# Patient Record
Sex: FEMALE | Race: BLACK OR AFRICAN AMERICAN | Employment: OTHER | URBAN - METROPOLITAN AREA
[De-identification: names, ages, dates, MRNs, and addresses within clinical notes are randomized per-mention and may not be internally consistent; named-entity substitution may affect disease eponyms.]

---

## 2015-12-09 LAB — PAP SMEAR, EXTERNAL: 0

## 2017-01-10 ENCOUNTER — OP HISTORICAL/CONVERTED ENCOUNTER (OUTPATIENT)
Dept: OTHER | Age: 52
End: 2017-01-10

## 2017-01-20 ENCOUNTER — OFFICE VISIT (OUTPATIENT)
Dept: ENDOCRINOLOGY | Age: 52
End: 2017-01-20

## 2017-01-20 VITALS
SYSTOLIC BLOOD PRESSURE: 95 MMHG | HEART RATE: 87 BPM | BODY MASS INDEX: 46.1 KG/M2 | WEIGHT: 270 LBS | HEIGHT: 64 IN | OXYGEN SATURATION: 98 % | DIASTOLIC BLOOD PRESSURE: 69 MMHG | TEMPERATURE: 96.6 F | RESPIRATION RATE: 20 BRPM

## 2017-01-20 DIAGNOSIS — Z79.4 UNCONTROLLED TYPE 2 DIABETES MELLITUS WITH HYPERGLYCEMIA, WITH LONG-TERM CURRENT USE OF INSULIN (HCC): Primary | ICD-10-CM

## 2017-01-20 DIAGNOSIS — I10 ESSENTIAL HYPERTENSION: ICD-10-CM

## 2017-01-20 DIAGNOSIS — E78.2 MIXED HYPERLIPIDEMIA: ICD-10-CM

## 2017-01-20 DIAGNOSIS — E11.42 DIABETIC POLYNEUROPATHY ASSOCIATED WITH TYPE 2 DIABETES MELLITUS (HCC): ICD-10-CM

## 2017-01-20 DIAGNOSIS — G62.0 NEUROPATHY DUE TO CHEMOTHERAPEUTIC DRUG (HCC): ICD-10-CM

## 2017-01-20 DIAGNOSIS — T45.1X5A NEUROPATHY DUE TO CHEMOTHERAPEUTIC DRUG (HCC): ICD-10-CM

## 2017-01-20 DIAGNOSIS — E11.65 UNCONTROLLED TYPE 2 DIABETES MELLITUS WITH HYPERGLYCEMIA, WITH LONG-TERM CURRENT USE OF INSULIN (HCC): Primary | ICD-10-CM

## 2017-01-20 DIAGNOSIS — G60.3 IDIOPATHIC PROGRESSIVE POLYNEUROPATHY: ICD-10-CM

## 2017-01-20 RX ORDER — SIMVASTATIN 20 MG/1
20 TABLET, FILM COATED ORAL
Qty: 30 TAB | Refills: 6 | Status: SHIPPED | OUTPATIENT
Start: 2017-01-20 | End: 2018-11-13 | Stop reason: ALTCHOICE

## 2017-01-20 NOTE — PROGRESS NOTES
Wt Readings from Last 3 Encounters:   01/20/17 270 lb (122.5 kg)   10/21/16 270 lb (122.5 kg)   07/21/16 267 lb (121.1 kg)     Temp Readings from Last 3 Encounters:   01/20/17 96.6 °F (35.9 °C) (Oral)   10/21/16 97.5 °F (36.4 °C) (Oral)   07/21/16 98 °F (36.7 °C) (Oral)     BP Readings from Last 3 Encounters:   01/20/17 95/69   10/21/16 90/64   07/21/16 103/74     Pulse Readings from Last 3 Encounters:   01/20/17 87   10/21/16 78   07/21/16 77     Lab Results   Component Value Date/Time    Hemoglobin A1c 6.8 01/11/2017 09:23 AM    Hemoglobin A1c, External 9.0 03/18/2016     No Podiatry  No Recent Eye exam

## 2017-01-20 NOTE — PROGRESS NOTES
HISTORY OF PRESENT ILLNESS  Luis Garza is a 46 y.o. female. HPI  Patient here for second f/u  After last visit of Type 2 diabetes mellitus  From july 2016  Gained 3 lb     She suffers from on and off cellulitis over the right arm and hand - from lymphedema after breast cancer surgery   She feels good over all     She describes neuropathy symptoms got worse   Only on cymbalta   She did nto like neurontin and she did not try metanx        Prior history :    Referred : by self/pcp    H/o diabetes for 30 years     Current A1C is 9 % and symptoms/problems include polyuria, polydipsia and visual disturbances     Current diabetic medications include intensive insulin injection program. And metformin   novolog by 10 units tid plus sliding scale and levemir  36 units hs       Current monitoring regimen: home blood tests - 2 times daily  Home blood sugar records: trend: fluctuating a lot  Any episodes of hypoglycemia? no    Weight trend: fluctuating a lot  Prior visit with dietician: no  Current diet: \"unhealthy\" diet in general  Current exercise: no regular exercise    Known diabetic complications: peripheral neuropathy  Cardiovascular risk factors: dyslipidemia, diabetes mellitus, obesity, hypertension, stress, post-menopausal    Eye exam current (within one year): yes  TRISTEN: no     Past Medical History   Diagnosis Date    Cancer [facility]) 2009     RIGHT breast    Diabetes ([address]     Hypercholesterolemia     Hypertension      Past Surgical History   Procedure Laterality Date    Hx breast lumpectomy  2009     RIGHT with SNBX    Hx appendectomy      Hx gyn      Hx urological      Hx heent       Current Outpatient Prescriptions   Medication Sig    ACCU-CHEK FASTCLIX misc USE TO TEST FOUR TIMES DAILY    SYNJARDY 12.5-1,000 mg per tablet TAKE 1 TABLET BY MOUTH TWICE DAILY WITH MEALS.  STOP METFORMIN    topiramate (TOPAMAX) 25 mg tablet TK 2 TS PO BID    TOUJEO SOLOSTAR 300 unit/mL (1.5 mL) inpn INJECT 50 UNITS SUBCUTANEOUSLY NIGHTLY. STOP LEVEMIR    albiglutide (TANZEUM) 30 mg/0.5 mL pnij 30 mg by SubCUTAneous route every seven (7) days.  DULoxetine (CYMBALTA) 60 mg capsule Take 1 Cap by mouth daily. Stop 30mg dose    carvedilol (COREG) 12.5 mg tablet Take 1 Tab by mouth daily.  simvastatin (ZOCOR) 20 mg tablet     triamcinolone acetonide (KENALOG) 0.1 % topical cream     NOVOFINE 30 30 gauge x 1/3\"     insulin aspart (NOVOLOG FLEXPEN) 100 unit/mL inpn Decrease to 7 units before breakfast, 7 units before lunch, and 8 units before dinner. Plus sliding scale. Max daily dose 64 units    glucose blood VI test strips (ACCU-CHEK SMARTVIEW TEST STRIP) strip Test 4 times daily. Dx code E11.65    losartan (COZAAR) 25 mg tablet Take  by mouth daily.  multivitamin (ONE A DAY) tablet Take 1 Tab by mouth daily.  ibuprofen (MOTRIN) 800 mg tablet Take  by mouth.  biotin 500 mcg Cap Take  by mouth.  levomefolate-B6-meB12-algal oil 3 mg-35 mg-2 mg -90.314 mg cap Take 1 Cap by mouth two (2) times a day.  dicloxacillin (DYNAPEN) 250 mg capsule TAKE ONE CAPSULE BY MOUTH FOUR TIMES DAILY    insulin glargine (TOUJEO SOLOSTAR) 300 unit/mL (1.5 mL) inpn 50 Units by SubCUTAneous route nightly. No current facility-administered medications for this visit. Review of Systems   Constitutional: Negative. HENT: Negative. Eyes: Negative for pain and redness. Respiratory: Negative. Cardiovascular: Negative for chest pain, palpitations and leg swelling. Gastrointestinal: Negative. Negative for constipation. Genitourinary: Negative. Musculoskeletal: Negative for myalgias. Skin:lump on left nasolabial fold   Neurological: Negative. Endo/Heme/Allergies: Negative. Psychiatric/Behavioral: Negative for depression and memory loss. The patient does not have insomnia. Physical Exam   Constitutional: She is oriented to person, place, and time. She appears well-developed and well-nourished. HENT:   Head: Normocephalic. Eyes: Conjunctivae and EOM are normal. Pupils are equal, round, and reactive to light. Neck: Normal range of motion. Neck supple. No JVD present. No tracheal deviation present. No thyromegaly present. Cardiovascular: Normal rate, regular rhythm and normal heart sounds. No murmur heard. Pulmonary/Chest: Breath sounds normal.   Abdominal: Soft. Bowel sounds are normal.   Musculoskeletal: Normal range of motion. Lymphadenopathy:     She has no cervical adenopathy. Neurological: She is alert and oriented to person, place, and time. She has pain, tingling and numbness on both feet   She has normal reflexes. Skin: Skin is warm. Psychiatric: She has a normal mood and affect. No disparity noted on LE       Lab Results   Component Value Date/Time    Hemoglobin A1c 6.8 01/11/2017 09:23 AM    Hemoglobin A1c 6.6 10/17/2016 11:07 AM    Hemoglobin A1c 7.0 07/14/2016 12:00 AM    Hemoglobin A1c, External 9.0 03/18/2016    Glucose 105 01/11/2017 09:23 AM    Glucose (POC) 172 02/06/2009 01:29 PM    Glucose  05/20/2016 04:58 PM    Microalb/Creat ratio (ug/mg creat.) 5.1 01/11/2017 09:23 AM    LDL, calculated 80 01/11/2017 09:23 AM    Creatinine (POC) 0.8 02/21/2012 06:03 PM    Creatinine 0.77 01/11/2017 09:23 AM      Lab Results   Component Value Date/Time    Cholesterol, total 197 01/11/2017 09:23 AM    HDL Cholesterol 44 01/11/2017 09:23 AM    LDL, calculated 80 01/11/2017 09:23 AM    Triglyceride 365 01/11/2017 09:23 AM       Lab Results   Component Value Date/Time    ALT 17 01/11/2017 09:23 AM    AST 16 01/11/2017 09:23 AM    Alk. phosphatase 58 01/11/2017 09:23 AM    Bilirubin, total <0.2 01/11/2017 09:23 AM            ASSESSMENT and PLAN  1.  Type 2 DM, poorly controlled : a1c is  6.8 %    From jan 2017   Compared to   6.6 %     From oct 2016  Compared to   7% from July 2016  Compared to    8.2 %    From may 2016 compared to  9 %    From march 2016       Reviewed the glucose log , excellent log   on synjardy   She could not tolerate januvia  In the past   Continuing on lantus and  Humalog  before breakfast, lunch and dinner. Add additional Humalog as recommended in increments . Patient is advised about checking blood sugars 4 times a day and maintaining log book. The danger of having low blood sugars has been explained with inappropriate use of insulin  Patient voiced understanding and using the printed instructions at home. Hypoglycemia management has been explained to the patient. lab results and schedule of future lab studies reviewed with patient      2. Hypoglycemia :  Educated on treating the hypoglycemia. Discussed insulin use and prescribed    3. HTN : continue coreg and cozaar   As BP is low, advised her to stop hctz. Also she is on synjardy   Patient is educated about importance of compliance with anti-hypertensives especially ARB/ACEI    4. Dyslipidemia : continue zocor,  And will try stopping tricor 50 as it is a recent addition and resume it   Later if needed as she achieved great control   Patient is educated about benefits and adverse effects of statins and explained how benefits outweigh risk. 5. use of aspirin to prevent MI and TIA's discussed      6. Diabetic complications :     A. Retinopathy-NO   educated on this complication,  regular f/u with ophthalmologist encouraged    B. Nephropathy - NO     educated on this disease and indicated stages and its prognosis.      C. 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

## 2017-01-20 NOTE — MR AVS SNAPSHOT
Visit Information Date & Time Provider Department Dept. Phone Encounter #  
 1/20/2017  2:45 PM Jose Martin Cervantes MD [facility] Diabetes & Endocrinology [phone] [id number] Follow-up Instructions Return in about 3 months (around 4/20/2017). Upcoming Health Maintenance Date Due Hepatitis C Screening 1965 FOOT EXAM Q1 4/20/1973 EYE EXAM RETINAL OR DILATED Q1 4/20/1973 Pneumococcal 19-64 Highest Risk (1 of 3 - PCV13) 4/20/1982 DTaP/Tdap/Td series (1 - Tdap) 4/20/1984 PAP AKA CERVICAL CYTOLOGY 4/20/1984 BREAST CANCER SCRN MAMMOGRAM 4/20/2013 FOBT Q 1 YEAR AGE 50-75 4/20/2013 INFLUENZA AGE 9 TO ADULT 8/1/2016 HEMOGLOBIN A1C Q6M 7/11/2017 MICROALBUMIN Q1 1/11/2018 LIPID PANEL Q1 1/11/2018 Allergies as of 1/20/2017  Review Complete On: 1/20/2017 By: Jose Martin Cervantes MD  
  
 Severity Noted Reaction Type Reactions Januvia [Sitagliptin]  04/08/2016    Rash Current Immunizations  Never Reviewed No immunizations on file. Not reviewed this visit You Were Diagnosed With   
  
 Codes Comments Uncontrolled type 2 diabetes mellitus with hyperglycemia, with long-term current use of insulin (HCC)    -  Primary ICD-10-CM: E11.65, Z79.4 ICD-9-CM: 250.02, V58.67 Mixed hyperlipidemia     ICD-10-CM: E78.2 ICD-9-CM: 272.2 Essential hypertension     ICD-10-CM: I10 
ICD-9-CM: 401.9 Idiopathic progressive polyneuropathy     ICD-10-CM: G60.3 ICD-9-CM: 356.4 Neuropathy due to chemotherapeutic drug ([facility] [address]     ICD-10-CM: G62.0, T45.1X5A 
ICD-9-CM: 357.6, E933.1 Diabetic polyneuropathy associated with type 2 diabetes mellitus ([facility] [address]     ICD-10-CM: E11.42 
ICD-9-CM: 250.60, 357.2 Vitals BP Pulse Temp Resp Height(growth percentile) Weight(growth percentile) 95/69 87 96.6 °F (35.9 °C) (Oral) 20 5' 4\" (1.626 m) 270 lb (122.5 kg) SpO2 BMI OB Status Smoking Status 98% 46.35 kg/m2 Postmenopausal Never Smoker BMI and BSA Data Body Mass Index Body Surface Area  
 46.35 kg/m 2 2.35 m 2 Preferred Pharmacy Pharmacy Name Phone [facility], [address] [place] [phone] Your Updated Medication List  
  
   
This list is accurate as of: 1/20/17  3:31 PM.  Always use your most recent med list.  
  
  
  
  
 Abelardo Long Generic drug:  Lancets USE TO TEST FOUR TIMES DAILY  
  
 albiglutide 30 mg/0.5 mL injector pen Commonly known as:  TANZEUM  
30 mg by SubCUTAneous route every seven (7) days. biotin 500 mcg Cap Take  by mouth. carvedilol 12.5 mg tablet Commonly known as:  Ernesto Gut Take 1 Tab by mouth daily. dicloxacillin 250 mg capsule Commonly known as:  DYNAPEN  
TAKE ONE CAPSULE BY MOUTH FOUR TIMES DAILY DULoxetine 60 mg capsule Commonly known as:  CYMBALTA Take 1 Cap by mouth daily. Stop 30mg dose  
  
 glucose blood VI test strips strip Commonly known as:  [address] Test 4 times daily. Dx code E11.65  
  
 ibuprofen 800 mg tablet Commonly known as:  MOTRIN Take  by mouth. insulin aspart 100 unit/mL Inpn Commonly known as:  Venancio Kelsea Decrease to 7 units before breakfast, 7 units before lunch, and 8 units before dinner. Plus sliding scale. Max daily dose 64 units * insulin glargine 300 unit/mL (1.5 mL) Inpn Commonly known as:  TOUJEO SOLOSTAR  
50 Units by SubCUTAneous route nightly. * TOUJEO SOLOSTAR 300 unit/mL (1.5 mL) Inpn Generic drug:  insulin glargine INJECT 50 UNITS SUBCUTANEOUSLY NIGHTLY. STOP LEVEMIR  
  
 losartan 25 mg tablet Commonly known as:  COZAAR Take  by mouth daily. multivitamin tablet Commonly known as:  ONE A DAY Take 1 Tab by mouth daily. NOVOFINE 30 30 gauge x 1/3\" Generic drug:  Insulin Needles (Disposable)  
  
 simvastatin 20 mg tablet Commonly known as:  ZOCOR  
  
 SYNJARDY 12.5-1,000 mg per tablet Generic drug:  empagliflozin-metFORMIN  
TAKE 1 TABLET BY MOUTH TWICE DAILY WITH MEALS. STOP METFORMIN  
  
 topiramate 25 mg tablet Commonly known as:  TOPAMAX TK 2 TS PO BID  
  
 triamcinolone acetonide 0.1 % topical cream  
Commonly known as:  KENALOG * Notice: This list has 2 medication(s) that are the same as other medications prescribed for you. Read the directions carefully, and ask your doctor or other care provider to review them with you. Follow-up Instructions Return in about 3 months (around 4/20/2017). To-Do List   
 01/20/2017 Neurology:  EMG TWO EXTREMITIES LOWER Introducing [facility] SERVICES! Dear [facility]: Thank you for requesting a [employer] account. Our records indicate that you already have an active [employer] account. You can access your account anytime at [email or web address] Did you know that you can access your hospital and ER discharge instructions at any time in [employer]? You can also review all of your test results from your hospital stay or ER visit. Additional Information If you have questions, please visit the Frequently Asked Questions section of the [employer] website at [email or web address] Remember, [employer] is NOT to be used for urgent needs. For medical emergencies, dial 911. Now available from your iPhone and Android! Please provide this summary of care documentation to your next provider. Your primary care clinician is listed as Crissy Evans. If you have any questions after today's visit, please call [phone].

## 2017-02-02 DIAGNOSIS — G62.9 NEUROPATHY: ICD-10-CM

## 2017-02-02 DIAGNOSIS — E11.43 CONTROLLED TYPE 2 DIABETES MELLITUS WITH DIABETIC AUTONOMIC NEUROPATHY, WITH LONG-TERM CURRENT USE OF INSULIN (HCC): ICD-10-CM

## 2017-02-02 DIAGNOSIS — Z79.4 CONTROLLED TYPE 2 DIABETES MELLITUS WITH DIABETIC AUTONOMIC NEUROPATHY, WITH LONG-TERM CURRENT USE OF INSULIN (HCC): ICD-10-CM

## 2017-02-04 RX ORDER — TOPIRAMATE 25 MG/1
TABLET ORAL
Qty: 120 TAB | Refills: 0 | Status: SHIPPED | OUTPATIENT
Start: 2017-02-04 | End: 2017-07-24 | Stop reason: ALTCHOICE

## 2017-02-24 ENCOUNTER — TELEPHONE (OUTPATIENT)
Dept: ENDOCRINOLOGY | Age: 52
End: 2017-02-24

## 2017-02-24 NOTE — TELEPHONE ENCOUNTER
Patient called about needing a letter for getting out of work. She said her employer is now not even allowing her time off for office visits. Please call patient.

## 2017-02-27 NOTE — TELEPHONE ENCOUNTER
Pt is wanting a letter to be out of work from 2.24.17 until 4.27.17 due to not being able to walk. She also states she sent info through my chart email. Please advise on specifics of the letter.  She states she discussed with you at visit in January

## 2017-02-27 NOTE — TELEPHONE ENCOUNTER
Please let her know that I am keeping abreast of the events shaping up but she needs to see physical rehab doc as to get that insight of being unable to walk or lift items from neuropathy           Dr. Belkys Cooley

## 2017-02-28 NOTE — TELEPHONE ENCOUNTER
She states she can not get the letter to be out of work, from rehab dr Jeffrey Pendleton, because she has never seen that doctor

## 2017-03-16 ENCOUNTER — ED HISTORICAL/CONVERTED ENCOUNTER (OUTPATIENT)
Dept: OTHER | Age: 52
End: 2017-03-16

## 2017-03-20 RX ORDER — LANCETS
EACH MISCELLANEOUS
Qty: 200 PACKAGE | Refills: 6 | Status: SHIPPED | OUTPATIENT
Start: 2017-03-20 | End: 2022-03-31 | Stop reason: ALTCHOICE

## 2017-03-20 NOTE — TELEPHONE ENCOUNTER
From: Carmen Garza  To: Amari Victor MD  Sent: 3/18/2017 10:11 PM EDT  Subject: Medication Renewal Request    Original authorizing provider: Amari Victor MD    Luis Greg would like a refill of the following medications:  ACCU-CHEK FASTCLIX misc Amari Victor MD]    [facility] [address]    Comment:  Kit Cronin, The pharmacy is filling the incorrect prescription. I do not use multi-clix, I use fast-clix. The pen used to dispense the needles are totally different, not compatible.

## 2017-04-09 RX ORDER — BLOOD SUGAR DIAGNOSTIC
STRIP MISCELLANEOUS
Qty: 200 STRIP | Refills: 0 | Status: SHIPPED | OUTPATIENT
Start: 2017-04-09 | End: 2017-05-25 | Stop reason: SDUPTHER

## 2017-04-12 RX ORDER — INSULIN GLARGINE 300 U/ML
INJECTION, SOLUTION SUBCUTANEOUS
Qty: 4.5 ML | Refills: 6 | Status: SHIPPED | OUTPATIENT
Start: 2017-04-12 | End: 2017-11-17 | Stop reason: SDUPTHER

## 2017-04-27 ENCOUNTER — OFFICE VISIT (OUTPATIENT)
Dept: ENDOCRINOLOGY | Age: 52
End: 2017-04-27

## 2017-04-27 VITALS
WEIGHT: 266 LBS | BODY MASS INDEX: 45.41 KG/M2 | OXYGEN SATURATION: 98 % | RESPIRATION RATE: 20 BRPM | SYSTOLIC BLOOD PRESSURE: 102 MMHG | DIASTOLIC BLOOD PRESSURE: 70 MMHG | HEART RATE: 72 BPM | HEIGHT: 64 IN | TEMPERATURE: 96.6 F

## 2017-04-27 DIAGNOSIS — G62.0 NEUROPATHY DUE TO CHEMOTHERAPEUTIC DRUG (HCC): ICD-10-CM

## 2017-04-27 DIAGNOSIS — Z79.4 UNCONTROLLED TYPE 2 DIABETES MELLITUS WITH HYPERGLYCEMIA, WITH LONG-TERM CURRENT USE OF INSULIN (HCC): Primary | ICD-10-CM

## 2017-04-27 DIAGNOSIS — I10 ESSENTIAL HYPERTENSION: ICD-10-CM

## 2017-04-27 DIAGNOSIS — E11.42 DIABETIC POLYNEUROPATHY ASSOCIATED WITH TYPE 2 DIABETES MELLITUS (HCC): ICD-10-CM

## 2017-04-27 DIAGNOSIS — T45.1X5A NEUROPATHY DUE TO CHEMOTHERAPEUTIC DRUG (HCC): ICD-10-CM

## 2017-04-27 DIAGNOSIS — E78.2 MIXED HYPERLIPIDEMIA: ICD-10-CM

## 2017-04-27 DIAGNOSIS — E11.65 UNCONTROLLED TYPE 2 DIABETES MELLITUS WITH HYPERGLYCEMIA, WITH LONG-TERM CURRENT USE OF INSULIN (HCC): Primary | ICD-10-CM

## 2017-04-27 RX ORDER — TRAMADOL HYDROCHLORIDE 50 MG/1
TABLET ORAL AS NEEDED
Refills: 0 | COMMUNITY
Start: 2017-03-08 | End: 2017-07-24 | Stop reason: ALTCHOICE

## 2017-04-27 RX ORDER — SUMATRIPTAN 100 MG/1
TABLET, FILM COATED ORAL AS NEEDED
Refills: 0 | COMMUNITY
Start: 2017-04-14 | End: 2017-11-17 | Stop reason: ALTCHOICE

## 2017-04-27 RX ORDER — ALCOHOL 2.38 KG/3.79L
1 GEL TOPICAL 2 TIMES DAILY
Qty: 60 CAP | Refills: 6 | Status: SHIPPED | OUTPATIENT
Start: 2017-04-27 | End: 2017-07-24 | Stop reason: SDUPTHER

## 2017-04-27 RX ORDER — GABAPENTIN 800 MG/1
1 TABLET ORAL 3 TIMES DAILY
Refills: 0 | Status: ON HOLD | COMMUNITY
Start: 2017-03-24

## 2017-04-27 NOTE — LETTER
NOTIFICATION RETURN TO WORK / SCHOOL 
 
4/27/2017 1:54 PM 
 
Ms. Luis Garza [address] [facility] [phone] To Whom It May Concern: 
 
Luis Garza is currently under the care of [address] [address] [address]. She will need to be out of work starting today 4/27/2017. She will return to work/school on: 7/27/2017 If there are questions or concerns please have the patient contact our office. Sincerely, Angelita Kulkarni MD

## 2017-04-27 NOTE — PROGRESS NOTES
Wt Readings from Last 3 Encounters:   04/27/17 266 lb (120.7 kg)   01/20/17 270 lb (122.5 kg)   10/21/16 270 lb (122.5 kg)     Temp Readings from Last 3 Encounters:   04/27/17 96.6 °F (35.9 °C) (Oral)   01/20/17 96.6 °F (35.9 °C) (Oral)   10/21/16 97.5 °F (36.4 °C) (Oral)     BP Readings from Last 3 Encounters:   04/27/17 102/70   01/20/17 95/69   10/21/16 90/64     Pulse Readings from Last 3 Encounters:   04/27/17 72   01/20/17 87   10/21/16 78     Lab Results   Component Value Date/Time    Hemoglobin A1c 6.6 04/20/2017 08:38 AM    Hemoglobin A1c, External 9.0 03/18/2016     No Podiatry  Last Eye exam unknown

## 2017-04-27 NOTE — PROGRESS NOTES
HISTORY OF PRESENT ILLNESS  Luis Garza is a 45 y.o. female. HPI  Patient here for  f/u  After last visit of Type 2 diabetes mellitus  From Jan 2017  Lost 4  lb     She has had several issued  From loss of sensations on LE  She had a fall     She reports losing balance several occasions   She has been off from work, per her PCP since Feb last week       She has seen Dr. Chuckie Jay per my request         Prior history :    Referred : by self/pcp    H/o diabetes for 30 years     Current A1C is 9 % and symptoms/problems include polyuria, polydipsia and visual disturbances     Current diabetic medications include intensive insulin injection program. And metformin   novolog by 10 units tid plus sliding scale and levemir  36 units hs       Current monitoring regimen: home blood tests - 2 times daily  Home blood sugar records: trend: fluctuating a lot  Any episodes of hypoglycemia? no    Weight trend: fluctuating a lot  Prior visit with dietician: no  Current diet: \"unhealthy\" diet in general  Current exercise: no regular exercise    Known diabetic complications: peripheral neuropathy  Cardiovascular risk factors: dyslipidemia, diabetes mellitus, obesity, hypertension, stress, post-menopausal    Eye exam current (within one year): yes  TRISTEN: no     Past Medical History:   Diagnosis Date    Cancer ([facility] [address] 2009    RIGHT breast    Diabetes ([facility] [address]     Hypercholesterolemia     Hypertension      Past Surgical History:   Procedure Laterality Date    HX APPENDECTOMY      HX BREAST LUMPECTOMY  2009    RIGHT with SNBX    HX GYN      HX HEENT      HX UROLOGICAL       Current Outpatient Prescriptions   Medication Sig    gabapentin (NEURONTIN) 800 mg tablet Take 1 Tab by mouth three (3) times daily.  SUMAtriptan (IMITREX) 100 mg tablet as needed.  traMADol (ULTRAM) 50 mg tablet as needed.  TOUJEO SOLOSTAR 300 unit/mL (1.5 mL) inpn INJECT 50 UNITS SUBCUTANEOUSLY EVERY NIGHT.  STOP USING LEVEMIR    ACCU-CHEK SMARTVIEW TEST STRIP strip USE TO TEST FOUR TIMES DAILY    Lancets (ACCU-CHEK FASTCLIX) misc Test 4 times daily. Dx code E11.65    DULoxetine (CYMBALTA) 60 mg capsule TAKE 1 CAPSULE BY MOUTH DAILY. STOP 30 MG DOSE    TANZEUM 30 mg/0.5 mL injector pen INJECT 30 MG SUBCUTANEOUSLY EVERY 7 DAYS    topiramate (TOPAMAX) 25 mg tablet TK 2 TS PO BID    simvastatin (ZOCOR) 20 mg tablet Take 1 Tab by mouth nightly.  SYNJARDY 12.5-1,000 mg per tablet TAKE 1 TABLET BY MOUTH TWICE DAILY WITH MEALS. STOP METFORMIN    carvedilol (COREG) 12.5 mg tablet Take 1 Tab by mouth daily.  triamcinolone acetonide (KENALOG) 0.1 % topical cream     NOVOFINE 30 30 gauge x 1/3\"     insulin aspart (NOVOLOG FLEXPEN) 100 unit/mL inpn Decrease to 7 units before breakfast, 7 units before lunch, and 8 units before dinner. Plus sliding scale. Max daily dose 64 units    losartan (COZAAR) 25 mg tablet Take  by mouth daily.  multivitamin (ONE A DAY) tablet Take 1 Tab by mouth daily.  biotin 500 mcg Cap Take  by mouth. No current facility-administered medications for this visit. Review of Systems   Constitutional: Negative. HENT: Negative. Eyes: Negative for pain and redness. Respiratory: Negative. Cardiovascular: Negative for chest pain, palpitations and leg swelling. Gastrointestinal: Negative. Negative for constipation. Genitourinary: Negative. Musculoskeletal: Negative for myalgias. Skin:lump on left nasolabial fold   Neurological: Negative. Endo/Heme/Allergies: Negative. Psychiatric/Behavioral: Negative for depression and memory loss. The patient does not have insomnia. Physical Exam   Constitutional: She is oriented to person, place, and time. She appears well-developed and well-nourished. HENT:   Head: Normocephalic. Eyes: Conjunctivae and EOM are normal. Pupils are equal, round, and reactive to light. Neck: Normal range of motion. Neck supple. No JVD present.  No tracheal deviation present. No thyromegaly present. Cardiovascular: Normal rate, regular rhythm and normal heart sounds. No murmur heard. Pulmonary/Chest: Breath sounds normal.   Abdominal: Soft. Bowel sounds are normal.   Musculoskeletal: Normal range of motion. Lymphadenopathy:     She has no cervical adenopathy. Neurological: She is alert and oriented to person, place, and time. She has pain, tingling and numbness on both feet   She has normal reflexes. Skin: Skin is warm. Psychiatric: She has a normal mood and affect. No disparity noted on LE       Lab Results   Component Value Date/Time    Hemoglobin A1c 6.6 04/20/2017 08:38 AM    Hemoglobin A1c 6.8 01/11/2017 09:23 AM    Hemoglobin A1c 6.6 10/17/2016 11:07 AM    Hemoglobin A1c, External 9.0 03/18/2016    Glucose 112 04/20/2017 08:38 AM    Glucose (POC) 172 02/06/2009 01:29 PM    Glucose  05/20/2016 04:58 PM    Microalb/Creat ratio (ug/mg creat.) 60.5 04/20/2017 08:38 AM    LDL, calculated 64 04/20/2017 08:38 AM    Creatinine (POC) 0.8 02/21/2012 06:03 PM    Creatinine 0.81 04/20/2017 08:38 AM      Lab Results   Component Value Date/Time    Cholesterol, total 159 04/20/2017 08:38 AM    HDL Cholesterol 48 04/20/2017 08:38 AM    LDL, calculated 64 04/20/2017 08:38 AM    Triglyceride 233 04/20/2017 08:38 AM       Lab Results   Component Value Date/Time    ALT (SGPT) 12 04/20/2017 08:38 AM    AST (SGOT) 15 04/20/2017 08:38 AM    Alk. phosphatase 51 04/20/2017 08:38 AM    Bilirubin, total <0.2 04/20/2017 08:38 AM            ASSESSMENT and PLAN  1.  Type 2 DM, un controlled : a1c is   6.6 %       From     April 2017     Compared to   6.8 %    From jan 2017   Compared to   6.6 %     From oct 2016  Compared to   7% from July 2016  Compared to    8.2 %    From may 2016 compared to  9 %    From march 2016       Could not review the  glucose log ,  She has been maintaining the  Glycemic control well    on synjardy   She could not tolerate januvia  In the past Continuing on lantus and  Humalog  before breakfast, lunch and dinner. Add additional Humalog as recommended in increments . Patient is advised about checking blood sugars 4 times a day and maintaining log book. The danger of having low blood sugars has been explained with inappropriate use of insulin  Patient voiced understanding and using the printed instructions at home. Hypoglycemia management has been explained to the patient. lab results and schedule of future lab studies reviewed with patient      2. Hypoglycemia :  Educated on treating the hypoglycemia. Discussed insulin use and prescribed    3. HTN : continue coreg and cozaar   As BP is low, advised her to stop hctz. Also she is on synjardy   Patient is educated about importance of compliance with anti-hypertensives especially ARB/ACEI    4. Dyslipidemia : continue zocor,  And will try stopping tricor 50 as it is a recent addition and resume it   Later if needed as she achieved great control   Patient is educated about benefits and adverse effects of statins and explained how benefits outweigh risk. 5. use of aspirin to prevent MI and TIA's discussed      6. Diabetic complications :     A. Retinopathy-NO   educated on this complication,  regular f/u with ophthalmologist encouraged    B. Nephropathy - NO     educated on this disease and indicated stages and its prognosis.      C. Peripheral Neuropathy - YES  severe  On cymbalta increased to 60 mg hs    She felt metanx would not work for her   she has maintained the glycemic control very well ( may have had bad control prior to being under my care )  She has no other microvascular complications from DM    She has back issues  And she is restricted from several activities by her spine doc , [facility]   She has chemotherapy induced neuropathy also   Reviewed  EMG   She has severe neuropathy , lost proprioception and has balance issues ,  Had a fall     She has numbness on left leg - no better  She is being continued to stay on short term disability  From - April 27 2017       E : CAD : YES  Cardiomyopathy , after chemo- adriamycin      Right breast cancer - treated and  Has lymphedema on right UE   She has lymph pump put in  In 2009 she says   She reports cellulitis on and off     D/w Dr. Chris Landers , he agrees to her developing chemo induced neuropathy     D/ w Dr. Dudley Aguirre who concurs that she has severe neuropathy leading loss of balance        F/u in 3 months

## 2017-04-27 NOTE — PATIENT INSTRUCTIONS
metanx 1 cap bid       synjardy 12.5 /1000 mg twice a day ( drink plenty of water )       Tanzeum 30 mg once  A week       Check blood sugars immediately before each meal and at bedtime      Take  Toujeo  insulin  50 units  at bed time     novolog insulin 7 units before breakfast, 7 units before lunch and 8 units before dinner.     Also, add additional novolog  as follows with meals  If blood sugars are[de-identified]    150-200 mg 2 units    201-250 mg 4 units    251-300 mg 6 units    301-350 mg 8 units    351-400 mg 10 units    401-450 mg 12 units    451-500 mg 14 units     Less than 70 mg NO INSULIN          Do not skip meals  Do not eat in between meals    Reduce carbs- pasta, rice, potatoes, bread   Do not drink juices or sodas  Donot eat peanut butter     Do not eat sugar free cookies and cakes   Do not eat peaches, grapes, pineapples, oranges

## 2017-04-27 NOTE — MR AVS SNAPSHOT
Visit Information Date & Time Provider Department Dept. Phone Encounter #  
 4/27/2017  1:15 PM Jarrett Hamman, MD [facility] [phone] [id number] Follow-up Instructions Return in about 3 months (around 7/27/2017). Upcoming Health Maintenance Date Due Hepatitis C Screening 1965 FOOT EXAM Q1 4/20/1973 EYE EXAM RETINAL OR DILATED Q1 4/20/1973 Pneumococcal 19-64 Highest Risk (1 of 3 - PCV13) 4/20/1982 DTaP/Tdap/Td series (1 - Tdap) 4/20/1984 PAP AKA CERVICAL CYTOLOGY 4/20/1984 BREAST CANCER SCRN MAMMOGRAM 4/20/2013 FOBT Q 1 YEAR AGE 50-75 4/20/2013 INFLUENZA AGE 9 TO ADULT 8/1/2016 HEMOGLOBIN A1C Q6M 10/20/2017 MICROALBUMIN Q1 4/20/2018 LIPID PANEL Q1 4/20/2018 Allergies as of 4/27/2017  Review Complete On: 4/27/2017 By: Jarrett Hamman, MD  
  
 Severity Noted Reaction Type Reactions Januvia [Sitagliptin]  04/08/2016    Rash Current Immunizations  Never Reviewed No immunizations on file. Not reviewed this visit You Were Diagnosed With   
  
 Codes Comments Uncontrolled type 2 diabetes mellitus with hyperglycemia, with long-term current use of insulin (HCC)    -  Primary ICD-10-CM: E11.65, Z79.4 ICD-9-CM: 250.02, V58.67 Mixed hyperlipidemia     ICD-10-CM: E78.2 ICD-9-CM: 272.2 Essential hypertension     ICD-10-CM: I10 
ICD-9-CM: 401.9 Neuropathy due to chemotherapeutic drug ([facility] [address]     ICD-10-CM: G62.0, T45.1X5A 
ICD-9-CM: 357.6, E933.1 Idiopathic progressive polyneuropathy     ICD-10-CM: G60.3 ICD-9-CM: 440. 4 Vitals BP Pulse Temp Resp Height(growth percentile) Weight(growth percentile) 102/70 72 96.6 °F (35.9 °C) (Oral) 20 5' 4\" (1.626 m) 266 lb (120.7 kg) SpO2 BMI OB Status Smoking Status 98% 45.66 kg/m2 Postmenopausal Never Smoker BMI and BSA Data Body Mass Index Body Surface Area  
 45.66 kg/m 2 2.33 m 2 Preferred Pharmacy Pharmacy Name Phone 99 [facility], [address] [place] [address] Quin Valdovinos [phone] Your Updated Medication List  
  
   
This list is accurate as of: 4/27/17  1:49 PM.  Always use your most recent med list.  
  
  
  
  
 ACCU-CHEK SMARTVIEW TEST STRIP strip Generic drug:  glucose blood VI test strips USE TO TEST FOUR TIMES DAILY  
  
 biotin 500 mcg Cap Take  by mouth. carvedilol 12.5 mg tablet Commonly known as:  Cleda Skip Take 1 Tab by mouth daily. DULoxetine 60 mg capsule Commonly known as:  CYMBALTA TAKE 1 CAPSULE BY MOUTH DAILY. STOP 30 MG DOSE  
  
 gabapentin 800 mg tablet Commonly known as:  NEURONTIN Take 1 Tab by mouth three (3) times daily. insulin aspart 100 unit/mL Inpn Commonly known as:  [place] Escalera Decrease to 7 units before breakfast, 7 units before lunch, and 8 units before dinner. Plus sliding scale. Max daily dose 64 units Lancets Misc Commonly known as:  ACCU-CHEK FASTCLIX Test 4 times daily. Dx code E11.65  
  
 losartan 25 mg tablet Commonly known as:  COZAAR Take  by mouth daily. multivitamin tablet Commonly known as:  ONE A DAY Take 1 Tab by mouth daily. NOVOFINE 30 30 gauge x 1/3\" Generic drug:  Insulin Needles (Disposable)  
  
 simvastatin 20 mg tablet Commonly known as:  ZOCOR Take 1 Tab by mouth nightly. SUMAtriptan 100 mg tablet Commonly known as:  IMITREX  
as needed. SYNJARDY 12.5-1,000 mg per tablet Generic drug:  empagliflozin-metFORMIN  
TAKE 1 TABLET BY MOUTH TWICE DAILY WITH MEALS. STOP METFORMIN  
  
 TANZEUM 30 mg/0.5 mL injector pen Generic drug:  albiglutide INJECT 30 MG SUBCUTANEOUSLY EVERY 7 DAYS  
  
 topiramate 25 mg tablet Commonly known as:  TOPAMAX TK 2 TS PO BID  
  
 TOUJEO SOLOSTAR 300 unit/mL (1.5 mL) Inpn Generic drug:  insulin glargine INJECT 50 UNITS SUBCUTANEOUSLY EVERY NIGHT.  STOP USING LEVEMIR  
 traMADol 50 mg tablet Commonly known as:  ULTRAM  
as needed. triamcinolone acetonide 0.1 % topical cream  
Commonly known as:  KENALOG Follow-up Instructions Return in about 3 months (around 7/27/2017). Patient Instructions   
 
metanx 1 cap bid  
 
 
synjardy 12.5 /1000 mg twice a day ( drink plenty of water ) Tanzeum 30 mg once  A week Check blood sugars immediately before each meal and at bedtime Take  Toujeo  insulin  50 units  at bed time 
 
 novolog insulin 7 units before breakfast, 7 units before lunch and 8 units before dinner. Also, add additional novolog  as follows with meals  If blood sugars are[de-identified] 
 
150-200 mg 2 units 201-250 mg 4 units 251-300 mg 6 units 301-350 mg 8 units 351-400 mg 10 units 401-450 mg 12 units 451-500 mg 14 units Less than 70 mg NO INSULIN 
 
 
 
 
Do not skip meals Do not eat in between meals Reduce carbs- pasta, rice, potatoes, bread Do not drink juices or sodas Donot eat peanut butter Do not eat sugar free cookies and cakes Do not eat peaches, grapes, pineapples, oranges Introducing [facility]! Dear Valarie Bassett: Thank you for requesting a [employer] account. Our records indicate that you already have an active [employer] account. You can access your account anytime at [email or web address] Did you know that you can access your hospital and ER discharge instructions at any time in [employer]? You can also review all of your test results from your hospital stay or ER visit. Additional Information If you have questions, please visit the Frequently Asked Questions section of the [employer] website at [email or web address] Remember, [employer] is NOT to be used for urgent needs. For medical emergencies, dial 911. Now available from your iPhone and Android! Please provide this summary of care documentation to your next provider. Your primary care clinician is listed as [address] If you have any questions after today's visit, please call [phone].

## 2017-05-22 ENCOUNTER — OP HISTORICAL/CONVERTED ENCOUNTER (OUTPATIENT)
Dept: OTHER | Age: 52
End: 2017-05-22

## 2017-07-13 ENCOUNTER — TELEPHONE (OUTPATIENT)
Dept: ENDOCRINOLOGY | Age: 52
End: 2017-07-13

## 2017-07-13 DIAGNOSIS — Z79.4 TYPE 2 DIABETES MELLITUS WITH HYPERGLYCEMIA, WITH LONG-TERM CURRENT USE OF INSULIN (HCC): Primary | ICD-10-CM

## 2017-07-13 DIAGNOSIS — E11.65 TYPE 2 DIABETES MELLITUS WITH HYPERGLYCEMIA, WITH LONG-TERM CURRENT USE OF INSULIN (HCC): Primary | ICD-10-CM

## 2017-07-24 ENCOUNTER — OFFICE VISIT (OUTPATIENT)
Dept: ENDOCRINOLOGY | Age: 52
End: 2017-07-24

## 2017-07-24 VITALS
HEIGHT: 64 IN | SYSTOLIC BLOOD PRESSURE: 105 MMHG | DIASTOLIC BLOOD PRESSURE: 81 MMHG | TEMPERATURE: 98.4 F | HEART RATE: 83 BPM | WEIGHT: 270 LBS | RESPIRATION RATE: 14 BRPM | BODY MASS INDEX: 46.1 KG/M2

## 2017-07-24 DIAGNOSIS — G60.3 IDIOPATHIC PROGRESSIVE POLYNEUROPATHY: ICD-10-CM

## 2017-07-24 DIAGNOSIS — G62.0 NEUROPATHY DUE TO CHEMOTHERAPEUTIC DRUG (HCC): ICD-10-CM

## 2017-07-24 DIAGNOSIS — E11.65 UNCONTROLLED TYPE 2 DIABETES MELLITUS WITH HYPERGLYCEMIA, WITH LONG-TERM CURRENT USE OF INSULIN (HCC): Primary | ICD-10-CM

## 2017-07-24 DIAGNOSIS — Z79.4 UNCONTROLLED TYPE 2 DIABETES MELLITUS WITH HYPERGLYCEMIA, WITH LONG-TERM CURRENT USE OF INSULIN (HCC): Primary | ICD-10-CM

## 2017-07-24 DIAGNOSIS — T45.1X5A NEUROPATHY DUE TO CHEMOTHERAPEUTIC DRUG (HCC): ICD-10-CM

## 2017-07-24 DIAGNOSIS — I10 ESSENTIAL HYPERTENSION: ICD-10-CM

## 2017-07-24 DIAGNOSIS — E78.2 MIXED HYPERLIPIDEMIA: ICD-10-CM

## 2017-07-24 RX ORDER — TAPENTADOL HYDROCHLORIDE 50 MG/1
TABLET, FILM COATED ORAL
Refills: 0 | COMMUNITY
Start: 2017-07-14 | End: 2019-05-16 | Stop reason: ALTCHOICE

## 2017-07-24 RX ORDER — ALCOHOL 2.38 KG/3.79L
1 GEL TOPICAL 2 TIMES DAILY
Qty: 180 CAP | Refills: 4 | Status: SHIPPED | OUTPATIENT
Start: 2017-07-24 | End: 2017-11-17 | Stop reason: ALTCHOICE

## 2017-07-24 NOTE — MR AVS SNAPSHOT
Visit Information Date & Time Provider Department Dept. Phone Encounter #  
 7/24/2017 10:15 AM Edmond Lutz MD [facility] [phone] [id number] Follow-up Instructions Return in about 4 months (around 11/24/2017). Upcoming Health Maintenance Date Due Hepatitis C Screening 1965 FOOT EXAM Q1 4/20/1973 EYE EXAM RETINAL OR DILATED Q1 4/20/1973 Pneumococcal 19-64 Highest Risk (1 of 3 - PCV13) 4/20/1982 DTaP/Tdap/Td series (1 - Tdap) 4/20/1984 PAP AKA CERVICAL CYTOLOGY 4/20/1984 BREAST CANCER SCRN MAMMOGRAM 4/20/2013 FOBT Q 1 YEAR AGE 50-75 4/20/2013 INFLUENZA AGE 9 TO ADULT 8/1/2017 HEMOGLOBIN A1C Q6M 1/18/2018 MICROALBUMIN Q1 7/18/2018 LIPID PANEL Q1 7/18/2018 Allergies as of 7/24/2017  Review Complete On: 7/24/2017 By: Edmond Lutz MD  
  
 Severity Noted Reaction Type Reactions Januvia [Sitagliptin]  04/08/2016    Rash Current Immunizations  Never Reviewed No immunizations on file. Not reviewed this visit You Were Diagnosed With   
  
 Codes Comments Uncontrolled type 2 diabetes mellitus with hyperglycemia, with long-term current use of insulin (HCC)    -  Primary ICD-10-CM: E11.65, Z79.4 ICD-9-CM: 250.02, V58.67 Neuropathy due to chemotherapeutic drug ([facility] [address]     ICD-10-CM: G62.0, T45.1X5A 
ICD-9-CM: 357.6, E933.1 Idiopathic progressive polyneuropathy     ICD-10-CM: G60.3 ICD-9-CM: 356.4 Mixed hyperlipidemia     ICD-10-CM: E78.2 ICD-9-CM: 272.2 Essential hypertension     ICD-10-CM: I10 
ICD-9-CM: 401.9 Vitals BP Pulse Temp Resp Height(growth percentile) Weight(growth percentile) 105/81 (BP 1 Location: Left arm, BP Patient Position: Sitting) 83 98.4 °F (36.9 °C) (Oral) 14 5' 4\" (1.626 m) 270 lb (122.5 kg) BMI OB Status Smoking Status 46.35 kg/m2 Postmenopausal Never Smoker BMI and BSA Data Body Mass Index Body Surface Area 46.35 kg/m 2 2.35 m 2 Preferred Pharmacy Pharmacy Name Phone 99 [facility], [address] Quin Valdovinos [phone] Your Updated Medication List  
  
   
This list is accurate as of: 7/24/17 11:15 AM.  Always use your most recent med list.  
  
  
  
  
 ACCU-CHEK SMARTVIEW TEST STRIP strip Generic drug:  glucose blood VI test strips USE TO TEST FOUR TIMES DAILY  
  
 biotin 500 mcg Cap Take  by mouth. carvedilol 12.5 mg tablet Commonly known as:  Edy Heal Take 1 Tab by mouth daily. DULoxetine 60 mg capsule Commonly known as:  CYMBALTA TAKE 1 CAPSULE BY MOUTH DAILY. STOP 30 MG DOSE  
  
 gabapentin 800 mg tablet Commonly known as:  NEURONTIN Take 1 Tab by mouth three (3) times daily. insulin aspart 100 unit/mL Inpn Commonly known as:  Priya Broaden Decrease to 7 units before breakfast, 7 units before lunch, and 8 units before dinner. Plus sliding scale. Max daily dose 64 units Lancets Misc Commonly known as:  ACCU-CHEK FASTCLIX Test 4 times daily. Dx code E11.65  
  
 levomefolate-B6-meB12-algal oil 3 mg-35 mg-2 mg -90.314 mg Cap Commonly known as:  METANX (ALGAL OIL) Take 1 Cap by mouth two (2) times a day. losartan 25 mg tablet Commonly known as:  COZAAR Take  by mouth daily. multivitamin tablet Commonly known as:  ONE A DAY Take 1 Tab by mouth daily. NOVOFINE 30 30 gauge x 1/3\" Generic drug:  Insulin Needles (Disposable) NUCYNTA 50 mg tablet Generic drug:  tapentadol TK 1 T PO TID PRN  
  
 simvastatin 20 mg tablet Commonly known as:  ZOCOR Take 1 Tab by mouth nightly. SUMAtriptan 100 mg tablet Commonly known as:  IMITREX  
as needed. SYNJARDY 12.5-1,000 mg per tablet Generic drug:  empagliflozin-metFORMIN  
TAKE 1 TABLET BY MOUTH TWICE DAILY WITH MEALS. STOP METFORMIN  
  
 TANZEUM 30 mg/0.5 mL injector pen Generic drug:  albiglutide INJECT 30 MG SUBCUTANEOUSLY EVERY 7 DAYS TOUJEO SOLOSTAR 300 unit/mL (1.5 mL) Inpn Generic drug:  insulin glargine INJECT 50 UNITS SUBCUTANEOUSLY EVERY NIGHT. STOP USING LEVEMIR  
  
 triamcinolone acetonide 0.1 % topical cream  
Commonly known as:  KENALOG Follow-up Instructions Return in about 4 months (around 11/24/2017). Patient Instructions   
 
metanx 1 cap bid ( send to brand direct ) 
 
 
synjardy 12.5 /1000 mg twice a day ( drink plenty of water ) Tanzeum 30 mg once  A week Check blood sugars immediately before each meal and at bedtime Take  Toujeo  insulin  50 units  at bed time 
 
 novolog insulin 7 units before breakfast, 7 units before lunch and 8 units before dinner. Also, add additional novolog  as follows with meals  If blood sugars are[de-identified] 
 
150-200 mg 2 units 201-250 mg 4 units 251-300 mg 6 units 301-350 mg 8 units 351-400 mg 10 units 401-450 mg 12 units 451-500 mg 14 units Less than 70 mg NO INSULIN 
 
 
 
 
Do not skip meals Do not eat in between meals Reduce carbs- pasta, rice, potatoes, bread Do not drink juices or sodas Donot eat peanut butter Do not eat sugar free cookies and cakes Do not eat peaches, grapes, pineapples, oranges Introducing [facility]! Dear [place]: Thank you for requesting a [employer] account. Our records indicate that you already have an active [employer] account. You can access your account anytime at [email or web address] Did you know that you can access your hospital and ER discharge instructions at any time in [employer]? You can also review all of your test results from your hospital stay or ER visit. Additional Information If you have questions, please visit the Frequently Asked Questions section of the [employer] website at [email or web address] Remember, MyChart is NOT to be used for urgent needs. For medical emergencies, dial 911. Now available from your iPhone and Android! Please provide this summary of care documentation to your next provider. Your primary care clinician is listed as [address]. If you have any questions after today's visit, please call [phone].

## 2017-07-24 NOTE — PROGRESS NOTES
HISTORY OF PRESENT ILLNESS  Luis Garza is a 45 y.o. female. HPI  Patient here for  f/u  After last visit of Type 2 diabetes mellitus  From April 2017  Gained  4  lb     She has seen Dr. Dominguez Summers per my request   She deferred the spinal stimulator  Because of complications           Prior history :    Referred : by self/pcp    H/o diabetes for 30 years     Current A1C is 9 % and symptoms/problems include polyuria, polydipsia and visual disturbances     Current diabetic medications include intensive insulin injection program. And metformin   novolog by 10 units tid plus sliding scale and levemir  36 units hs       Current monitoring regimen: home blood tests - 2 times daily  Home blood sugar records: trend: fluctuating a lot  Any episodes of hypoglycemia? no    Weight trend: fluctuating a lot  Prior visit with dietician: no  Current diet: \"unhealthy\" diet in general  Current exercise: no regular exercise    Known diabetic complications: peripheral neuropathy  Cardiovascular risk factors: dyslipidemia, diabetes mellitus, obesity, hypertension, stress, post-menopausal    Eye exam current (within one year): yes  TRISTEN: no     Past Medical History:   Diagnosis Date    [facility] [address] 2009    RIGHT breast    [facility]     Hypercholesterolemia     Hypertension      Past Surgical History:   Procedure Laterality Date    HX APPENDECTOMY      HX BREAST LUMPECTOMY  2009    RIGHT with SNBX    HX GYN      HX HEENT      HX UROLOGICAL       Current Outpatient Prescriptions   Medication Sig    SYNJARDY 12.5-1,000 mg per tablet TAKE 1 TABLET BY MOUTH TWICE DAILY WITH MEALS. STOP METFORMIN    ACCU-CHEK SMARTVIEW TEST STRIP strip USE TO TEST FOUR TIMES DAILY    gabapentin (NEURONTIN) 800 mg tablet Take 1 Tab by mouth three (3) times daily.  SUMAtriptan (IMITREX) 100 mg tablet as needed.  TOUJEO SOLOSTAR 300 unit/mL (1.5 mL) inpn INJECT 50 UNITS SUBCUTANEOUSLY EVERY NIGHT.  STOP USING LEVEMIR    Lancets (ACCU-CHEK FASTCLIX) misc Test 4 times daily. Dx code E11.65    DULoxetine (CYMBALTA) 60 mg capsule TAKE 1 CAPSULE BY MOUTH DAILY. STOP 30 MG DOSE    TANZEUM 30 mg/0.5 mL injector pen INJECT 30 MG SUBCUTANEOUSLY EVERY 7 DAYS    simvastatin (ZOCOR) 20 mg tablet Take 1 Tab by mouth nightly.  carvedilol (COREG) 12.5 mg tablet Take 1 Tab by mouth daily.  insulin aspart (NOVOLOG FLEXPEN) 100 unit/mL inpn Decrease to 7 units before breakfast, 7 units before lunch, and 8 units before dinner. Plus sliding scale. Max daily dose 64 units    losartan (COZAAR) 25 mg tablet Take  by mouth daily.  multivitamin (ONE A DAY) tablet Take 1 Tab by mouth daily.  biotin 500 mcg Cap Take  by mouth.  NUCYNTA 50 mg tablet TK 1 T PO TID PRN    levomefolate-B6-meB12-algal oil (METANX, ALGAL OIL,) 3 mg-35 mg-2 mg -90.314 mg cap Take 1 Cap by mouth two (2) times a day.  topiramate (TOPAMAX) 25 mg tablet TK 2 TS PO BID    triamcinolone acetonide (KENALOG) 0.1 % topical cream     NOVOFINE 30 30 gauge x 1/3\"      No current facility-administered medications for this visit. Review of Systems   Constitutional: Negative. HENT: Negative. Eyes: Negative for pain and redness. Respiratory: Negative. Cardiovascular: Negative for chest pain, palpitations and leg swelling. Gastrointestinal: Negative. Negative for constipation. Genitourinary: Negative. Musculoskeletal: Negative for myalgias. Skin:lump on left nasolabial fold   Neurological: Negative. Endo/Heme/Allergies: Negative. Psychiatric/Behavioral: Negative for depression and memory loss. The patient does not have insomnia. Physical Exam   Constitutional: She is oriented to person, place, and time. She appears well-developed and well-nourished. HENT:   Head: Normocephalic. Eyes: Conjunctivae and EOM are normal. Pupils are equal, round, and reactive to light. Neck: Normal range of motion. Neck supple. No JVD present.  No tracheal deviation present. No thyromegaly present. Cardiovascular: Normal rate, regular rhythm and normal heart sounds. No murmur heard. Pulmonary/Chest: Breath sounds normal.   Abdominal: Soft. Bowel sounds are normal.   Musculoskeletal: Normal range of motion. Lymphadenopathy:     She has no cervical adenopathy. Neurological: She is alert and oriented to person, place, and time. She has pain, tingling and numbness on both feet   She has normal reflexes. Skin: Skin is warm. Psychiatric: She has a normal mood and affect. No disparity noted on LE       Lab Results   Component Value Date/Time    Hemoglobin A1c 6.4 07/18/2017 09:19 AM    Hemoglobin A1c 6.6 04/20/2017 08:38 AM    Hemoglobin A1c 6.8 01/11/2017 09:23 AM    Hemoglobin A1c, External 9.0 03/18/2016    Glucose 105 07/18/2017 09:19 AM    Glucose (POC) 172 02/06/2009 01:29 PM    Glucose  05/20/2016 04:58 PM    Microalb/Creat ratio (ug/mg creat.) 5.9 07/18/2017 09:19 AM    LDL, calculated 46 07/18/2017 09:19 AM    Creatinine (POC) 0.8 02/21/2012 06:03 PM    Creatinine 0.74 07/18/2017 09:19 AM      Lab Results   Component Value Date/Time    Cholesterol, total 148 07/18/2017 09:19 AM    HDL Cholesterol 52 07/18/2017 09:19 AM    LDL, calculated 46 07/18/2017 09:19 AM    Triglyceride 248 07/18/2017 09:19 AM       Lab Results   Component Value Date/Time    ALT (SGPT) 12 07/18/2017 09:19 AM    AST (SGOT) 12 07/18/2017 09:19 AM    Alk. phosphatase 53 07/18/2017 09:19 AM    Bilirubin, total 0.2 07/18/2017 09:19 AM                ASSESSMENT and PLAN  1.  Type 2 DM, un controlled : a1c is  6.4  %     From    July 2017   Compared to  6.6 %       From     April 2017     Compared to   6.8 %    From jan 2017   Compared to   6.6 %     From oct 2016  Compared to   7% from July 2016  Compared to    8.2 %    From may 2016 compared to  9 %    From march 2016       Reviewed the glucose log   She has been maintaining the  Glycemic control well    on synjardy   She could not tolerate januvia  In the past   Continuing on lantus and  Humalog  before breakfast, lunch and dinner. Add additional Humalog as recommended in increments . Patient is advised about checking blood sugars 4 times a day and maintaining log book. The danger of having low blood sugars has been explained with inappropriate use of insulin  Patient voiced understanding and using the printed instructions at home. Hypoglycemia management has been explained to the patient. lab results and schedule of future lab studies reviewed with patient      2. Hypoglycemia :  Educated on treating the hypoglycemia. Discussed insulin use and prescribed    3. HTN : continue coreg and cozaar   As BP is low, advised her to stop hctz. Also she is on synjardy   Patient is educated about importance of compliance with anti-hypertensives especially ARB/ACEI    4. Dyslipidemia : continue zocor,  And will try stopping tricor 50 as it is a recent addition and resume it   Later if needed as she achieved great control   Patient is educated about benefits and adverse effects of statins and explained how benefits outweigh risk. 5. use of aspirin to prevent MI and TIA's discussed      6. Diabetic complications :     A. Retinopathy-NO   educated on this complication,  regular f/u with ophthalmologist encouraged    B. Nephropathy - NO     educated on this disease and indicated stages and its prognosis.      C. Peripheral Neuropathy - YES  severe  On cymbalta increased to 60 mg hs   , on neurontin 800 mg tid   recommending   Metanx       she has maintained the glycemic control very well ( may have had bad control prior to being under my care )  She has no other microvascular complications from DM    She has back issues  And she is restricted from several activities by her spine doc , [facility]   She has chemotherapy induced neuropathy also   Reviewed  EMG   She has severe neuropathy , lost proprioception and has balance issues ,  Had a fall She has numbness on left leg - no better  She is being continued to stay on short term disability  From - April 27 2017     She deferred spinal stimulator for fear of infection  She cannot tolerate Lyrica   She can only take gabapentin      E : CAD : YES  Cardiomyopathy , after chemo- adriamycin      Right breast cancer - treated and  Has lymphedema on right UE   She has lymph pump put in  In 2009 she says   She reports cellulitis on and off       She got papers from [employer] last week and wanted to extend the leave off from work - I refused to fill papers/ extent leave  - did not remember   From my previous conversation, she said pcp is starting the leave     D/w Dr. Brandt Whitlock , he agrees to her developing chemo induced neuropathy     D/ w Dr. Brett Tobar who concurs that she has severe neuropathy leading loss of balance        F/u in  4 months

## 2017-07-24 NOTE — PATIENT INSTRUCTIONS
metanx 1 cap bid ( send to [facility] )      synjardy 12.5 /1000 mg twice a day ( drink plenty of water )       Tanzeum 30 mg once  A week       Check blood sugars immediately before each meal and at bedtime      Take  Toujeo  insulin  50 units  at bed time     novolog insulin 7 units before breakfast, 7 units before lunch and 8 units before dinner.     Also, add additional novolog  as follows with meals  If blood sugars are[de-identified]    150-200 mg 2 units    201-250 mg 4 units    251-300 mg 6 units    301-350 mg 8 units    351-400 mg 10 units    401-450 mg 12 units    451-500 mg 14 units     Less than 70 mg NO INSULIN          Do not skip meals  Do not eat in between meals    Reduce carbs- pasta, rice, potatoes, bread   Do not drink juices or sodas  Donot eat peanut butter     Do not eat sugar free cookies and cakes   Do not eat peaches, grapes, pineapples, oranges

## 2017-07-24 NOTE — PROGRESS NOTES
Wt Readings from Last 3 Encounters:   07/24/17 270 lb (122.5 kg)   04/27/17 266 lb (120.7 kg)   01/20/17 270 lb (122.5 kg)     Temp Readings from Last 3 Encounters:   07/24/17 98.4 °F (36.9 °C) (Oral)   04/27/17 96.6 °F (35.9 °C) (Oral)   01/20/17 96.6 °F (35.9 °C) (Oral)     BP Readings from Last 3 Encounters:   07/24/17 105/81   04/27/17 102/70   01/20/17 95/69     Pulse Readings from Last 3 Encounters:   07/24/17 83   04/27/17 72   01/20/17 87     Lab Results   Component Value Date/Time    Hemoglobin A1c 6.4 07/18/2017 09:19 AM    Hemoglobin A1c, External 9.0 03/18/2016

## 2017-07-27 RX ORDER — INSULIN ASPART 100 [IU]/ML
INJECTION, SOLUTION INTRAVENOUS; SUBCUTANEOUS
Qty: 15 ML | Refills: 6 | Status: SHIPPED | OUTPATIENT
Start: 2017-07-27 | End: 2017-07-27 | Stop reason: CLARIF

## 2017-07-27 RX ORDER — INSULIN LISPRO 100 [IU]/ML
INJECTION, SOLUTION INTRAVENOUS; SUBCUTANEOUS
Qty: 15 ML | Refills: 6 | Status: SHIPPED | OUTPATIENT
Start: 2017-07-27 | End: 2018-05-17 | Stop reason: ALTCHOICE

## 2017-07-27 NOTE — TELEPHONE ENCOUNTER
Patient still waiting to get Novalog refill from Pharmacy. Please contact patient if there is something she needs to do.

## 2017-08-07 ENCOUNTER — TELEPHONE (OUTPATIENT)
Dept: ENDOCRINOLOGY | Age: 52
End: 2017-08-07

## 2017-08-07 NOTE — TELEPHONE ENCOUNTER
[employer] paperwork is going to be faxed again. Due by 8. 9.2017.  Pt called today requesting it be sent ASAP

## 2017-11-16 ENCOUNTER — TELEPHONE (OUTPATIENT)
Dept: SURGERY | Age: 52
End: 2017-11-16

## 2017-11-16 DIAGNOSIS — I89.0 LYMPHEDEMA OF RIGHT ARM: Primary | ICD-10-CM

## 2017-11-16 RX ORDER — DICLOXACILLIN SODIUM 500 MG/1
CAPSULE ORAL
Qty: 28 CAP | Status: SHIPPED | OUTPATIENT
Start: 2017-11-16 | End: 2019-05-16 | Stop reason: ALTCHOICE

## 2017-11-17 ENCOUNTER — OFFICE VISIT (OUTPATIENT)
Dept: ENDOCRINOLOGY | Age: 52
End: 2017-11-17

## 2017-11-17 VITALS
RESPIRATION RATE: 16 BRPM | HEART RATE: 86 BPM | BODY MASS INDEX: 47.14 KG/M2 | TEMPERATURE: 98.2 F | SYSTOLIC BLOOD PRESSURE: 111 MMHG | DIASTOLIC BLOOD PRESSURE: 75 MMHG | HEIGHT: 64 IN | WEIGHT: 276.1 LBS | OXYGEN SATURATION: 98 %

## 2017-11-17 DIAGNOSIS — E11.65 UNCONTROLLED TYPE 2 DIABETES MELLITUS WITH HYPERGLYCEMIA, WITH LONG-TERM CURRENT USE OF INSULIN (HCC): Primary | ICD-10-CM

## 2017-11-17 DIAGNOSIS — I10 ESSENTIAL HYPERTENSION: ICD-10-CM

## 2017-11-17 DIAGNOSIS — G62.0 NEUROPATHY DUE TO CHEMOTHERAPEUTIC DRUG (HCC): ICD-10-CM

## 2017-11-17 DIAGNOSIS — T45.1X5A NEUROPATHY DUE TO CHEMOTHERAPEUTIC DRUG (HCC): ICD-10-CM

## 2017-11-17 DIAGNOSIS — E78.2 MIXED HYPERLIPIDEMIA: ICD-10-CM

## 2017-11-17 DIAGNOSIS — E11.42 DIABETIC POLYNEUROPATHY ASSOCIATED WITH TYPE 2 DIABETES MELLITUS (HCC): ICD-10-CM

## 2017-11-17 DIAGNOSIS — Z79.4 UNCONTROLLED TYPE 2 DIABETES MELLITUS WITH HYPERGLYCEMIA, WITH LONG-TERM CURRENT USE OF INSULIN (HCC): Primary | ICD-10-CM

## 2017-11-17 NOTE — PROGRESS NOTES
HISTORY OF PRESENT ILLNESS  Luis Garza is a 45 y.o. female. HPI  Patient here for  f/u  After last visit of Type 2 diabetes mellitus  From July 2017  Gained  6  lb     She is OFF  Pain meds ( too expensive )  Got into disability, long term  She deferred the spinal stimulator  Because of complications           Prior history :    Referred : by self/pcp    H/o diabetes for 30 years     Current A1C is 9 % and symptoms/problems include polyuria, polydipsia and visual disturbances     Current diabetic medications include intensive insulin injection program. And metformin   novolog by 10 units tid plus sliding scale and levemir  36 units hs       Current monitoring regimen: home blood tests - 2 times daily  Home blood sugar records: trend: fluctuating a lot  Any episodes of hypoglycemia? no    Weight trend: fluctuating a lot  Prior visit with dietician: no  Current diet: \"unhealthy\" diet in general  Current exercise: no regular exercise    Known diabetic complications: peripheral neuropathy  Cardiovascular risk factors: dyslipidemia, diabetes mellitus, obesity, hypertension, stress, post-menopausal    Eye exam current (within one year): yes  TRISTEN: no     Past Medical History:   Diagnosis Date    [facility] ([facility] [address] 2009    RIGHT breast    Diabetes ([facility] [address] [facility]     Hypercholesterolemia     Hypertension      Past Surgical History:   Procedure Laterality Date    HX APPENDECTOMY      HX BREAST LUMPECTOMY  2009    RIGHT with SNBX    HX GYN      HX HEENT      HX UROLOGICAL       Current Outpatient Prescriptions   Medication Sig    dicloxacillin (DYNAPEN) 500 mg capsule TAKE ONE CAPSULE BY MOUTH FOUR TIMES DAILY FOR 7 DAYS.  DULoxetine (CYMBALTA) 60 mg capsule TAKE 1 CAPSULE BY MOUTH DAILY. STOP 30 MG DOSE    insulin lispro (HUMALOG KWIKPEN) 100 unit/mL kwikpen Decrease to 7 units before breakfast, 7 units before lunch, and 8 units before dinner.  Plus sliding scale    SYNJARDY 12.5-1,000 mg per tablet TAKE 1 TABLET BY MOUTH TWICE DAILY WITH MEALS. STOP METFORMIN    ACCU-CHEK SMARTVIEW TEST STRIP strip USE TO TEST FOUR TIMES DAILY    gabapentin (NEURONTIN) 800 mg tablet Take 1 Tab by mouth three (3) times daily.  TOUJEO SOLOSTAR 300 unit/mL (1.5 mL) inpn INJECT 50 UNITS SUBCUTANEOUSLY EVERY NIGHT. STOP USING LEVEMIR    Lancets (ACCU-CHEK FASTCLIX) misc Test 4 times daily. Dx code E11.65    TANZEUM 30 mg/0.5 mL injector pen INJECT 30 MG SUBCUTANEOUSLY EVERY 7 DAYS    simvastatin (ZOCOR) 20 mg tablet Take 1 Tab by mouth nightly.  carvedilol (COREG) 12.5 mg tablet Take 1 Tab by mouth daily.  NOVOFINE 30 30 gauge x 1/3\"     losartan (COZAAR) 25 mg tablet Take  by mouth daily.  multivitamin (ONE A DAY) tablet Take 1 Tab by mouth daily.  biotin 500 mcg Cap Take  by mouth.  NUCYNTA 50 mg tablet TK 1 T PO TID PRN    levomefolate-B6-meB12-algal oil (METANX, ALGAL OIL,) 3 mg-35 mg-2 mg -90.314 mg cap Take 1 Cap by mouth two (2) times a day.  SUMAtriptan (IMITREX) 100 mg tablet as needed.  triamcinolone acetonide (KENALOG) 0.1 % topical cream      No current facility-administered medications for this visit. Review of Systems   Constitutional: Negative. HENT: Negative. Eyes: Negative for pain and redness. Respiratory: Negative. Cardiovascular: Negative for chest pain, palpitations and leg swelling. Gastrointestinal: Negative. Negative for constipation. Genitourinary: Negative. Musculoskeletal: Negative for myalgias. Skin:lump on left nasolabial fold   Neurological: Negative. Endo/Heme/Allergies: Negative. Psychiatric/Behavioral: Negative for depression and memory loss. The patient does not have insomnia. Physical Exam   Constitutional: She is oriented to person, place, and time. She appears well-developed and well-nourished. HENT:   Head: Normocephalic. Eyes: Conjunctivae and EOM are normal. Pupils are equal, round, and reactive to light.    Neck: Normal range of motion. Neck supple. No JVD present. No tracheal deviation present. No thyromegaly present. Cardiovascular: Normal rate, regular rhythm and normal heart sounds. No murmur heard. Pulmonary/Chest: Breath sounds normal.   Abdominal: Soft. Bowel sounds are normal.   Musculoskeletal: Normal range of motion. Lymphadenopathy:     She has no cervical adenopathy. Neurological: She is alert and oriented to person, place, and time. She has pain, tingling and numbness on both feet   Skin: Skin is warm. Psychiatric: She has a normal mood and affect. No disparity noted on LE       Lab Results   Component Value Date/Time    Hemoglobin A1c 7.2 11/09/2017 08:30 AM    Hemoglobin A1c 6.4 07/18/2017 09:19 AM    Hemoglobin A1c 6.6 04/20/2017 08:38 AM    Hemoglobin A1c, External 9.0 03/18/2016    Glucose 145 11/09/2017 08:30 AM    Glucose (POC) 172 02/06/2009 01:29 PM    Glucose  05/20/2016 04:58 PM    Microalb/Creat ratio (ug/mg creat.) 4.9 11/09/2017 08:30 AM    LDL, calculated Comment 11/09/2017 08:30 AM    Creatinine (POC) 0.8 02/21/2012 06:03 PM    Creatinine 0.94 11/09/2017 08:30 AM      Lab Results   Component Value Date/Time    Cholesterol, total 295 11/09/2017 08:30 AM    HDL Cholesterol 56 11/09/2017 08:30 AM    LDL, calculated Comment 11/09/2017 08:30 AM    Triglyceride 453 11/09/2017 08:30 AM       Lab Results   Component Value Date/Time    ALT (SGPT) 20 11/09/2017 08:30 AM    AST (SGOT) 19 11/09/2017 08:30 AM    Alk. phosphatase 58 11/09/2017 08:30 AM    Bilirubin, total 0.2 11/09/2017 08:30 AM                ASSESSMENT and PLAN  1.  Type 2 DM, un controlled : a1c is  7.2 %     From   Nov 2017    Compared to  6.4  %     From    July 2017   Compared to  6.6 %       From     April 2017     Compared to   6.8 %    From jan 2017   Compared to   6.6 %     From oct 2016  Compared to   7% from July 2016  Compared to    8.2 %    From may 2016 compared to  9 %    From march 2016     No log for review   Losing control of Glycemic control    Cellulitis of right UE     on synjardy   She could not tolerate januvia  In the past   Continuing on lantus and  Humalog  before breakfast, lunch and dinner. Add additional Humalog as recommended in increments . Patient is advised about checking blood sugars 4 times a day and maintaining log book. The danger of having low blood sugars has been explained with inappropriate use of insulin  Patient voiced understanding and using the printed instructions at home. Hypoglycemia management has been explained to the patient. lab results and schedule of future lab studies reviewed with patient      2. Hypoglycemia :  Educated on treating the hypoglycemia. Discussed insulin use and prescribed    3. HTN : continue coreg and cozaar   As BP is low, advised her to stop hctz. Also she is on synjardy   Patient is educated about importance of compliance with anti-hypertensives especially ARB/ACEI    4. Dyslipidemia : lipids are very high . continue zocor,    Discussed low  fat diet    Patient is educated about benefits and adverse effects of statins and explained how benefits outweigh risk. 5. use of aspirin to prevent MI and TIA's discussed      6. Diabetic complications :     A. Retinopathy-NO   educated on this complication,  regular f/u with ophthalmologist encouraged    B. Nephropathy - NO     educated on this disease and indicated stages and its prognosis.      C. Peripheral Neuropathy - YES  severe  On cymbalta increased to 60 mg hs   , on neurontin 800 mg tid   recommending   Metanx ( too expensive )      she has maintained the glycemic control very well ( may have had bad control prior to being under my care )  She has no other microvascular complications from DM    She has back issues  And she is restricted from several activities by her spine doc , [facility]   She has chemotherapy induced neuropathy also   Reviewed  EMG   She has severe neuropathy , lost proprioception and has balance issues ,  Had a fall   She has numbness on left leg - no better  She is being continued to stay on short term disability  From - April 27 2017     She deferred spinal stimulator for fear of infection  She cannot tolerate Lyrica   She can only take gabapentin      E : CAD : YES  Cardiomyopathy , after chemo- adriamycin      Right breast cancer - treated and  Has lymphedema on right UE   She has lymph pump put in  In 2009 she says   She reports cellulitis on and off       She got papers from [employer] last week and wanted to extend the leave off from work - I refused to fill papers/ extent leave  - did not remember   From my previous conversation, she said pcp is starting the leave     D/w Dr. Jared Crockett , he agrees to her developing chemo induced neuropathy     D/ w Dr. Gael Ruiz who concurs that she has severe neuropathy leading loss of balance        F/u in 6  months

## 2017-11-17 NOTE — PROGRESS NOTES
Chief Complaint   Patient presents with    Diabetes     1. Have you been to the ER, urgent care clinic since your last visit? Hospitalized since your last visit? No    2. Have you seen or consulted any other health care providers outside of the [address] [place] [address] since your last visit? Include any pap smears or colon screening.  No     Wt Readings from Last 3 Encounters:   11/17/17 276 lb 1.6 oz (125.2 kg)   07/24/17 270 lb (122.5 kg)   04/27/17 266 lb (120.7 kg)     Temp Readings from Last 3 Encounters:   11/17/17 98.2 °F (36.8 °C) (Oral)   07/24/17 98.4 °F (36.9 °C) (Oral)   04/27/17 96.6 °F (35.9 °C) (Oral)     BP Readings from Last 3 Encounters:   11/17/17 111/75   07/24/17 105/81   04/27/17 102/70     Pulse Readings from Last 3 Encounters:   11/17/17 86   07/24/17 83   04/27/17 72     Pt do not have meter  Pt had a eye exam  No foot exam

## 2017-11-17 NOTE — MR AVS SNAPSHOT
Visit Information Date & Time Provider Department Dept. Phone Encounter #  
 11/17/2017 10:00 AM Devang Tavarez MD [facility] Diabetes & Endocrinology [phone] [id number] Follow-up Instructions Return in about 6 months (around 5/17/2018). Upcoming Health Maintenance Date Due Hepatitis C Screening 1965 FOOT EXAM Q1 4/20/1973 EYE EXAM RETINAL OR DILATED Q1 4/20/1973 Pneumococcal 19-64 Highest Risk (1 of 3 - PCV13) 4/20/1982 DTaP/Tdap/Td series (1 - Tdap) 4/20/1984 PAP AKA CERVICAL CYTOLOGY 4/20/1984 BREAST CANCER SCRN MAMMOGRAM 4/20/2013 FOBT Q 1 YEAR AGE 50-75 4/20/2013 Influenza Age 5 to Adult 8/1/2017 HEMOGLOBIN A1C Q6M 5/9/2018 MICROALBUMIN Q1 11/9/2018 LIPID PANEL Q1 11/9/2018 Allergies as of 11/17/2017  Review Complete On: 11/17/2017 By: Devang Tavarez MD  
  
 Severity Noted Reaction Type Reactions Januvia [Sitagliptin]  04/08/2016    Rash Current Immunizations  Never Reviewed No immunizations on file. Not reviewed this visit You Were Diagnosed With   
  
 Codes Comments Uncontrolled type 2 diabetes mellitus with hyperglycemia, with long-term current use of insulin ([facility])    -  Primary ICD-10-CM: E11.65, Z79.4 ICD-9-CM: 250.02, V58.67 Diabetic polyneuropathy associated with type 2 diabetes mellitus ([facility]     ICD-10-CM: E11.42 
ICD-9-CM: 250.60, 357.2 Neuropathy due to chemotherapeutic drug ([facility]     ICD-10-CM: G62.0, T45.1X5A 
ICD-9-CM: 357.6, E933.1 Mixed hyperlipidemia     ICD-10-CM: E78.2 ICD-9-CM: 272.2 Essential hypertension     ICD-10-CM: I10 
ICD-9-CM: 401.9 Vitals BP Pulse Temp Resp Height(growth percentile) Weight(growth percentile) 111/75 (BP 1 Location: Left arm, BP Patient Position: Sitting) 86 98.2 °F (36.8 °C) (Oral) 16 5' 4\" (1.626 m) 276 lb 1.6 oz (125.2 kg) SpO2 BMI OB Status Smoking Status 98% 47.39 kg/m2 Postmenopausal Never Smoker BMI and BSA Data Body Mass Index Body Surface Area  
 47.39 kg/m 2 2.38 m 2 Preferred Pharmacy Pharmacy Name Phone 99 [facility], [address] [place] [phone] Your Updated Medication List  
  
   
This list is accurate as of: 11/17/17 10:58 AM.  Always use your most recent med list.  
  
  
  
  
 ACCU-CHEK SMARTVIEW TEST STRIP strip Generic drug:  glucose blood VI test strips USE TO TEST FOUR TIMES DAILY  
  
 biotin 500 mcg Cap Take  by mouth. carvedilol 12.5 mg tablet Commonly known as:  Elier Briana Take 1 Tab by mouth daily. dicloxacillin 500 mg capsule Commonly known as:  DYNAPEN  
TAKE ONE CAPSULE BY MOUTH FOUR TIMES DAILY FOR 7 DAYS. DULoxetine 60 mg capsule Commonly known as:  CYMBALTA TAKE 1 CAPSULE BY MOUTH DAILY. STOP 30 MG DOSE  
  
 gabapentin 800 mg tablet Commonly known as:  NEURONTIN Take 1 Tab by mouth three (3) times daily. insulin lispro 100 unit/mL kwikpen Commonly known as:  HumaLOG KwikPen Decrease to 7 units before breakfast, 7 units before lunch, and 8 units before dinner. Plus sliding scale Lancets Misc Commonly known as:  ACCU-CHEK FASTCLIX Test 4 times daily. Dx code E11.65  
  
 losartan 25 mg tablet Commonly known as:  COZAAR Take  by mouth daily. multivitamin tablet Commonly known as:  ONE A DAY Take 1 Tab by mouth daily. NOVOFINE 30 30 gauge x 1/3\" Generic drug:  Insulin Needles (Disposable) NUCYNTA 50 mg tablet Generic drug:  tapentadol TK 1 T PO TID PRN  
  
 simvastatin 20 mg tablet Commonly known as:  ZOCOR Take 1 Tab by mouth nightly. SYNJARDY 12.5-1,000 mg per tablet Generic drug:  empagliflozin-metFORMIN  
TAKE 1 TABLET BY MOUTH TWICE DAILY WITH MEALS. STOP METFORMIN  
  
 TANZEUM 30 mg/0.5 mL injector pen Generic drug:  albiglutide INJECT 30 MG SUBCUTANEOUSLY EVERY 7 DAYS TOUJEO SOLOSTAR 300 unit/mL (1.5 mL) Inpn Generic drug:  insulin glargine INJECT 50 UNITS SUBCUTANEOUSLY EVERY NIGHT. STOP USING LEVEMIR  
  
 triamcinolone acetonide 0.1 % topical cream  
Commonly known as:  KENALOG Follow-up Instructions Return in about 6 months (around 5/17/2018). Patient Instructions   
 
 
synjardy 12.5 /1000 mg twice a day ( drink plenty of water ) Tanzeum 30 mg once  A week Check blood sugars immediately before each meal and at bedtime Take  Toujeo  insulin  50 units  at bed time 
 
 novolog insulin 7 units before breakfast, 7 units before lunch and 8 units before dinner. Also, add additional novolog  as follows with meals  If blood sugars are[de-identified] 
 
150-200 mg 2 units 201-250 mg 4 units 251-300 mg 6 units 301-350 mg 8 units 351-400 mg 10 units 401-450 mg 12 units 451-500 mg 14 units Less than 70 mg NO INSULIN 
 
 
 
 
Do not skip meals Do not eat in between meals Reduce carbs- pasta, rice, potatoes, bread Do not drink juices or sodas Donot eat peanut butter Do not eat sugar free cookies and cakes Do not eat peaches, grapes, pineapples, oranges Introducing [facility]! Dear Manjula Singh: Thank you for requesting a [employer] account. Our records indicate that you already have an active [employer] Or[employer] account. You can access your account anytime at [email or web address] Did you know that you can access your hospital and ER discharge instructions at any time in [employer] Orchid? You can also review all of your test results from your hospital stay or ER visit. Additional Information If you have questions, please visit the Frequently Asked Questions section of the [employer] Or[employer] website at [email or web address] Remember, [employer] is NOT to be used for urgent needs. For medical emergencies, dial 911. Now available from your iPhone and Android! Please provide this summary of care documentation to your next provider. Your primary care clinician is listed as [address]. If you have any questions after today's visit, please call [phone].

## 2017-11-17 NOTE — PATIENT INSTRUCTIONS
synjardy 12.5 /1000 mg twice a day ( drink plenty of water )       Tanzeum 30 mg once  A week       Check blood sugars immediately before each meal and at bedtime      Take  Toujeo  insulin  50 units  at bed time     novolog insulin 7 units before breakfast, 7 units before lunch and 8 units before dinner.     Also, add additional novolog  as follows with meals  If blood sugars are[de-identified]    150-200 mg 2 units    201-250 mg 4 units    251-300 mg 6 units    301-350 mg 8 units    351-400 mg 10 units    401-450 mg 12 units    451-500 mg 14 units     Less than 70 mg NO INSULIN          Do not skip meals  Do not eat in between meals    Reduce carbs- pasta, rice, potatoes, bread   Do not drink juices or sodas  Donot eat peanut butter     Do not eat sugar free cookies and cakes   Do not eat peaches, grapes, pineapples, oranges

## 2017-12-01 ENCOUNTER — OP HISTORICAL/CONVERTED ENCOUNTER (OUTPATIENT)
Dept: OTHER | Age: 52
End: 2017-12-01

## 2017-12-06 ENCOUNTER — DOCUMENTATION ONLY (OUTPATIENT)
Dept: SURGERY | Age: 52
End: 2017-12-06

## 2018-01-09 ENCOUNTER — TELEPHONE (OUTPATIENT)
Dept: ENDOCRINOLOGY | Age: 53
End: 2018-01-09

## 2018-01-09 NOTE — TELEPHONE ENCOUNTER
Patient needs to discuss with nurse about medication. Toujeo. She states she needs to discuss info that may be incorrect.

## 2018-01-09 NOTE — TELEPHONE ENCOUNTER
Explained to patient with the relion brand she would need 2 vials per month because there are 100 units/ml and 10ml in the bottle. Patient verbalized understanding.

## 2018-05-25 ENCOUNTER — OFFICE VISIT (OUTPATIENT)
Dept: ENDOCRINOLOGY | Age: 53
End: 2018-05-25

## 2018-05-25 VITALS
BODY MASS INDEX: 47.27 KG/M2 | DIASTOLIC BLOOD PRESSURE: 59 MMHG | TEMPERATURE: 96.7 F | WEIGHT: 276.9 LBS | RESPIRATION RATE: 12 BRPM | SYSTOLIC BLOOD PRESSURE: 92 MMHG | OXYGEN SATURATION: 96 % | HEART RATE: 82 BPM | HEIGHT: 64 IN

## 2018-05-25 DIAGNOSIS — Z79.4 ENCOUNTER FOR LONG-TERM (CURRENT) USE OF INSULIN (HCC): ICD-10-CM

## 2018-05-25 DIAGNOSIS — I10 ESSENTIAL HYPERTENSION: ICD-10-CM

## 2018-05-25 DIAGNOSIS — E78.2 MIXED HYPERLIPIDEMIA: ICD-10-CM

## 2018-05-25 DIAGNOSIS — G62.9 NEUROPATHY: ICD-10-CM

## 2018-05-25 LAB — HBA1C MFR BLD HPLC: 6.3 %

## 2018-05-25 RX ORDER — PRAVASTATIN SODIUM 40 MG/1
40 TABLET ORAL
Qty: 30 TAB | Refills: 6 | Status: SHIPPED | OUTPATIENT
Start: 2018-05-25 | End: 2018-11-09 | Stop reason: SDUPTHER

## 2018-05-25 RX ORDER — INSULIN ASPART 100 [IU]/ML
INJECTION, SOLUTION INTRAVENOUS; SUBCUTANEOUS
Qty: 15 ML | Refills: 6 | Status: SHIPPED | OUTPATIENT
Start: 2018-05-25 | End: 2018-11-13 | Stop reason: ALTCHOICE

## 2018-05-25 RX ORDER — METFORMIN HYDROCHLORIDE 500 MG/1
1000 TABLET, EXTENDED RELEASE ORAL 2 TIMES DAILY WITH MEALS
Qty: 120 TAB | Refills: 6 | Status: SHIPPED | OUTPATIENT
Start: 2018-05-25 | End: 2018-11-09 | Stop reason: SDUPTHER

## 2018-05-25 RX ORDER — LEVORPHANOL TARTRATE 2 MG/1
TABLET ORAL
COMMUNITY
End: 2019-05-16 | Stop reason: ALTCHOICE

## 2018-05-25 NOTE — PROGRESS NOTES
HISTORY OF PRESENT ILLNESS  Luis Garza is a 52 y.o. female. HPI  Patient here for  f/u  After last visit of Type 2 diabetes mellitus  From Nov 2017  Weight is stable     She is OFF  Pain meds ( too expensive )  Got into disability, long term  She deferred the spinal stimulator  Because of complications     Compliant with diabetic meds           Prior history :    Referred : by self/pcp    H/o diabetes for 30 years     Current A1C is 9 % and symptoms/problems include polyuria, polydipsia and visual disturbances     Current diabetic medications include intensive insulin injection program. And metformin   novolog by 10 units tid plus sliding scale and levemir  36 units hs       Current monitoring regimen: home blood tests - 2 times daily  Home blood sugar records: trend: fluctuating a lot  Any episodes of hypoglycemia? no    Weight trend: fluctuating a lot  Prior visit with dietician: no  Current diet: \"unhealthy\" diet in general  Current exercise: no regular exercise    Known diabetic complications: peripheral neuropathy  Cardiovascular risk factors: dyslipidemia, diabetes mellitus, obesity, hypertension, stress, post-menopausal    Eye exam current (within one year): yes  TRISTEN: no     Past Medical History:   Diagnosis Date    Cancer ([facility] 2009    RIGHT breast    Diabetes ([facility]     Hypercholesterolemia     Hypertension      Past Surgical History:   Procedure Laterality Date    HX APPENDECTOMY      HX BREAST LUMPECTOMY  2009    RIGHT with SNBX    HX GYN      HX HEENT      HX UROLOGICAL       Current Outpatient Prescriptions   Medication Sig    levorphanol (LEVO-DROMORAN) 2 mg tablet Take 1 tablet every 8 hours by oral route as directed for 30 days.  NOVOLOG FLEXPEN U-100 INSULIN 100 unit/mL inpn INJECT 8 UNITS SUBCUTANEOUS BEFORE BREAKFAST, LUNCH, AND DINNER. PLUS SLIDING SCALE. MAX DAILY DOSE 66 UNITS    DULoxetine (CYMBALTA) 60 mg capsule TAKE 1 CAPSULE BY MOUTH DAILY.  STOP 30 MG DOSE   [facility] 12.5-1,000 mg per tablet TAKE 1 TABLET BY MOUTH TWICE DAILY WITH MEALS. STOP METFORMIN    insulin glargine (TOUJEO SOLOSTAR) 300 unit/mL (1.5 mL) inpn 50 Units by SubCUTAneous route nightly.  ACCU-CHEK SMARTVIEW TEST STRIP strip USE TO TEST FOUR TIMES DAILY    gabapentin (NEURONTIN) 800 mg tablet Take 1 Tab by mouth three (3) times daily.  Lancets (ACCU-CHEK FASTCLIX) misc Test 4 times daily. Dx code E11.65    carvedilol (COREG) 12.5 mg tablet Take 1 Tab by mouth daily.  triamcinolone acetonide (KENALOG) 0.1 % topical cream     NOVOFINE 30 30 gauge x 1/3\"     losartan (COZAAR) 25 mg tablet Take  by mouth daily.  multivitamin (ONE A DAY) tablet Take 1 Tab by mouth daily.  biotin 500 mcg Cap Take  by mouth.  albiglutide (TANZEUM) 30 mg/0.5 mL injector pen INJECT 30 MG SUBCUTANEOUSLY EVERY 7 DAYS    dicloxacillin (DYNAPEN) 500 mg capsule TAKE ONE CAPSULE BY MOUTH FOUR TIMES DAILY FOR 7 DAYS.  NUCYNTA 50 mg tablet TK 1 T PO TID PRN    simvastatin (ZOCOR) 20 mg tablet Take 1 Tab by mouth nightly. No current facility-administered medications for this visit. Review of Systems   Constitutional: Negative. HENT: Negative. Eyes: Negative for pain and redness. Respiratory: Negative. Cardiovascular: Negative for chest pain, palpitations and leg swelling. Gastrointestinal: Negative. Negative for constipation. Genitourinary: Negative. Musculoskeletal: Negative for myalgias. Skin:lump on left nasolabial fold   Neurological: Negative. Endo/Heme/Allergies: Negative. Psychiatric/Behavioral: Negative for depression and memory loss. The patient does not have insomnia. Physical Exam   Constitutional: She is oriented to person, place, and time. She appears well-developed and well-nourished. HENT:   Head: Normocephalic. Eyes: Conjunctivae and EOM are normal. Pupils are equal, round, and reactive to light. Neck: Normal range of motion. Neck supple.  No JVD present. No tracheal deviation present. No thyromegaly present. Cardiovascular: Normal rate, regular rhythm and normal heart sounds. No murmur heard. Pulmonary/Chest: Breath sounds normal.   Abdominal: Soft. Bowel sounds are normal.   Musculoskeletal: Normal range of motion. Lymphadenopathy:     She has no cervical adenopathy. Neurological: She is alert and oriented to person, place, and time. She has pain, tingling and numbness on both feet   Skin: Skin is warm. Psychiatric: She has a normal mood and affect. No disparity noted on LE       Lab Results   Component Value Date/Time    Hemoglobin A1c 6.7 (H) 05/18/2018 02:19 PM    Hemoglobin A1c 7.2 (H) 11/09/2017 08:30 AM    Hemoglobin A1c 6.4 (H) 07/18/2017 09:19 AM    Hemoglobin A1c, External 9.0 03/18/2016    Glucose 134 (H) 05/18/2018 02:19 PM    Glucose (POC) 172 (H) 02/06/2009 01:29 PM    Glucose  05/20/2016 04:58 PM    Microalb/Creat ratio (ug/mg creat.) 52.1 (H) 05/18/2018 02:19 PM    LDL, calculated Comment 05/18/2018 02:19 PM    Creatinine (POC) 0.8 02/21/2012 06:03 PM    Creatinine 0.96 05/18/2018 02:19 PM      Lab Results   Component Value Date/Time    Cholesterol, total 267 (H) 05/18/2018 02:19 PM    HDL Cholesterol 46 05/18/2018 02:19 PM    LDL, calculated Comment 05/18/2018 02:19 PM    Triglyceride 491 (H) 05/18/2018 02:19 PM       Lab Results   Component Value Date/Time    ALT (SGPT) 13 05/18/2018 02:19 PM    AST (SGOT) 11 05/18/2018 02:19 PM    Alk. phosphatase 54 05/18/2018 02:19 PM    Bilirubin, total <0.2 05/18/2018 02:19 PM                ASSESSMENT and PLAN  1.  Type 2 DM, un controlled : a1c is   6.7 %    From  May 2018     Compared to   7.2 %     From   Nov 2017    Compared to  6.4  %     From    July 2017   Compared to  6.6 %       From     April 2017     Compared to   6.8 %    From jan 2017   Compared to   6.6 %     From oct 2016  Compared to   7% from July 2016  Compared to    8.2 %    From may 2016 compared to  9 % From march 2016     She brought the  log for review   on synjardy ( sometimes difficulty in obtaining it )  She can do metformin et 1000 mg bid   She could not tolerate [place]  In the past       Continuing on lantus and  Novolog  before breakfast, lunch and dinner. Suggesting change to NPH and regular insulins    Patient is advised about checking blood sugars 4 times a day and maintaining log book. The danger of having low blood sugars has been explained with inappropriate use of insulin  Patient voiced understanding and using the printed instructions at home. Hypoglycemia management has been explained to the patient. lab results and schedule of future lab studies reviewed with patient      2. Hypoglycemia :  Educated on treating the hypoglycemia. Discussed insulin use and prescribed    3. HTN : continue coreg and cozaar   As BP is low, advised her to stop hctz. Also she is on synjardy   Patient is educated about importance of compliance with anti-hypertensives especially ARB/ACEI    4. Dyslipidemia : lipids are very high . Start on pravachol  40 mg hs   Discussed low  fat diet    Patient is educated about benefits and adverse effects of statins and explained how benefits outweigh risk. 5. use of aspirin to prevent MI and TIA's discussed      6. Diabetic complications :     A. Retinopathy-NO   educated on this complication,  regular f/u with ophthalmologist encouraged    B. Nephropathy - NO     educated on this disease and indicated stages and its prognosis.      C. Peripheral Neuropathy - YES  severe  On cymbalta increased to 60 mg hs   And  on  neurontin 800 mg tid   recommending   Metanx ( too expensive )      she has maintained the glycemic control very well ( may have had bad control prior to being under my care )  She has no other microvascular complications from DM    She has back issues  And she is restricted from several activities by her spine doc , [facility]   She has chemotherapy induced neuropathy also   Reviewed  EMG   She has severe neuropathy , lost proprioception and has balance issues ,  Had a fall   She has numbness on left leg - no better  She is being continued to stay on short term disability  From - April 27 2017     She deferred spinal stimulator for fear of infection  She cannot tolerate Lyrica   She can only take gabapentin      E : CAD : YES  Cardiomyopathy , after chemo- adriamycin      Right breast cancer - treated and  Has lymphedema on right UE   She has lymph pump put in  In 2009 she says   She reports cellulitis on and off       She got papers from [employer] last week and wanted to extend the leave off from work - I refused to fill papers/ extent leave  - did not remember   From my previous conversation, she said pcp is starting the leave     D/w Dr. Jaleesa Perkins , he agrees to her developing chemo induced neuropathy     D/ w Dr. Franky Cagle who concurs that she has severe neuropathy leading loss of balance        F/u in 6  months  > 50 % of time is spent on counseling   Patient voiced understanding her plan of care

## 2018-05-25 NOTE — PATIENT INSTRUCTIONS
Stop synjardy 12.5 /1000 mg twice a day ( drink plenty of water )  Start  On Metformin er 500 mg 2 pills twice a day        STOP Tanzeum 30 mg once  A week       Start on pravachol 40 mg at night       Check blood sugars immediately before each meal and at bedtime      Start on   NPH  insulin  50 units  at bed time     novolog  Or    Regular   insulin 7 units before breakfast, 7 units before lunch and 8 units before dinner.    Vials    Also, add additional novolog  as follows with meals  If blood sugars are[de-identified]    150-200 mg 2 units    201-250 mg 4 units    251-300 mg 6 units    301-350 mg 8 units    351-400 mg 10 units    401-450 mg 12 units    451-500 mg 14 units     Less than 70 mg NO INSULIN          Do not skip meals  Do not eat in between meals    Reduce carbs- pasta, rice, potatoes, bread   Do not drink juices or sodas  Donot eat peanut butter     Do not eat sugar free cookies and cakes   Do not eat peaches, grapes, pineapples, oranges

## 2018-05-25 NOTE — MR AVS SNAPSHOT
[address] [facility] [address] 
[phone] Patient: Ramy Alcala MRN: [id number] Visit Information Date & Time Provider Department Dept. Phone Encounter #  
 5/25/2018  9:15 AM Mali Rodríguez MD [facility] Diabetes & Endocrinology [phone] [id number] Follow-up Instructions Return in about 6 months (around 11/25/2018). Upcoming Health Maintenance Date Due Hepatitis C Screening 1965 FOOT EXAM Q1 4/20/1973 EYE EXAM RETINAL OR DILATED Q1 4/20/1973 Pneumococcal 19-64 Highest Risk (1 of 3 - PCV13) 4/20/1982 DTaP/Tdap/Td series (1 - Tdap) 4/20/1984 PAP AKA CERVICAL CYTOLOGY 4/20/1984 BREAST CANCER SCRN MAMMOGRAM 4/20/2013 FOBT Q 1 YEAR AGE 50-75 4/20/2013 Influenza Age 5 to Adult 8/1/2018 HEMOGLOBIN A1C Q6M 11/18/2018 MICROALBUMIN Q1 5/18/2019 LIPID PANEL Q1 5/18/2019 Allergies as of 5/25/2018  Review Complete On: 5/25/2018 By: Mali Rodríguez MD  
  
 Severity Noted Reaction Type Reactions Januvia [Sitagliptin]  04/08/2016    Rash Current Immunizations  Never Reviewed No immunizations on file. Not reviewed this visit You Were Diagnosed With   
  
 Codes Comments Uncontrolled type 2 diabetes mellitus with diabetic nephropathy, without long-term current use of insulin ([address]    -  Primary ICD-10-CM: E11.21, E11.65 ICD-9-CM: 250.42, 583.81 Encounter for long-term (current) use of insulin ([address]     ICD-10-CM: Z79.4 ICD-9-CM: V58.67 Mixed hyperlipidemia     ICD-10-CM: E78.2 ICD-9-CM: 272.2 Essential hypertension     ICD-10-CM: I10 
ICD-9-CM: 401.9 Neuropathy     ICD-10-CM: G62.9 ICD-9-CM: 003. 9 Vitals BP Pulse Temp Resp Height(growth percentile) Weight(growth percentile) 92/59 (BP 1 Location: Left arm, BP Patient Position: Sitting) 82 96.7 °F (35.9 °C) (Oral) 12 5' 4\" (1.626 m) 276 lb 14.4 oz (125.6 kg) SpO2 BMI OB Status Smoking Status 96% 47.53 kg/m2 Postmenopausal Never Smoker Vitals History BMI and BSA Data Body Mass Index Body Surface Area  
 47.53 kg/m 2 2.38 m 2 Preferred Pharmacy Pharmacy Name Phone 99 [facility], [address] [place] [address] [place] [phone] Your Updated Medication List  
  
   
This list is accurate as of 5/25/18  9:32 AM.  Always use your most recent med list.  
  
  
  
  
 ACCU-CHEK SMARTVIEW TEST STRIP strip Generic drug:  glucose blood VI test strips USE TO TEST FOUR TIMES DAILY  
  
 albiglutide 30 mg/0.5 mL injector pen Commonly known as:  Merwyn Ruhs INJECT 30 MG SUBCUTANEOUSLY EVERY 7 DAYS  
  
 biotin 500 mcg Cap Take  by mouth. carvedilol 12.5 mg tablet Commonly known as:  Miguel Angel Fritter Take 1 Tab by mouth daily. dicloxacillin 500 mg capsule Commonly known as:  DYNAPEN  
TAKE ONE CAPSULE BY MOUTH FOUR TIMES DAILY FOR 7 DAYS. DULoxetine 60 mg capsule Commonly known as:  CYMBALTA TAKE 1 CAPSULE BY MOUTH DAILY. STOP 30 MG DOSE  
  
 gabapentin 800 mg tablet Commonly known as:  NEURONTIN Take 1 Tab by mouth three (3) times daily. insulin glargine 300 unit/mL (1.5 mL) Inpn Commonly known as:  TOUJEO SOLOSTAR U-300 INSULIN  
50 Units by SubCUTAneous route nightly. Lancets Misc Commonly known as:  ACCU-CHEK FASTCLIX Test 4 times daily. Dx code E11.65  
  
 levorphanol 2 mg tablet Commonly known as:  LEVO-DROMORAN Take 1 tablet every 8 hours by oral route as directed for 30 days. losartan 25 mg tablet Commonly known as:  COZAAR Take  by mouth daily. multivitamin tablet Commonly known as:  ONE A DAY Take 1 Tab by mouth daily. NOVOFINE 30 30 gauge x 1/3\" Generic drug:  Insulin Needles (Disposable) NovoLOG Flexpen U-100 Insulin 100 unit/mL Inpn Generic drug:  insulin aspart U-100 INJECT 8 UNITS SUBCUTANEOUS BEFORE BREAKFAST, LUNCH, AND DINNER. PLUS SLIDING SCALE. MAX DAILY DOSE 66 UNITS  
  
 NUCYNTA 50 mg tablet Generic drug:  tapentadol TK 1 T PO TID PRN  
  
 simvastatin 20 mg tablet Commonly known as:  ZOCOR Take 1 Tab by mouth nightly. triamcinolone acetonide 0.1 % topical cream  
Commonly known as:  KENALOG We Performed the Following AMB POC HEMOGLOBIN A1C [10263 CPT(R)] Follow-up Instructions Return in about 6 months (around 11/25/2018). Patient Instructions Stop synjardy 12.5 /1000 mg twice a day ( drink plenty of water ) Start  On Metformin er 500 mg 2 pills twice a day STOP Tanzeum 30 mg once  A week Start on pravachol 40 mg at night Check blood sugars immediately before each meal and at bedtime Start on   NPH  insulin  50 units  at bed time 
 
 novolog  Or    Regular   insulin 7 units before breakfast, 7 units before lunch and 8 units before dinner. Vials Also, add additional novolog  as follows with meals  If blood sugars are[de-identified] 
 
150-200 mg 2 units 201-250 mg 4 units 251-300 mg 6 units 301-350 mg 8 units 351-400 mg 10 units 401-450 mg 12 units 451-500 mg 14 units Less than 70 mg NO INSULIN 
 
 
 
 
Do not skip meals Do not eat in between meals Reduce carbs- pasta, rice, potatoes, bread Do not drink juices or sodas Donot eat peanut butter Do not eat sugar free cookies and cakes Do not eat peaches, grapes, pineapples, oranges Introducing [facility]! Dear Torres Pablo: Thank you for requesting a [employer] account. Our records indicate that you already have an active TherOx account. You can access your account anytime at [email or web address] Did you know that you can access your hospital and ER discharge instructions at any time in TherOx? You can also review all of your test results from your hospital stay or ER visit. Additional Information If you have questions, please visit the Frequently Asked Questions section of the [employer] website at [email or web address] Remember, [employer] is NOT to be used for urgent needs. For medical emergencies, dial 911. Now available from your iPhone and Android! Please provide this summary of care documentation to your next provider. Your primary care clinician is listed as [address]. If you have any questions after today's visit, please call [phone].

## 2018-05-25 NOTE — PROGRESS NOTES
Chief Complaint   Patient presents with    Diabetes     6 months f/up     1. Have you been to the ER, urgent care clinic since your last visit? Hospitalized since your last visit? No    2. Have you seen or consulted any other health care providers outside of the [address] [place] [address] since your last visit? Include any pap smears or colon screening.  No       Wt Readings from Last 3 Encounters:   05/25/18 276 lb 14.4 oz (125.6 kg)   11/17/17 276 lb 1.6 oz (125.2 kg)   07/24/17 270 lb (122.5 kg)     Temp Readings from Last 3 Encounters:   05/25/18 96.7 °F (35.9 °C) (Oral)   11/17/17 98.2 °F (36.8 °C) (Oral)   07/24/17 98.4 °F (36.9 °C) (Oral)     BP Readings from Last 3 Encounters:   05/25/18 92/59   11/17/17 111/75   07/24/17 105/81     Pulse Readings from Last 3 Encounters:   05/25/18 82   11/17/17 86   07/24/17 83     Last foot exam date n/a   Last eye exam date n/a

## 2018-11-09 ENCOUNTER — OFFICE VISIT (OUTPATIENT)
Dept: ENDOCRINOLOGY | Age: 53
End: 2018-11-09

## 2018-11-09 VITALS
BODY MASS INDEX: 47.84 KG/M2 | TEMPERATURE: 97.9 F | DIASTOLIC BLOOD PRESSURE: 65 MMHG | OXYGEN SATURATION: 97 % | SYSTOLIC BLOOD PRESSURE: 113 MMHG | HEART RATE: 77 BPM | RESPIRATION RATE: 16 BRPM | HEIGHT: 64 IN | WEIGHT: 280.2 LBS

## 2018-11-09 DIAGNOSIS — G62.9 NEUROPATHY: ICD-10-CM

## 2018-11-09 DIAGNOSIS — E78.2 MIXED HYPERLIPIDEMIA: ICD-10-CM

## 2018-11-09 DIAGNOSIS — E78.1 HYPERTRIGLYCERIDEMIA: ICD-10-CM

## 2018-11-09 DIAGNOSIS — Z79.4 ENCOUNTER FOR LONG-TERM (CURRENT) USE OF INSULIN (HCC): ICD-10-CM

## 2018-11-09 RX ORDER — INSULIN ASPART 100 [IU]/ML
INJECTION, SOLUTION INTRAVENOUS; SUBCUTANEOUS
Qty: 15 ML | Refills: 6 | Status: CANCELLED | OUTPATIENT
Start: 2018-11-09

## 2018-11-09 NOTE — PROGRESS NOTES
HISTORY OF PRESENT ILLNESS Aneta Redd is a 52 y.o. female. HPI Patient here for  f/u  After last visit of Type 2 diabetes mellitus  From May 2018 Gained 4 lbs She is compliant with insulins No meter She is now starting with rheumatologist  
 
 
 
Old history Weight is stable She is OFF  Pain meds ( too expensive ) Got into disability, long term She deferred the spinal stimulator  Because of complications Compliant with diabetic meds Prior history : 
 
Referred : by self/pcp H/o diabetes for 30 years Current A1C is 9 % and symptoms/problems include polyuria, polydipsia and visual disturbances Current diabetic medications include intensive insulin injection program. And metformin  
novolog by 10 units tid plus sliding scale and levemir  36 units hs Current monitoring regimen: home blood tests - 2 times daily Home blood sugar records: trend: fluctuating a lot Any episodes of hypoglycemia? no 
 
Weight trend: fluctuating a lot Prior visit with dietician: no 
Current diet: \"unhealthy\" diet in general 
Current exercise: no regular exercise Known diabetic complications: peripheral neuropathy Cardiovascular risk factors: dyslipidemia, diabetes mellitus, obesity, hypertension, stress, post-menopausal 
 
Eye exam current (within one year): yes TRISTEN: no  
 
Past Medical History:  
Diagnosis Date  Cancer [facility]) 2009 RIGHT breast  
 Diabetes ([address]  Hypercholesterolemia  Hypertension Past Surgical History:  
Procedure Laterality Date  HX APPENDECTOMY  HX BREAST LUMPECTOMY  2009 RIGHT with SNBX  
 HX GYN    
 HX HEENT    
 HX UROLOGICAL Current Outpatient Medications Medication Sig  
 insulin regular (NOVOLIN R REGULAR U-100 INSULN) 100 unit/mL injection Novolin R Regular U-100 Insulin 100 unit/mL injection solution Take 8 sliding scale doses 3 times a day by injection route.  levorphanol (LEVO-DROMORAN) 2 mg tablet Take 1 tablet every 8 hours by oral route as directed for 30 days.  metFORMIN ER (GLUCOPHAGE XR) 500 mg tablet Take 2 Tabs by mouth two (2) times daily (with meals). Stop synjardy  insulin NPH (HUMULIN N) 100 unit/mL (3 mL) inpn Inject 50 units at bedtime  DULoxetine (CYMBALTA) 60 mg capsule TAKE 1 CAPSULE BY MOUTH DAILY. STOP 30 MG DOSE  dicloxacillin (DYNAPEN) 500 mg capsule TAKE ONE CAPSULE BY MOUTH FOUR TIMES DAILY FOR 7 DAYS.  ACCU-CHEK SMARTVIEW TEST STRIP strip USE TO TEST FOUR TIMES DAILY  gabapentin (NEURONTIN) 800 mg tablet Take 1 Tab by mouth three (3) times daily.  Lancets (ACCU-CHEK FASTCLIX) misc Test 4 times daily. Dx code E11.65  
 carvedilol (COREG) 12.5 mg tablet Take 1 Tab by mouth daily.  triamcinolone acetonide (KENALOG) 0.1 % topical cream   
 NOVOFINE 30 30 gauge x 1/3\"  losartan (COZAAR) 25 mg tablet Take  by mouth daily.  multivitamin (ONE A DAY) tablet Take 1 Tab by mouth daily.  biotin 500 mcg Cap Take  by mouth.  pravastatin (PRAVACHOL) 40 mg tablet Take 1 Tab by mouth nightly.  insulin aspart U-100 (NOVOLOG FLEXPEN U-100 INSULIN) 100 unit/mL inpn Inject 7 units before breakfast and lunch, and 8 units before dinner. Plus sliding scale to max 64 units daily  insulin glargine (TOUJEO SOLOSTAR) 300 unit/mL (1.5 mL) inpn 50 Units by SubCUTAneous route nightly.  NUCYNTA 50 mg tablet TK 1 T PO TID PRN  
 simvastatin (ZOCOR) 20 mg tablet Take 1 Tab by mouth nightly. No current facility-administered medications for this visit. Review of Systems Constitutional: Negative. HENT: Negative. Eyes: Negative for pain and redness. Respiratory: Negative. Cardiovascular: Negative for chest pain, palpitations and leg swelling. Gastrointestinal: Negative. Negative for constipation. Genitourinary: Negative. Musculoskeletal: Negative for myalgias. Skin:lump on left nasolabial fold Neurological: Negative. Endo/Heme/Allergies: Negative. Psychiatric/Behavioral: Negative for depression and memory loss. The patient does not have insomnia. Physical Exam  
Constitutional: She is oriented to person, place, and time. She appears well-developed and well-nourished. HENT:  
Head: Normocephalic. Eyes: Conjunctivae and EOM are normal. Pupils are equal, round, and reactive to light. Neck: Normal range of motion. Neck supple. No JVD present. No tracheal deviation present. No thyromegaly present. Cardiovascular: Normal rate, regular rhythm and normal heart sounds. No murmur heard. Pulmonary/Chest: Breath sounds normal.  
Abdominal: Soft. Bowel sounds are normal.  
Musculoskeletal: Normal range of motion. Lymphadenopathy:  
  She has no cervical adenopathy. Neurological: She is alert and oriented to person, place, and time. She has pain, tingling and numbness on both feet Skin: Skin is warm. Psychiatric: She has a normal mood and affect. No disparity noted on LE  
 
 
Diabetic foot exam: nov 2018 Left Foot: 
 Visual Exam: normal  
 Pulse DP: 2+ (normal) Filament test: absent sensation Vibratory sensation: absent Right Foot: 
 Visual Exam: normal  
 Pulse DP: 2+ (normal) Filament test: absent sensation Vibratory sensation: absent Lab Results Component Value Date/Time Hemoglobin A1c 6.4 (H) 11/02/2018 08:24 AM  
 Hemoglobin A1c 6.7 (H) 05/18/2018 02:19 PM  
 Hemoglobin A1c 7.2 (H) 11/09/2017 08:30 AM  
 Hemoglobin A1c, External 9.0 03/18/2016 Glucose 131 (H) 11/02/2018 08:24 AM  
 Glucose (POC) 172 (H) 02/06/2009 01:29 PM  
 Glucose  05/20/2016 04:58 PM  
 Microalb/Creat ratio (ug/mg creat.) 15.8 11/02/2018 08:24 AM  
 LDL, calculated Comment 11/02/2018 08:24 AM  
 Creatinine (POC) 0.8 02/21/2012 06:03 PM  
 Creatinine 0.81 11/02/2018 08:24 AM  
  
Lab Results Component Value Date/Time Cholesterol, total 235 (H) 11/02/2018 08:24 AM  
 HDL Cholesterol 43 11/02/2018 08:24 AM  
 LDL, calculated Comment 11/02/2018 08:24 AM  
 Triglyceride 456 (H) 11/02/2018 08:24 AM  
 
 
Lab Results Component Value Date/Time ALT (SGPT) 15 11/02/2018 08:24 AM  
 AST (SGOT) 12 11/02/2018 08:24 AM  
 Alk. phosphatase 61 11/02/2018 08:24 AM  
 Bilirubin, total 0.2 11/02/2018 08:24 AM  
 
     
  
 
ASSESSMENT and PLAN 1. Type 2 DM, un controlled : a1c is   6.4 %       From nov 2018      Compared to    6.7 %    From  May 2018     Compared to   7.2 %     From   Nov 2017    Compared to  6.4  %     From    July 2017   Compared to  6.6 %       From     April 2017     Compared to   6.8 %    From jan 2017   Compared to   6.6 %     From oct 2016  Compared to   7% from July 2016  Compared to    8.2 %    From may 2016 compared to  9 %    From march 2016 No  log for review  
 metformin et 1000 mg bid She could not tolerate [place]  In the past  
 
 
Continuing on lantus and  Novolog  before breakfast, lunch and dinner. Suggested changing to NPH and regular insulins Patient is advised about checking blood sugars 4 times a day and maintaining log book. The danger of having low blood sugars has been explained with inappropriate use of insulin  Patient voiced understanding and using the printed instructions at home. Hypoglycemia management has been explained to the patient. lab results and schedule of future lab studies reviewed with patient 2. Hypoglycemia :  Educated on treating the hypoglycemia. Discussed insulin use and prescribed 3. HTN : continue coreg and cozaar As BP is low, advised her to stop hctz. Also she is on synjardy Patient is educated about importance of compliance with anti-hypertensives especially ARB/ACEI 4. Dyslipidemia : lipids are very high . Start on pravachol  40 mg hs   ( TG are high ) Discussed low  fat diet Patient is educated about benefits and adverse effects of statins and explained how benefits outweigh risk. 5. use of aspirin to prevent MI and TIA's discussed 6. Diabetic complications :  
 
A. Retinopathy-NO   educated on this complication,  regular f/u with ophthalmologist encouraged B. Nephropathy - NO    
educated on this disease and indicated stages and its prognosis. C. Peripheral Neuropathy - YES  severe On cymbalta increased to 60 mg hs   And  on  neurontin 800 mg tid  
recommending   Metanx ( too expensive ) 
 
 
she has maintained the glycemic control very well ( may have had bad control prior to being under my care ) She has no other microvascular complications from DM She has back issues  And she is restricted from several activities by her spine doc , [facility] She has chemotherapy induced neuropathy also Reviewed  EMG She has severe neuropathy , lost proprioception and has balance issues ,  Had a fall She has numbness on left leg - no better She is being continued to stay on short term disability  From - April 27 2017 She deferred spinal stimulator for fear of infection She cannot tolerate Lyrica She can only take gabapentin E : CAD : YES Cardiomyopathy , after chemo- adriamycin Right breast cancer - treated and  Has lymphedema on right UE She has lymph pump put in  In 2009 she says She reports cellulitis on and off She got papers from [employer] last week and wanted to extend the leave off from work - I refused to fill papers/ extent leave  - did not remember From my previous conversation, she said pcp is starting the leave D/w Dr. Lisa Galdamez , he agrees to her developing chemo induced neuropathy D/ w Dr. Danyel Quinn who concurs that she has severe neuropathy leading loss of balance 7. Obesity  : Body mass index is 48.1 kg/m². Difficulty in mobility with  Neuropathy F/u in 6  Months > 50 % of time is spent on counseling Patient voiced understanding her plan of care

## 2018-11-09 NOTE — PROGRESS NOTES
1. Have you been to the ER, urgent care clinic since your last visit? Hospitalized since your last visit? No 
 
2. Have you seen or consulted any other health care providers outside of the [address] [place] [address] since your last visit? Include any pap smears or colon screening. No  
 
Lab Results Component Value Date/Time Hemoglobin A1c 6.4 (H) 11/02/2018 08:24 AM  
 Hemoglobin A1c (POC) 6.3 05/25/2018 09:13 AM  
 Hemoglobin A1c, External 9.0 03/18/2016 Wt Readings from Last 3 Encounters:  
11/09/18 280 lb 3.2 oz (127.1 kg) 05/25/18 276 lb 14.4 oz (125.6 kg)  
11/17/17 276 lb 1.6 oz (125.2 kg) Temp Readings from Last 3 Encounters:  
11/09/18 97.9 °F (36.6 °C) (Oral) 05/25/18 96.7 °F (35.9 °C) (Oral)  
11/17/17 98.2 °F (36.8 °C) (Oral) BP Readings from Last 3 Encounters:  
11/09/18 113/65  
05/25/18 92/59  
11/17/17 111/75 Pulse Readings from Last 3 Encounters:  
11/09/18 77  
05/25/18 82  
11/17/17 86

## 2018-11-09 NOTE — PATIENT INSTRUCTIONS
Metformin er 500 mg 2 pills twice a day Stay  on pravachol 40 mg at night Check blood sugars immediately before each meal and at bedtime Start on   NPH  insulin  50 units  at bed time 
 
 novolog  Or    Regular   insulin 7 units before breakfast, 7 units before lunch and 8 units before dinner. Vials Also, add additional novolog  as follows with meals  If blood sugars are[de-identified] 
 
150-200 mg 2 units 201-250 mg 4 units 251-300 mg 6 units 301-350 mg 8 units 351-400 mg 10 units 401-450 mg 12 units 451-500 mg 14 units Less than 70 mg NO INSULIN 
 
 
 
 
Do not skip meals Do not eat in between meals Reduce carbs- pasta, rice, potatoes, bread Do not drink juices or sodas Donot eat peanut butter Do not eat sugar free cookies and cakes Do not eat peaches, grapes, pineapples, oranges

## 2018-11-13 RX ORDER — METFORMIN HYDROCHLORIDE 500 MG/1
1000 TABLET, EXTENDED RELEASE ORAL 2 TIMES DAILY WITH MEALS
Qty: 120 TAB | Refills: 6 | Status: SHIPPED | OUTPATIENT
Start: 2018-11-13 | End: 2019-05-16 | Stop reason: SDUPTHER

## 2018-11-13 RX ORDER — PRAVASTATIN SODIUM 40 MG/1
40 TABLET ORAL
Qty: 30 TAB | Refills: 6 | Status: SHIPPED | OUTPATIENT
Start: 2018-11-13 | End: 2019-05-16 | Stop reason: SDUPTHER

## 2018-12-14 RX ORDER — DULOXETIN HYDROCHLORIDE 60 MG/1
CAPSULE, DELAYED RELEASE ORAL
Qty: 30 CAP | Refills: 0 | Status: SHIPPED | OUTPATIENT
Start: 2018-12-14 | End: 2019-01-10 | Stop reason: SDUPTHER

## 2019-01-10 RX ORDER — DULOXETIN HYDROCHLORIDE 60 MG/1
CAPSULE, DELAYED RELEASE ORAL
Qty: 30 CAP | Refills: 0 | Status: SHIPPED | OUTPATIENT
Start: 2019-01-10 | End: 2019-02-22 | Stop reason: SDUPTHER

## 2019-02-22 RX ORDER — DULOXETIN HYDROCHLORIDE 60 MG/1
CAPSULE, DELAYED RELEASE ORAL
Qty: 30 CAP | Refills: 0 | Status: SHIPPED | OUTPATIENT
Start: 2019-02-22 | End: 2019-03-23 | Stop reason: SDUPTHER

## 2019-03-12 DIAGNOSIS — E78.2 MIXED HYPERLIPIDEMIA: ICD-10-CM

## 2019-03-12 DIAGNOSIS — E78.1 HYPERTRIGLYCERIDEMIA: ICD-10-CM

## 2019-03-13 LAB
CHOLEST SERPL-MCNC: 271 MG/DL (ref 100–199)
HDLC SERPL-MCNC: 48 MG/DL
INTERPRETATION, 910389: NORMAL
LDLC SERPL CALC-MCNC: ABNORMAL MG/DL (ref 0–99)
LDLC SERPL DIRECT ASSAY-MCNC: 149 MG/DL (ref 0–99)
TRIGL SERPL-MCNC: 458 MG/DL (ref 0–149)
VLDLC SERPL CALC-MCNC: ABNORMAL MG/DL (ref 5–40)

## 2019-04-04 LAB — MAMMOGRAPHY, EXTERNAL: 0

## 2019-05-10 LAB
ALBUMIN SERPL-MCNC: 4.3 G/DL (ref 3.5–5.5)
ALBUMIN/CREAT UR: <15.6 MG/G CREAT (ref 0–30)
ALBUMIN/GLOB SERPL: 1.6 {RATIO} (ref 1.2–2.2)
ALP SERPL-CCNC: 58 IU/L (ref 39–117)
ALT SERPL-CCNC: 15 IU/L (ref 0–32)
AST SERPL-CCNC: 13 IU/L (ref 0–40)
BILIRUB SERPL-MCNC: 0.2 MG/DL (ref 0–1.2)
BUN SERPL-MCNC: 12 MG/DL (ref 6–24)
BUN/CREAT SERPL: 13 (ref 9–23)
CALCIUM SERPL-MCNC: 9.6 MG/DL (ref 8.7–10.2)
CHLORIDE SERPL-SCNC: 102 MMOL/L (ref 96–106)
CHOLEST SERPL-MCNC: 286 MG/DL (ref 100–199)
CO2 SERPL-SCNC: 23 MMOL/L (ref 20–29)
CREAT SERPL-MCNC: 0.89 MG/DL (ref 0.57–1)
CREAT UR-MCNC: 19.2 MG/DL
EST. AVERAGE GLUCOSE BLD GHB EST-MCNC: 148 MG/DL
GLOBULIN SER CALC-MCNC: 2.7 G/DL (ref 1.5–4.5)
GLUCOSE SERPL-MCNC: 126 MG/DL (ref 65–99)
HBA1C MFR BLD: 6.8 % (ref 4.8–5.6)
HDLC SERPL-MCNC: 48 MG/DL
INTERPRETATION, 910389: NORMAL
LDLC SERPL CALC-MCNC: 169 MG/DL (ref 0–99)
Lab: NORMAL
MICROALBUMIN UR-MCNC: <3 UG/ML
POTASSIUM SERPL-SCNC: 4.7 MMOL/L (ref 3.5–5.2)
PROT SERPL-MCNC: 7 G/DL (ref 6–8.5)
SODIUM SERPL-SCNC: 140 MMOL/L (ref 134–144)
TRIGL SERPL-MCNC: 344 MG/DL (ref 0–149)
VLDLC SERPL CALC-MCNC: 69 MG/DL (ref 5–40)

## 2019-05-16 ENCOUNTER — OFFICE VISIT (OUTPATIENT)
Dept: ENDOCRINOLOGY | Age: 54
End: 2019-05-16

## 2019-05-16 VITALS
BODY MASS INDEX: 44.44 KG/M2 | HEIGHT: 64 IN | HEART RATE: 77 BPM | DIASTOLIC BLOOD PRESSURE: 74 MMHG | WEIGHT: 260.3 LBS | RESPIRATION RATE: 18 BRPM | OXYGEN SATURATION: 95 % | TEMPERATURE: 97.9 F | SYSTOLIC BLOOD PRESSURE: 107 MMHG

## 2019-05-16 DIAGNOSIS — Z79.4 UNCONTROLLED TYPE 2 DIABETES MELLITUS WITH HYPERGLYCEMIA, WITH LONG-TERM CURRENT USE OF INSULIN (HCC): Primary | ICD-10-CM

## 2019-05-16 DIAGNOSIS — E11.65 UNCONTROLLED TYPE 2 DIABETES MELLITUS WITH HYPERGLYCEMIA, WITH LONG-TERM CURRENT USE OF INSULIN (HCC): Primary | ICD-10-CM

## 2019-05-16 DIAGNOSIS — G62.9 NEUROPATHY: ICD-10-CM

## 2019-05-16 DIAGNOSIS — E78.2 MIXED HYPERLIPIDEMIA: ICD-10-CM

## 2019-05-16 RX ORDER — METFORMIN HYDROCHLORIDE 500 MG/1
1000 TABLET, EXTENDED RELEASE ORAL 2 TIMES DAILY WITH MEALS
Qty: 120 TAB | Refills: 6 | Status: SHIPPED | OUTPATIENT
Start: 2019-05-16 | End: 2019-11-21 | Stop reason: SDUPTHER

## 2019-05-16 RX ORDER — VENLAFAXINE 75 MG/1
75 TABLET ORAL 2 TIMES DAILY
COMMUNITY
End: 2019-11-21 | Stop reason: ALTCHOICE

## 2019-05-16 RX ORDER — ICOSAPENT ETHYL 1000 MG/1
1 CAPSULE ORAL DAILY
COMMUNITY
End: 2019-11-21 | Stop reason: ALTCHOICE

## 2019-05-16 RX ORDER — PRAVASTATIN SODIUM 40 MG/1
40 TABLET ORAL
Qty: 30 TAB | Refills: 6 | Status: SHIPPED | OUTPATIENT
Start: 2019-05-16 | End: 2019-11-21 | Stop reason: ALTCHOICE

## 2019-05-16 RX ORDER — LATANOPROST 50 UG/ML
1 SOLUTION/ DROPS OPHTHALMIC
Status: ON HOLD | COMMUNITY

## 2019-05-16 RX ORDER — HYDROCODONE BITARTRATE AND ACETAMINOPHEN 10; 325 MG/1; MG/1
1 TABLET ORAL
COMMUNITY
End: 2021-11-18

## 2019-05-16 NOTE — PATIENT INSTRUCTIONS
salivary swabs at 11 pm      Metformin er 500 mg 2 pills twice a day       Stay  on pravachol 40 mg at night       Check blood sugars immediately before each meal and at bedtime      novolin  NPH  insulin  50 units  at bed time     novolin   Regular   insulin 7 units before breakfast, 7 units before lunch and 8 units before dinner.    Vials    Also, add additional novolin  R as follows with meals  If blood sugars are[de-identified]    150-200 mg 2 units    201-250 mg 4 units    251-300 mg 6 units    301-350 mg 8 units    351-400 mg 10 units    401-450 mg 12 units    451-500 mg 14 units     Less than 70 mg NO INSULIN          Do not skip meals  Do not eat in between meals    Reduce carbs- pasta, rice, potatoes, bread   Do not drink juices or sodas  Donot eat peanut butter     Do not eat sugar free cookies and cakes   Do not eat peaches, grapes, pineapples, oranges

## 2019-05-16 NOTE — PROGRESS NOTES
HISTORY OF PRESENT ILLNESS  Luis Garza is a 62 y.o. female.   HPI  Patient here for  f/u  After last visit of Type 2 diabetes mellitus  From November 2018     Pt lost 20 lbs   She did water aerobics and now doing weight watchers     She has early stage glaucoma   She is hoping to get bariatric surgery   She is now having primary care doc Dr. Maeve Walden at [address] [place] [address] hsitory       Gained 4 lbs   She is compliant with insulins   No meter   She is now starting with rheumatologist         Old history   Weight is stable   She is OFF  Pain meds ( too expensive )  Got into disability, long term  She deferred the spinal stimulator  Because of complications     Compliant with diabetic meds           Prior history :    Referred : by self/pcp    H/o diabetes for 30 years     Current A1C is 9 % and symptoms/problems include polyuria, polydipsia and visual disturbances     Current diabetic medications include intensive insulin injection program. And metformin   novolog by 10 units tid plus sliding scale and levemir  36 units hs       Current monitoring regimen: home blood tests - 2 times daily  Home blood sugar records: trend: fluctuating a lot  Any episodes of hypoglycemia? no    Weight trend: fluctuating a lot  Prior visit with dietician: no  Current diet: \"unhealthy\" diet in general  Current exercise: no regular exercise    Known diabetic complications: peripheral neuropathy  Cardiovascular risk factors: dyslipidemia, diabetes mellitus, obesity, hypertension, stress, post-menopausal    Eye exam current (within one year): yes  TRISTEN: no     Past Medical History:   Diagnosis Date    [facility] ([facility] 2009    RIGHT breast    Diabetes ([facility]     Hypercholesterolemia     Hypertension      Past Surgical History:   Procedure Laterality Date    HX APPENDECTOMY      HX BREAST LUMPECTOMY  2009    RIGHT with SNBX    HX GYN      HX HEENT      HX UROLOGICAL       Current Outpatient Medications   Medication Sig    venlafaxine (EFFEXOR) 75 mg tablet Take 75 mg by mouth two (2) times a day.  latanoprost (XALATAN) 0.005 % ophthalmic solution Administer 1 Drop to both eyes nightly.  HYDROcodone-acetaminophen (NORCO)  mg tablet Take 1 Tab by mouth every eight (8) hours as needed for Pain.  icosapent ethyl (VASCEPA) 1 gram capsule Take 1 Cap by mouth daily.  DULoxetine (CYMBALTA) 60 mg capsule TAKE 1 CAPSULE BY MOUTH DAILY. STOP 30 MG DOSE    metFORMIN ER (GLUCOPHAGE XR) 500 mg tablet Take 2 Tabs by mouth two (2) times daily (with meals). Stop synjardy    insulin regular (NOVOLIN R REGULAR U-100 INSULN) 100 unit/mL injection Inject 7 units before breakfast, 7 units before lunch and 8 units before dinner. Plus sliding scale to max 64 units daily    insulin NPH (HUMULIN N) 100 unit/mL (3 mL) inpn Inject 50 units at bedtime    gabapentin (NEURONTIN) 800 mg tablet Take 1 Tab by mouth three (3) times daily.  Lancets (ACCU-CHEK FASTCLIX) misc Test 4 times daily. Dx code E11.65    carvedilol (COREG) 12.5 mg tablet Take 1 Tab by mouth daily.  triamcinolone acetonide (KENALOG) 0.1 % topical cream     NOVOFINE 30 30 gauge x 1/3\"     multivitamin (ONE A DAY) tablet Take 1 Tab by mouth daily.  biotin 500 mcg Cap Take  by mouth.  pravastatin (PRAVACHOL) 40 mg tablet Take 1 Tab by mouth nightly.  levorphanol (LEVO-DROMORAN) 2 mg tablet Take 1 tablet every 8 hours by oral route as directed for 30 days.  dicloxacillin (DYNAPEN) 500 mg capsule TAKE ONE CAPSULE BY MOUTH FOUR TIMES DAILY FOR 7 DAYS.  NUCYNTA 50 mg tablet TK 1 T PO TID PRN    ACCU-CHEK SMARTVIEW TEST STRIP strip USE TO TEST FOUR TIMES DAILY    losartan (COZAAR) 25 mg tablet Take  by mouth daily. No current facility-administered medications for this visit. Review of Systems   Constitutional: Negative. HENT: Negative. Eyes: Negative for pain and redness. Respiratory: Negative.     Cardiovascular: Negative for chest pain, palpitations and leg swelling. Gastrointestinal: Negative. Negative for constipation. Genitourinary: Negative. Musculoskeletal: Negative for myalgias. Skin:lump on left nasolabial fold   Neurological: Negative. Endo/Heme/Allergies: Negative. Psychiatric/Behavioral: Negative for depression and memory loss. The patient does not have insomnia. Physical Exam   Constitutional: She is oriented to person, place, and time. She appears well-developed and well-nourished. HENT:   Head: Normocephalic. Eyes: Conjunctivae and EOM are normal. Pupils are equal, round, and reactive to light. Neck: Normal range of motion. Neck supple. No JVD present. No tracheal deviation present. No thyromegaly present. Cardiovascular: Normal rate, regular rhythm and normal heart sounds. No murmur heard. Pulmonary/Chest: Breath sounds normal.   Abdominal: Soft. Bowel sounds are normal.   Musculoskeletal: she has right wrist splint    Lymphadenopathy:     She has no cervical adenopathy. Neurological: She is alert and oriented to person, place, and time. She has pain, tingling and numbness on both feet   Skin: Skin is warm. Psychiatric: She has a normal mood and affect.    No disparity noted on LE       Diabetic foot exam: nov 2018     Left Foot:   Visual Exam: normal    Pulse DP: 2+ (normal)   Filament test: absent sensation    Vibratory sensation: absent      Right Foot:   Visual Exam: normal    Pulse DP: 2+ (normal)   Filament test: absent sensation    Vibratory sensation: absent          Lab Results   Component Value Date/Time    Hemoglobin A1c 6.8 (H) 05/09/2019 08:44 AM    Hemoglobin A1c 6.4 (H) 11/02/2018 08:24 AM    Hemoglobin A1c 6.7 (H) 05/18/2018 02:19 PM    Hemoglobin A1c, External 9.0 03/18/2016    Glucose 126 (H) 05/09/2019 08:44 AM    Glucose (POC) 172 (H) 02/06/2009 01:29 PM    Glucose  05/20/2016 04:58 PM    Microalb/Creat ratio (ug/mg creat.) <15.6 05/09/2019 08:44 AM    LDL,Direct 149 (H) 03/12/2019 09:26 AM    LDL, calculated 169 (H) 05/09/2019 08:44 AM    Creatinine (POC) 0.8 02/21/2012 06:03 PM    Creatinine 0.89 05/09/2019 08:44 AM      Lab Results   Component Value Date/Time    Cholesterol, total 286 (H) 05/09/2019 08:44 AM    HDL Cholesterol 48 05/09/2019 08:44 AM    LDL,Direct 149 (H) 03/12/2019 09:26 AM    LDL, calculated 169 (H) 05/09/2019 08:44 AM    Triglyceride 344 (H) 05/09/2019 08:44 AM       Lab Results   Component Value Date/Time    ALT (SGPT) 15 05/09/2019 08:44 AM    AST (SGOT) 13 05/09/2019 08:44 AM    Alk. phosphatase 58 05/09/2019 08:44 AM    Bilirubin, total 0.2 05/09/2019 08:44 AM                ASSESSMENT and PLAN  1. Type 2 DM, un controlled : a1c is   6.8  %     From may 2019     Compared to  6.4 %       From nov 2018      Compared to    6.7 %    From  May 2018     Compared to   7.2 %     From   Nov 2017    Compared to  6.4  %     From    July 2017   Compared to  6.6 %       From     April 2017     Compared to   6.8 %    From jan 2017   Compared to   6.6 %     From oct 2016  Compared to   7% from July 2016  Compared to    8.2 %    From may 2016 compared to  9 %    From march 2016     She got the   log for review    metformin er 1000 mg bid   She could not tolerate januvia  In the past       Continuing on lantus and  Novolog  before breakfast, lunch and dinner. Suggested changing to NPH and regular insulins to accomodate the cost     Patient is advised about checking blood sugars 4 times a day and maintaining log book. The danger of having low blood sugars has been explained with inappropriate use of insulin  Patient voiced understanding and using the printed instructions at home. Hypoglycemia management has been explained to the patient. lab results and schedule of future lab studies reviewed with patient      2. Hypoglycemia :  Educated on treating the hypoglycemia. Discussed insulin use and prescribed    3.  HTN : continue coreg and cozaar   As BP is low, advised her to stop hctz. Also she is on synjardy   Patient is educated about importance of compliance with anti-hypertensives especially ARB/ACEI    4. Dyslipidemia : lipids are very high . Start on pravachol  40 mg hs   ( TG are high )  Discussed low  fat diet    Patient is educated about benefits and adverse effects of statins and explained how benefits outweigh risk. 5. use of aspirin to prevent MI and TIA's discussed      6. Diabetic complications :     A. Retinopathy-NO   educated on this complication,  regular f/u with ophthalmologist encouraged    B. Nephropathy - NO     educated on this disease and indicated stages and its prognosis.      C. Peripheral Neuropathy - YES  severe  On cymbalta increased to 60 mg hs   And  on  neurontin 800 mg tid   recommending   Metanx ( too expensive )      she has maintained the glycemic control very well ( may have had bad control prior to being under my care )  She has no other microvascular complications from DM    She has back issues  And she is restricted from several activities by her spine doc , [facility]   She has chemotherapy induced neuropathy also   Reviewed  EMG   She has severe neuropathy , lost proprioception and has balance issues ,  Had a fall   She has numbness on left leg - no better  She is being continued to stay on short term disability  From - April 27 2017     She deferred spinal stimulator for fear of infection  She cannot tolerate Lyrica   She can only take gabapentin      E : CAD : YES  Cardiomyopathy , after chemo- adriamycin      Right breast cancer - treated and  Has lymphedema on right UE   She has lymph pump put in  In 2009 she says   She reports cellulitis on and off       She got papers from [employer] last week and wanted to extend the leave off from work - I refused to fill papers/ extent leave  - did not remember   From my previous conversation, she said pcp is starting the leave     D/w Dr. Marin Schaumann , he agrees to her developing chemo induced neuropathy     D/ w Dr. Sunil Victor who concurs that she has severe neuropathy leading loss of balance        7. Obesity  : Body mass index is 44.68 kg/m².   Difficulty in mobility with  Neuropathy   She is now seeing pcp at [facility]   She would like to get bariatric surgery  Done   Lost 20 lbs by TLC           F/u in 6  Months      > 50 % of time is spent on counseling   Patient voiced understanding her plan of care

## 2019-05-16 NOTE — LETTER
5/16/19 Patient: Ayleen Cornejo YOB: 1965 Date of Visit: 5/16/2019 Andee Leyden, MD 
[place] [address] VIA Facsimile: [phone] Dear Andee Leyden, MD, Thank you for referring Ms. Luis Garza to [facility] for evaluation. My notes for this consultation are attached. If you have questions, please do not hesitate to call me. I look forward to following your patient along with you. Sincerely, Cata Ravi MD

## 2019-05-29 DIAGNOSIS — Z79.4 UNCONTROLLED TYPE 2 DIABETES MELLITUS WITH HYPERGLYCEMIA, WITH LONG-TERM CURRENT USE OF INSULIN (HCC): ICD-10-CM

## 2019-05-29 DIAGNOSIS — E11.65 UNCONTROLLED TYPE 2 DIABETES MELLITUS WITH HYPERGLYCEMIA, WITH LONG-TERM CURRENT USE OF INSULIN (HCC): ICD-10-CM

## 2019-05-29 DIAGNOSIS — E78.2 MIXED HYPERLIPIDEMIA: ICD-10-CM

## 2019-05-29 DIAGNOSIS — G62.9 NEUROPATHY: ICD-10-CM

## 2019-05-30 LAB
C PEPTIDE SERPL-MCNC: 3.7 NG/ML (ref 1.1–4.4)
GAD65 AB SER IA-ACNC: <5 U/ML (ref 0–5)
TSH SERPL DL<=0.005 MIU/L-ACNC: 1.42 UIU/ML (ref 0.45–4.5)

## 2019-06-04 LAB
CORTIS BS SAL-MCNC: 0.03 UG/DL
CORTIS SP1 P CHAL SAL-MCNC: 0.03 UG/DL

## 2019-10-24 RX ORDER — DULOXETIN HYDROCHLORIDE 60 MG/1
CAPSULE, DELAYED RELEASE ORAL
Qty: 30 CAP | Refills: 0 | Status: SHIPPED | OUTPATIENT
Start: 2019-10-24 | End: 2019-11-21 | Stop reason: SDUPTHER

## 2019-11-14 ENCOUNTER — LAB ONLY (OUTPATIENT)
Dept: ENDOCRINOLOGY | Age: 54
End: 2019-11-14

## 2019-11-14 ENCOUNTER — HOSPITAL ENCOUNTER (OUTPATIENT)
Dept: LAB | Age: 54
Discharge: HOME | End: 2019-11-14

## 2019-11-14 DIAGNOSIS — E78.2 MIXED HYPERLIPIDEMIA: ICD-10-CM

## 2019-11-14 DIAGNOSIS — G62.0 NEUROPATHY DUE TO CHEMOTHERAPEUTIC DRUG (HCC): ICD-10-CM

## 2019-11-14 DIAGNOSIS — E11.65 UNCONTROLLED TYPE 2 DIABETES MELLITUS WITH HYPERGLYCEMIA, WITH LONG-TERM CURRENT USE OF INSULIN (HCC): ICD-10-CM

## 2019-11-14 DIAGNOSIS — G62.9 NEUROPATHY: ICD-10-CM

## 2019-11-14 DIAGNOSIS — Z79.4 UNCONTROLLED TYPE 2 DIABETES MELLITUS WITH HYPERGLYCEMIA, WITH LONG-TERM CURRENT USE OF INSULIN (HCC): Primary | ICD-10-CM

## 2019-11-14 DIAGNOSIS — T45.1X5A NEUROPATHY DUE TO CHEMOTHERAPEUTIC DRUG (HCC): ICD-10-CM

## 2019-11-14 DIAGNOSIS — E11.65 UNCONTROLLED TYPE 2 DIABETES MELLITUS WITH HYPERGLYCEMIA, WITH LONG-TERM CURRENT USE OF INSULIN (HCC): Primary | ICD-10-CM

## 2019-11-14 DIAGNOSIS — Z79.4 UNCONTROLLED TYPE 2 DIABETES MELLITUS WITH HYPERGLYCEMIA, WITH LONG-TERM CURRENT USE OF INSULIN (HCC): ICD-10-CM

## 2019-11-14 LAB
ALBUMIN SERPL-MCNC: 3.8 G/DL (ref 3.5–5)
ALBUMIN/GLOB SERPL: 1.1 {RATIO} (ref 1.1–2.2)
ALP SERPL-CCNC: 53 U/L (ref 45–117)
ALT SERPL-CCNC: 20 U/L (ref 12–78)
ANION GAP SERPL CALC-SCNC: 6 MMOL/L (ref 5–15)
AST SERPL-CCNC: 9 U/L (ref 15–37)
BILIRUB SERPL-MCNC: 0.3 MG/DL (ref 0.2–1)
BUN SERPL-MCNC: 9 MG/DL (ref 6–20)
BUN/CREAT SERPL: 12 (ref 12–20)
CALCIUM SERPL-MCNC: 9.3 MG/DL (ref 8.5–10.1)
CHLORIDE SERPL-SCNC: 103 MMOL/L (ref 97–108)
CHOLEST SERPL-MCNC: 290 MG/DL
CO2 SERPL-SCNC: 28 MMOL/L (ref 21–32)
CREAT SERPL-MCNC: 0.75 MG/DL (ref 0.55–1.02)
CREAT UR-MCNC: 210 MG/DL
EST. AVERAGE GLUCOSE BLD GHB EST-MCNC: 143 MG/DL
GLOBULIN SER CALC-MCNC: 3.5 G/DL (ref 2–4)
GLUCOSE SERPL-MCNC: 145 MG/DL (ref 65–100)
HBA1C MFR BLD: 6.6 % (ref 4–5.6)
HDLC SERPL-MCNC: 53 MG/DL
HDLC SERPL: 5.5 {RATIO} (ref 0–5)
LDLC SERPL CALC-MCNC: 158.2 MG/DL (ref 0–100)
LIPID PROFILE,FLP: ABNORMAL
MICROALBUMIN UR-MCNC: 3.67 MG/DL
MICROALBUMIN/CREAT UR-RTO: 17 MG/G (ref 0–30)
POTASSIUM SERPL-SCNC: 4.7 MMOL/L (ref 3.5–5.1)
PROT SERPL-MCNC: 7.3 G/DL (ref 6.4–8.2)
SODIUM SERPL-SCNC: 137 MMOL/L (ref 136–145)
TRIGL SERPL-MCNC: 394 MG/DL (ref ?–150)
VLDLC SERPL CALC-MCNC: 78.8 MG/DL

## 2019-11-21 ENCOUNTER — OFFICE VISIT (OUTPATIENT)
Dept: ENDOCRINOLOGY | Age: 54
End: 2019-11-21

## 2019-11-21 VITALS
SYSTOLIC BLOOD PRESSURE: 105 MMHG | RESPIRATION RATE: 18 BRPM | OXYGEN SATURATION: 95 % | HEART RATE: 74 BPM | DIASTOLIC BLOOD PRESSURE: 69 MMHG | HEIGHT: 64 IN | BODY MASS INDEX: 44.2 KG/M2 | TEMPERATURE: 98.1 F | WEIGHT: 258.9 LBS

## 2019-11-21 DIAGNOSIS — E11.65 UNCONTROLLED TYPE 2 DIABETES MELLITUS WITH HYPERGLYCEMIA, WITH LONG-TERM CURRENT USE OF INSULIN (HCC): Primary | ICD-10-CM

## 2019-11-21 DIAGNOSIS — G62.9 NEUROPATHY: ICD-10-CM

## 2019-11-21 DIAGNOSIS — Z79.4 ENCOUNTER FOR LONG-TERM (CURRENT) USE OF INSULIN (HCC): ICD-10-CM

## 2019-11-21 DIAGNOSIS — Z79.4 UNCONTROLLED TYPE 2 DIABETES MELLITUS WITH HYPERGLYCEMIA, WITH LONG-TERM CURRENT USE OF INSULIN (HCC): Primary | ICD-10-CM

## 2019-11-21 DIAGNOSIS — E78.2 MIXED HYPERLIPIDEMIA: ICD-10-CM

## 2019-11-21 RX ORDER — METFORMIN HYDROCHLORIDE 500 MG/1
1000 TABLET, EXTENDED RELEASE ORAL 2 TIMES DAILY WITH MEALS
Qty: 120 TAB | Refills: 6 | Status: SHIPPED | OUTPATIENT
Start: 2019-11-21 | End: 2020-01-19

## 2019-11-21 RX ORDER — TRAZODONE HYDROCHLORIDE 100 MG/1
100 TABLET ORAL
Refills: 1 | Status: ON HOLD | COMMUNITY
Start: 2019-10-24

## 2019-11-21 RX ORDER — POTASSIUM CHLORIDE 20 MEQ/1
TABLET, EXTENDED RELEASE ORAL
Refills: 5 | COMMUNITY
Start: 2019-09-10 | End: 2022-05-20

## 2019-11-21 RX ORDER — ALLOPURINOL 100 MG/1
TABLET ORAL
Refills: 4 | COMMUNITY
Start: 2019-10-23 | End: 2020-11-19 | Stop reason: ALTCHOICE

## 2019-11-21 RX ORDER — TIMOLOL MALEATE 2.5 MG/ML
1 SOLUTION/ DROPS OPHTHALMIC 2 TIMES DAILY
COMMUNITY
End: 2020-11-19 | Stop reason: SDUPTHER

## 2019-11-21 RX ORDER — DULOXETIN HYDROCHLORIDE 60 MG/1
CAPSULE, DELAYED RELEASE ORAL
Qty: 30 CAP | Refills: 0 | Status: SHIPPED | OUTPATIENT
Start: 2019-11-21 | End: 2019-12-22

## 2019-11-21 RX ORDER — FUROSEMIDE 20 MG/1
TABLET ORAL
Refills: 5 | COMMUNITY
Start: 2019-11-10 | End: 2022-05-20

## 2019-11-21 NOTE — PROGRESS NOTES
HISTORY OF PRESENT ILLNESS  Luis Garza is a 62 y.o. female.   HPI  Patient here for  f/u  After last visit of Type 2 diabetes mellitus  From may 2019     Going for bariatric surgery by Dr. Mony Patel at [facility] in [facility] 48 2 lbs     She is doing TLC      Old history  :   Pt lost 20 lbs   She did water aerobics and now doing weight watchers     She has early stage glaucoma   She is hoping to get bariatric surgery   She is now having primary care doc Dr. Grady Venegas at [address]   Gained 4 lbs   She is compliant with insulins   No meter   She is now starting with rheumatologist         Old history   Weight is stable   She is OFF  Pain meds ( too expensive )  Got into disability, long term  She deferred the spinal stimulator  Because of complications     Compliant with diabetic meds           Prior history :    Referred : by self/pcp    H/o diabetes for 30 years     Current A1C is 9 % and symptoms/problems include polyuria, polydipsia and visual disturbances     Current diabetic medications include intensive insulin injection program. And metformin   novolog by 10 units tid plus sliding scale and levemir  36 units hs       Current monitoring regimen: home blood tests - 2 times daily  Home blood sugar records: trend: fluctuating a lot  Any episodes of hypoglycemia? no    Weight trend: fluctuating a lot  Prior visit with dietician: no  Current diet: \"unhealthy\" diet in general  Current exercise: no regular exercise    Known diabetic complications: peripheral neuropathy  Cardiovascular risk factors: dyslipidemia, diabetes mellitus, obesity, hypertension, stress, post-menopausal    Eye exam current (within one year): yes  TRISTEN: no     Past Medical History:   Diagnosis Date    Cancer ([facility] [address] 2009    RIGHT breast    Diabetes ([facility] [address]     Hypercholesterolemia     Hypertension      Past Surgical History:   Procedure Laterality Date    HX APPENDECTOMY      HX BREAST LUMPECTOMY  2009    RIGHT with SNBX    HX GYN      HX HEENT      HX UROLOGICAL       Current Outpatient Medications   Medication Sig    timolol (TIMOPTIC) 0.25 % ophthalmic solution Administer 1 Drop to both eyes two (2) times a day.  allopurinol (ZYLOPRIM) 100 mg tablet TK 1 T PO QD    traZODone (DESYREL) 100 mg tablet TK 1 T PO QD HS    furosemide (LASIX) 20 mg tablet TK 1 T PO QD PRF WEIGHT GAIN OF 2 LBS OR MORE IN A DAY    potassium chloride (K-DUR, KLOR-CON) 20 mEq tablet TK 1 T PO QD PRN WHEN TAKING FUROSEMIDE    DULoxetine (CYMBALTA) 60 mg capsule TAKE 1 CAPSULE BY MOUTH DAILY. STOP 30 MG DOSE    latanoprost (XALATAN) 0.005 % ophthalmic solution Administer 1 Drop to both eyes nightly.  HYDROcodone-acetaminophen (NORCO)  mg tablet Take 1 Tab by mouth every eight (8) hours as needed for Pain.  metFORMIN ER (GLUCOPHAGE XR) 500 mg tablet Take 2 Tabs by mouth two (2) times daily (with meals). Stop synjardy    insulin regular (NOVOLIN R REGULAR U-100 INSULN) 100 unit/mL injection Inject 7 units before breakfast, 7 units before lunch and 8 units before dinner. Plus sliding scale to max 64 units daily    insulin NPH (NOVOLIN N NPH U-100 INSULIN) 100 unit/mL injection Inject 50 units at bedtime. Stop humulin    gabapentin (NEURONTIN) 800 mg tablet Take 1 Tab by mouth three (3) times daily.  Lancets (ACCU-CHEK FASTCLIX) misc Test 4 times daily. Dx code E11.65    carvedilol (COREG) 12.5 mg tablet Take 1 Tab by mouth daily.  triamcinolone acetonide (KENALOG) 0.1 % topical cream     NOVOFINE 30 30 gauge x 1/3\"     multivitamin (ONE A DAY) tablet Take 1 Tab by mouth daily.  biotin 500 mcg Cap Take  by mouth.  venlafaxine (EFFEXOR) 75 mg tablet Take 75 mg by mouth two (2) times a day.  icosapent ethyl (VASCEPA) 1 gram capsule Take 1 Cap by mouth daily.  pravastatin (PRAVACHOL) 40 mg tablet Take 1 Tab by mouth nightly.     insulin NPH (HUMULIN N) 100 unit/mL (3 mL) inpn Inject 50 units at bedtime     No current facility-administered medications for this visit. Review of Systems   Constitutional: Negative. HENT: Negative. Eyes: Negative for pain and redness. Respiratory: Negative. Cardiovascular: Negative for chest pain, palpitations and leg swelling. Gastrointestinal: Negative. Negative for constipation. Genitourinary: Negative. Musculoskeletal: Negative for myalgias. Skin:lump on left nasolabial fold   Neurological: Negative. Endo/Heme/Allergies: Negative. Psychiatric/Behavioral: Negative for depression and memory loss. The patient does not have insomnia. Physical Exam   Constitutional: She is oriented to person, place, and time. She appears well-developed and well-nourished. HENT:   Head: Normocephalic. Eyes: Conjunctivae and EOM are normal. Pupils are equal, round, and reactive to light. Neck: Normal range of motion. Neck supple. No JVD present. No tracheal deviation present. No thyromegaly present. Cardiovascular: Normal rate, regular rhythm and normal heart sounds. No murmur heard. Pulmonary/Chest: Breath sounds normal.   Abdominal: Soft. Bowel sounds are normal.   Musculoskeletal: she has right wrist splint    Lymphadenopathy:     She has no cervical adenopathy. Neurological: She is alert and oriented to person, place, and time. She has pain, tingling and numbness on both feet   Skin: Skin is warm. Psychiatric: She has a normal mood and affect.    No disparity noted on LE       Diabetic foot exam: nov 2018     Left Foot:   Visual Exam: normal    Pulse DP: 2+ (normal)   Filament test: absent sensation    Vibratory sensation: absent      Right Foot:   Visual Exam: normal    Pulse DP: 2+ (normal)   Filament test: absent sensation    Vibratory sensation: absent          Lab Results   Component Value Date/Time    Hemoglobin A1c 6.6 (H) 11/14/2019 10:11 AM    Hemoglobin A1c 6.8 (H) 05/09/2019 08:44 AM    Hemoglobin A1c 6.4 (H) 11/02/2018 08:24 AM    Hemoglobin A1c, External 9.0 03/18/2016    Glucose 145 (H) 11/14/2019 10:11 AM    Glucose (POC) 172 (H) 02/06/2009 01:29 PM    Glucose  05/20/2016 04:58 PM    Microalbumin/Creat ratio (mg/g creat) 17 11/14/2019 10:11 AM    Microalbumin,urine random 3.67 11/14/2019 10:11 AM    LDL,Direct 149 (H) 03/12/2019 09:26 AM    LDL, calculated 158.2 (H) 11/14/2019 10:11 AM    Creatinine (POC) 0.8 02/21/2012 06:03 PM    Creatinine 0.75 11/14/2019 10:11 AM      Lab Results   Component Value Date/Time    Cholesterol, total 290 (H) 11/14/2019 10:11 AM    HDL Cholesterol 53 11/14/2019 10:11 AM    LDL,Direct 149 (H) 03/12/2019 09:26 AM    LDL, calculated 158.2 (H) 11/14/2019 10:11 AM    Triglyceride 394 (H) 11/14/2019 10:11 AM    CHOL/HDL Ratio 5.5 (H) 11/14/2019 10:11 AM       Lab Results   Component Value Date/Time    ALT (SGPT) 20 11/14/2019 10:11 AM    AST (SGOT) 9 (L) 11/14/2019 10:11 AM    Alk. phosphatase 53 11/14/2019 10:11 AM    Bilirubin, total 0.3 11/14/2019 10:11 AM                ASSESSMENT and PLAN  1. Type 2 DM, un controlled : a1c is   6.6 %     From nov 2019  Compared to   6.8  %     From may 2019     Compared to  6.4 %       From nov 2018      Compared to    6.7 %    From  May 2018     Compared to   7.2 %     From   Nov 2017    Compared to  6.4  %     From    July 2017   Compared to  6.6 %       From     April 2017     Compared to   6.8 %    From jan 2017   Compared to   6.6 %     From oct 2016  Compared to   7% from July 2016  Compared to    8.2 %    From may 2016 compared to  9 %    From march 2016     She got the   log for review , good sugars    stay on  metformin er 1000 mg bid , stopped synjardy because of cost   She could not tolerate januvia  In the past       Continuing on lantus and  Novolog  before breakfast, lunch and dinner. Suggested changing to NPH and regular insulins to accomodate the cost     Patient is advised about checking blood sugars 4 times a day and maintaining log book.  The danger of having low blood sugars has been explained with inappropriate use of insulin  Patient voiced understanding and using the printed instructions at home. Hypoglycemia management has been explained to the patient. lab results and schedule of future lab studies reviewed with patient      2. Hypoglycemia :  Educated on treating the hypoglycemia. Discussed insulin use and prescribed    3. HTN : continue coreg and cozaar   As BP is low, advised her to stop hctz. Patient is educated about importance of compliance with anti-hypertensives especially ARB/ACEI    4. Dyslipidemia : lipids are very high along with TG   Could not tolerate any kinds of statins  Discussed low  fat diet    Patient is educated about benefits and adverse effects of statins and explained how benefits outweigh risk. 5. use of aspirin to prevent MI and TIA's discussed      6. Diabetic complications :     A. Retinopathy-NO   educated on this complication,  regular f/u with ophthalmologist encouraged    B. Nephropathy - NO     educated on this disease and indicated stages and its prognosis.      C. Peripheral Neuropathy - YES  severe  On cymbalta increased to 60 mg hs   And  on  neurontin 800 mg tid   recommending   Metanx ( too expensive )      she has maintained the glycemic control very well ( may have had bad control prior to being under my care )  She has no other microvascular complications from DM    She has back issues  And she is restricted from several activities by her spine doc , [facility]   She has chemotherapy induced neuropathy also   Reviewed  EMG   She has severe neuropathy , lost proprioception and has balance issues ,  Had a fall   She has numbness on left leg - no better  She is being continued to stay on short term disability  From - April 27 2017     She deferred spinal stimulator for fear of infection  She cannot tolerate Lyrica   She can only take gabapentin      E : CAD : YES  Cardiomyopathy , after chemo- adriamycin      H/o Right breast cancer - treated and  Has lymphedema on right UE   She has lymph pump put in  In 2009 she says   She reports cellulitis on and off       She got papers from [employer] last week and wanted to extend the leave off from work - I refused to fill papers/ extent leave  - did not remember   From my previous conversation, she said pcp is starting the leave     D/w Dr. Krystal Shea , he agrees to her developing chemo induced neuropathy     D/ w Dr. Roxanne Litten who concurs that she has severe neuropathy leading loss of balance        7. Obesity  : Body mass index is 44.44 kg/m².   Difficulty in mobility with  Neuropathy   She is now seeing pcp at [facility]   She would like to get bariatric surgery  Done             F/u in 6  Months      > 50 % of time is spent on counseling   Patient voiced understanding her plan of care

## 2019-11-21 NOTE — LETTER
11/23/19 Patient: Ganesh De La Garza YOB: 1965 Date of Visit: 11/21/2019 Reinhold Krabbe, MD 
[place] [address] VIA Facsimile: [phone] Dear Reinhold Krabbe, MD, Thank you for referring Ms. Luis Garza to [facility] for evaluation. My notes for this consultation are attached. If you have questions, please do not hesitate to call me. I look forward to following your patient along with you. Sincerely, Juan Alberto Ayon MD

## 2019-11-21 NOTE — PROGRESS NOTES
1. Have you been to the ER, urgent care clinic since your last visit? No  Hospitalized since your last visit? No    2. Have you seen or consulted any other health care providers outside of the [address] [place] [address] since your last visit? Include any pap smears or colon screening. No    Wt Readings from Last 3 Encounters:   11/21/19 258 lb 14.4 oz (117.4 kg)   05/16/19 260 lb 4.8 oz (118.1 kg)   11/09/18 280 lb 3.2 oz (127.1 kg)     Temp Readings from Last 3 Encounters:   11/21/19 98.1 °F (36.7 °C) (Oral)   05/16/19 97.9 °F (36.6 °C) (Oral)   11/09/18 97.9 °F (36.6 °C) (Oral)     BP Readings from Last 3 Encounters:   11/21/19 105/69   05/16/19 107/74   11/09/18 113/65     Pulse Readings from Last 3 Encounters:   11/21/19 74   05/16/19 77   11/09/18 77     Lab Results   Component Value Date/Time    Hemoglobin A1c 6.6 (H) 11/14/2019 10:11 AM    Hemoglobin A1c (POC) 6.3 05/25/2018 09:13 AM    Hemoglobin A1c, External 9.0 03/18/2016     Patient has meter today.

## 2019-11-21 NOTE — PATIENT INSTRUCTIONS
Metformin er 500 mg 2 pills twice a day     Stop  pravachol       Check blood sugars immediately before each meal and at bedtime      novolin  NPH  insulin  50 units  at bed time     novolin   Regular   insulin 7 units before breakfast, 7 units before lunch and 8 units before dinner.    Vials    Also, add additional novolin  R as follows with meals  If blood sugars are[de-identified]    150-200 mg 2 units    201-250 mg 4 units    251-300 mg 6 units    301-350 mg 8 units    351-400 mg 10 units    401-450 mg 12 units    451-500 mg 14 units     Less than 70 mg NO INSULIN          Do not skip meals  Do not eat in between meals    Reduce carbs- pasta, rice, potatoes, bread   Do not drink juices or sodas  Donot eat peanut butter     Do not eat sugar free cookies and cakes   Do not eat peaches, grapes, pineapples, oranges

## 2019-12-11 ENCOUNTER — DOCUMENTATION ONLY (OUTPATIENT)
Dept: ENDOCRINOLOGY | Age: 54
End: 2019-12-11

## 2019-12-11 NOTE — PROGRESS NOTES
She dropped off long term disability papers from [facility]    I filled them in after discussing with her on phone    These papers were filled in by her PCP in the past and the person working for PCP could nto do it and so it is sent to me.    The papers have questions reg her functional capacity etc., which I can never      Jenna Munoz MD

## 2019-12-22 RX ORDER — DULOXETIN HYDROCHLORIDE 60 MG/1
CAPSULE, DELAYED RELEASE ORAL
Qty: 30 CAP | Refills: 0 | Status: SHIPPED | OUTPATIENT
Start: 2019-12-22 | End: 2020-01-20

## 2020-01-19 RX ORDER — METFORMIN HYDROCHLORIDE 500 MG/1
TABLET, EXTENDED RELEASE ORAL
Qty: 120 TAB | Refills: 6 | Status: SHIPPED | OUTPATIENT
Start: 2020-01-19 | End: 2020-05-21 | Stop reason: SDUPTHER

## 2020-01-20 ENCOUNTER — OP HISTORICAL/CONVERTED ENCOUNTER (OUTPATIENT)
Dept: OTHER | Age: 55
End: 2020-01-20

## 2020-01-20 RX ORDER — DULOXETIN HYDROCHLORIDE 60 MG/1
CAPSULE, DELAYED RELEASE ORAL
Qty: 30 CAP | Refills: 0 | Status: SHIPPED | OUTPATIENT
Start: 2020-01-20 | End: 2020-02-23

## 2020-01-21 ENCOUNTER — IP HISTORICAL/CONVERTED ENCOUNTER (OUTPATIENT)
Dept: OTHER | Age: 55
End: 2020-01-21

## 2020-02-23 RX ORDER — DULOXETIN HYDROCHLORIDE 60 MG/1
CAPSULE, DELAYED RELEASE ORAL
Qty: 30 CAP | Refills: 0 | Status: SHIPPED | OUTPATIENT
Start: 2020-02-23 | End: 2020-03-24

## 2020-03-24 RX ORDER — DULOXETIN HYDROCHLORIDE 60 MG/1
CAPSULE, DELAYED RELEASE ORAL
Qty: 30 CAP | Refills: 0 | Status: SHIPPED | OUTPATIENT
Start: 2020-03-24 | End: 2020-04-21

## 2020-04-15 ENCOUNTER — TELEPHONE (OUTPATIENT)
Dept: ENDOCRINOLOGY | Age: 55
End: 2020-04-15

## 2020-04-15 DIAGNOSIS — Z79.4 UNCONTROLLED TYPE 2 DIABETES MELLITUS WITH HYPERGLYCEMIA, WITH LONG-TERM CURRENT USE OF INSULIN (HCC): Primary | ICD-10-CM

## 2020-04-15 DIAGNOSIS — I10 ESSENTIAL HYPERTENSION: ICD-10-CM

## 2020-04-15 DIAGNOSIS — E78.2 MIXED HYPERLIPIDEMIA: ICD-10-CM

## 2020-04-15 DIAGNOSIS — E11.65 UNCONTROLLED TYPE 2 DIABETES MELLITUS WITH HYPERGLYCEMIA, WITH LONG-TERM CURRENT USE OF INSULIN (HCC): Primary | ICD-10-CM

## 2020-04-15 NOTE — TELEPHONE ENCOUNTER
Order placed for pt per verbal order with read back from Dr. Ama Brumfield 04/15/20    Lab slips mailed

## 2020-04-21 RX ORDER — DULOXETIN HYDROCHLORIDE 60 MG/1
CAPSULE, DELAYED RELEASE ORAL
Qty: 30 CAP | Refills: 0 | Status: SHIPPED | OUTPATIENT
Start: 2020-04-21 | End: 2020-05-21 | Stop reason: SDUPTHER

## 2020-04-23 ENCOUNTER — HOSPITAL ENCOUNTER (OUTPATIENT)
Dept: LAB | Age: 55
Discharge: HOME | End: 2020-04-23
Payer: MEDICARE

## 2020-04-23 PROCEDURE — 80061 LIPID PANEL: CPT

## 2020-04-23 PROCEDURE — 80053 COMPREHEN METABOLIC PANEL: CPT

## 2020-04-23 PROCEDURE — 82043 UR ALBUMIN QUANTITATIVE: CPT

## 2020-04-23 PROCEDURE — 83036 HEMOGLOBIN GLYCOSYLATED A1C: CPT

## 2020-04-23 PROCEDURE — 36415 COLL VENOUS BLD VENIPUNCTURE: CPT

## 2020-04-24 LAB
ALBUMIN SERPL-MCNC: 4.4 G/DL (ref 3.8–4.9)
ALBUMIN/CREAT UR: 6 MG/G CREAT (ref 0–29)
ALBUMIN/GLOB SERPL: 1.9 {RATIO} (ref 1.2–2.2)
ALP SERPL-CCNC: 55 IU/L (ref 39–117)
ALT SERPL-CCNC: 8 IU/L (ref 0–32)
AST SERPL-CCNC: 8 IU/L (ref 0–40)
BILIRUB SERPL-MCNC: 0.2 MG/DL (ref 0–1.2)
BUN SERPL-MCNC: 13 MG/DL (ref 6–24)
BUN/CREAT SERPL: 19 (ref 9–23)
CALCIUM SERPL-MCNC: 9.6 MG/DL (ref 8.7–10.2)
CHLORIDE SERPL-SCNC: 106 MMOL/L (ref 96–106)
CHOLEST SERPL-MCNC: 219 MG/DL (ref 100–199)
CO2 SERPL-SCNC: 23 MMOL/L (ref 20–29)
CREAT SERPL-MCNC: 0.69 MG/DL (ref 0.57–1)
CREAT UR-MCNC: 246 MG/DL
EST. AVERAGE GLUCOSE BLD GHB EST-MCNC: 137 MG/DL
GLOBULIN SER CALC-MCNC: 2.3 G/DL (ref 1.5–4.5)
GLUCOSE SERPL-MCNC: 108 MG/DL (ref 65–99)
HBA1C MFR BLD: 6.4 % (ref 4.8–5.6)
HDLC SERPL-MCNC: 52 MG/DL
INTERPRETATION, 910389: NORMAL
LDLC SERPL CALC-MCNC: 130 MG/DL (ref 0–99)
Lab: NORMAL
MICROALBUMIN UR-MCNC: 14 UG/ML
POTASSIUM SERPL-SCNC: 4.6 MMOL/L (ref 3.5–5.2)
PROT SERPL-MCNC: 6.7 G/DL (ref 6–8.5)
SODIUM SERPL-SCNC: 143 MMOL/L (ref 134–144)
TRIGL SERPL-MCNC: 183 MG/DL (ref 0–149)
VLDLC SERPL CALC-MCNC: 37 MG/DL (ref 5–40)

## 2020-05-21 ENCOUNTER — VIRTUAL VISIT (OUTPATIENT)
Dept: ENDOCRINOLOGY | Age: 55
End: 2020-05-21

## 2020-05-21 DIAGNOSIS — Z79.4 UNCONTROLLED TYPE 2 DIABETES MELLITUS WITH HYPERGLYCEMIA, WITH LONG-TERM CURRENT USE OF INSULIN (HCC): Primary | ICD-10-CM

## 2020-05-21 DIAGNOSIS — Z98.84 S/P GASTRIC BYPASS: ICD-10-CM

## 2020-05-21 DIAGNOSIS — E11.65 UNCONTROLLED TYPE 2 DIABETES MELLITUS WITH HYPERGLYCEMIA, WITH LONG-TERM CURRENT USE OF INSULIN (HCC): Primary | ICD-10-CM

## 2020-05-21 DIAGNOSIS — E78.2 MIXED HYPERLIPIDEMIA: ICD-10-CM

## 2020-05-21 DIAGNOSIS — G62.9 NEUROPATHY: ICD-10-CM

## 2020-05-21 RX ORDER — DULOXETIN HYDROCHLORIDE 60 MG/1
CAPSULE, DELAYED RELEASE ORAL
Qty: 30 CAP | Refills: 6 | Status: SHIPPED | OUTPATIENT
Start: 2020-05-21 | End: 2020-11-19 | Stop reason: SDUPTHER

## 2020-05-21 RX ORDER — METFORMIN HYDROCHLORIDE 500 MG/1
TABLET, EXTENDED RELEASE ORAL
Qty: 120 TAB | Refills: 6 | Status: SHIPPED | OUTPATIENT
Start: 2020-05-21 | End: 2021-06-04

## 2020-05-21 RX ORDER — URSODIOL 300 MG/1
CAPSULE ORAL
COMMUNITY
Start: 2020-05-11 | End: 2020-11-19 | Stop reason: ALTCHOICE

## 2020-05-21 NOTE — PROGRESS NOTES
**THIS IS A VIRTUAL VISIT VIA AUDIO- VIDEO SYNCHRONOUS DISCUSSION. PATIENT AGREED TO HAVE THEIR CARE DELIVERED OVER A [employer] [employer] ME VIDEO VISIT IN PLACE OF THEIR REGULARLY SCHEDULED OFFICE VISIT**   Pt  is aware that this is a billable encounter and is responsible for copays/deductibles   Patient gave a verbal consent to proceed with virtual video visit   Patient is at home and I, the provider,  am at the office care diabetes and endocrinology        HISTORY OF PRESENT ILLNESS  Luis Garza is a 60 y.o. female.   HPI  Patient here for  f/u  After last visit of Type 2 diabetes mellitus  From November 2019     S/p  bariatric surgery by Dr. Loyd Wright at [facility] in [facility] 48 45 lbs roughly - down to  215 lbs by her calc   Post op phase was good           Old history  :   Pt lost 20 lbs   She did water aerobics and now doing weight watchers     She has early stage glaucoma   She is hoping to get bariatric surgery   She is now having primary care doc Dr. Boom Shelby at [address]   Gained 4 lbs   She is compliant with insulins   No meter   She is now starting with rheumatologist         Old history   Weight is stable   She is OFF  Pain meds ( too expensive )  Got into disability, long term  She deferred the spinal stimulator  Because of complications     Compliant with diabetic meds           Prior history :    Referred : by self/pcp    H/o diabetes for 30 years     Current A1C is 9 % and symptoms/problems include polyuria, polydipsia and visual disturbances     Current diabetic medications include intensive insulin injection program. And metformin   novolog by 10 units tid plus sliding scale and levemir  36 units hs       Current monitoring regimen: home blood tests - 2 times daily  Home blood sugar records: trend: fluctuating a lot  Any episodes of hypoglycemia? no    Weight trend: fluctuating a lot  Prior visit with dietician: no  Current diet: \"unhealthy\" diet in general  Current exercise: no regular exercise    Known diabetic complications: peripheral neuropathy  Cardiovascular risk factors: dyslipidemia, diabetes mellitus, obesity, hypertension, stress, post-menopausal    Eye exam current (within one year): yes  TRISTEN: no     Past Medical History:   Diagnosis Date    Cancer ([facility] [address] 2009    RIGHT breast    Diabetes ([facility] [address]     Hypercholesterolemia     Hypertension      Past Surgical History:   Procedure Laterality Date    HX APPENDECTOMY      HX BREAST LUMPECTOMY  2009    RIGHT with SNBX    HX GASTRIC BYPASS  01/21/2020    HX GYN      HX HEENT      HX UROLOGICAL       Current Outpatient Medications   Medication Sig    ursodioL (ACTIGALL) 300 mg capsule TK 1 C PO BID    DULoxetine (CYMBALTA) 60 mg capsule TAKE 1 CAPSULE BY MOUTH DAILY. STOP 30 MG DOSE    metFORMIN ER (GLUCOPHAGE XR) 500 mg tablet TAKE 2 TABLETS BY MOUTH TWICE DAILY WITH MEALS. STOP SYNJARDY    timolol (TIMOPTIC) 0.25 % ophthalmic solution Administer 1 Drop to both eyes two (2) times a day.  allopurinol (ZYLOPRIM) 100 mg tablet TK 1 T PO QD    traZODone (DESYREL) 100 mg tablet TK 1 T PO QD HS    furosemide (LASIX) 20 mg tablet TK 1 T PO QD PRF WEIGHT GAIN OF 2 LBS OR MORE IN A DAY    potassium chloride (K-DUR, KLOR-CON) 20 mEq tablet TK 1 T PO QD PRN WHEN TAKING FUROSEMIDE    latanoprost (XALATAN) 0.005 % ophthalmic solution Administer 1 Drop to both eyes nightly.  HYDROcodone-acetaminophen (NORCO)  mg tablet Take 1 Tab by mouth every eight (8) hours as needed for Pain.  gabapentin (NEURONTIN) 800 mg tablet Take 1 Tab by mouth three (3) times daily.  Lancets (ACCU-CHEK FASTCLIX) misc Test 4 times daily. Dx code E11.65    carvedilol (COREG) 12.5 mg tablet Take 1 Tab by mouth daily.  triamcinolone acetonide (KENALOG) 0.1 % topical cream     NOVOFINE 30 30 gauge x 1/3\"     multivitamin (ONE A DAY) tablet Take 1 Tab by mouth daily.  biotin 500 mcg Cap Take  by mouth.     insulin regular (Timothy Andrade REGULAR U-100 INSULN) 100 unit/mL injection Inject 7 units before breakfast, 7 units before lunch and 8 units before dinner. Plus sliding scale to max 64 units daily    insulin NPH (NOVOLIN N NPH U-100 INSULIN) 100 unit/mL injection Inject 50 units at bedtime. Stop humulin     No current facility-administered medications for this visit. Review of Systems   Constitutional: Negative. HENT: Negative. Eyes: Negative for pain and redness. Respiratory: Negative. Cardiovascular: Negative for chest pain, palpitations and leg swelling. Gastrointestinal: Negative. Negative for constipation. Genitourinary: Negative. Musculoskeletal: Negative for myalgias. Skin: Negative. Neurological: Negative. Endo/Heme/Allergies: Negative. Psychiatric/Behavioral: Negative for depression and memory loss. The patient does not have insomnia. Physical Exam   Constitutional: oriented to person, place, and time. appears well-developed and well-nourished. HENT:   Head: Normocephalic. Eyes: normal , noted no swelling or redness. Neck: Normal range of motion. Cardiovascular: could nto be examined  Pulmonary/Chest: appears breathing effortlessly  Abdominal: Soft. Musculoskeletal: appears relatively nprmal  range of motion. Neurological: He is alert and oriented to person, place, and time. Appears to have no focal deficits    Psychiatric: He has a normal mood and affect.            Lab Results   Component Value Date/Time    Hemoglobin A1c 6.4 (H) 04/23/2020 02:42 PM    Hemoglobin A1c 6.6 (H) 11/14/2019 10:11 AM    Hemoglobin A1c 6.8 (H) 05/09/2019 08:44 AM    Hemoglobin A1c, External 9.0 03/18/2016    Glucose 108 (H) 04/23/2020 02:42 PM    Glucose (POC) 172 (H) 02/06/2009 01:29 PM    Glucose  05/20/2016 04:58 PM    Microalbumin/Creat ratio (mg/g creat) 17 11/14/2019 10:11 AM    Microalb/Creat ratio (ug/mg creat.) 6 04/23/2020 02:42 PM    Microalbumin,urine random 3.67 11/14/2019 10:11 AM    LDL,Direct 149 (H) 03/12/2019 09:26 AM    LDL, calculated 130 (H) 04/23/2020 02:42 PM    Creatinine (POC) 0.8 02/21/2012 06:03 PM    Creatinine 0.69 04/23/2020 02:42 PM      Lab Results   Component Value Date/Time    Cholesterol, total 219 (H) 04/23/2020 02:42 PM    HDL Cholesterol 52 04/23/2020 02:42 PM    LDL,Direct 149 (H) 03/12/2019 09:26 AM    LDL, calculated 130 (H) 04/23/2020 02:42 PM    Triglyceride 183 (H) 04/23/2020 02:42 PM    CHOL/HDL Ratio 5.5 (H) 11/14/2019 10:11 AM       Lab Results   Component Value Date/Time    ALT (SGPT) 8 04/23/2020 02:42 PM    AST (SGOT) 8 04/23/2020 02:42 PM    Alk. phosphatase 55 04/23/2020 02:42 PM    Bilirubin, total 0.2 04/23/2020 02:42 PM                ASSESSMENT and PLAN  1. Type 2 DM, un controlled : a1c is 6.4 %     From April 2020     Compared to    6.6 %     From nov 2019  Compared to   6.8  %     From may 2019     Compared to  6.4 %       From nov 2018      Compared to    6.7 %    From  May 2018     Compared to   7.2 %     From   Nov 2017    Compared to  6.4  %     From    July 2017   Compared to  6.6 %       From     April 2017     Compared to   6.8 %    From jan 2017   Compared to   6.6 %     From oct 2016  Compared to   7% from July 2016  Compared to    8.2 %    From may 2016 compared to  9 %    From march 2016     She stopped  Sugar checks     stay on  metformin er 1000 mg bid   S/p bypass surgery lost weight     Stop  Insulins  Check twice a day  By rotation         2. Hypoglycemia :  Educated on treating the hypoglycemia. 3. HTN : continue coreg       4. Dyslipidemia : lipids are still above the goal despite weight loss from bypass surgery   Could not tolerate any kinds of statins  Discussed low  fat diet    May 2020  : As the  [facility] recommendations changed, I again discussed with  Patient is educated about benefits and adverse effects of statins and explained how benefits outweigh risk.     5.    Diabetic complications :     A. Retinopathy-NO   educated on this complication,  regular f/u with ophthalmologist encouraged    B. Nephropathy - NO     educated on this disease and indicated stages and its prognosis. C. Peripheral Neuropathy - YES  severe  On cymbalta increased to 60 mg hs   And  on  neurontin 800 mg tid   recommending   Metanx ( too expensive )      she has maintained the glycemic control very well ( may have had bad control prior to being under my care )  She has no other microvascular complications from DM    She has back issues  And she is restricted from several activities by her spine doc , [facility]   She has chemotherapy induced neuropathy also   Reviewed  EMG   She has severe neuropathy , lost proprioception and has balance issues ,  Had a fall   She has numbness on left leg - no better  She is being continued to stay on short term disability  From - April 27 2017     She deferred spinal stimulator for fear of infection  She cannot tolerate Lyrica   She can only take gabapentin      E : CAD : YES  Cardiomyopathy , after chemo- adriamycin      H/o  Right breast cancer - treated and  Has lymphedema on right UE   She has lymph pump put in  In 2009 she says   She reports cellulitis on and off       She got papers from [employer] last week and wanted to extend the leave off from work - I refused to fill papers/ extent leave  - did not remember   From my previous conversation, she said pcp is starting the leave     D/w Dr. Junior Diallo , he agrees to her developing chemo induced neuropathy   D/ w Dr. Shanna Cardoso who concurs that she has severe neuropathy leading loss of balance        7. Obesity  : There is no height or weight on file to calculate BMI.   S/p bariatric surgery  Jan 2020 -    On vit d, b12 and iron              F/u in 6  Months    Pursuant  To the Emergency declaration under the [address] and the [facility], [address] [place] [address] authority and the [facility], to reduce the patient's risk of exposure to  COVID-19 and provide continuity of care for an established patient.         > 50 % of time is spent on counseling   Patient voiced understanding her plan of care

## 2020-05-21 NOTE — PROGRESS NOTES
1. Have you been to the ER, urgent care clinic since your last visit? No Hospitalized since your last visit? No    2. Have you seen or consulted any other health care providers outside of the [address] [place] [address] since your last visit? Include any pap smears or colon screening.  No

## 2020-07-25 LAB
25(OH)D3+25(OH)D2 SERPL-MCNC: 36.9 NG/ML (ref 30–100)
ALBUMIN SERPL-MCNC: 4.2 G/DL (ref 3.8–4.9)
ALBUMIN/GLOB SERPL: 1.8 {RATIO} (ref 1.2–2.2)
ALP SERPL-CCNC: 51 IU/L (ref 39–117)
ALT SERPL-CCNC: 12 IU/L (ref 0–32)
AST SERPL-CCNC: 15 IU/L (ref 0–40)
BASOPHILS # BLD AUTO: 0 X10E3/UL (ref 0–0.2)
BASOPHILS NFR BLD AUTO: 1 %
BILIRUB SERPL-MCNC: <0.2 MG/DL (ref 0–1.2)
BUN SERPL-MCNC: 14 MG/DL (ref 6–24)
BUN/CREAT SERPL: 17 (ref 9–23)
CALCIUM SERPL-MCNC: 9.3 MG/DL (ref 8.7–10.2)
CHLORIDE SERPL-SCNC: 100 MMOL/L (ref 96–106)
CO2 SERPL-SCNC: 20 MMOL/L (ref 20–29)
CREAT SERPL-MCNC: 0.82 MG/DL (ref 0.57–1)
EOSINOPHIL # BLD AUTO: 0.1 X10E3/UL (ref 0–0.4)
EOSINOPHIL NFR BLD AUTO: 1 %
ERYTHROCYTE [DISTWIDTH] IN BLOOD BY AUTOMATED COUNT: 13.2 % (ref 11.7–15.4)
FERRITIN SERPL-MCNC: 59 NG/ML (ref 15–150)
FOLATE SERPL-MCNC: 18.5 NG/ML
GLOBULIN SER CALC-MCNC: 2.4 G/DL (ref 1.5–4.5)
GLUCOSE SERPL-MCNC: 120 MG/DL (ref 65–99)
HCT VFR BLD AUTO: 39.5 % (ref 34–46.6)
HGB BLD-MCNC: 12.8 G/DL (ref 11.1–15.9)
IMM GRANULOCYTES # BLD AUTO: 0 X10E3/UL (ref 0–0.1)
IMM GRANULOCYTES NFR BLD AUTO: 0 %
IRON SATN MFR SERPL: 20 % (ref 15–55)
IRON SERPL-MCNC: 65 UG/DL (ref 27–159)
LYMPHOCYTES # BLD AUTO: 3.3 X10E3/UL (ref 0.7–3.1)
LYMPHOCYTES NFR BLD AUTO: 38 %
MCH RBC QN AUTO: 30.3 PG (ref 26.6–33)
MCHC RBC AUTO-ENTMCNC: 32.4 G/DL (ref 31.5–35.7)
MCV RBC AUTO: 93 FL (ref 79–97)
MONOCYTES # BLD AUTO: 0.5 X10E3/UL (ref 0.1–0.9)
MONOCYTES NFR BLD AUTO: 6 %
NEUTROPHILS # BLD AUTO: 4.9 X10E3/UL (ref 1.4–7)
NEUTROPHILS NFR BLD AUTO: 54 %
PLATELET # BLD AUTO: 396 X10E3/UL (ref 150–450)
POTASSIUM SERPL-SCNC: 4.4 MMOL/L (ref 3.5–5.2)
PREALB SERPL-MCNC: 36 MG/DL (ref 10–36)
PROT SERPL-MCNC: 6.6 G/DL (ref 6–8.5)
RBC # BLD AUTO: 4.23 X10E6/UL (ref 3.77–5.28)
SODIUM SERPL-SCNC: 139 MMOL/L (ref 134–144)
TIBC SERPL-MCNC: 321 UG/DL (ref 250–450)
UIBC SERPL-MCNC: 256 UG/DL (ref 131–425)
VIT B1 BLD-SCNC: 139.5 NMOL/L (ref 66.5–200)
VIT B12 SERPL-MCNC: >2000 PG/ML (ref 232–1245)
WBC # BLD AUTO: 8.9 X10E3/UL (ref 3.4–10.8)

## 2020-07-30 ENCOUNTER — OFFICE VISIT (OUTPATIENT)
Dept: SURGERY | Age: 55
End: 2020-07-30
Payer: MEDICARE

## 2020-07-30 VITALS
BODY MASS INDEX: 35.16 KG/M2 | WEIGHT: 211 LBS | SYSTOLIC BLOOD PRESSURE: 84 MMHG | TEMPERATURE: 97.9 F | HEART RATE: 68 BPM | DIASTOLIC BLOOD PRESSURE: 59 MMHG | OXYGEN SATURATION: 98 % | HEIGHT: 65 IN

## 2020-07-30 DIAGNOSIS — K91.2 POSTSURGICAL MALABSORPTION, NOT ELSEWHERE CLASSIFIED: ICD-10-CM

## 2020-07-30 DIAGNOSIS — E56.9 VITAMIN DEFICIENCY, UNSPECIFIED: ICD-10-CM

## 2020-07-30 DIAGNOSIS — E66.01 SEVERE OBESITY (HCC): ICD-10-CM

## 2020-07-30 DIAGNOSIS — Z98.84 STATUS POST BARIATRIC SURGERY: Primary | ICD-10-CM

## 2020-07-30 DIAGNOSIS — Z86.39 HISTORY OF NUTRITIONAL DEFICIENCY: ICD-10-CM

## 2020-07-30 DIAGNOSIS — Z98.84 STATUS POST BARIATRIC SURGERY: ICD-10-CM

## 2020-07-30 PROCEDURE — 99212 OFFICE O/P EST SF 10 MIN: CPT | Performed by: SURGERY

## 2020-07-30 RX ORDER — METFORMIN HYDROCHLORIDE 500 MG/1
TABLET, FILM COATED, EXTENDED RELEASE ORAL
COMMUNITY
End: 2020-11-19 | Stop reason: ALTCHOICE

## 2020-07-30 RX ORDER — CHOLECALCIFEROL (VITAMIN D3) 50 MCG
3 CAPSULE ORAL DAILY
Status: ON HOLD | COMMUNITY

## 2020-07-30 RX ORDER — CARVEDILOL 12.5 MG/1
12.5 TABLET ORAL 2 TIMES DAILY
COMMUNITY
End: 2022-07-11 | Stop reason: ALTCHOICE

## 2020-07-30 RX ORDER — LANOLIN ALCOHOL/MO/W.PET/CERES
CREAM (GRAM) TOPICAL
COMMUNITY
End: 2021-05-19 | Stop reason: ALTCHOICE

## 2020-07-30 RX ORDER — TRIAMCINOLONE ACETONIDE 1 MG/G
CREAM TOPICAL
COMMUNITY
End: 2022-10-17

## 2020-07-30 RX ORDER — CELECOXIB 200 MG/1
200 CAPSULE ORAL DAILY
COMMUNITY
End: 2020-11-19 | Stop reason: ALTCHOICE

## 2020-07-30 RX ORDER — LATANOPROST 50 UG/ML
SOLUTION/ DROPS OPHTHALMIC
COMMUNITY
End: 2020-11-19 | Stop reason: SDUPTHER

## 2020-07-30 RX ORDER — GABAPENTIN 400 MG/1
400 CAPSULE ORAL DAILY
COMMUNITY
End: 2020-11-19 | Stop reason: DRUGHIGH

## 2020-07-30 RX ORDER — OXYCODONE AND ACETAMINOPHEN 10; 325 MG/1; MG/1
10-325 TABLET ORAL DAILY
COMMUNITY
End: 2022-07-11 | Stop reason: ALTCHOICE

## 2020-07-30 RX ORDER — MULTIVIT-MIN/IRON/FOLIC ACID/K 45-800-120
1 CAPSULE ORAL DAILY
Status: ON HOLD | COMMUNITY
Start: 2020-01-23

## 2020-07-30 NOTE — PROGRESS NOTES
[facility]  [facility] [address] [facility]  [phone]    Bariatric Surgery Follow up    Patient Name: Lieutenant Chapa [de-identified]60 y.o., female)    Patient Address: [address] [place] [id number]    PCP: Courtney Mtz MD     Patient contact numbers:     Home Phone  Work Phone  Mobile    [phone]   Patient Allergies:  Januvia [sitagliptin]        Temp: (not recorded) Pulse: (not recorded) Resp: (not recorded) BP: (not recorded) SpO2: (not recorded) Weight: (not recorded)     Current Outpatient Medications   Medication Sig Dispense Refill    ursodioL (ACTIGALL) 300 mg capsule TK 1 C PO BID      DULoxetine (CYMBALTA) 60 mg capsule TAKE 1 CAPSULE BY MOUTH DAILY. STOP 30 MG DOSE 30 Cap 6    metFORMIN ER (GLUCOPHAGE XR) 500 mg tablet TAKE 2 TABLETS BY MOUTH TWICE DAILY WITH MEALS. STOP SYNJARDY 120 Tab 6    timolol (TIMOPTIC) 0.25 % ophthalmic solution Administer 1 Drop to both eyes two (2) times a day.  allopurinol (ZYLOPRIM) 100 mg tablet TK 1 T PO QD  4    traZODone (DESYREL) 100 mg tablet TK 1 T PO QD HS  1    furosemide (LASIX) 20 mg tablet TK 1 T PO QD PRF WEIGHT GAIN OF 2 LBS OR MORE IN A DAY  5    potassium chloride (K-DUR, KLOR-CON) 20 mEq tablet TK 1 T PO QD PRN WHEN TAKING FUROSEMIDE  5    latanoprost (XALATAN) 0.005 % ophthalmic solution Administer 1 Drop to both eyes nightly.  HYDROcodone-acetaminophen (NORCO)  mg tablet Take 1 Tab by mouth every eight (8) hours as needed for Pain.  gabapentin (NEURONTIN) 800 mg tablet Take 1 Tab by mouth three (3) times daily. 0    Lancets (ACCU-CHEK FASTCLIX) misc Test 4 times daily. Dx code E11.65 200 Package 6    carvedilol (COREG) 12.5 mg tablet Take 1 Tab by mouth daily. 5    triamcinolone acetonide (KENALOG) 0.1 % topical cream       NOVOFINE 30 30 gauge x 1/3\"   11    multivitamin (ONE A DAY) tablet Take 1 Tab by mouth daily.       biotin 500 mcg Cap Take  by mouth. Patient Active Problem List   Diagnosis Code    Breast cancer, stage 2 C50.919    Neuropathy due to chemotherapeutic drug ([facility] [address] G62.0, T45.1X5A    Encounter for long-term (current) use of insulin ([facility] [address] Z79.4    Mixed hyperlipidemia E78.2    Essential hypertension I10    Type II diabetes mellitus, uncontrolled ([facility] [address] E11.65    Neuropathy G62.9    Status post bariatric surgery Z98.84       No family history on file. Social History     Socioeconomic History    Marital status:      Spouse name: Not on file    Number of children: Not on file    Years of education: Not on file    Highest education level: Not on file   Occupational History    Not on file   Social Needs    Financial resource strain: Not on file    Food insecurity     Worry: Not on file     Inability: Not on file    Transportation needs     Medical: Not on file     Non-medical: Not on file   Tobacco Use    Smoking status: Never Smoker    Smokeless tobacco: Never Used   Substance and Sexual Activity    Alcohol use:  Yes     Alcohol/week: 1.7 standard drinks     Types: 2 Glasses of wine per week    Drug use: No    Sexual activity: Yes   Lifestyle    Physical activity     Days per week: Not on file     Minutes per session: Not on file    Stress: Not on file   Relationships    Social connections     Talks on phone: Not on file     Gets together: Not on file     Attends Yazidism service: Not on file     Active member of club or organization: Not on file     Attends meetings of clubs or organizations: Not on file     Relationship status: Not on file    Intimate partner violence     Fear of current or ex partner: Not on file     Emotionally abused: Not on file     Physically abused: Not on file     Forced sexual activity: Not on file   Other Topics Concern    Not on file   Social History Narrative    Not on file       Past Surgical History:   Procedure Laterality Date    HX APPENDECTOMY      HX BREAST LUMPECTOMY 2009    RIGHT with SNBX    HX GASTRIC BYPASS  01/21/2020    HX GYN      HX HEENT      HX UROLOGICAL         Past Medical History:   Diagnosis Date    Cancer [facility]) 2009    RIGHT breast    Diabetes ([facility] [address]     Hypercholesterolemia     Hypertension            History of Present Illness    Patient presents for follow up after weight loss surgery. Procedure: Robotic gastric bypass  Date of procedure: 1/21/2020    Protein intake: 80 g  Fluid intake: 64 ounces  Exercise:   Vitamins: MVI, calcium with D, B12  Symptoms:   Patient presents to the office for routine follow-up. She is consuming 80 g of protein per day and approximately 64 ounces of fluid per day. She is walking for exercise. As directed. She is taking her vitamins and supplements as directed. She denies any abdominal pain, nausea, vomiting, fevers, chills, reflux. Her blood sugars range between 108 and 112. She is interested in returning back to water aerobics and singing. 2/20/2020   Patient is doing well from a surgical standpoint. She is getting her fluid and protein in. She is doing water aerobics 2 days a week and she also works with a . She is taking her vitamins and supplements as directed. She denies abdominal pain, nausea, vomiting, fevers, chills, hospitalizations or surgeries since her last office visit with me.     4/30/2020   Patient is doing well from a surgical standpoint. She denies any hospitalizations or surgeries since her last office visit with me. She is consuming her goals of fluid and protein intake. She is working out on a daily basis and recently purchased a home gym which she expects to receive later this week. She is taking her vitamins and supplements as directed. I reviewed her lab work and there is no evidence of any vitamin deficiency. She does continue to have some chronic constipation for which she takes laxatives.  Her last bowel movement was this morning and was normal.    7/30/2020  Patient presents for routine follow-up. She is doing well. She has occasional mild abdominal pain after eating too fast or too much at one time. This also happens with drinking certain fluids. She denies any fevers or chills. She denies vomiting blood or noticing blood in her stool. She is getting her fluid and protein intake. She is going to the gym and doing water aerobics 3 days/week. She also works with a . She is taking her vitamins and supplements as directed. She denies any abdominal pain, vomiting. She does have some occasional constipation for which she self medicates with over-the-counter medications. 9/9/2019 Consult weight: 259 lbs  1/21/2020 Pre-op weight: 251 lbs   2/3/2020 Current weight: 241 lbs   2/20/2020: 230.5 lbs   4/30/2020: 216 pounds   7/30/2020: 211 pounds      Review of Systems  Review of Systems   Constitutional: Negative for chills and fever. HENT: Negative for ear pain, hearing loss, nosebleeds, sinus pain and tinnitus. Eyes: Negative for blurred vision and pain. Respiratory: Negative for cough, hemoptysis, shortness of breath and wheezing. Cardiovascular: Negative for chest pain, palpitations and leg swelling. Gastrointestinal: Negative for abdominal pain, blood in stool, constipation, diarrhea, heartburn, melena, nausea and vomiting. Genitourinary: Negative for dysuria, frequency and hematuria. Musculoskeletal: Negative for back pain, joint pain and myalgias. Skin: Negative for itching and rash. Neurological: Negative for dizziness, tingling, tremors, loss of consciousness and headaches. Endo/Heme/Allergies: Does not bruise/bleed easily. Psychiatric/Behavioral: Negative for depression and substance abuse. The patient is not nervous/anxious. Physical Exam  There were no vitals taken for this visit. General:  Alert, cooperative, no distress. Head:  Normocephalic, without obvious abnormality, atraumatic. Eyes:  Conjunctivae/corneas clear. Pupils equal, round, reactive to light. Extraocular movements intact. Lungs:   Clear to auscultation bilaterally. Chest wall:  No tenderness or deformity. Heart:  Regular rate and rhythm, S1, S2 normal, no murmur, click, rub, or gallop. Abdomen:   Soft, non-tender. Bowel sounds normal. No masses. No organomegaly. Extremities: Extremities normal, atraumatic, no cyanosis or edema. Pulses: 2+ and symmetric all extremities. Skin: Skin color, texture, turgor normal. No rashes or lesions. Lymph nodes: Cervical, supraclavicular, and axillary nodes normal.   Neurologic: CNII-XII intact. Normal strength, sensation, and reflexes throughout. Assessment    Problem List Items Addressed This Visit        Other    Status post bariatric surgery - Primary          Plan  47-year-old female status post Misael-en-Y gastric bypass on 1/20/2020.  1.  Patient is doing well from a surgical standpoint  2. Continue current diet and exercise  3. Continue current vitamin supplementation  4. Return to the office in 3 months for additional follow-up  5. Check lab work prior to next office visit.

## 2020-08-13 VITALS
HEART RATE: 67 BPM | HEIGHT: 64 IN | DIASTOLIC BLOOD PRESSURE: 68 MMHG | OXYGEN SATURATION: 98 % | WEIGHT: 216 LBS | TEMPERATURE: 98.2 F | SYSTOLIC BLOOD PRESSURE: 97 MMHG | BODY MASS INDEX: 36.88 KG/M2

## 2020-08-13 PROBLEM — L98.499 CHRONIC ULCER OF SKIN (HCC): Status: ACTIVE | Noted: 2020-08-13

## 2020-08-13 PROBLEM — T66.XXXA RADIATION INJURY: Status: ACTIVE | Noted: 2020-08-13

## 2020-08-13 PROBLEM — G47.33 OBSTRUCTIVE SLEEP APNEA SYNDROME: Status: ACTIVE | Noted: 2020-08-13

## 2020-08-13 RX ORDER — TOPIRAMATE 25 MG/1
TABLET ORAL 2 TIMES DAILY
COMMUNITY
End: 2020-11-19 | Stop reason: ALTCHOICE

## 2020-08-13 RX ORDER — PRAVASTATIN SODIUM 40 MG/1
40 TABLET ORAL
COMMUNITY
End: 2020-11-19 | Stop reason: SINTOL

## 2020-08-13 RX ORDER — DICLOXACILLIN SODIUM 500 MG/1
CAPSULE ORAL
COMMUNITY
End: 2020-11-19 | Stop reason: ALTCHOICE

## 2020-08-13 RX ORDER — SIMVASTATIN 20 MG/1
TABLET, FILM COATED ORAL
COMMUNITY
End: 2020-11-19 | Stop reason: SINTOL

## 2020-08-13 RX ORDER — DICLOFENAC SODIUM 10 MG/G
GEL TOPICAL 4 TIMES DAILY
COMMUNITY
End: 2021-05-19

## 2020-08-13 RX ORDER — TIMOLOL 5.12 MG/ML
1 SOLUTION/ DROPS OPHTHALMIC 2 TIMES DAILY
Status: ON HOLD | COMMUNITY

## 2020-08-13 RX ORDER — INSULIN GLARGINE 300 U/ML
INJECTION, SOLUTION SUBCUTANEOUS
COMMUNITY
End: 2020-11-19 | Stop reason: ALTCHOICE

## 2020-08-13 RX ORDER — VENLAFAXINE HYDROCHLORIDE 150 MG/1
TABLET, EXTENDED RELEASE ORAL DAILY
COMMUNITY
End: 2020-11-19 | Stop reason: ALTCHOICE

## 2020-08-13 RX ORDER — INSULIN ASPART 100 [IU]/ML
INJECTION, SOLUTION INTRAVENOUS; SUBCUTANEOUS
COMMUNITY
End: 2020-11-19 | Stop reason: ALTCHOICE

## 2020-08-13 RX ORDER — EMPAGLIFLOZIN AND METFORMIN HYDROCHLORIDE 12.5; 1 MG/1; MG/1
1 TABLET ORAL 2 TIMES DAILY WITH MEALS
COMMUNITY
End: 2020-11-19 | Stop reason: ALTCHOICE

## 2020-08-18 ENCOUNTER — OFFICE VISIT (OUTPATIENT)
Dept: SURGERY | Age: 55
End: 2020-08-18

## 2020-08-18 DIAGNOSIS — Z71.3 DIETARY COUNSELING: Primary | ICD-10-CM

## 2020-08-18 NOTE — PROGRESS NOTES
[facility]  [address] [place] [address]  [phone]    Bariatric Follow Up    Patient Name: Lani Phoenix 1965 (60 y.o., female)    Ht: 162.6 cm Wt: 201.8 lbs (self reported) BMI: 34.6  Type of Surgery had: RGBP  Date of Surgery: 1/21/2020  Wt at time of Surgery: 251 lbs  Current Weight: 201.8 lbs  % wt loss post surgery: 19.6%    Estimated Energy Needs:  Energy (kcal/g): 1140 kcal/d    Estimated Protein Needs:  Protein: 60-80 g/d    Medication/Supplements/Diet Pills: Pls refer h&P  Pertinent Medical History: Pls refer h&P    Home Life/Support  Lives with: Spouse/Partner    Who does grocery shopping at home? Self  Who prepares the meals? Self  Example of types of meals prepared: protein/fruit/veg  Method of preparing meals? baked/steamed/boiled  Any challenges gaining access to food? No    Work, Social, Cultural Hx affecting wt issues:  Any occupation related influences on eating and lifestyle behaviors: N/A- Disability  Type of job:-  Environmental triggers to wt gain: none  Smoke cigarettes/cigars? no  Drink Alcohol: No  Any other recreational drug addiction? No  Jehovah's witness. Ethnic, cultural factors affecting wt/food choices: none    Physical Activity and Limitations:  Current Activity: water aerobics, home workouts  Frequency: 5   Times per week: 60  Minutes each  Limitations/barrriers: none (Walks with cane).   Notes: works w/  at gym    Diet Hx/Food Recall:  Beverages Consumed:   Coffee: 1 c regular/d  Tea: 1 c /d  Juice: -  Soda: -  Water: -  Milk: -  Other: Protein shake 1-2 /d    Diet Recall: Food, beverages, amounts etc:   Breakfast: protein shake or oatmeal with fruit, mixed wit protein powder  AM snack: -  Lunch: Protein shake or fruit  PM Snack: -  Dinner: wrap made with turkey, spinach, cucumber, lite kent  HS: cherries, strawberries, grapefruit  Others: nuts or PB bites    Meal eaten away from home: none  Type of restaurant: N/A  Portions look like: *Appropriate    What plans does patient verbalizes to continue post surgery diet regimen? Patient presents for routine follow-up. Pt reported compliance with post op diet; verbalized understanding to continue post op diet. Denied n/v; reported occasional mild abdominal pain after eating too fast or too much at one time. She is consuming ~60 oz of water/d; consuming ~60 g protein/d; She is going to the gym and doing water aerobics 3 days/week, works with a  + doing some exercise at home. She is taking her vitamins and supplements as directed. She does have some occasional constipation for which she is using over-the-counter laxative; BM: everyday. RD reviewed pertinent handouts, addressed all Qs at this time. Pt verbalized understanding. Discussed Diet Expectations:    3-6 scheduled meals   MVI and calcium supplements   1/2 c total volume per meal   Liquids pre and post meals   Importance of protein intakes   Chew, chew, chew   Eating mindfully and slowly taking at least 30 mins/meal   Drinking 64 oz of non- caloric fluids/day    Plans for Nutrition Intervention:  Goals:   · Consume 3-5 scheduled meals; No meal skipping.   · Meet protein needs  · Refer to post op diet for appropriate portions  · Eating slow, chewing well and taking time to finish meal.     Session begin time: 1: 10 pm    Session End time: 1: 50 pm

## 2020-08-19 VITALS
DIASTOLIC BLOOD PRESSURE: 80 MMHG | WEIGHT: 228 LBS | BODY MASS INDEX: 38.93 KG/M2 | SYSTOLIC BLOOD PRESSURE: 128 MMHG | HEIGHT: 64 IN

## 2020-09-19 LAB
25(OH)D3+25(OH)D2 SERPL-MCNC: 39.7 NG/ML (ref 30–100)
ALBUMIN SERPL-MCNC: 4.5 G/DL (ref 3.8–4.9)
ALBUMIN/GLOB SERPL: 2 {RATIO} (ref 1.2–2.2)
ALP SERPL-CCNC: 57 IU/L (ref 39–117)
ALT SERPL-CCNC: 22 IU/L (ref 0–32)
AST SERPL-CCNC: 15 IU/L (ref 0–40)
BILIRUB SERPL-MCNC: 0.2 MG/DL (ref 0–1.2)
BUN SERPL-MCNC: 16 MG/DL (ref 6–24)
BUN/CREAT SERPL: 22 (ref 9–23)
CALCIUM SERPL-MCNC: 9.9 MG/DL (ref 8.7–10.2)
CHLORIDE SERPL-SCNC: 102 MMOL/L (ref 96–106)
CO2 SERPL-SCNC: 22 MMOL/L (ref 20–29)
CREAT SERPL-MCNC: 0.74 MG/DL (ref 0.57–1)
ERYTHROCYTE [DISTWIDTH] IN BLOOD BY AUTOMATED COUNT: 13 % (ref 11.7–15.4)
EST. AVERAGE GLUCOSE BLD GHB EST-MCNC: 137 MG/DL
FERRITIN SERPL-MCNC: 108 NG/ML (ref 15–150)
GLOBULIN SER CALC-MCNC: 2.3 G/DL (ref 1.5–4.5)
GLUCOSE SERPL-MCNC: 107 MG/DL (ref 65–99)
HBA1C MFR BLD: 6.4 % (ref 4.8–5.6)
HCT VFR BLD AUTO: 36.7 % (ref 34–46.6)
HGB BLD-MCNC: 12.7 G/DL (ref 11.1–15.9)
IRON SATN MFR SERPL: 16 % (ref 15–55)
IRON SERPL-MCNC: 52 UG/DL (ref 27–159)
MAGNESIUM SERPL-MCNC: 2.1 MG/DL (ref 1.6–2.3)
MCH RBC QN AUTO: 31.5 PG (ref 26.6–33)
MCHC RBC AUTO-ENTMCNC: 34.6 G/DL (ref 31.5–35.7)
MCV RBC AUTO: 91 FL (ref 79–97)
PHOSPHATE SERPL-MCNC: 4.2 MG/DL (ref 3–4.3)
PLATELET # BLD AUTO: 389 X10E3/UL (ref 150–450)
POTASSIUM SERPL-SCNC: 4.5 MMOL/L (ref 3.5–5.2)
PREALB SERPL-MCNC: 32 MG/DL (ref 10–36)
PROT SERPL-MCNC: 6.8 G/DL (ref 6–8.5)
RBC # BLD AUTO: 4.03 X10E6/UL (ref 3.77–5.28)
SODIUM SERPL-SCNC: 137 MMOL/L (ref 134–144)
TIBC SERPL-MCNC: 321 UG/DL (ref 250–450)
UIBC SERPL-MCNC: 269 UG/DL (ref 131–425)
VIT B1 BLD-SCNC: 136.9 NMOL/L (ref 66.5–200)
VIT B12 SERPL-MCNC: >2000 PG/ML (ref 232–1245)
WBC # BLD AUTO: 8.8 X10E3/UL (ref 3.4–10.8)

## 2020-10-15 VITALS
DIASTOLIC BLOOD PRESSURE: 68 MMHG | HEART RATE: 67 BPM | TEMPERATURE: 98.2 F | HEIGHT: 64 IN | OXYGEN SATURATION: 98 % | BODY MASS INDEX: 36.88 KG/M2 | WEIGHT: 216 LBS | SYSTOLIC BLOOD PRESSURE: 97 MMHG

## 2020-10-15 RX ORDER — TIMOLOL MALEATE 5 MG/ML
1 SOLUTION/ DROPS OPHTHALMIC 2 TIMES DAILY
COMMUNITY
End: 2020-11-19 | Stop reason: SDUPTHER

## 2020-10-19 VITALS
OXYGEN SATURATION: 98 % | HEART RATE: 67 BPM | WEIGHT: 216 LBS | HEIGHT: 64 IN | TEMPERATURE: 98.2 F | SYSTOLIC BLOOD PRESSURE: 97 MMHG | DIASTOLIC BLOOD PRESSURE: 68 MMHG | BODY MASS INDEX: 36.88 KG/M2

## 2020-10-19 RX ORDER — TRAZODONE HYDROCHLORIDE 50 MG/1
TABLET ORAL
COMMUNITY
End: 2020-11-19 | Stop reason: DRUGHIGH

## 2020-10-26 ENCOUNTER — OFFICE VISIT (OUTPATIENT)
Dept: SURGERY | Age: 55
End: 2020-10-26
Payer: MEDICARE

## 2020-10-26 VITALS
HEART RATE: 68 BPM | OXYGEN SATURATION: 98 % | SYSTOLIC BLOOD PRESSURE: 98 MMHG | HEIGHT: 65 IN | BODY MASS INDEX: 32.65 KG/M2 | TEMPERATURE: 96.8 F | WEIGHT: 196 LBS | DIASTOLIC BLOOD PRESSURE: 71 MMHG

## 2020-10-26 DIAGNOSIS — Z98.84 STATUS POST BARIATRIC SURGERY: Primary | ICD-10-CM

## 2020-10-26 DIAGNOSIS — R10.11 RUQ PAIN: ICD-10-CM

## 2020-10-26 PROCEDURE — 99214 OFFICE O/P EST MOD 30 MIN: CPT | Performed by: SURGERY

## 2020-10-26 RX ORDER — SUCRALFATE 1 G/10ML
1 SUSPENSION ORAL 4 TIMES DAILY
Qty: 560 ML | Refills: 0 | Status: SHIPPED | OUTPATIENT
Start: 2020-10-26 | End: 2020-11-09

## 2020-10-26 RX ORDER — DULOXETIN HYDROCHLORIDE 60 MG/1
60 CAPSULE, DELAYED RELEASE ORAL DAILY
COMMUNITY
End: 2020-12-15 | Stop reason: SDUPTHER

## 2020-10-26 RX ORDER — PHENOL/SODIUM PHENOLATE
20 AEROSOL, SPRAY (ML) MUCOUS MEMBRANE 2 TIMES DAILY
Qty: 28 TAB | Refills: 0 | Status: SHIPPED | OUTPATIENT
Start: 2020-10-26 | End: 2020-11-09

## 2020-10-26 RX ORDER — METFORMIN HYDROCHLORIDE 500 MG/1
500 TABLET ORAL 2 TIMES DAILY
COMMUNITY
End: 2021-05-19 | Stop reason: ALTCHOICE

## 2020-10-26 RX ORDER — METAPROTERENOL SULFATE 20 MG
1 TABLET ORAL
Status: ON HOLD | COMMUNITY

## 2020-10-26 RX ORDER — DICLOXACILLIN SODIUM 250 MG/1
CAPSULE ORAL
COMMUNITY
End: 2020-11-19 | Stop reason: ALTCHOICE

## 2020-10-26 NOTE — PROGRESS NOTES
[facility]  [address] [place] [address]  [phone]    Bariatric Surgery Follow up    Patient Name: Son Saavedra60 y.o., female)    Patient Address: [address] [place] [id number]    PCP: Kaveh Rosas MD     Patient contact numbers:     Home Phone  Work [facility] Phone  Home Phone    [phone]   Patient Allergies:  Januvia [sitagliptin] and Statins-hmg-coa reductase inhibitors        Temp: 96.8 °F (36 °C) (10/26/20 0921) Pulse (Heart Rate): 68 (10/26/20 0921) Resp: (not recorded) BP: 98/71 (10/26/20 0921) O2 Sat (%): 98 % (10/26/20 0921) Weight: 196 lb (88.9 kg) (10/26/20 0921)     Current Outpatient Medications   Medication Sig Dispense Refill    ascorbic acid-collagen (Collagen Plus Vitamin C) 125-740 mg cap Collagen Plus Vitamin C      DULoxetine (CYMBALTA) 60 mg capsule Take 60 mg by mouth daily.  IRON, FERROUS SULFATE, PO Iron (ferrous sulfate)      metFORMIN (GLUCOPHAGE) 500 mg tablet Take 500 mg by mouth two (2) times a day.  dicloxacillin (DYNAPEN) 250 mg capsule TAKE 2 CAPSULES BY MOUTH ONCE DAILY AS NEEDED      timoloL (BetimoL) 0.5 % ophthalmic solution Administer 1 Drop to both eyes two (2) times a day. use in affected eye(s)      empagliflozin-metFORMIN (Synjardy) 12.5-1,000 mg per tablet Take 1 Tab by mouth two (2) times daily (with meals).  multivitamin-min-iron-FA-vit K (Bariatric Multivitamins) 45 mg iron- 800 mcg-120 mcg cap Bariatric Multivitamins      BIOTIN PO biotin      latanoprost (XALATAN) 0.005 % ophthalmic solution latanoprost 0.005 % eye drops   INT 1 GTT IN OU QD HS      metFORMIN (GLUMETZA ER) 500 mg TG24 24 hour tablet metformin  mg tablet,extended release 24 hr   TK 2 TS PO BID WITH MEALS.  STOP SYNJARDY      triamcinolone acetonide (KENALOG) 0.1 % topical cream triamcinolone acetonide 0.1 % topical cream      cyanocobalamin (Vitamin B-12) 1,000 mcg tablet Vitamin B12      calcium-cholecalciferol, d3, 600 mg calcium- 200 unit cap Take 600 mg by mouth daily.  oxyCODONE-acetaminophen (PERCOCET 10)  mg per tablet Take  Tabs by mouth daily.  DULoxetine (CYMBALTA) 60 mg capsule TAKE 1 CAPSULE BY MOUTH DAILY. STOP 30 MG DOSE 30 Cap 6    metFORMIN ER (GLUCOPHAGE XR) 500 mg tablet TAKE 2 TABLETS BY MOUTH TWICE DAILY WITH MEALS. STOP SYNJARDY 120 Tab 6    traZODone (DESYREL) 100 mg tablet TK 1 T PO QD HS  1    furosemide (LASIX) 20 mg tablet TK 1 T PO QD PRF WEIGHT GAIN OF 2 LBS OR MORE IN A DAY  5    potassium chloride (K-DUR, KLOR-CON) 20 mEq tablet TK 1 T PO QD PRN WHEN TAKING FUROSEMIDE  5    latanoprost (XALATAN) 0.005 % ophthalmic solution Administer 1 Drop to both eyes nightly.  gabapentin (NEURONTIN) 800 mg tablet Take 1 Tab by mouth three (3) times daily. 0    carvedilol (COREG) 12.5 mg tablet Take 1 Tab by mouth daily. 5    traZODone (DESYREL) 50 mg tablet Take  by mouth nightly.  calcium carbonate (CALCIUM 500 PO) Take  by mouth.  timolol (TIMOPTIC) 0.5 % ophthalmic solution 1 Drop two (2) times a day.  diclofenac (VOLTAREN) 1 % gel Apply  to affected area four (4) times daily.  suvorexant (Belsomra) 10 mg tablet Take  by mouth nightly as needed for Insomnia.  dicloxacillin (DYNAPEN) 500 mg capsule Take  by mouth Before breakfast, lunch, dinner and at bedtime.  insulin aspart U-100 (NovoLOG Flexpen U-100 Insulin) 100 unit/mL (3 mL) inpn by SubCUTAneous route.  pravastatin (PRAVACHOL) 40 mg tablet Take 40 mg by mouth nightly.  simvastatin (ZOCOR) 20 mg tablet Take  by mouth nightly.  albiglutide (Tanzeum) 30 mg/0.5 mL injector pen by SubCUTAneous route every seven (7) days.  topiramate (TOPAMAX) 25 mg tablet Take  by mouth two (2) times a day.       insulin glargine U-300 conc (Toujeo SoloStar U-300 Insulin) 300 unit/mL (1.5 mL) inpn pen by SubCUTAneous route.      Venlafaxine-ER 24 HR (EFFEXOR-ER) 150 mg tr24 tablet Take  by mouth daily.  carvediloL (COREG) 12.5 mg tablet Take 12.5 mg by mouth two (2) times a day.  DULoxetine 60 mg CDRS Take 60 mg by mouth daily.  gabapentin (Neurontin) 400 mg capsule Take 400 mg by mouth daily.  omega 3-dha-epa-fish oil (Fish Oil) 100-160-1,000 mg cap Take 3 mg by mouth daily.  celecoxib (CELEBREX) 200 mg capsule Take 200 mg by mouth daily.  ursodioL (ACTIGALL) 300 mg capsule TK 1 C PO BID      timolol (TIMOPTIC) 0.25 % ophthalmic solution Administer 1 Drop to both eyes two (2) times a day.  allopurinol (ZYLOPRIM) 100 mg tablet TK 1 T PO QD  4    HYDROcodone-acetaminophen (NORCO)  mg tablet Take 1 Tab by mouth every eight (8) hours as needed for Pain.  Lancets (ACCU-CHEK FASTCLIX) misc Test 4 times daily. Dx code E11.65 200 Package 6    triamcinolone acetonide (KENALOG) 0.1 % topical cream       NOVOFINE 30 30 gauge x 1/3\"   11    multivitamin (ONE A DAY) tablet Take 1 Tab by mouth daily.  biotin 500 mcg Cap Take  by mouth. Patient Active Problem List   Diagnosis Code    Breast cancer, stage 2 C50.919    Neuropathy due to chemotherapeutic drug ([address] G62.0, T45.1X5A    Encounter for long-term (current) use of insulin ([address] Z79.4    Mixed hyperlipidemia E78.2    Essential hypertension I10    Type II diabetes mellitus, uncontrolled ([address] E11.65    Neuropathy G62.9    Status post bariatric surgery Z98.84    Severe obesity ([address] E66.01    Chronic ulcer of skin ([address] L98.499    Obstructive sleep apnea syndrome G47.33    Radiation injury T66. XXXA    Body mass index 40.0-44.9, adult ([address] Z68.41       Family History   Problem Relation Age of Onset    Hypertension Mother     Cancer Mother     Thyroid Disease Mother     Hypertension Father     Cancer Father     Diabetes Brother     Hypertension Brother     Cancer Brother     Stroke Brother     Lung Disease Daughter     Asthma Daughter     Hypertension Daughter     Thyroid Disease Daughter     Diabetes Son    [facility] Lung Disease Son     Hypertension Son     Stroke Maternal Grandmother     Dementia Maternal Grandmother     Hypertension Maternal Grandmother        Social History     Socioeconomic History    Marital status:      Spouse name: Not on file    Number of children: Not on file    Years of education: Not on file    Highest education level: Not on file   Occupational History    Not on file   Social Needs    Financial resource strain: Not on file    Food insecurity     Worry: Not on file     Inability: Not on file   [employer] needs     Medical: Not on file     Non-medical: Not on file   Tobacco Use    Smoking status: Never Smoker    Smokeless tobacco: Never Used   Substance and Sexual Activity    Alcohol use:  Yes     Alcohol/week: 1.7 standard drinks     Types: 2 Glasses of wine per week    Drug use: No    Sexual activity: Yes   Lifestyle    Physical activity     Days per week: Not on file     Minutes per session: Not on file    Stress: Not on file   Relationships    Social connections     Talks on phone: Not on file     Gets together: Not on file     Attends [employer] service: Not on file     Active member of club or organization: Not on file     Attends meetings of clubs or organizations: Not on file     Relationship status: Not on file    Intimate partner violence     Fear of current or ex partner: Not on file     Emotionally abused: Not on file     Physically abused: Not on file     Forced sexual activity: Not on file   Other Topics Concern    Not on file   Social History Narrative    Not on file       Past Surgical History:   Procedure Laterality Date    HX ABDOMINAL LAPAROSCOPY      Surgical, gastric restrictive procedure w/ gastric bypass & bonita-en-y gastrpenterostomy (surg)    HX APPENDECTOMY      HX BREAST LUMPECTOMY  2009    RIGHT with SNBX    HX GASTRIC BYPASS  2020    HX GYN       x1, Bilateral tubal ligation, Dilation & Curettage    HX GYN      Hysterectomy (partial), Lumpectomy of breast    HX HEENT      Tonsillectomy    HX ORTHOPAEDIC      Procedure on shoulder    HX PREMALIG/BENIGN SKIN LESION EXCISION      Cyst removed from scalp    HX PREMALIG/BENIGN SKIN LESION EXCISION      Debridement of subcutaneous tissue    HX TONSILLECTOMY  1970    HX TONSILLECTOMY      HX TONSILLECTOMY      HX TONSILLECTOMY      HX UROLOGICAL         Past Medical History:   Diagnosis Date    Arthritis     Back/Neck problems    Burning with urination     Frequency    Cancer ([address] 2009    RIGHT breast    Congestive heart failure (HCC)     Depression     Including Post Partum    Diabetes ([address]     Dizziness     GERD (gastroesophageal reflux disease)     Headache     Heart disease     Hypercholesterolemia     Hypertension     Morbid obesity (HCC)     Neuropathy     Shortness of breath     With activity    Sleep apnea     Sleep disorder     Difficulty falling asleep, night sweats    Stool color black            History of Present Illness    Patient presents for follow up after weight loss surgery. Procedure: Robotic gastric bypass  Date of procedure: 2020    Protein intake: 80 g  Fluid intake: 64 ounces  Exercise:   Vitamins: MVI, calcium with D, B12  Symptoms:   Patient presents to the office for routine follow-up. She is consuming 80 g of protein per day and approximately 64 ounces of fluid per day. She is walking for exercise. As directed. She is taking her vitamins and supplements as directed. She denies any abdominal pain, nausea, vomiting, fevers, chills, reflux. Her blood sugars range between 108 and 112. She is interested in returning back to water aerobics and singing. 2020   Patient is doing well from a surgical standpoint. She is getting her fluid and protein in.  She is doing water aerobics 2 days a week and she also works with a . She is taking her vitamins and supplements as directed. She denies abdominal pain, nausea, vomiting, fevers, chills, hospitalizations or surgeries since her last office visit with me.     4/30/2020   Patient is doing well from a surgical standpoint. She denies any hospitalizations or surgeries since her last office visit with me. She is consuming her goals of fluid and protein intake. She is working out on a daily basis and recently purchased a home gym which she expects to receive later this week. She is taking her vitamins and supplements as directed. I reviewed her lab work and there is no evidence of any vitamin deficiency. She does continue to have some chronic constipation for which she takes laxatives. Her last bowel movement was this morning and was normal.    7/30/2020  Patient presents for routine follow-up. She is doing well. She has occasional mild abdominal pain after eating too fast or too much at one time. This also happens with drinking certain fluids. She denies any fevers or chills. She denies vomiting blood or noticing blood in her stool. She is getting her fluid and protein intake. She is going to the gym and doing water aerobics 3 days/week. She also works with a . She is taking her vitamins and supplements as directed. She denies any abdominal pain, vomiting. She does have some occasional constipation for which she self medicates with over-the-counter medications. 10/26/2020  Patient returns to the office for routine follow-up. She denies any hospitalizations or surgeries. She does complain of some epigastric and right upper quadrant abdominal pain after eating. She has been using Gas-X without improvement of her symptoms. She denies any blood in her stool or hematemesis. Her exercise consists of going to the gym, water aerobics, using a . She is following her diet.   I reviewed her lab work and there was no evidence of nutritional deficiency. She denies any reflux, fevers, chills. She denies smoking or using NSAID medications. 9/9/2019 Consult weight: 259 lbs  1/21/2020 Pre-op weight: 251 lbs   2/3/2020 Current weight: 241 lbs   2/20/2020: 230.5 lbs   4/30/2020: 216 pounds   7/30/2020: 211 pounds  10/26/2020: 196 lbs    Review of Systems  Review of Systems   Constitutional: Negative for chills and fever. HENT: Negative for ear pain, hearing loss, nosebleeds, sinus pain and tinnitus. Eyes: Negative for blurred vision and pain. Respiratory: Negative for cough, hemoptysis, shortness of breath and wheezing. Cardiovascular: Negative for chest pain, palpitations and leg swelling. Gastrointestinal: Negative for abdominal pain, blood in stool, constipation, diarrhea, heartburn, melena, nausea and vomiting. Genitourinary: Negative for dysuria, frequency and hematuria. Musculoskeletal: Negative for back pain, joint pain and myalgias. Skin: Negative for itching and rash. Neurological: Negative for dizziness, tingling, tremors, loss of consciousness and headaches. Endo/Heme/Allergies: Does not bruise/bleed easily. Psychiatric/Behavioral: Negative for depression and substance abuse. The patient is not nervous/anxious. Physical Exam  Visit Vitals  BP 98/71 (BP 1 Location: Left arm, BP Patient Position: Sitting)   Pulse 68   Temp 96.8 °F (36 °C) (Oral)   Ht 5' 5\" (1.651 m)   Wt 196 lb (88.9 kg)   SpO2 98%   BMI 32.62 kg/m²     General:  Alert, cooperative, no distress. Head:  Normocephalic, without obvious abnormality, atraumatic. Eyes:  Conjunctivae/corneas clear. Pupils equal, round, reactive to light. Extraocular movements intact. Lungs:   Clear to auscultation bilaterally. Chest wall:  No tenderness or deformity. Heart:  Regular rate and rhythm, S1, S2 normal, no murmur, click, rub, or gallop. Abdomen:   Soft, non-tender. Bowel sounds normal. No masses. No organomegaly. Extremities: Extremities normal, atraumatic, no cyanosis or edema. Pulses: 2+ and symmetric all extremities. Skin: Skin color, texture, turgor normal. No rashes or lesions. Lymph nodes: Cervical, supraclavicular, and axillary nodes normal.   Neurologic: CNII-XII intact. Normal strength, sensation, and reflexes throughout. Assessment    Problem List Items Addressed This Visit        Other    Status post bariatric surgery - Primary          Plan  57-year-old female status post Misael-en-Y gastric bypass on 1/20/2020.  1.  Obtain right upper quadrant ultrasound to further evaluate for possible gallstones  2. Start PPI, Carafate for suspected marginal ulceration. 3.  Continue current vitamin supplementation  4.   Return to the office in 3 months for follow-up

## 2020-10-26 NOTE — LETTER
10/26/20 Patient: Hodan Barboza YOB: 1965 Date of Visit: 10/26/2020 Mary Triplett MD 
[address] [facility] [address] VIA In Basket Dear Mary Triplett MD, Thank you for referring Ms. Luis Garza to [address] [place] [address] for evaluation. My notes for this consultation are attached. If you have questions, please do not hesitate to call me. I look forward to following your patient along with you. Sincerely, Abbie Gardner MD

## 2020-10-28 ENCOUNTER — TELEPHONE (OUTPATIENT)
Dept: SURGERY | Age: 55
End: 2020-10-28

## 2020-10-28 NOTE — TELEPHONE ENCOUNTER
Pt is stilll having pain abd pain. Dr Helder Ledbetter has already prescribed karafate and omeprazole. She has an US scheduled for net week. Pt does not know what to do and she said the pain is worse since she has been on the medication. When she eats she feels very uncomfortable.

## 2020-11-04 ENCOUNTER — HOSPITAL ENCOUNTER (OUTPATIENT)
Dept: ULTRASOUND IMAGING | Age: 55
Discharge: HOME | End: 2020-11-04
Attending: SURGERY
Payer: MEDICARE

## 2020-11-04 DIAGNOSIS — Z98.84 STATUS POST BARIATRIC SURGERY: ICD-10-CM

## 2020-11-04 DIAGNOSIS — R10.11 RUQ PAIN: ICD-10-CM

## 2020-11-04 PROCEDURE — 76705 ECHO EXAM OF ABDOMEN: CPT

## 2020-11-19 ENCOUNTER — OFFICE VISIT (OUTPATIENT)
Dept: ENDOCRINOLOGY | Age: 55
End: 2020-11-19
Payer: MEDICARE

## 2020-11-19 VITALS
HEART RATE: 63 BPM | WEIGHT: 202.8 LBS | DIASTOLIC BLOOD PRESSURE: 67 MMHG | HEIGHT: 65 IN | RESPIRATION RATE: 18 BRPM | SYSTOLIC BLOOD PRESSURE: 94 MMHG | BODY MASS INDEX: 33.79 KG/M2 | TEMPERATURE: 98.2 F | OXYGEN SATURATION: 98 %

## 2020-11-19 DIAGNOSIS — E78.2 MIXED HYPERLIPIDEMIA: ICD-10-CM

## 2020-11-19 DIAGNOSIS — G62.9 NEUROPATHY: ICD-10-CM

## 2020-11-19 DIAGNOSIS — E11.65 UNCONTROLLED TYPE 2 DIABETES MELLITUS WITH HYPERGLYCEMIA, WITH LONG-TERM CURRENT USE OF INSULIN (HCC): Primary | ICD-10-CM

## 2020-11-19 DIAGNOSIS — Z98.84 S/P GASTRIC BYPASS: ICD-10-CM

## 2020-11-19 DIAGNOSIS — Z79.4 UNCONTROLLED TYPE 2 DIABETES MELLITUS WITH HYPERGLYCEMIA, WITH LONG-TERM CURRENT USE OF INSULIN (HCC): Primary | ICD-10-CM

## 2020-11-19 PROCEDURE — 3017F COLORECTAL CA SCREEN DOC REV: CPT | Performed by: INTERNAL MEDICINE

## 2020-11-19 PROCEDURE — G8754 DIAS BP LESS 90: HCPCS | Performed by: INTERNAL MEDICINE

## 2020-11-19 PROCEDURE — G8417 CALC BMI ABV UP PARAM F/U: HCPCS | Performed by: INTERNAL MEDICINE

## 2020-11-19 PROCEDURE — G9899 SCRN MAM PERF RSLTS DOC: HCPCS | Performed by: INTERNAL MEDICINE

## 2020-11-19 PROCEDURE — 99214 OFFICE O/P EST MOD 30 MIN: CPT | Performed by: INTERNAL MEDICINE

## 2020-11-19 PROCEDURE — G8427 DOCREV CUR MEDS BY ELIG CLIN: HCPCS | Performed by: INTERNAL MEDICINE

## 2020-11-19 PROCEDURE — 2022F DILAT RTA XM EVC RTNOPTHY: CPT | Performed by: INTERNAL MEDICINE

## 2020-11-19 PROCEDURE — G8752 SYS BP LESS 140: HCPCS | Performed by: INTERNAL MEDICINE

## 2020-11-19 PROCEDURE — 3044F HG A1C LEVEL LT 7.0%: CPT | Performed by: INTERNAL MEDICINE

## 2020-11-19 PROCEDURE — G8510 SCR DEP NEG, NO PLAN REQD: HCPCS | Performed by: INTERNAL MEDICINE

## 2020-11-19 RX ORDER — MULTIVIT WITH MINERALS/HERBS
1 TABLET ORAL
Status: ON HOLD | COMMUNITY

## 2020-11-19 NOTE — PROGRESS NOTES
1. Have you been to the ER, urgent care clinic since your last visit? NO  Hospitalized since your last visit? No    2. Have you seen or consulted any other health care providers outside of the [address] [place] [address] since your last visit? Include any pap smears or colon screening. No    Wt Readings from Last 3 Encounters:   11/19/20 202 lb 12.8 oz (92 kg)   10/26/20 196 lb (88.9 kg)   04/30/20 216 lb (98 kg)     Temp Readings from Last 3 Encounters:   11/19/20 98.2 °F (36.8 °C) (Temporal)   10/26/20 96.8 °F (36 °C) (Oral)   04/30/20 98.2 °F (36.8 °C)     BP Readings from Last 3 Encounters:   11/19/20 94/67   10/26/20 98/71   04/30/20 97/68     Pulse Readings from Last 3 Encounters:   11/19/20 63   10/26/20 68   04/30/20 67     Lab Results   Component Value Date/Time    Hemoglobin A1c 6.4 (H) 11/12/2020 11:48 AM    Hemoglobin A1c (POC) 6.3 05/25/2018 09:13 AM    Hemoglobin A1c, External 9.0 03/18/2016     Patient has meter today.

## 2020-11-19 NOTE — LETTER
11/22/20 Patient: Hodan Barboza YOB: 1965 Date of Visit: 11/19/2020 Mary Triplett MD 
[place] [address] VIA In Basket Dear Mary Triplett MD, Thank you for referring Ms. Luis Garza to [facility] for evaluation. My notes for this consultation are attached. If you have questions, please do not hesitate to call me. I look forward to following your patient along with you. Sincerely, Gerardo Alonso MD

## 2020-11-19 NOTE — PROGRESS NOTES
HISTORY OF PRESENT ILLNESS  Luis Garza is a 60 y.o. female.   HPI  Patient here for  f/u  After last visit of Type 2 diabetes mellitus  From May video visit 2020       S/p bariatric surgery at  [facility] by Dr. Denys Lomeli in jan 2020   [address] 56 lbs  Post bariatric gastric bypass  surgery  - in one year    Pt says she lost 70 lbs           Video visit May 2020     S/p  bariatric surgery by Dr. Denys Lomeli at [facility] in [facility] 48 45 lbs roughly - down to  215 lbs by her calc   Post op phase was good           Old history  :   Pt lost 20 lbs   She did water aerobics and now doing weight watchers     She has early stage glaucoma   She is hoping to get bariatric surgery   She is now having primary care doc Dr. Walter Alegria at [address]   Gained 4 lbs   She is compliant with insulins   No meter   She is now starting with rheumatologist         Old history   Weight is stable   She is OFF  Pain meds ( too expensive )  Got into disability, long term  She deferred the spinal stimulator  Because of complications   Compliant with diabetic meds           Prior history :    Referred : by self/pcp    H/o diabetes for 30 years     Current A1C is 9 % and symptoms/problems include polyuria, polydipsia and visual disturbances   Current diabetic medications include intensive insulin injection program. And metformin   novolog by 10 units tid plus sliding scale and levemir  36 units hs   Current monitoring regimen: home blood tests - 2 times daily  Home blood sugar records: trend: fluctuating a lot  Any episodes of hypoglycemia? no  Weight trend: fluctuating a lot  Prior visit with dietician: no  Current diet: \"unhealthy\" diet in general  Current exercise: no regular exercise    Known diabetic complications: peripheral neuropathy  Cardiovascular risk factors: dyslipidemia, diabetes mellitus, obesity, hypertension, stress, post-menopausal    Eye exam current (within one year): yes  TRISTEN: no     Past Medical History:   Diagnosis Date    Arthritis     Back/Neck problems    Burning with urination     Frequency    Cancer ([facility] [address] 2009    RIGHT breast    Congestive heart failure (HCC)     Depression     Including Post Partum    Diabetes ([facility] [address]     Dizziness     GERD (gastroesophageal reflux disease)     Headache     Heart disease     Hypercholesterolemia     Hypertension     Morbid obesity (HCC)     Neuropathy     Shortness of breath     With activity    Sleep apnea     Sleep disorder     Difficulty falling asleep, night sweats    Stool color black      Past Surgical History:   Procedure Laterality Date    HX ABDOMINAL LAPAROSCOPY      Surgical, gastric restrictive procedure w/ gastric bypass & bonita-en-y gastrpenterostomy (surg)    HX APPENDECTOMY      HX BREAST LUMPECTOMY  2009    RIGHT with SNBX    HX GASTRIC BYPASS  2020    HX GYN       x1, Bilateral tubal ligation, Dilation & Curettage    HX GYN      Hysterectomy (partial), Lumpectomy of breast    HX HEENT      Tonsillectomy    HX ORTHOPAEDIC      Procedure on shoulder    HX PREMALIG/BENIGN SKIN LESION EXCISION      Cyst removed from scalp    HX PREMALIG/BENIGN SKIN LESION EXCISION      Debridement of subcutaneous tissue    HX TONSILLECTOMY  1970    HX TONSILLECTOMY      HX TONSILLECTOMY      HX TONSILLECTOMY      HX UROLOGICAL       Current Outpatient Medications   Medication Sig    b complex vitamins tablet Take 1 Tab by mouth daily.  ascorbic acid-collagen (Collagen Plus Vitamin C) 125-740 mg cap Collagen Plus Vitamin C    DULoxetine (CYMBALTA) 60 mg capsule Take 60 mg by mouth daily.  IRON, FERROUS SULFATE, PO Iron (ferrous sulfate)    dicloxacillin (DYNAPEN) 250 mg capsule TAKE 2 CAPSULES BY MOUTH ONCE DAILY AS NEEDED    calcium carbonate (CALCIUM 500 PO) Take  by mouth.  timoloL (BetimoL) 0.5 % ophthalmic solution Administer 1 Drop to both eyes two (2) times a day.  use in affected eye(s)    multivitamin-min-iron-FA-vit K (Bariatric Multivitamins) 45 mg iron- 800 mcg-120 mcg cap Bariatric Multivitamins    carvediloL (COREG) 12.5 mg tablet Take 12.5 mg by mouth two (2) times a day.  omega 3-dha-epa-fish oil (Fish Oil) 100-160-1,000 mg cap Take 3 mg by mouth daily.  triamcinolone acetonide (KENALOG) 0.1 % topical cream triamcinolone acetonide 0.1 % topical cream    calcium-cholecalciferol, d3, 600 mg calcium- 200 unit cap Take 600 mg by mouth daily.  oxyCODONE-acetaminophen (PERCOCET 10)  mg per tablet Take  Tabs by mouth daily.  metFORMIN ER (GLUCOPHAGE XR) 500 mg tablet TAKE 2 TABLETS BY MOUTH TWICE DAILY WITH MEALS. STOP SYNJARDY    traZODone (DESYREL) 100 mg tablet TK 1 T PO QD HS    furosemide (LASIX) 20 mg tablet TK 1 T PO QD PRF WEIGHT GAIN OF 2 LBS OR MORE IN A DAY    potassium chloride (K-DUR, KLOR-CON) 20 mEq tablet TK 1 T PO QD PRN WHEN TAKING FUROSEMIDE    latanoprost (XALATAN) 0.005 % ophthalmic solution Administer 1 Drop to both eyes nightly.  HYDROcodone-acetaminophen (NORCO)  mg tablet Take 1 Tab by mouth every eight (8) hours as needed for Pain.  gabapentin (NEURONTIN) 800 mg tablet Take 1 Tab by mouth three (3) times daily.  Lancets (ACCU-CHEK FASTCLIX) misc Test 4 times daily. Dx code E11.65    NOVOFINE 30 30 gauge x 1/3\"     multivitamin (ONE A DAY) tablet Take 1 Tab by mouth daily.  biotin 500 mcg Cap Take  by mouth.  metFORMIN (GLUCOPHAGE) 500 mg tablet Take 500 mg by mouth two (2) times a day.  traZODone (DESYREL) 50 mg tablet Take  by mouth nightly.  diclofenac (VOLTAREN) 1 % gel Apply  to affected area four (4) times daily.  suvorexant (Belsomra) 10 mg tablet Take  by mouth nightly as needed for Insomnia.  dicloxacillin (DYNAPEN) 500 mg capsule Take  by mouth Before breakfast, lunch, dinner and at bedtime.  insulin aspart U-100 (NovoLOG Flexpen U-100 Insulin) 100 unit/mL (3 mL) inpn by SubCUTAneous route.     pravastatin (PRAVACHOL) 40 mg tablet Take 40 mg by mouth nightly.  simvastatin (ZOCOR) 20 mg tablet Take  by mouth nightly.  empagliflozin-metFORMIN (Synjardy) 12.5-1,000 mg per tablet Take 1 Tab by mouth two (2) times daily (with meals).  albiglutide (Tanzeum) 30 mg/0.5 mL injector pen by SubCUTAneous route every seven (7) days.  topiramate (TOPAMAX) 25 mg tablet Take  by mouth two (2) times a day.  insulin glargine U-300 conc (Toujeo SoloStar U-300 Insulin) 300 unit/mL (1.5 mL) inpn pen by SubCUTAneous route.  Venlafaxine-ER 24 HR (EFFEXOR-ER) 150 mg tr24 tablet Take  by mouth daily.  BIOTIN PO biotin    DULoxetine 60 mg CDRS Take 60 mg by mouth daily.  gabapentin (Neurontin) 400 mg capsule Take 400 mg by mouth daily.  metFORMIN (GLUMETZA ER) 500 mg TG24 24 hour tablet metformin  mg tablet,extended release 24 hr   TK 2 TS PO BID WITH MEALS. STOP SYNJARDY    cyanocobalamin (Vitamin B-12) 1,000 mcg tablet Vitamin B12    celecoxib (CELEBREX) 200 mg capsule Take 200 mg by mouth daily.  ursodioL (ACTIGALL) 300 mg capsule TK 1 C PO BID    allopurinol (ZYLOPRIM) 100 mg tablet TK 1 T PO QD    carvedilol (COREG) 12.5 mg tablet Take 1 Tab by mouth daily. No current facility-administered medications for this visit. Review of Systems   Constitutional: Negative. HENT: Negative. Eyes: Negative for pain and redness. Respiratory: Negative. Cardiovascular: Negative for chest pain, palpitations and leg swelling. Gastrointestinal: Negative. Negative for constipation. Genitourinary: Negative. Musculoskeletal: Negative for myalgias. Skin: Negative. Neurological: Negative. Endo/Heme/Allergies: Negative. Psychiatric/Behavioral: Negative for depression and memory loss. The patient does not have insomnia. Physical Exam   Constitutional: oriented to person, place, and time. appears well-developed and well-nourished. HENT:   Head: Normocephalic.    Eyes: normal , noted no swelling or redness. Neck: Normal range of motion. Cardiovascular: could nto be examined  Pulmonary/Chest: appears breathing effortlessly  Abdominal: Soft. Musculoskeletal: appears relatively nprmal  range of motion. Neurological: He is alert and oriented to person, place, and time. Appears to have no focal deficits    Psychiatric: He has a normal mood and affect. Lab Results   Component Value Date/Time    Hemoglobin A1c 6.4 (H) 11/12/2020 11:48 AM    Hemoglobin A1c 6.4 (H) 09/14/2020 12:44 PM    Hemoglobin A1c 6.4 (H) 04/23/2020 02:42 PM    Hemoglobin A1c, External 9.0 03/18/2016    Glucose 109 (H) 11/12/2020 11:48 AM    Glucose (POC) 172 (H) 02/06/2009 01:29 PM    Glucose  05/20/2016 04:58 PM    Microalbumin/Creat ratio (mg/g creat) 6 11/12/2020 11:48 AM    Microalbumin,urine random 1.55 11/12/2020 11:48 AM    LDL,Direct 149 (H) 03/12/2019 09:26 AM    LDL, calculated 117.8 (H) 11/12/2020 11:48 AM    Creatinine (POC) 0.8 02/21/2012 06:03 PM    Creatinine 0.69 11/12/2020 11:48 AM      Lab Results   Component Value Date/Time    Cholesterol, total 219 (H) 11/12/2020 11:48 AM    HDL Cholesterol 64 11/12/2020 11:48 AM    LDL,Direct 149 (H) 03/12/2019 09:26 AM    LDL, calculated 117.8 (H) 11/12/2020 11:48 AM    Triglyceride 186 (H) 11/12/2020 11:48 AM    CHOL/HDL Ratio 3.4 11/12/2020 11:48 AM       Lab Results   Component Value Date/Time    ALT (SGPT) 37 11/12/2020 11:48 AM    Alk. phosphatase 57 11/12/2020 11:48 AM    Bilirubin, total 0.3 11/12/2020 11:48 AM                ASSESSMENT and PLAN  1.  Type 2 DM, un controlled : a1c is  6.4 %      From     Nov 2020       Compared to  6.4 %     From April 2020     Compared to    6.6 %     From nov 2019  Compared to   6.8  %     From may 2019     Compared to  6.4 %       From nov 2018      Compared to    6.7 %    From  May 2018     Compared to   7.2 %     From   Nov 2017    Compared to  6.4  %     From    July 2017   Compared to  6.6 %       From     April 2017 Compared to   6.8 %    From jan 2017   Compared to   6.6 %     From oct 2016  Compared to   7% from July 2016  Compared to    8.2 %    From may 2016 compared to  9 %    From march 2016 Nov 2020     S/p bariatric surgery  Lost good weight   a1c  Is  6.3 %  Despite weight loss   On metformin er,  to be continued       May video visit     She stopped  Sugar checks     stay on  metformin er 1000 mg bid   S/p bypass surgery lost weight   Stop  Insulins  Check twice a day  By rotation         2. HTN : continue coreg       4. Dyslipidemia : lipids are still above the goal despite weight loss from  Gastric  bypass surgery   Could not tolerate any kinds of statins  Discussed low  fat diet    May 2020  : As the  [facility] recommendations changed, I again discussed with  Patient is educated about benefits and adverse effects of statins and explained how benefits outweigh risk. 5.    Diabetic complications :     A. Retinopathy-NO   educated on this complication,  regular f/u with ophthalmologist encouraged    B. Nephropathy - NO     educated on this disease and indicated stages and its prognosis.      C. Peripheral Neuropathy - YES  severe  On cymbalta increased to 60 mg hs   And  on  neurontin 800 mg tid   recommending   Metanx ( too expensive )      she has maintained the glycemic control very well ( may have had bad control prior to being under my care )  She has no other microvascular complications from DM    She has back issues  And she is restricted from several activities by her spine doc , [facility]   She has chemotherapy induced neuropathy also   Reviewed  EMG   She has severe neuropathy , lost proprioception and has balance issues ,  Had a fall   She has numbness on left leg - no better  She is being continued to stay on short term disability  From - April 27 2017     She deferred spinal stimulator for fear of infection  She cannot tolerate Lyrica   She can only take gabapentin      E : CAD : YES  Cardiomyopathy , after chemo- adriamycin      H/o  Right breast cancer - treated and  Has lymphedema on right UE   She has lymph pump put in  In 2009 she says   She reports cellulitis on and off       She got papers from [employer] last week and wanted to extend the leave off from work - I refused to fill papers/ extent leave  - did not remember   From my previous conversation, she said pcp is starting the leave     D/w Dr. Anupama Mcdermott , he agrees with chemo induced neuropathy   D/ w Dr. Antonella Saenz who concurs that she has severe neuropathy leading loss of balance        7. Obesity  : Body mass index is 33.75 kg/m².   S/p bariatric surgery  Jan 2020 -    On vit d, b12 and iron          F/u in 6  Months          > 50 % of time is spent on counseling   Patient voiced understanding her plan of care

## 2020-12-15 RX ORDER — DULOXETIN HYDROCHLORIDE 60 MG/1
60 CAPSULE, DELAYED RELEASE ORAL DAILY
Qty: 30 CAP | Refills: 6 | Status: SHIPPED | OUTPATIENT
Start: 2020-12-15 | End: 2021-07-03

## 2021-04-19 ENCOUNTER — TELEPHONE (OUTPATIENT)
Dept: SURGERY | Age: 56
End: 2021-04-19

## 2021-04-19 NOTE — TELEPHONE ENCOUNTER
Per request from Catarino Amado at Dr. Tatiana Mancera office, labs from 9/14/20 are being denied and additional diagnosis' needed to be given. Per ALEXA Vanessa diagnosis E63.9 and K91.2 needed to be added. Spoke with Erlinda Fournier at [employer] and she added the diagnosis. She said it may take 30-45 days to process.

## 2021-05-19 ENCOUNTER — OFFICE VISIT (OUTPATIENT)
Dept: ENDOCRINOLOGY | Age: 56
End: 2021-05-19
Payer: MEDICARE

## 2021-05-19 VITALS
WEIGHT: 192.8 LBS | BODY MASS INDEX: 32.12 KG/M2 | SYSTOLIC BLOOD PRESSURE: 91 MMHG | OXYGEN SATURATION: 99 % | RESPIRATION RATE: 18 BRPM | TEMPERATURE: 97.5 F | HEART RATE: 70 BPM | HEIGHT: 65 IN | DIASTOLIC BLOOD PRESSURE: 68 MMHG

## 2021-05-19 DIAGNOSIS — E87.1 HYPONATREMIA: ICD-10-CM

## 2021-05-19 DIAGNOSIS — G62.9 NEUROPATHY: ICD-10-CM

## 2021-05-19 DIAGNOSIS — Z79.4 UNCONTROLLED TYPE 2 DIABETES MELLITUS WITH HYPERGLYCEMIA, WITH LONG-TERM CURRENT USE OF INSULIN (HCC): Primary | ICD-10-CM

## 2021-05-19 DIAGNOSIS — E11.65 UNCONTROLLED TYPE 2 DIABETES MELLITUS WITH HYPERGLYCEMIA, WITH LONG-TERM CURRENT USE OF INSULIN (HCC): Primary | ICD-10-CM

## 2021-05-19 DIAGNOSIS — I10 ESSENTIAL HYPERTENSION: ICD-10-CM

## 2021-05-19 DIAGNOSIS — E78.2 MIXED HYPERLIPIDEMIA: ICD-10-CM

## 2021-05-19 DIAGNOSIS — Z98.84 S/P GASTRIC BYPASS: ICD-10-CM

## 2021-05-19 DIAGNOSIS — N17.9 ACUTE RENAL FAILURE, UNSPECIFIED ACUTE RENAL FAILURE TYPE (HCC): ICD-10-CM

## 2021-05-19 DIAGNOSIS — Z79.4 ENCOUNTER FOR LONG-TERM (CURRENT) USE OF INSULIN (HCC): ICD-10-CM

## 2021-05-19 PROCEDURE — G8417 CALC BMI ABV UP PARAM F/U: HCPCS | Performed by: INTERNAL MEDICINE

## 2021-05-19 PROCEDURE — 3017F COLORECTAL CA SCREEN DOC REV: CPT | Performed by: INTERNAL MEDICINE

## 2021-05-19 PROCEDURE — G8510 SCR DEP NEG, NO PLAN REQD: HCPCS | Performed by: INTERNAL MEDICINE

## 2021-05-19 PROCEDURE — 99214 OFFICE O/P EST MOD 30 MIN: CPT | Performed by: INTERNAL MEDICINE

## 2021-05-19 PROCEDURE — 2022F DILAT RTA XM EVC RTNOPTHY: CPT | Performed by: INTERNAL MEDICINE

## 2021-05-19 PROCEDURE — G8752 SYS BP LESS 140: HCPCS | Performed by: INTERNAL MEDICINE

## 2021-05-19 PROCEDURE — 3044F HG A1C LEVEL LT 7.0%: CPT | Performed by: INTERNAL MEDICINE

## 2021-05-19 PROCEDURE — G8427 DOCREV CUR MEDS BY ELIG CLIN: HCPCS | Performed by: INTERNAL MEDICINE

## 2021-05-19 PROCEDURE — G8754 DIAS BP LESS 90: HCPCS | Performed by: INTERNAL MEDICINE

## 2021-05-19 RX ORDER — SERTRALINE HYDROCHLORIDE 25 MG/1
25 TABLET, FILM COATED ORAL
COMMUNITY

## 2021-05-19 RX ORDER — HYDROXYZINE PAMOATE 25 MG/1
50 CAPSULE ORAL
COMMUNITY
Start: 2021-04-08

## 2021-05-19 NOTE — LETTER
5/19/2021 Patient: Rajni Gutierrez YOB: 1965 Date of Visit: 5/19/2021 Angie Haynes MD 
[place] [id number] [address] [employer] Dear Angie Haynes MD, Thank you for referring Ms. Luis Garza to [facility] for evaluation. My notes for this consultation are attached. If you have questions, please do not hesitate to call me. I look forward to following your patient along with you. Sincerely, Bria Flynn MD

## 2021-05-19 NOTE — PATIENT INSTRUCTIONS
SPECIFIC INSTRUCTIONS BELOW      Metformin er 500 mg 2 pills twice a day       PAY ATTENTION TO THESE GENERAL INSTRUCTIONS     -ANY tests other than blood work, which you opt to do  outside [facility] imaging facilities, you are responsible for prior authorizations if  required   - [address] UP TO DATE ON YOUR AVS- PLEASE IGNORE   - YOUR MED LIST IS NOT UP TO DATE AS SOME CHANGES ARE BEING MADE AFTER THE VISIT - FOLLOW SPECIFIC INSTRUCTIONS  ABOVE     Results     *Normal results will not be notified by a phone call starting January 1 2021   *If you have an upcoming visit, the results will be discussed at the visit   *Please sign up for MY CHART if you want access to your lab and test results  *Abnormal results which require immediate attention will be notified by phone call   *Abnormal results which do not require immediate assistance will be notified in 1-2 weeks       Refills    -    have your pharmacy send us a refill request  Phone calls  -  Allow  24 hrs.  for non-urgent calls to be returned  Prior authorization - It may take 2-4 weeks to process  Forms  -  FMLA, DMV etc., will take up to 2 weeks to process  Cancellations - please notify the office 2 days in advance   Samples  - will only be dispensed at visits     --------------------------------------------------------------------------------------------

## 2021-05-19 NOTE — PROGRESS NOTES
1. Have you been to the ER, urgent care clinic since your last visit? Gallbladder Surgery/April 7,5553  Hospitalized since your last visit? No    2. Have you seen or consulted any other health care providers outside of the [address] [place] [address] since your last visit? Include any pap smears or colon screening. No    Wt Readings from Last 3 Encounters:   05/19/21 192 lb 12.8 oz (87.5 kg)   11/19/20 202 lb 12.8 oz (92 kg)   10/26/20 196 lb (88.9 kg)     Temp Readings from Last 3 Encounters:   05/19/21 97.5 °F (36.4 °C) (Oral)   11/19/20 98.2 °F (36.8 °C) (Temporal)   10/26/20 96.8 °F (36 °C) (Oral)     BP Readings from Last 3 Encounters:   05/19/21 91/68   11/19/20 94/67   10/26/20 98/71     Pulse Readings from Last 3 Encounters:   05/19/21 70   11/19/20 63   10/26/20 68     Lab Results   Component Value Date/Time    Hemoglobin A1c 6.4 (H) 05/10/2021 09:20 AM    Hemoglobin A1c (POC) 6.3 05/25/2018 09:13 AM    Hemoglobin A1c, External 9.0 03/18/2016 12:00 AM     Patient has meter today.

## 2021-05-19 NOTE — PROGRESS NOTES
HISTORY OF PRESENT ILLNESS  Luis Garza is a 60 y.o. female. HPI  Patient here for  f/u  After last visit of Type 2 diabetes mellitus  From nov 2020     Lost 10 lbs   ( lost 66 lbs for 1.5 years )   She is s/p  Lap choly  April 2021     She had labs -         Nov 2020     S/p bariatric surgery at  [facility] by Dr. Anuj Lynn in [address] 56 lbs  Post bariatric gastric bypass  surgery  - in one year    Pt says she lost 70 lbs           Video visit May 2020     S/p  bariatric surgery by Dr. Anuj Lynn at [facility] in [place] [address] 45 lbs roughly - down to  215 lbs by her calc   Post op phase was good           Old history  :   Pt lost 20 lbs   She did water aerobics and now doing weight watchers   She has early stage glaucoma   She is hoping to get bariatric surgery   She is now having primary care doc Dr. Michelle Olivares at [facility]      Prior history :    Referred : by self/pcp  H/o diabetes for 30 years   Current A1C is 9 % and symptoms/problems include polyuria, polydipsia and visual disturbances   Current diabetic medications include intensive insulin injection program. And metformin   novolog by 10 units tid plus sliding scale and levemir  36 units hs   Current monitoring regimen: home blood tests - 2 times daily      Review of Systems   Constitutional: Negative. Neuropathy, back issues are persisting     Psychiatric/Behavioral: Negative for depression and memory loss. The patient does not have insomnia. Physical Exam   Constitutional: oriented to person, place, and time. appears well-developed and well-nourished. Psychiatric: He has a normal mood and affect.            Lab Results   Component Value Date/Time    Hemoglobin A1c 6.4 (H) 05/10/2021 09:20 AM    Hemoglobin A1c 6.4 (H) 11/12/2020 11:48 AM    Hemoglobin A1c 6.4 (H) 09/14/2020 12:44 PM    Hemoglobin A1c, External 9.0 03/18/2016 12:00 AM    Glucose 135 (H) 05/10/2021 09:20 AM    Glucose (POC) 172 (H) 02/06/2009 01:29 PM    Glucose  05/20/2016 04:58 PM    Microalbumin/Creat ratio (mg/g creat) 6 11/12/2020 11:48 AM    Microalbumin,urine random 1.55 11/12/2020 11:48 AM    LDL,Direct 149 (H) 03/12/2019 09:26 AM    LDL, calculated 127.2 (H) 05/10/2021 09:20 AM    Creatinine (POC) 0.8 02/21/2012 06:03 PM    Creatinine 1.43 (H) 05/10/2021 09:20 AM      Lab Results   Component Value Date/Time    Cholesterol, total 245 (H) 05/10/2021 09:20 AM    HDL Cholesterol 58 05/10/2021 09:20 AM    LDL,Direct 149 (H) 03/12/2019 09:26 AM    LDL, calculated 127.2 (H) 05/10/2021 09:20 AM    Triglyceride 299 (H) 05/10/2021 09:20 AM    CHOL/HDL Ratio 4.2 05/10/2021 09:20 AM       Lab Results   Component Value Date/Time    ALT (SGPT) 36 05/10/2021 09:20 AM    Alk. phosphatase 91 05/10/2021 09:20 AM    Bilirubin, total 0.4 05/10/2021 09:20 AM                ASSESSMENT and PLAN    1. Elevated RBC and WBC counts , low sodium and elevated creat levels   Denies any infection , On Lasix ,   Advised on f/u with pcp to get labs repeated         1. Type 2 DM, un controlled : a1c is  6.4 %     From May 2021    Compared to    6.4 %      From     Nov 2020       Compared to  6.4 %     From April 2020     Compared to    6.6 %     From nov 2019  Compared to   6.8  %     From may 2019     Compared to  6.4 %       From nov 2018      Compared to    6.7 %    From  May 2018     Compared to   7.2 %     From   Nov 2017    Compared to  6.4  %     From    July 2017   Compared to  6.6 %       From     April 2017     Compared to   6.8 %    From jan 2017          May 2021   Good control still , on metformin er   By A1c   Fasting blood sugars are higher       Nov 2020     S/p bariatric surgery  Lost good weight   a1c  Is  6.3 %  Despite weight loss   On metformin er,  to be continued       May video visit   She stopped  Sugar checks     stay on  metformin er 1000 mg bid   S/p bypass surgery lost weight   Stop  Insulins- Check twice a day  By rotation         2. HTN : continue coreg       4.   Dyslipidemia : lipids are still above the goal despite weight loss from  Gastric  bypass surgery   Could not tolerate any kinds of statins        5. Diabetic complications :     A. Retinopathy-NO   educated on this complication,  regular f/u with ophthalmologist encouraged    B. Nephropathy - NO     educated on this disease and indicated stages and its prognosis. C. Peripheral Neuropathy - YES  severe  On cymbalta and  neurontin 800 mg tid   recommending   Metanx ( too expensive )  She cannot tolerate Lyrica         E : CAD : YES  Cardiomyopathy , after chemo- adriamycin      H/o  Right breast cancer - treated and  Has lymphedema on right UE        7. Obesity  : Body mass index is 32.08 kg/m².   S/p bariatric surgery  Jan 2020 -    On vit d, b12 and iron          F/u in 6  Months    Reviewed results with patient and discussed the labs being ordered today/bnv  Patient voiced understanding of plan of care

## 2021-06-04 RX ORDER — METFORMIN HYDROCHLORIDE 500 MG/1
TABLET, EXTENDED RELEASE ORAL
Qty: 120 TABLET | Refills: 6 | Status: SHIPPED | OUTPATIENT
Start: 2021-06-04 | End: 2021-11-18 | Stop reason: SDUPTHER

## 2021-06-21 ENCOUNTER — TRANSCRIBE ORDER (OUTPATIENT)
Dept: SCHEDULING | Age: 56
End: 2021-06-21

## 2021-06-21 DIAGNOSIS — Z12.31 VISIT FOR SCREENING MAMMOGRAM: Primary | ICD-10-CM

## 2021-06-22 ENCOUNTER — HOSPITAL ENCOUNTER (OUTPATIENT)
Dept: GENERAL RADIOLOGY | Age: 56
Discharge: HOME | End: 2021-06-22
Payer: MEDICARE

## 2021-06-22 ENCOUNTER — TRANSCRIBE ORDER (OUTPATIENT)
Dept: REGISTRATION | Age: 56
End: 2021-06-22

## 2021-06-22 DIAGNOSIS — R52 PAIN: Primary | ICD-10-CM

## 2021-06-22 PROCEDURE — 73502 X-RAY EXAM HIP UNI 2-3 VIEWS: CPT

## 2021-06-30 ENCOUNTER — HOSPITAL ENCOUNTER (OUTPATIENT)
Dept: MAMMOGRAPHY | Age: 56
Discharge: HOME | End: 2021-06-30
Attending: FAMILY MEDICINE
Payer: MEDICARE

## 2021-06-30 DIAGNOSIS — Z12.31 VISIT FOR SCREENING MAMMOGRAM: ICD-10-CM

## 2021-06-30 DIAGNOSIS — R52 PAIN: ICD-10-CM

## 2021-06-30 PROCEDURE — 77063 BREAST TOMOSYNTHESIS BI: CPT

## 2021-07-03 RX ORDER — DULOXETIN HYDROCHLORIDE 60 MG/1
CAPSULE, DELAYED RELEASE ORAL
Qty: 30 CAPSULE | Refills: 6 | Status: SHIPPED | OUTPATIENT
Start: 2021-07-03 | End: 2022-10-17

## 2021-08-10 ENCOUNTER — APPOINTMENT (OUTPATIENT)
Dept: CT IMAGING | Age: 56
End: 2021-08-10
Attending: EMERGENCY MEDICINE
Payer: MEDICARE

## 2021-08-10 ENCOUNTER — HOSPITAL ENCOUNTER (EMERGENCY)
Age: 56
Discharge: HOME | End: 2021-08-10
Attending: EMERGENCY MEDICINE
Payer: MEDICARE

## 2021-08-10 VITALS
DIASTOLIC BLOOD PRESSURE: 78 MMHG | OXYGEN SATURATION: 100 % | BODY MASS INDEX: 33.66 KG/M2 | SYSTOLIC BLOOD PRESSURE: 117 MMHG | HEART RATE: 66 BPM | TEMPERATURE: 98.3 F | WEIGHT: 202 LBS | RESPIRATION RATE: 16 BRPM | HEIGHT: 65 IN

## 2021-08-10 DIAGNOSIS — R42 DIZZINESS: Primary | ICD-10-CM

## 2021-08-10 LAB
ALBUMIN SERPL-MCNC: 3.3 G/DL (ref 3.5–5)
ALBUMIN/GLOB SERPL: 0.8 {RATIO} (ref 1.1–2.2)
ALP SERPL-CCNC: 76 U/L (ref 45–117)
ALT SERPL-CCNC: 49 U/L (ref 12–78)
ANION GAP SERPL CALC-SCNC: 4 MMOL/L (ref 5–15)
AST SERPL W P-5'-P-CCNC: 15 U/L (ref 15–37)
BASOPHILS # BLD: 0.1 K/UL (ref 0–0.1)
BASOPHILS NFR BLD: 1 % (ref 0–1)
BILIRUB SERPL-MCNC: 0.2 MG/DL (ref 0.2–1)
BUN SERPL-MCNC: 27 MG/DL (ref 6–20)
BUN/CREAT SERPL: 29 (ref 12–20)
CA-I BLD-MCNC: 9.4 MG/DL (ref 8.5–10.1)
CHLORIDE SERPL-SCNC: 104 MMOL/L (ref 97–108)
CO2 SERPL-SCNC: 30 MMOL/L (ref 21–32)
CREAT SERPL-MCNC: 0.94 MG/DL (ref 0.55–1.02)
DIFFERENTIAL METHOD BLD: ABNORMAL
EOSINOPHIL # BLD: 0.1 K/UL (ref 0–0.4)
EOSINOPHIL NFR BLD: 1 % (ref 0–7)
ERYTHROCYTE [DISTWIDTH] IN BLOOD BY AUTOMATED COUNT: 12.6 % (ref 11.5–14.5)
GLOBULIN SER CALC-MCNC: 3.9 G/DL (ref 2–4)
GLUCOSE SERPL-MCNC: 112 MG/DL (ref 65–100)
HCT VFR BLD AUTO: 35.7 % (ref 35–47)
HGB BLD-MCNC: 11.8 G/DL (ref 11.5–16)
IMM GRANULOCYTES # BLD AUTO: 0 K/UL (ref 0–0.04)
IMM GRANULOCYTES NFR BLD AUTO: 0 % (ref 0–0.5)
LYMPHOCYTES # BLD: 4 K/UL (ref 0.8–3.5)
LYMPHOCYTES NFR BLD: 40 % (ref 12–49)
MCH RBC QN AUTO: 31.2 PG (ref 26–34)
MCHC RBC AUTO-ENTMCNC: 33.1 G/DL (ref 30–36.5)
MCV RBC AUTO: 94.4 FL (ref 80–99)
MONOCYTES # BLD: 0.6 K/UL (ref 0–1)
MONOCYTES NFR BLD: 6 % (ref 5–13)
NEUTS SEG # BLD: 5.4 K/UL (ref 1.8–8)
NEUTS SEG NFR BLD: 52 % (ref 32–75)
NRBC # BLD: 0 K/UL (ref 0–0.01)
NRBC BLD-RTO: 0 PER 100 WBC
PLATELET # BLD AUTO: 444 K/UL (ref 150–400)
PMV BLD AUTO: 9.9 FL (ref 8.9–12.9)
POTASSIUM SERPL-SCNC: 4.6 MMOL/L (ref 3.5–5.1)
PROT SERPL-MCNC: 7.2 G/DL (ref 6.4–8.2)
RBC # BLD AUTO: 3.78 M/UL (ref 3.8–5.2)
SODIUM SERPL-SCNC: 138 MMOL/L (ref 136–145)
TROPONIN I SERPL-MCNC: <0.05 NG/ML
WBC # BLD AUTO: 10.2 K/UL (ref 3.6–11)

## 2021-08-10 PROCEDURE — 80053 COMPREHEN METABOLIC PANEL: CPT

## 2021-08-10 PROCEDURE — 84484 ASSAY OF TROPONIN QUANT: CPT

## 2021-08-10 PROCEDURE — 70450 CT HEAD/BRAIN W/O DYE: CPT

## 2021-08-10 PROCEDURE — 36415 COLL VENOUS BLD VENIPUNCTURE: CPT

## 2021-08-10 PROCEDURE — 99282 EMERGENCY DEPT VISIT SF MDM: CPT

## 2021-08-10 PROCEDURE — 85025 COMPLETE CBC W/AUTO DIFF WBC: CPT

## 2021-08-10 PROCEDURE — 93005 ELECTROCARDIOGRAM TRACING: CPT

## 2021-08-10 NOTE — ED TRIAGE NOTES
Pt states she's had syncopal episodes x1 year. Last episode Wednesday. Sattes she's never been seen for them. Told her MD about them and he urged her to come here. Pt c/o headache and fatigue now.

## 2021-08-10 NOTE — ED PROVIDER NOTES
EMERGENCY DEPARTMENT HISTORY AND PHYSICAL EXAM      Date: 8/10/2021  Patient Name: Cassandra Garza      History of Presenting Illness     Chief Complaint   Patient presents with    Dizziness       History Provided By: Patient    HPI: Shruthi Snow, 60 y.o. female with a past medical history significant diabetes, hypertension and hyperlipidemia presents to the ED with cc of feeling weak at times and having syncopal episodes a few times over the past year. Her last one was a week to 10 days ago. She was seen and evaluated by her PCP who conveys she should come to the emergency room for evaluation. She waited a few days and decided to come into the ER. There are no exacerbating or relieving factors and she has not treated her symptoms with anything. She denies any fever, chills, nausea, vomiting, chest pain, shortness of breath, rash, diarrhea, headache, night sweats, weight loss. There are no other complaints, changes, or physical findings at this time. PCP: Sara Avila MD    Current Outpatient Medications   Medication Sig Dispense Refill    DULoxetine (CYMBALTA) 60 mg capsule TAKE 1 CAPSULE BY MOUTH DAILY 30 Capsule 6    metFORMIN ER (GLUCOPHAGE XR) 500 mg tablet TAKE 2 TABLET BY MOUTH TWICE DAILY DAILY WITH MEALS STOP SYNJARDY 120 Tablet 6    hydrOXYzine pamoate (VISTARIL) 25 mg capsule Take 50 mg by mouth nightly.  sertraline (ZOLOFT) 25 mg tablet sertraline 25 mg tablet   TAKE 1 TABLET BY MOUTH EVERY DAY      b complex vitamins tablet Take 1 Tab by mouth daily.  ascorbic acid-collagen (Collagen Plus Vitamin C) 125-740 mg cap Collagen Plus Vitamin C      IRON, FERROUS SULFATE, PO Iron (ferrous sulfate)      calcium carbonate (CALCIUM 500 PO) Take  by mouth.  timoloL (BetimoL) 0.5 % ophthalmic solution Administer 1 Drop to both eyes two (2) times a day.  use in affected eye(s)      multivitamin-min-iron-FA-vit K (Bariatric Multivitamins) 45 mg iron- 800 mcg-120 mcg cap Bariatric Multivitamins      carvediloL (COREG) 12.5 mg tablet Take 12.5 mg by mouth two (2) times a day.  omega 3-dha-epa-fish oil (Fish Oil) 100-160-1,000 mg cap Take 3 mg by mouth daily.  triamcinolone acetonide (KENALOG) 0.1 % topical cream triamcinolone acetonide 0.1 % topical cream      calcium-cholecalciferol, d3, 600 mg calcium- 200 unit cap Take 600 mg by mouth daily.  oxyCODONE-acetaminophen (PERCOCET 10)  mg per tablet Take  Tabs by mouth daily.  traZODone (DESYREL) 100 mg tablet TK 1 T PO QD HS  1    furosemide (LASIX) 20 mg tablet TK 1 T PO QD PRF WEIGHT GAIN OF 2 LBS OR MORE IN A DAY  5    potassium chloride (K-DUR, KLOR-CON) 20 mEq tablet TK 1 T PO QD PRN WHEN TAKING FUROSEMIDE  5    latanoprost (XALATAN) 0.005 % ophthalmic solution Administer 1 Drop to both eyes nightly.  HYDROcodone-acetaminophen (NORCO)  mg tablet Take 1 Tab by mouth every eight (8) hours as needed for Pain.  gabapentin (NEURONTIN) 800 mg tablet Take 1 Tab by mouth three (3) times daily. 0    Lancets (ACCU-CHEK FASTCLIX) misc Test 4 times daily. Dx code E11.65 200 Package 6    NOVOFINE 30 30 gauge x 1/3\"   11    multivitamin (ONE A DAY) tablet Take 1 Tab by mouth daily.  biotin 500 mcg Cap Take  by mouth.          Past History     Past Medical History:  Past Medical History:   Diagnosis Date    Arthritis     Back/Neck problems    Burning with urination     Frequency    Cancer ([facility] [address] 2009    RIGHT breast    Congestive heart failure (HCC)     Depression     Including Post Partum    Diabetes (HCC)     Dizziness     GERD (gastroesophageal reflux disease)     Headache     Heart disease     Hypercholesterolemia     Hypertension     Morbid obesity (HCC)     Neuropathy     Shortness of breath     With activity    Sleep apnea     Sleep disorder     Difficulty falling asleep, night sweats    Stool color black        Past Surgical History:  Past Surgical History: Procedure Laterality Date    HX ABDOMINAL LAPAROSCOPY      Surgical, gastric restrictive procedure w/ gastric bypass & bonita-en-y gastrpenterostomy (surg)    HX APPENDECTOMY      HX BREAST LUMPECTOMY  2009    RIGHT with SNBX    HX GASTRIC BYPASS  2020    HX GYN       x1, Bilateral tubal ligation, Dilation & Curettage    HX GYN      Hysterectomy (partial), Lumpectomy of breast    HX HEENT      Tonsillectomy    HX ORTHOPAEDIC      Procedure on shoulder    HX PREMALIG/BENIGN SKIN LESION EXCISION      Cyst removed from scalp    HX PREMALIG/BENIGN SKIN LESION EXCISION      Debridement of subcutaneous tissue    HX TONSILLECTOMY  1970    HX TONSILLECTOMY      HX TONSILLECTOMY      HX TONSILLECTOMY      HX UROLOGICAL         Family History:  Family History   Problem Relation Age of Onset    Hypertension Mother     Cancer Mother     Thyroid Disease Mother     Hypertension Father     Cancer Father     Diabetes Brother     Hypertension Brother     Cancer Brother     Stroke Brother     Lung Disease Daughter     Asthma Daughter     Hypertension Daughter     Thyroid Disease Daughter     Diabetes Son     Lung Disease Son     Hypertension Son     Stroke Maternal Grandmother     Dementia Maternal Grandmother     Hypertension Maternal Grandmother        Social History:  Social History     Tobacco Use    Smoking status: Never Smoker    Smokeless tobacco: Never Used   Substance Use Topics    Alcohol use: Yes     Alcohol/week: 1.7 standard drinks     Types: 2 Glasses of wine per week    Drug use: No       Allergies: Allergies   Allergen Reactions    Januvia [Sitagliptin] Rash    Statins-Hmg-Coa Reductase Inhibitors Itching         Review of Systems     Review of Systems   Constitutional: Negative. Negative for appetite change, chills, fatigue and fever. HENT: Negative. Negative for congestion and sinus pain. Eyes: Negative. Negative for pain and visual disturbance. Respiratory: Negative. Negative for chest tightness and shortness of breath. Cardiovascular: Negative. Negative for chest pain. Gastrointestinal: Negative. Negative for abdominal pain, diarrhea, nausea and vomiting. Genitourinary: Negative. Negative for difficulty urinating. No discharge   Musculoskeletal: Negative. Negative for arthralgias. Skin: Negative. Negative for rash. Neurological: Positive for syncope. Negative for weakness and headaches. Hematological: Negative. Psychiatric/Behavioral: Negative. Negative for agitation. The patient is not nervous/anxious. All other systems reviewed and are negative. Physical Exam     Physical Exam  Vitals and nursing note reviewed. Constitutional:       General: She is not in acute distress. Appearance: She is well-developed. HENT:      Head: Normocephalic and atraumatic. Nose: Nose normal.      Mouth/Throat:      Mouth: Mucous membranes are moist.      Pharynx: Oropharynx is clear. No oropharyngeal exudate. Eyes:      General:         Right eye: No discharge. Left eye: No discharge. Conjunctiva/sclera: Conjunctivae normal.      Pupils: Pupils are equal, round, and reactive to light. Cardiovascular:      Rate and Rhythm: Normal rate and regular rhythm. Chest Wall: PMI is not displaced. No thrill. Heart sounds: Normal heart sounds. No murmur heard. No friction rub. No gallop. Pulmonary:      Effort: Pulmonary effort is normal. No respiratory distress. Breath sounds: Normal breath sounds. No wheezing or rales. Chest:      Chest wall: No tenderness. Abdominal:      General: Bowel sounds are normal. There is no distension. Palpations: Abdomen is soft. There is no mass. Tenderness: There is no abdominal tenderness. There is no guarding or rebound. Musculoskeletal:         General: Normal range of motion. Cervical back: Normal range of motion and neck supple. Lymphadenopathy:      Cervical: No cervical adenopathy. Skin:     General: Skin is warm and dry. Capillary Refill: Capillary refill takes less than 2 seconds. Findings: No erythema or rash. Neurological:      Mental Status: She is alert and oriented to person, place, and time. Cranial Nerves: No cranial nerve deficit. Coordination: Coordination normal.   Psychiatric:         Mood and Affect: Mood normal.         Behavior: Behavior normal.         Lab and Diagnostic Study Results     Labs -     Recent Results (from the past 12 hour(s))   CBC WITH AUTOMATED DIFF    Collection Time: 08/10/21  6:15 PM   Result Value Ref Range    WBC 10.2 3.6 - 11.0 K/uL    RBC 3.78 (L) 3.80 - 5.20 M/uL    HGB 11.8 11.5 - 16.0 g/dL    HCT 35.7 35.0 - 47.0 %    MCV 94.4 80.0 - 99.0 FL    MCH 31.2 26.0 - 34.0 PG    MCHC 33.1 30.0 - 36.5 g/dL    RDW 12.6 11.5 - 14.5 %    PLATELET 514 (H) 486 - 400 K/uL    MPV 9.9 8.9 - 12.9 FL    NRBC 0.0 0.0  WBC    ABSOLUTE NRBC 0.00 0.00 - 0.01 K/uL    NEUTROPHILS 52 32 - 75 %    LYMPHOCYTES 40 12 - 49 %    MONOCYTES 6 5 - 13 %    EOSINOPHILS 1 0 - 7 %    BASOPHILS 1 0 - 1 %    IMMATURE GRANULOCYTES 0 0 - 0.5 %    ABS. NEUTROPHILS 5.4 1.8 - 8.0 K/UL    ABS. LYMPHOCYTES 4.0 (H) 0.8 - 3.5 K/UL    ABS. MONOCYTES 0.6 0.0 - 1.0 K/UL    ABS. EOSINOPHILS 0.1 0.0 - 0.4 K/UL    ABS. BASOPHILS 0.1 0.0 - 0.1 K/UL    ABS. IMM.  GRANS. 0.0 0.00 - 0.04 K/UL    DF AUTOMATED     METABOLIC PANEL, COMPREHENSIVE    Collection Time: 08/10/21  6:15 PM   Result Value Ref Range    Sodium 138 136 - 145 mmol/L    Potassium 4.6 3.5 - 5.1 mmol/L    Chloride 104 97 - 108 mmol/L    CO2 30 21 - 32 mmol/L    Anion gap 4 (L) 5 - 15 mmol/L    Glucose 112 (H) 65 - 100 mg/dL    BUN 27 (H) 6 - 20 mg/dL    Creatinine 0.94 0.55 - 1.02 mg/dL    BUN/Creatinine ratio 29 (H) 12 - 20      GFR est AA >60 >60 ml/min/1.73m2    GFR est non-AA >60 >60 ml/min/1.73m2    Calcium 9.4 8.5 - 10.1 mg/dL    Bilirubin, total 0.2 0.2 - 1.0 mg/dL    AST (SGOT) 15 15 - 37 U/L    ALT (SGPT) 49 12 - 78 U/L    Alk. phosphatase 76 45 - 117 U/L    Protein, total 7.2 6.4 - 8.2 g/dL    Albumin 3.3 (L) 3.5 - 5.0 g/dL    Globulin 3.9 2.0 - 4.0 g/dL    A-G Ratio 0.8 (L) 1.1 - 2.2     TROPONIN I    Collection Time: 08/10/21  6:15 PM   Result Value Ref Range    Troponin-I, Qt. <0.05 <0.05 ng/mL       Radiologic Studies -   [unfilled]  CT Results  (Last 48 hours)               08/10/21 1838  CT HEAD WO CONT Final result    Impression:  No diagnostic abnormality. Narrative:  Axial images were obtained with coronal and sagittal reconstructions. Dose Reduction: All CT scans at this facility are performed using dose reduction   optimization techniques as appropriate to a performed exam including the   following: Automated exposure control, adjustments of the mA and/or kV according   to patient size, or use of iterative reconstruction technique. The ventricles are normal in position, size, and configuration. There is no   focal brain parenchymal abnormality. There is no mass or infarct. There is no   intracranial hemorrhage or extra-axial fluid collection. The posterior fossa and   skull base are normal. There is no fracture. CXR Results  (Last 48 hours)    None          Medical Decision Making and ED Course   - I am the first and primary provider for this patient AND AM THE PRIMARY PROVIDER OF RECORD. - I reviewed the vital signs, available nursing notes, past medical history, past surgical history, family history and social history. - Initial assessment performed. The patients presenting problems have been discussed, and the staff are in agreement with the care plan formulated and outlined with them. I have encouraged them to ask questions as they arise throughout their visit. Vital Signs-Reviewed the patient's vital signs.     Patient Vitals for the past 12 hrs:   Temp Pulse Resp BP SpO2   08/10/21 1749 98.3 °F (36.8 °C) 66  117/78 100 %   08/10/21 1747   16          EKG interpretation: (Preliminary): Performed at 1748, and read at 1753  Ventricular rate 63 bpm,  ms, QRS duration 70 ms, QTC 4 and 33 ms. Interpretation: Normal sinus rhythm with normal EKG. Records Reviewed: Nursing Notes    The patient presents with passing out with a differential diagnosis of ACS, arrhythmia, dehydration, electrolyte abnormality, CVA. Will assess with basic and cardiac labs labs. ED Course:              Provider Notes (Medical Decision Making):   47-year-old female with dizziness going on for the past several months. There are appear to see anything acute in the emergency room. Would have the patient follow-up with neurology for further evaluation. MDM           Consultations:       Consultations: - NONE        Procedures and Critical Care       Performed by: Nitesh Torres MD  PROCEDURES:  Procedures         Disposition     Disposition: Condition stable    Discharged    Remove if not discharged  DISCHARGE PLAN:  1. Current Discharge Medication List      CONTINUE these medications which have NOT CHANGED    Details   DULoxetine (CYMBALTA) 60 mg capsule TAKE 1 CAPSULE BY MOUTH DAILY  Qty: 30 Capsule, Refills: 6      metFORMIN ER (GLUCOPHAGE XR) 500 mg tablet TAKE 2 TABLET BY MOUTH TWICE DAILY DAILY WITH MEALS STOP SYNJARDY  Qty: 120 Tablet, Refills: 6      hydrOXYzine pamoate (VISTARIL) 25 mg capsule Take 50 mg by mouth nightly. sertraline (ZOLOFT) 25 mg tablet sertraline 25 mg tablet   TAKE 1 TABLET BY MOUTH EVERY DAY      b complex vitamins tablet Take 1 Tab by mouth daily. ascorbic acid-collagen (Collagen Plus Vitamin C) 125-740 mg cap Collagen Plus Vitamin C      IRON, FERROUS SULFATE, PO Iron (ferrous sulfate)      calcium carbonate (CALCIUM 500 PO) Take  by mouth. timoloL (BetimoL) 0.5 % ophthalmic solution Administer 1 Drop to both eyes two (2) times a day.  use in affected eye(s) multivitamin-min-iron-FA-vit K (Bariatric Multivitamins) 45 mg iron- 800 mcg-120 mcg cap Bariatric Multivitamins      carvediloL (COREG) 12.5 mg tablet Take 12.5 mg by mouth two (2) times a day. omega 3-dha-epa-fish oil (Fish Oil) 100-160-1,000 mg cap Take 3 mg by mouth daily. triamcinolone acetonide (KENALOG) 0.1 % topical cream triamcinolone acetonide 0.1 % topical cream      calcium-cholecalciferol, d3, 600 mg calcium- 200 unit cap Take 600 mg by mouth daily. oxyCODONE-acetaminophen (PERCOCET 10)  mg per tablet Take  Tabs by mouth daily. traZODone (DESYREL) 100 mg tablet TK 1 T PO QD HS  Refills: 1      furosemide (LASIX) 20 mg tablet TK 1 T PO QD PRF WEIGHT GAIN OF 2 LBS OR MORE IN A DAY  Refills: 5      potassium chloride (K-DUR, KLOR-CON) 20 mEq tablet TK 1 T PO QD PRN WHEN TAKING FUROSEMIDE  Refills: 5      latanoprost (XALATAN) 0.005 % ophthalmic solution Administer 1 Drop to both eyes nightly. HYDROcodone-acetaminophen (NORCO)  mg tablet Take 1 Tab by mouth every eight (8) hours as needed for Pain.      gabapentin (NEURONTIN) 800 mg tablet Take 1 Tab by mouth three (3) times daily. Refills: 0      Lancets (ACCU-CHEK FASTCLIX) misc Test 4 times daily. Dx code E11.65  Qty: 200 Package, Refills: 6      NOVOFINE 30 30 gauge x 1/3\" Refills: 11    Associated Diagnoses: Type II diabetes mellitus, uncontrolled ([facility] [address]      multivitamin (ONE A DAY) tablet Take 1 Tab by mouth daily. biotin 500 mcg Cap Take  by mouth. 2.   Follow-up Information     Follow up With Specialties Details Why Contact Info    Gina Bearden MD Neurology Call in 2 days  [address] [facility] [address] [phone]          3. Return to ED if worse   4. Current Discharge Medication List          Diagnosis     Clinical Impression:   1.  Dizziness        Attestations:    Fantasma Monsivais MD    Please note that this dictation was completed with [employer] computer voice recognition software. Quite often unanticipated grammatical, syntax, homophones, and other interpretive errors are inadvertently transcribed by the computer software. Please disregard these errors. Please excuse any errors that have escaped final proofreading. Thank you.

## 2021-08-12 LAB
ATRIAL RATE: 63 BPM
CALCULATED P AXIS, ECG09: 32 DEGREES
CALCULATED R AXIS, ECG10: -8 DEGREES
CALCULATED T AXIS, ECG11: 38 DEGREES
DIAGNOSIS, 93000: NORMAL
P-R INTERVAL, ECG05: 142 MS
Q-T INTERVAL, ECG07: 424 MS
QRS DURATION, ECG06: 70 MS
QTC CALCULATION (BEZET), ECG08: 433 MS
VENTRICULAR RATE, ECG03: 63 BPM

## 2021-09-16 ENCOUNTER — TRANSCRIBE ORDER (OUTPATIENT)
Dept: SCHEDULING | Age: 56
End: 2021-09-16

## 2021-09-16 DIAGNOSIS — R55 SYNCOPE AND COLLAPSE: Primary | ICD-10-CM

## 2021-09-17 ENCOUNTER — HOSPITAL ENCOUNTER (OUTPATIENT)
Dept: NON INVASIVE DIAGNOSTICS | Age: 56
Discharge: HOME | End: 2021-09-17
Attending: PSYCHIATRY & NEUROLOGY
Payer: MEDICARE

## 2021-09-17 DIAGNOSIS — R55 SYNCOPE AND COLLAPSE: ICD-10-CM

## 2021-09-17 LAB
LEFT ARM BP: 97 MMHG
LEFT CCA DIST DIAS: 35.1 CM/S
LEFT CCA DIST SYS: 98.3 CM/S
LEFT CCA PROX DIAS: 46.8 CM/S
LEFT CCA PROX SYS: 139 CM/S
LEFT ECA DIAS: 21.3 CM/S
LEFT ECA SYS: 89.4 CM/S
LEFT ICA DIST DIAS: 29.6 CM/S
LEFT ICA DIST SYS: 72.2 CM/S
LEFT ICA PROX DIAS: 30.9 CM/S
LEFT ICA PROX SYS: 88 CM/S
LEFT ICA/CCA SYS: 0.9
LEFT SUBCLAVIAN DIAS: 0 CM/S
LEFT SUBCLAVIAN SYS: 59.3 CM/S
LEFT VERTEBRAL DIAS: 15.8 CM/S
LEFT VERTEBRAL SYS: 54.4 CM/S
RIGHT ARM BP: 102 MMHG
RIGHT CCA DIST DIAS: 32 CM/S
RIGHT CCA DIST SYS: 82.6 CM/S
RIGHT CCA PROX DIAS: 27.7 CM/S
RIGHT CCA PROX SYS: 84.4 CM/S
RIGHT ECA DIAS: 24.1 CM/S
RIGHT ECA SYS: 89.9 CM/S
RIGHT ICA DIST DIAS: 53.1 CM/S
RIGHT ICA DIST SYS: 106 CM/S
RIGHT ICA PROX DIAS: 23.5 CM/S
RIGHT ICA PROX SYS: 66.9 CM/S
RIGHT ICA/CCA SYS: 0.81
RIGHT SUBCLAVIAN DIAS: 0 CM/S
RIGHT SUBCLAVIAN SYS: 76.4 CM/S
RIGHT VERTEBRAL DIAS: 21.9 CM/S
RIGHT VERTEBRAL SYS: 49.8 CM/S

## 2021-09-17 PROCEDURE — 93880 EXTRACRANIAL BILAT STUDY: CPT

## 2021-11-02 LAB
ALBUMIN SERPL-MCNC: 3.9 G/DL (ref 3.8–4.9)
ALBUMIN/CREAT UR: 4 MG/G CREAT (ref 0–29)
ALBUMIN/GLOB SERPL: 1.6 {RATIO} (ref 1.2–2.2)
ALP SERPL-CCNC: 84 IU/L (ref 44–121)
ALT SERPL-CCNC: 35 IU/L (ref 0–32)
AST SERPL-CCNC: 20 IU/L (ref 0–40)
BASOPHILS # BLD AUTO: 0 X10E3/UL (ref 0–0.2)
BASOPHILS NFR BLD AUTO: 1 %
BILIRUB SERPL-MCNC: <0.2 MG/DL (ref 0–1.2)
BUN SERPL-MCNC: 25 MG/DL (ref 6–24)
BUN/CREAT SERPL: 33 (ref 9–23)
CALCIUM SERPL-MCNC: 9.5 MG/DL (ref 8.7–10.2)
CHLORIDE SERPL-SCNC: 100 MMOL/L (ref 96–106)
CHOLEST SERPL-MCNC: 241 MG/DL (ref 100–199)
CO2 SERPL-SCNC: 26 MMOL/L (ref 20–29)
CREAT SERPL-MCNC: 0.76 MG/DL (ref 0.57–1)
CREAT UR-MCNC: 117.3 MG/DL
EOSINOPHIL # BLD AUTO: 0.1 X10E3/UL (ref 0–0.4)
EOSINOPHIL NFR BLD AUTO: 1 %
ERYTHROCYTE [DISTWIDTH] IN BLOOD BY AUTOMATED COUNT: 12.7 % (ref 11.7–15.4)
EST. AVERAGE GLUCOSE BLD GHB EST-MCNC: 157 MG/DL
FOLATE SERPL-MCNC: 15.1 NG/ML
GLOBULIN SER CALC-MCNC: 2.5 G/DL (ref 1.5–4.5)
GLUCOSE SERPL-MCNC: 113 MG/DL (ref 65–99)
HBA1C MFR BLD: 7.1 % (ref 4.8–5.6)
HCT VFR BLD AUTO: 35.3 % (ref 34–46.6)
HDLC SERPL-MCNC: 62 MG/DL
HGB BLD-MCNC: 12.1 G/DL (ref 11.1–15.9)
IMM GRANULOCYTES # BLD AUTO: 0 X10E3/UL (ref 0–0.1)
IMM GRANULOCYTES NFR BLD AUTO: 0 %
IMP & REVIEW OF LAB RESULTS: NORMAL
LDLC SERPL CALC-MCNC: 112 MG/DL (ref 0–99)
LYMPHOCYTES # BLD AUTO: 3.4 X10E3/UL (ref 0.7–3.1)
LYMPHOCYTES NFR BLD AUTO: 40 %
MCH RBC QN AUTO: 30 PG (ref 26.6–33)
MCHC RBC AUTO-ENTMCNC: 34.3 G/DL (ref 31.5–35.7)
MCV RBC AUTO: 87 FL (ref 79–97)
MICROALBUMIN UR-MCNC: 4.9 UG/ML
MONOCYTES # BLD AUTO: 0.6 X10E3/UL (ref 0.1–0.9)
MONOCYTES NFR BLD AUTO: 7 %
NEUTROPHILS # BLD AUTO: 4.3 X10E3/UL (ref 1.4–7)
NEUTROPHILS NFR BLD AUTO: 51 %
PLATELET # BLD AUTO: 453 X10E3/UL (ref 150–450)
POTASSIUM SERPL-SCNC: 4.3 MMOL/L (ref 3.5–5.2)
PROT SERPL-MCNC: 6.4 G/DL (ref 6–8.5)
RBC # BLD AUTO: 4.04 X10E6/UL (ref 3.77–5.28)
SODIUM SERPL-SCNC: 138 MMOL/L (ref 134–144)
TRIGL SERPL-MCNC: 388 MG/DL (ref 0–149)
VIT B12 SERPL-MCNC: >2000 PG/ML (ref 232–1245)
VLDLC SERPL CALC-MCNC: 67 MG/DL (ref 5–40)
WBC # BLD AUTO: 8.4 X10E3/UL (ref 3.4–10.8)

## 2021-11-18 ENCOUNTER — OFFICE VISIT (OUTPATIENT)
Dept: ENDOCRINOLOGY | Age: 56
End: 2021-11-18
Payer: MEDICARE

## 2021-11-18 VITALS
TEMPERATURE: 98 F | RESPIRATION RATE: 20 BRPM | HEART RATE: 76 BPM | OXYGEN SATURATION: 97 % | WEIGHT: 212.2 LBS | HEIGHT: 65 IN | SYSTOLIC BLOOD PRESSURE: 91 MMHG | DIASTOLIC BLOOD PRESSURE: 67 MMHG | BODY MASS INDEX: 35.35 KG/M2

## 2021-11-18 DIAGNOSIS — E11.65 UNCONTROLLED TYPE 2 DIABETES MELLITUS WITH HYPERGLYCEMIA, WITH LONG-TERM CURRENT USE OF INSULIN (HCC): Primary | ICD-10-CM

## 2021-11-18 DIAGNOSIS — E53.8 B12 DEFICIENCY: ICD-10-CM

## 2021-11-18 DIAGNOSIS — E78.2 MIXED HYPERLIPIDEMIA: ICD-10-CM

## 2021-11-18 DIAGNOSIS — Z98.84 S/P GASTRIC BYPASS: ICD-10-CM

## 2021-11-18 DIAGNOSIS — Z79.4 UNCONTROLLED TYPE 2 DIABETES MELLITUS WITH HYPERGLYCEMIA, WITH LONG-TERM CURRENT USE OF INSULIN (HCC): Primary | ICD-10-CM

## 2021-11-18 DIAGNOSIS — G62.9 NEUROPATHY: ICD-10-CM

## 2021-11-18 PROCEDURE — G8752 SYS BP LESS 140: HCPCS | Performed by: INTERNAL MEDICINE

## 2021-11-18 PROCEDURE — 3017F COLORECTAL CA SCREEN DOC REV: CPT | Performed by: INTERNAL MEDICINE

## 2021-11-18 PROCEDURE — G8427 DOCREV CUR MEDS BY ELIG CLIN: HCPCS | Performed by: INTERNAL MEDICINE

## 2021-11-18 PROCEDURE — G9899 SCRN MAM PERF RSLTS DOC: HCPCS | Performed by: INTERNAL MEDICINE

## 2021-11-18 PROCEDURE — G8432 DEP SCR NOT DOC, RNG: HCPCS | Performed by: INTERNAL MEDICINE

## 2021-11-18 PROCEDURE — 3051F HG A1C>EQUAL 7.0%<8.0%: CPT | Performed by: INTERNAL MEDICINE

## 2021-11-18 PROCEDURE — 2022F DILAT RTA XM EVC RTNOPTHY: CPT | Performed by: INTERNAL MEDICINE

## 2021-11-18 PROCEDURE — G8754 DIAS BP LESS 90: HCPCS | Performed by: INTERNAL MEDICINE

## 2021-11-18 PROCEDURE — 99214 OFFICE O/P EST MOD 30 MIN: CPT | Performed by: INTERNAL MEDICINE

## 2021-11-18 PROCEDURE — G8417 CALC BMI ABV UP PARAM F/U: HCPCS | Performed by: INTERNAL MEDICINE

## 2021-11-18 RX ORDER — SEMAGLUTIDE 1.34 MG/ML
INJECTION, SOLUTION SUBCUTANEOUS
Qty: 3 ML | Refills: 3 | Status: SHIPPED | OUTPATIENT
Start: 2021-11-18 | End: 2021-11-19 | Stop reason: SDUPTHER

## 2021-11-18 RX ORDER — DULOXETIN HYDROCHLORIDE 30 MG/1
CAPSULE, DELAYED RELEASE ORAL
COMMUNITY
End: 2022-05-20

## 2021-11-18 RX ORDER — METFORMIN HYDROCHLORIDE 500 MG/1
TABLET, EXTENDED RELEASE ORAL
Qty: 120 TABLET | Refills: 6 | Status: SHIPPED | OUTPATIENT
Start: 2021-11-18 | End: 2021-11-19 | Stop reason: SDUPTHER

## 2021-11-18 RX ORDER — PEN NEEDLE, DIABETIC 31 GX3/16"
NEEDLE, DISPOSABLE MISCELLANEOUS
Qty: 50 PEN NEEDLE | Refills: 3 | Status: SHIPPED | OUTPATIENT
Start: 2021-11-18 | End: 2022-03-31 | Stop reason: ALTCHOICE

## 2021-11-18 NOTE — LETTER
11/21/2021    Patient: Josselin Potter   YOB: 1965   Date of Visit: 11/18/2021     Bari Wilson MD  [address] [facility]    Dear Bari Wilson MD,      Thank you for referring Ms. Luis Garza to [facility] for evaluation. My notes for this consultation are attached. If you have questions, please do not hesitate to call me. I look forward to following your patient along with you.       Sincerely,    Gagan Reinoso MD

## 2021-11-18 NOTE — PATIENT INSTRUCTIONS
SPECIFIC INSTRUCTIONS BELOW      Metformin er 500 mg 2 pills twice a day       Start on ozempic  0.25 mg a week and after 3 weeks increase to  0.5 mg a week         -------------PAY ATTENTION TO THESE GENERAL INSTRUCTIONS -----------------      - The medications prescribed at this visit will not be available at pharmacy until 6 pm       - YOUR MED LIST IS NOT UP TO DATE AS SOME CHANGES ARE BEING MADE AFTER THE VISIT - FOLLOW SPECIFIC INSTRUCTIONS  ABOVE     -ANY tests other than blood work, which you opt to do  outside the  [facility] imaging facilities, you are responsible for prior authorizations if  required    - [address] UP TO DATE ON YOUR AVS- PLEASE IGNORE     Results     *Normal results will not be notified by a phone call starting January 1 2021   *If you have an upcoming visit, the results will be discussed at the visit   *Please sign up for MY CHART if you want access to your lab and test results  *Abnormal results which require immediate attention will be notified by phone call   *Abnormal results which do not require immediate assistance will be notified in 1-2 weeks       Refills    -    have your pharmacy send us a refill request . Refills are done max for one year and a visit is a must before refills are extended    Follow up appointments -  highly encourage you to make it when you are checking out. We can accommodate you into the schedule based on your clinical situation, but not for extending refills beyond a year. Labs are important to give refills and is important to get labs before the visit     Phone calls  -  Allow  24 hrs.  for non-urgent calls to be returned  Prior authorization - It may take 2-4 weeks to process  Forms  -  FMLA, DMV etc., will take up to 2 weeks to process  Cancellations - please notify the office 2 days in advance   Samples  - will only be dispensed at visits       If not showing for the appointments and cancelling appointments within 24 hours are kept track of and three  of such situations in  two consecutive years will likely be considered for termination from the practice    -------------------------------------------------------------------------------------------------------------------

## 2021-11-18 NOTE — PROGRESS NOTES
HISTORY OF PRESENT ILLNESS  Luis Garza is a 60 y.o. female. HPI  Patient here for  f/u  After last visit of Type 2 diabetes mellitus  From MAy 2021     Gained 10 lbs   She has not changed diet and she is not able to exercise      MAy 2021     Lost 10 lbs   ( lost 66 lbs for 1.5 years )   She is s/p  Lap choly  April 2021   She had labs -     Nov 2020     S/p bariatric surgery at  [facility] by Dr. Rogerio Rouse in [address] 56 lbs  Post bariatric gastric bypass  surgery  - in one year    Pt says she lost 70 lbs     Prior history :    Referred : by self/pcp  H/o diabetes for 30 years   Current A1C is 9 % and symptoms/problems include polyuria, polydipsia and visual disturbances   Current diabetic medications include intensive insulin injection program. And metformin   novolog by 10 units tid plus sliding scale and levemir  36 units hs   Current monitoring regimen: home blood tests - 2 times daily      Review of Systems   Constitutional: Negative. Neuropathy, back issues are persisting     Psychiatric/Behavioral: Negative for depression and memory loss. The patient does not have insomnia. Physical Exam   Constitutional: oriented to person, place, and time. appears well-developed and well-nourished. Psychiatric: He has a normal mood and affect.            Lab Results   Component Value Date/Time    Hemoglobin A1c 7.1 (H) 11/01/2021 03:25 PM    Hemoglobin A1c 6.4 (H) 05/10/2021 09:20 AM    Hemoglobin A1c 6.4 (H) 11/12/2020 11:48 AM    Hemoglobin A1c, External 9.0 03/18/2016 12:00 AM    Glucose 113 (H) 11/01/2021 03:25 PM    Glucose (POC) 172 (H) 02/06/2009 01:29 PM    Glucose  05/20/2016 04:58 PM    Microalbumin/Creat ratio (mg/g creat) 6 11/12/2020 11:48 AM    Microalb/Creat ratio (ug/mg creat.) 4 11/01/2021 03:25 PM    Microalbumin,urine random 1.55 11/12/2020 11:48 AM    LDL,Direct 149 (H) 03/12/2019 09:26 AM    LDL, calculated 112 (H) 11/01/2021 03:25 PM    LDL, calculated 127.2 (H) 05/10/2021 09:20 AM    Creatinine (POC) 0.8 02/21/2012 06:03 PM    Creatinine 0.76 11/01/2021 03:25 PM      Lab Results   Component Value Date/Time    Cholesterol, total 241 (H) 11/01/2021 03:25 PM    HDL Cholesterol 62 11/01/2021 03:25 PM    LDL,Direct 149 (H) 03/12/2019 09:26 AM    LDL, calculated 112 (H) 11/01/2021 03:25 PM    LDL, calculated 127.2 (H) 05/10/2021 09:20 AM    Triglyceride 388 (H) 11/01/2021 03:25 PM    CHOL/HDL Ratio 4.2 05/10/2021 09:20 AM       Lab Results   Component Value Date/Time    ALT (SGPT) 35 (H) 11/01/2021 03:25 PM    Alk. phosphatase 84 11/01/2021 03:25 PM    Bilirubin, total <0.2 11/01/2021 03:25 PM                ASSESSMENT and PLAN    1. Elevated PLATELET COUNT   Advised on f/u with pcp to get labs repeated         2. Type 2 DM, un controlled : a1c is  7.1 %    From   Nov 2021    Compared   To     6.4 %     From May 2021    Compared to    6.4 %      From     Nov 2020       Compared to  6.4 %     From April 2020     Compared to    6.6 %     From nov 2019  Compared to   6.8  %     From may 2019     Compared to  6.4 %       From nov 2018      Compared to    6.7 %    From  May 2018     Compared to   7.2 %     From   Nov 2017    Compared to  6.4  %     From    July 2017   Compared to  6.6 %       From     April 2017     Compared to   6.8 %    From jan 2017 Nov 2021     Losing control , gaining weight  ( sec to steroid shot in hip  - 2 weeks ago )   Alerted her about watching  diet   Start on ozempic   Stay on metformin er         May 2021   Good control still , on metformin er   By A1c   Fasting blood sugars are higher       3. HTN : continue coreg       4. Dyslipidemia : lipids are still above the goal despite weight loss from  Gastric  bypass surgery   Could not tolerate any kinds of statins        5. Diabetic complications :     A. Retinopathy-NO   educated on this complication,  regular f/u with ophthalmologist encouraged    B.  Nephropathy - NO     educated on this disease and indicated stages and its prognosis. C. Peripheral Neuropathy - YES  Severe - On cymbalta and  neurontin 800 mg tid   recommending   Metanx ( too expensive )  She cannot tolerate Lyrica       D: CAD : YES  Cardiomyopathy , after chemo- adriamycin      6. H/o  Right breast cancer - treated and  Has lymphedema on right UE        7. Obesity  : Body mass index is 35.31 kg/m².   S/p bariatric surgery  Jan 2020 -    On vit d, b12 and iron          F/u in 6  Months    Reviewed results with patient and discussed the labs being ordered today/bnv  Patient voiced understanding of plan of care

## 2021-11-19 ENCOUNTER — DOCUMENTATION ONLY (OUTPATIENT)
Dept: ENDOCRINOLOGY | Age: 56
End: 2021-11-19

## 2021-11-19 DIAGNOSIS — Z98.84 S/P GASTRIC BYPASS: ICD-10-CM

## 2021-11-19 DIAGNOSIS — E11.65 UNCONTROLLED TYPE 2 DIABETES MELLITUS WITH HYPERGLYCEMIA, WITH LONG-TERM CURRENT USE OF INSULIN (HCC): Primary | ICD-10-CM

## 2021-11-19 DIAGNOSIS — G62.9 NEUROPATHY: ICD-10-CM

## 2021-11-19 DIAGNOSIS — Z79.4 UNCONTROLLED TYPE 2 DIABETES MELLITUS WITH HYPERGLYCEMIA, WITH LONG-TERM CURRENT USE OF INSULIN (HCC): Primary | ICD-10-CM

## 2021-11-19 RX ORDER — DULOXETIN HYDROCHLORIDE 30 MG/1
CAPSULE, DELAYED RELEASE ORAL
Qty: 90 CAPSULE | Refills: 1 | Status: CANCELLED | OUTPATIENT
Start: 2021-11-19

## 2021-11-19 RX ORDER — DULOXETIN HYDROCHLORIDE 60 MG/1
60 CAPSULE, DELAYED RELEASE ORAL DAILY
Qty: 90 CAPSULE | Refills: 1 | Status: CANCELLED | OUTPATIENT
Start: 2021-11-19

## 2021-11-19 RX ORDER — METFORMIN HYDROCHLORIDE 500 MG/1
TABLET, EXTENDED RELEASE ORAL
Qty: 360 TABLET | Refills: 1 | Status: SHIPPED | OUTPATIENT
Start: 2021-11-19

## 2021-11-19 RX ORDER — SEMAGLUTIDE 1.34 MG/ML
INJECTION, SOLUTION SUBCUTANEOUS
Qty: 9 ML | Refills: 1 | Status: SHIPPED | OUTPATIENT
Start: 2021-11-19 | End: 2022-10-17

## 2021-11-19 NOTE — TELEPHONE ENCOUNTER
Ozempic Metformin Cymbalta and  Needles for Ozempic she wants to switch to getting filled through [place] mail order 90 day supplies

## 2021-11-19 NOTE — PROGRESS NOTES
Ask Ms. Garza if she can have the meds which act on brain and nervous system be under one doc     - eg : her cymbalta , trazadone, zoloft   And any other pain meds   With one doctor like Jagjit Zambrano MD

## 2021-11-21 ENCOUNTER — APPOINTMENT (OUTPATIENT)
Dept: GENERAL RADIOLOGY | Age: 56
End: 2021-11-21
Attending: EMERGENCY MEDICINE
Payer: MEDICARE

## 2021-11-21 ENCOUNTER — APPOINTMENT (OUTPATIENT)
Dept: CT IMAGING | Age: 56
End: 2021-11-21
Attending: EMERGENCY MEDICINE
Payer: MEDICARE

## 2021-11-21 ENCOUNTER — HOSPITAL ENCOUNTER (EMERGENCY)
Age: 56
Discharge: HOME | End: 2021-11-21
Attending: EMERGENCY MEDICINE | Admitting: EMERGENCY MEDICINE
Payer: MEDICARE

## 2021-11-21 VITALS
OXYGEN SATURATION: 100 % | WEIGHT: 213 LBS | BODY MASS INDEX: 35.49 KG/M2 | TEMPERATURE: 98.3 F | HEART RATE: 69 BPM | SYSTOLIC BLOOD PRESSURE: 100 MMHG | HEIGHT: 65 IN | RESPIRATION RATE: 18 BRPM | DIASTOLIC BLOOD PRESSURE: 62 MMHG
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
## 2021-11-21 DIAGNOSIS — S80.00XA CONTUSION OF KNEE, UNSPECIFIED LATERALITY, INITIAL ENCOUNTER: ICD-10-CM

## 2021-11-21 DIAGNOSIS — S06.0X0A CONCUSSION WITHOUT LOSS OF CONSCIOUSNESS, INITIAL ENCOUNTER: ICD-10-CM

## 2021-11-21 LAB
ATRIAL RATE: 67 BPM
CALCULATED P AXIS, ECG09: 36 DEGREES
CALCULATED R AXIS, ECG10: 5 DEGREES
CALCULATED T AXIS, ECG11: 47 DEGREES
DIAGNOSIS, 93000: NORMAL
P-R INTERVAL, ECG05: 150 MS
Q-T INTERVAL, ECG07: 404 MS
QRS DURATION, ECG06: 78 MS
QTC CALCULATION (BEZET), ECG08: 426 MS
VENTRICULAR RATE, ECG03: 67 BPM

## 2021-11-21 PROCEDURE — 72125 CT NECK SPINE W/O DYE: CPT

## 2021-11-21 PROCEDURE — 71045 X-RAY EXAM CHEST 1 VIEW: CPT

## 2021-11-21 PROCEDURE — 93005 ELECTROCARDIOGRAM TRACING: CPT

## 2021-11-21 PROCEDURE — 99284 EMERGENCY DEPT VISIT MOD MDM: CPT

## 2021-11-21 PROCEDURE — 72100 X-RAY EXAM L-S SPINE 2/3 VWS: CPT

## 2021-11-21 PROCEDURE — 73564 X-RAY EXAM KNEE 4 OR MORE: CPT

## 2021-11-21 PROCEDURE — 70450 CT HEAD/BRAIN W/O DYE: CPT

## 2021-11-21 PROCEDURE — 73010 X-RAY EXAM OF SHOULDER BLADE: CPT

## 2021-11-21 PROCEDURE — 74011250637 HC RX REV CODE- 250/637: Performed by: EMERGENCY MEDICINE

## 2021-11-21 RX ORDER — BUTALBITAL, ACETAMINOPHEN AND CAFFEINE 50; 325; 40 MG/1; MG/1; MG/1
2 TABLET ORAL
Status: COMPLETED | OUTPATIENT
Start: 2021-11-21 | End: 2021-11-21

## 2021-11-21 RX ORDER — ONDANSETRON 4 MG/1
4 TABLET, ORALLY DISINTEGRATING ORAL
Qty: 20 TABLET | Refills: 0 | Status: SHIPPED | OUTPATIENT
Start: 2021-11-21 | End: 2022-03-31 | Stop reason: ALTCHOICE

## 2021-11-21 RX ORDER — METHYLPREDNISOLONE 4 MG/1
TABLET ORAL
Qty: 1 DOSE PACK | Refills: 0 | Status: SHIPPED | OUTPATIENT
Start: 2021-11-21 | End: 2022-03-31 | Stop reason: ALTCHOICE

## 2021-11-21 RX ORDER — BUTALBITAL, ACETAMINOPHEN AND CAFFEINE 300; 40; 50 MG/1; MG/1; MG/1
1 CAPSULE ORAL
Qty: 30 CAPSULE | Refills: 0 | Status: SHIPPED | OUTPATIENT
Start: 2021-11-21 | End: 2022-03-31 | Stop reason: ALTCHOICE

## 2021-11-21 RX ORDER — KETOROLAC TROMETHAMINE 10 MG/1
10 TABLET, FILM COATED ORAL ONCE
Status: COMPLETED | OUTPATIENT
Start: 2021-11-21 | End: 2021-11-21

## 2021-11-21 RX ORDER — MECLIZINE HYDROCHLORIDE 25 MG/1
25 TABLET ORAL
Qty: 30 TABLET | Refills: 0 | Status: SHIPPED | OUTPATIENT
Start: 2021-11-21 | End: 2021-12-01

## 2021-11-21 RX ADMIN — KETOROLAC TROMETHAMINE 10 MG: 10 TABLET, FILM COATED ORAL at 13:49

## 2021-11-21 RX ADMIN — BUTALBITAL, ACETAMINOPHEN, AND CAFFEINE 2 TABLET: 50; 325; 40 TABLET ORAL at 13:49
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
## 2021-11-22 NOTE — PROGRESS NOTES
Called patient and advised her of Dr Antonio Horowitz comments.  She states that she does not think that she will be able to get all of these medications prescribed by one provider as she gets some of them through her psychiatrist. Mery Flavio her will let Dr Barfield Comes know

## 2022-03-03 ENCOUNTER — TRANSCRIBE ORDER (OUTPATIENT)
Dept: SCHEDULING | Age: 57
End: 2022-03-03

## 2022-03-03 DIAGNOSIS — Z85.3 PERSONAL HISTORY OF BREAST CANCER: Primary | ICD-10-CM

## 2022-03-04 ENCOUNTER — TRANSCRIBE ORDER (OUTPATIENT)
Dept: SCHEDULING | Age: 57
End: 2022-03-04

## 2022-03-04 DIAGNOSIS — Z12.31 VISIT FOR SCREENING MAMMOGRAM: Primary | ICD-10-CM

## 2022-03-04 DIAGNOSIS — C50.111 MALIGNANT NEOPLASM OF CENTRAL PORTION OF RIGHT FEMALE BREAST (HCC): ICD-10-CM

## 2022-03-04 DIAGNOSIS — R63.4 LOSS OF WEIGHT: Primary | ICD-10-CM

## 2022-03-18 PROBLEM — E66.01 SEVERE OBESITY (HCC): Status: ACTIVE | Noted: 2020-07-30

## 2022-03-18 PROBLEM — T66.XXXA RADIATION INJURY: Status: ACTIVE | Noted: 2020-08-13

## 2022-03-18 PROBLEM — G47.33 OBSTRUCTIVE SLEEP APNEA SYNDROME: Status: ACTIVE | Noted: 2020-08-13

## 2022-03-19 PROBLEM — Z98.84 STATUS POST BARIATRIC SURGERY: Status: ACTIVE | Noted: 2020-07-30

## 2022-03-19 PROBLEM — L98.499 CHRONIC ULCER OF SKIN (HCC): Status: ACTIVE | Noted: 2020-08-13

## 2022-03-20 PROBLEM — G62.9 NEUROPATHY: Status: ACTIVE | Noted: 2018-05-25

## 2022-03-23 ENCOUNTER — HOSPITAL ENCOUNTER (OUTPATIENT)
Dept: MAMMOGRAPHY | Age: 57
Discharge: HOME | End: 2022-03-23
Attending: INTERNAL MEDICINE
Payer: MEDICARE

## 2022-03-23 ENCOUNTER — HOSPITAL ENCOUNTER (OUTPATIENT)
Dept: CT IMAGING | Age: 57
Discharge: HOME | End: 2022-03-23
Attending: INTERNAL MEDICINE
Payer: MEDICARE

## 2022-03-23 DIAGNOSIS — R63.4 LOSS OF WEIGHT: ICD-10-CM

## 2022-03-23 DIAGNOSIS — Z85.3 PERSONAL HISTORY OF BREAST CANCER: ICD-10-CM

## 2022-03-23 DIAGNOSIS — C50.111 MALIGNANT NEOPLASM OF CENTRAL PORTION OF RIGHT FEMALE BREAST (HCC): ICD-10-CM

## 2022-03-23 LAB — CREAT BLD-MCNC: 1.1 MG/DL (ref 0.6–1.3)

## 2022-03-23 PROCEDURE — 71260 CT THORAX DX C+: CPT

## 2022-03-23 PROCEDURE — 82565 ASSAY OF CREATININE: CPT

## 2022-03-23 PROCEDURE — 74011000636 HC RX REV CODE- 636: Performed by: INTERNAL MEDICINE

## 2022-03-23 PROCEDURE — 77062 BREAST TOMOSYNTHESIS BI: CPT

## 2022-03-23 PROCEDURE — 74177 CT ABD & PELVIS W/CONTRAST: CPT

## 2022-03-23 RX ADMIN — IOPAMIDOL 100 ML: 755 INJECTION, SOLUTION INTRAVENOUS at 10:53

## 2022-03-31 ENCOUNTER — OFFICE VISIT (OUTPATIENT)
Dept: SURGERY | Age: 57
End: 2022-03-31
Payer: MEDICARE

## 2022-03-31 VITALS
DIASTOLIC BLOOD PRESSURE: 75 MMHG | WEIGHT: 185.2 LBS | HEIGHT: 65 IN | SYSTOLIC BLOOD PRESSURE: 108 MMHG | BODY MASS INDEX: 30.86 KG/M2 | RESPIRATION RATE: 20 BRPM | TEMPERATURE: 97.4 F | HEART RATE: 82 BPM | OXYGEN SATURATION: 92 %

## 2022-03-31 DIAGNOSIS — Z98.84 STATUS POST BARIATRIC SURGERY: Primary | ICD-10-CM

## 2022-03-31 DIAGNOSIS — K59.03 DRUG-INDUCED CONSTIPATION: ICD-10-CM

## 2022-03-31 PROCEDURE — G8754 DIAS BP LESS 90: HCPCS | Performed by: SURGERY

## 2022-03-31 PROCEDURE — G9899 SCRN MAM PERF RSLTS DOC: HCPCS | Performed by: SURGERY

## 2022-03-31 PROCEDURE — G8427 DOCREV CUR MEDS BY ELIG CLIN: HCPCS | Performed by: SURGERY

## 2022-03-31 PROCEDURE — G8417 CALC BMI ABV UP PARAM F/U: HCPCS | Performed by: SURGERY

## 2022-03-31 PROCEDURE — 99214 OFFICE O/P EST MOD 30 MIN: CPT | Performed by: SURGERY

## 2022-03-31 PROCEDURE — G8752 SYS BP LESS 140: HCPCS | Performed by: SURGERY

## 2022-03-31 PROCEDURE — 3017F COLORECTAL CA SCREEN DOC REV: CPT | Performed by: SURGERY

## 2022-03-31 PROCEDURE — G8511 SCR DEP POS, NO PLAN DOC RNG: HCPCS | Performed by: SURGERY

## 2022-03-31 NOTE — PROGRESS NOTES
Visit Vitals  /75 (BP 1 Location: Right upper arm, BP Patient Position: Sitting, BP Cuff Size: Large adult)   Pulse 82   Temp 97.4 °F (36.3 °C) (Temporal)   Resp 20   Ht 5' 5\" (1.651 m)   Wt 185 lb 3.2 oz (84 kg)   SpO2 92%   BMI 30.82 kg/m²     Chief Complaint   Patient presents with   [address] [place] [address] Patient     concerns about previous bariatric surgery, unable to have BM wihout taking 18 laxative tablets, stomach making usual noises   1. Have you been to the ER, urgent care clinic since your last visit? Hospitalized since your last visit? Yes Where: [facility]    2. Have you seen or consulted any other health care providers outside of the [address] [place] [address] since your last visit? Include any pap smears or colon screening.  No

## 2022-03-31 NOTE — PROGRESS NOTES
Lenore Fuentes  [address] [facility], [facility], [address] [facility] (formerly [facility])[3]    Bariatric Surgery Follow Up    Patient Name: Kerwin Cao (15 y.o., female)    Patient Address: [address] [place] [id number]    PCP: Tessie Dan MD     Patient contact numbers:     Home Phone  Work [facility] Phone    [phone]       Subjective:      Kerwin Cao is a 60 y.o. female, who presents for follow up. She underwent a laparoscopic gastric bypass by Dr. Leslie Swain on 1/21/2020. She has been following with his practice, but is now unsatisfied with her care and wishes to stay in this area for further care. Her last follow up with him was November 2021. Patient is taking in a minimal amount of liquids daily due to feeling full and consuming an unknown amount of protein. She is having difficulty with loss of appetite and has been having increased weight loss, more that intended. She is not able to tolerate meat and her diet is mostly fruits and vegetables. She states she has not been able to follow up with Dr. Eros Salter dietician recently but would like to explore that as well as attending a support group. Her largest concern is that she has issues with bowel movements. She states that she requires the use of large amounts of ex lax daily in order to have a bowel movement. She admits to abdominal discomfort if she does not have a bowel movement everyday. She sees Dr. Ced Mosquera for her GI issues, has had a recent colonoscopy which was negative. She states she has been trialed with prescription medications for constipation, but they were either ineffective or too costly based on her previous insurance. She last saw Dr. Ced Mosquera in January 2022. She sees a pain specialist who prescribes her Percocet for chronic pain.  She states she understands that narcotic pain medication can worsen constipation, but she is unable to stop taking her pain medications. She sees an endocrinologist as well. Her vitamin levels were checked in November and all were within normal limits. She has also had recent imaging which revealed concerning calcifications of the right breast, she will be undergoing stereotactic breast biopsy, as well as a possible renal mass which she states will be biopsied as well.     Past Medical History:   Diagnosis Date    Arthritis     Back/Neck problems    Burning with urination     Frequency    Cancer ([facility] 2009    RIGHT breast    Congestive heart failure (HCC)     Depression     Including Post Partum    Diabetes ([facility]     Dizziness     GERD (gastroesophageal reflux disease)     Glaucoma     Gout     Headache     Heart disease     Hypercholesterolemia     Hypertension     Morbid obesity ([facility]     Neuropathy     Shortness of breath     With activity    Sleep apnea     Sleep disorder     Difficulty falling asleep, night sweats    Stool color black        Past Surgical History:   Procedure Laterality Date    HX APPENDECTOMY      HX BREAST LUMPECTOMY      RIGHT with SNBX    HX CHOLECYSTECTOMY      HX GASTRIC BYPASS  2020    HX GYN       x1, Bilateral tubal ligation, Dilation & Curettage    HX GYN      Hysterectomy (partial)    HX ORTHOPAEDIC      Procedure on shoulder    HX PREMALIG/BENIGN SKIN LESION EXCISION      Cyst removed from scalp    HX PREMALIG/BENIGN SKIN LESION EXCISION      Debridement of subcutaneous tissue    HX TONSILLECTOMY  1970    HX UROLOGICAL         Family History   Problem Relation Age of Onset    Hypertension Mother     Cancer Mother     Thyroid Disease Mother     Hypertension Father     Cancer Father     Diabetes Brother     Hypertension Brother     Cancer Brother     Stroke Brother     Lung Disease Daughter     Asthma Daughter     Hypertension Daughter     Thyroid Disease Daughter     Diabetes Son     Lung Disease Son    [employer] Hypertension Son     Stroke Maternal Grandmother     Dementia Maternal Grandmother     Hypertension Maternal Grandmother        Social History     Tobacco Use    Smoking status: Never Smoker    Smokeless tobacco: Never Used   Substance Use Topics    Alcohol use: Yes     Alcohol/week: 1.7 standard drinks     Types: 2 Glasses of wine per week    Drug use: No       Current Outpatient Medications   Medication Sig Dispense Refill    metFORMIN ER (GLUCOPHAGE XR) 500 mg tablet TAKE 2 TABLET BY MOUTH TWICE DAILY DAILY WITH MEALS STOP SYNJARDY 360 Tablet 1    semaglutide (Ozempic) 0.25 mg or 0.5 mg/dose (2 mg/1.5 ml) subq pen Inject 0.25mg once weekly for three weeks, then increase to 0.5mg once weekly 9 mL 1    DULoxetine (CYMBALTA) 30 mg capsule duloxetine 30 mg capsule,delayed release   TAKE AS DIRECTED ALONG WITH 60MG CYMBALTA      DULoxetine (CYMBALTA) 60 mg capsule TAKE 1 CAPSULE BY MOUTH DAILY 30 Capsule 6    hydrOXYzine pamoate (VISTARIL) 25 mg capsule Take 50 mg by mouth nightly.  sertraline (ZOLOFT) 25 mg tablet sertraline 25 mg tablet   TAKE 1 TABLET BY MOUTH EVERY DAY      b complex vitamins tablet Take 1 Tab by mouth daily.  ascorbic acid-collagen (Collagen Plus Vitamin C) 125-740 mg cap Collagen Plus Vitamin C      IRON, FERROUS SULFATE, PO Iron (ferrous sulfate)      timoloL (BetimoL) 0.5 % ophthalmic solution Administer 1 Drop to both eyes two (2) times a day. use in affected eye(s)      multivitamin-min-iron-FA-vit K (Bariatric Multivitamins) 45 mg iron- 800 mcg-120 mcg cap Bariatric Multivitamins      carvediloL (COREG) 12.5 mg tablet Take 12.5 mg by mouth two (2) times a day.  omega 3-dha-epa-fish oil (Fish Oil) 100-160-1,000 mg cap Take 3 mg by mouth daily.  triamcinolone acetonide (KENALOG) 0.1 % topical cream triamcinolone acetonide 0.1 % topical cream      calcium-cholecalciferol, d3, 600 mg calcium- 200 unit cap Take 600 mg by mouth daily.       oxyCODONE-acetaminophen (PERCOCET 10)  mg per tablet Take  Tabs by mouth daily.  traZODone (DESYREL) 100 mg tablet TK 1 T PO QD HS  1    furosemide (LASIX) 20 mg tablet TK 1 T PO QD PRF WEIGHT GAIN OF 2 LBS OR MORE IN A DAY  5    potassium chloride (K-DUR, KLOR-CON) 20 mEq tablet TK 1 T PO QD PRN WHEN TAKING FUROSEMIDE  5    latanoprost (XALATAN) 0.005 % ophthalmic solution Administer 1 Drop to both eyes nightly.  gabapentin (NEURONTIN) 800 mg tablet Take 1 Tab by mouth three (3) times daily. 0    biotin 500 mcg Cap Take  by mouth. Allergies   Allergen Reactions    Januvia [Sitagliptin] Rash    Levorphanol Itching and Rash    Statins-Hmg-Coa Reductase Inhibitors Itching       Review of Systems  Review of Systems   Constitutional: Positive for malaise/fatigue. Negative for chills, fever and weight loss. HENT: Negative for congestion, ear pain and sore throat. Eyes: Negative for blurred vision and redness. Respiratory: Negative for cough, shortness of breath and wheezing. Cardiovascular: Negative for chest pain, palpitations and leg swelling. Gastrointestinal: Positive for nausea. Negative for abdominal pain, heartburn and vomiting. Genitourinary: Negative for dysuria, frequency and hematuria. Musculoskeletal: Positive for back pain and myalgias. Negative for joint pain. Skin: Negative for itching and rash. Neurological: Negative for dizziness, seizures and headaches. Endo/Heme/Allergies: Does not bruise/bleed easily. Psychiatric/Behavioral: Positive for depression. +excessive sleeping       Objective:     Temp: 97.4 °F (36.3 °C) (03/31/22 1058) Pulse (Heart Rate): 82 (03/31/22 1058) Resp Rate: 20 (03/31/22 1058) BP: 108/75 (03/31/22 1058) O2 Sat (%): 92 % (03/31/22 1058) Weight: 185 lb 3.2 oz (84 kg) (03/31/22 1058)     Physical Exam:  General:  Alert, cooperative, no distress. Head:  Normocephalic, without obvious abnormality, atraumatic.    Eyes: Conjunctivae/corneas clear. Pupils equal, round, reactive to light. Extraocular movements intact. Lungs:   Clear to auscultation bilaterally. Chest wall:  No tenderness or deformity. Heart:  Regular rate and rhythm   Abdomen:   Soft, non-tender. Well healed surgical incisions   Extremities: Extremities normal, atraumatic, no cyanosis or edema. Pulses: 2+ and symmetric all extremities. Skin: Skin color, texture, turgor normal. No rashes or lesions. Neurologic: CNII-XII intact. Normal strength, sensation, and reflexes throughout. Weight History:  Consult weight (9/9/2019): 259 lbs  Preop weight (1/21/2020): 251 lbs  3/31/2022: 185 lbs  Total weight loss: 74 lbs    Labs Reviewed:  Lab Results   Component Value Date/Time    WBC 8.4 11/01/2021 03:25 PM    HGB 12.1 11/01/2021 03:25 PM    HCT 35.3 11/01/2021 03:25 PM    PLATELET 003 (H) 99/64/9343 03:25 PM    MCV 87 11/01/2021 03:25 PM     Lab Results   Component Value Date/Time    Sodium 138 11/01/2021 03:25 PM    Potassium 4.3 11/01/2021 03:25 PM    Chloride 100 11/01/2021 03:25 PM    CO2 26 11/01/2021 03:25 PM    Anion gap 4 (L) 08/10/2021 06:15 PM    Glucose 113 (H) 11/01/2021 03:25 PM    BUN 25 (H) 11/01/2021 03:25 PM    Creatinine 0.76 11/01/2021 03:25 PM    BUN/Creatinine ratio 33 (H) 11/01/2021 03:25 PM    GFR est  11/01/2021 03:25 PM    GFR est non-AA 87 11/01/2021 03:25 PM    Calcium 9.5 11/01/2021 03:25 PM     Lab Results   Component Value Date/Time    ALT (SGPT) 35 (H) 11/01/2021 03:25 PM    AST (SGOT) 20 11/01/2021 03:25 PM    Alk.  phosphatase 84 11/01/2021 03:25 PM    Bilirubin, total <0.2 11/01/2021 03:25 PM     Lab Results   Component Value Date/Time    Vitamin D 25-Hydroxy 58.4 05/10/2021 09:20 AM       Lab Results   Component Value Date/Time    Vitamin B12 >2000 (H) 11/01/2021 03:25 PM    Folate 15.1 11/01/2021 03:25 PM     Lab Results   Component Value Date/Time    Calcium 9.5 11/01/2021 03:25 PM    Phosphorus 4.2 09/14/2020 12:44 PM     Lab Results   Component Value Date/Time    TSH 1.420 05/29/2019 08:52 AM     Cholesterol, total   Date Value Ref Range Status   11/01/2021 241 (H) 100 - 199 mg/dL Final   05/10/2021 245 (H) <200 MG/DL Final   11/12/2020 219 (H) <200 MG/DL Final     HDL Cholesterol   Date Value Ref Range Status   11/01/2021 62 >39 mg/dL Final   05/10/2021 58 MG/DL Final     Comment:     Based on NCEP ATP III, HDL Cholesterol <40 mg/dL is considered low and >60  mg/dL is elevated. 11/12/2020 64 MG/DL Final     Comment:     Based on NCEP ATP III, HDL Cholesterol <40 mg/dL is considered low and >60  mg/dL is elevated. LDL,Direct   Date Value Ref Range Status   03/12/2019 149 (H) 0 - 99 mg/dL Final     Lab Results   Component Value Date/Time    Iron 52 09/14/2020 12:44 PM    TIBC 321 09/14/2020 12:44 PM    Iron % saturation 16 09/14/2020 12:44 PM    Ferritin 108 09/14/2020 12:44 PM     Lab Results   Component Value Date/Time    Hemoglobin A1c 7.1 (H) 11/01/2021 03:25 PM    Hemoglobin A1c (POC) 6.3 05/25/2018 09:13 AM    Hemoglobin A1c, External 9.0 03/18/2016 12:00 AM         Assessment:     S/P laparoscopic Misael en Y gastric bypass surgery on 1/21/2020. Overall doing well with 74 lbs total weight loss since surgery. Problem List Items Addressed This Visit        Other    Status post bariatric surgery - Primary      Other Visit Diagnoses     Drug-induced constipation              Plan:     1. Increase fluid intake to daily goal of 64 oz.  2. Increase protein intake to daily goal of 60 g- encouraged restarting protein supplements  3. Increase daily exercise. 4. Vitamins: continue current  5. Return to clinic in 3 months for routine follow up. Will recheck labs at that time. 6. Will check into possible referral to dietician and providing information about support groups. 7. Recommend to continue follow up with Dr. Jad Alejandro to discuss treatments for constipation.  Recommended switching from exlax to BID Chris.       Ariel Fuentes, DO  4/4/2022

## 2022-03-31 NOTE — Clinical Note
Erwin Bojorquez,   This patient is a patient of Dr. Aniya Claire at [facility], but she has been unsatisfied with her care and is wanting to switch over to my practice. She sounds very complicated with her history, but would likely benefit from additional dietician intervention- is this someone that would be appropriate for you or [facility] to see since she wasn't originally a [facility] patient? She was also asking if she could attend our support groups. Let me know your thoughts.

## 2022-04-08 ENCOUNTER — HOSPITAL ENCOUNTER (OUTPATIENT)
Dept: MAMMOGRAPHY | Age: 57
Discharge: HOME | End: 2022-04-08
Attending: INTERNAL MEDICINE
Payer: MEDICARE

## 2022-04-08 DIAGNOSIS — C50.111 MALIGNANT NEOPLASM OF CENTRAL PORTION OF RIGHT FEMALE BREAST (HCC): ICD-10-CM

## 2022-04-08 PROCEDURE — 88305 TISSUE EXAM BY PATHOLOGIST: CPT

## 2022-04-08 PROCEDURE — 88360 TUMOR IMMUNOHISTOCHEM/MANUAL: CPT

## 2022-04-08 PROCEDURE — 77065 DX MAMMO INCL CAD UNI: CPT

## 2022-04-08 PROCEDURE — 77030019952 MAM 3D TOMO STEREO VAC BX BREAST RT 1ST LES W/CLIP SPEC

## 2022-04-08 PROCEDURE — 88377 M/PHMTRC ALYS ISHQUANT/SEMIQ: CPT

## 2022-04-08 RX ORDER — LIDOCAINE HYDROCHLORIDE AND EPINEPHRINE 10; 10 MG/ML; UG/ML
22 INJECTION, SOLUTION INFILTRATION; PERINEURAL
Status: DISCONTINUED | OUTPATIENT
Start: 2022-04-08 | End: 2022-04-09 | Stop reason: HOSPADM

## 2022-04-18 ENCOUNTER — TRANSCRIBE ORDER (OUTPATIENT)
Dept: SCHEDULING | Age: 57
End: 2022-04-18

## 2022-04-18 DIAGNOSIS — C50.111 MALIGNANT NEOPLASM OF CENTRAL PORTION OF RIGHT FEMALE BREAST (HCC): Primary | ICD-10-CM

## 2022-04-22 ENCOUNTER — HOSPITAL ENCOUNTER (OUTPATIENT)
Dept: NUCLEAR MEDICINE | Age: 57
Discharge: HOME | End: 2022-04-22
Attending: SURGERY
Payer: MEDICARE

## 2022-04-22 DIAGNOSIS — C50.111 MALIGNANT NEOPLASM OF CENTRAL PORTION OF RIGHT FEMALE BREAST (HCC): ICD-10-CM

## 2022-04-22 PROCEDURE — 78306 BONE IMAGING WHOLE BODY: CPT

## 2022-04-26 ENCOUNTER — NURSE NAVIGATOR (OUTPATIENT)
Dept: CASE MANAGEMENT | Age: 57
End: 2022-04-26

## 2022-04-26 NOTE — NURSE NAVIGATOR
[employer]  Breast Navigator Encounter    Name: Liban Peters  Age: 59 y.o.  : 1965  Diagnosis: Right breast IDC; ER-/CT-/HER2-    Encounter type:  [x]Initial Navigator Encounter  []Patient Initiated  []Navigator Follow-up []Pre-op  []Post-op []Check-in Prior to First Treatment []Treatment Modality Change   []Other:     Narrative:   Called pt to introduce self/role of Breast Navigator. We discussed recent diagnosis and next steps. Pt states she has an upcoming consult with Dr. Alexandra Morales. She states that she is hoping for a GREG flap using abdominal tissue, and is afraid she will have to have 2 separate surgeries. She would prefer to avoid expanders if at all possible. Supportive listening provided. Pt shared she is currently in counseling for concerns outside of breast cancer. We discuss that although originally seeing the counselor for other reasons, it will be beneficial to continue this support throughout treatment. 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 She is still active within the [employer] and plans to participate, virtually, in their upcoming retreat. She states she is also awaiting to hear about details [facility] offered by [employer]. My contact info was provided and I encouraged pt to reach out with any questions or concerns.     Interdisciplinary Team:  Med-Onc:   Surg-Onc: Dr. Suhas Carpenter MD  Rad-Onc:  Plastics: Dr. Lucho Jensen MD  :   Nurse Navigator:  RADHA Wilde BSN, RN, [facility]  Breast Cancer Nurse [address] [place] [address], [address] [place] [address]  W: [phone]  F: [phone]  [email or web address]   Good Help to Those in [facility]

## 2022-05-16 ENCOUNTER — APPOINTMENT (OUTPATIENT)
Dept: CT IMAGING | Age: 57
DRG: 641 | End: 2022-05-16
Attending: EMERGENCY MEDICINE
Payer: MEDICARE

## 2022-05-16 ENCOUNTER — HOSPITAL ENCOUNTER (INPATIENT)
Age: 57
LOS: 4 days | Discharge: HOME HEALTH | DRG: 641 | End: 2022-05-20
Attending: EMERGENCY MEDICINE | Admitting: FAMILY MEDICINE
Payer: MEDICARE

## 2022-05-16 ENCOUNTER — APPOINTMENT (OUTPATIENT)
Dept: NON INVASIVE DIAGNOSTICS | Age: 57
DRG: 641 | End: 2022-05-16
Attending: EMERGENCY MEDICINE
Payer: MEDICARE

## 2022-05-16 DIAGNOSIS — R60.9 PERIPHERAL EDEMA: Primary | ICD-10-CM

## 2022-05-16 PROBLEM — R60.0 LEG EDEMA: Status: ACTIVE | Noted: 2022-05-16

## 2022-05-16 PROBLEM — R60.0 PERIPHERAL EDEMA: Status: ACTIVE | Noted: 2022-05-16

## 2022-05-16 LAB
ABO + RH BLD: NORMAL
ALBUMIN SERPL-MCNC: 2.8 G/DL (ref 3.5–5)
ALBUMIN/GLOB SERPL: 0.9 {RATIO} (ref 1.1–2.2)
ALP SERPL-CCNC: 74 U/L (ref 45–117)
ALT SERPL-CCNC: 16 U/L (ref 12–78)
ANION GAP SERPL CALC-SCNC: 7 MMOL/L (ref 5–15)
AST SERPL W P-5'-P-CCNC: 14 U/L (ref 15–37)
BASOPHILS # BLD: 0 K/UL (ref 0–0.1)
BASOPHILS NFR BLD: 0 % (ref 0–1)
BILIRUB SERPL-MCNC: 0.3 MG/DL (ref 0.2–1)
BLOOD GROUP ANTIBODIES SERPL: NEGATIVE
BNP SERPL-MCNC: 394 PG/ML
BUN SERPL-MCNC: 11 MG/DL (ref 6–20)
BUN/CREAT SERPL: 12 (ref 12–20)
CA-I BLD-MCNC: 9.1 MG/DL (ref 8.5–10.1)
CHLORIDE SERPL-SCNC: 109 MMOL/L (ref 97–108)
CO2 SERPL-SCNC: 27 MMOL/L (ref 21–32)
CREAT SERPL-MCNC: 0.89 MG/DL (ref 0.55–1.02)
DIFFERENTIAL METHOD BLD: ABNORMAL
EOSINOPHIL # BLD: 0.2 K/UL (ref 0–0.4)
EOSINOPHIL NFR BLD: 2 % (ref 0–7)
ERYTHROCYTE [DISTWIDTH] IN BLOOD BY AUTOMATED COUNT: 14.6 % (ref 11.5–14.5)
GLOBULIN SER CALC-MCNC: 3 G/DL (ref 2–4)
GLUCOSE SERPL-MCNC: 90 MG/DL (ref 65–100)
HCT VFR BLD AUTO: 31.4 % (ref 35–47)
HGB BLD-MCNC: 10.5 G/DL (ref 11.5–16)
IMM GRANULOCYTES # BLD AUTO: 0 K/UL (ref 0–0.04)
IMM GRANULOCYTES NFR BLD AUTO: 0 % (ref 0–0.5)
LACTATE SERPL-SCNC: 1.2 MMOL/L (ref 0.4–2)
LYMPHOCYTES # BLD: 2.7 K/UL (ref 0.8–3.5)
LYMPHOCYTES NFR BLD: 30 % (ref 12–49)
MCH RBC QN AUTO: 30.3 PG (ref 26–34)
MCHC RBC AUTO-ENTMCNC: 33.4 G/DL (ref 30–36.5)
MCV RBC AUTO: 90.8 FL (ref 80–99)
MONOCYTES # BLD: 0.6 K/UL (ref 0–1)
MONOCYTES NFR BLD: 7 % (ref 5–13)
NEUTS SEG # BLD: 5.4 K/UL (ref 1.8–8)
NEUTS SEG NFR BLD: 61 % (ref 32–75)
NRBC # BLD: 0 K/UL (ref 0–0.01)
NRBC BLD-RTO: 0 PER 100 WBC
PLATELET # BLD AUTO: 446 K/UL (ref 150–400)
PMV BLD AUTO: 10 FL (ref 8.9–12.9)
POTASSIUM SERPL-SCNC: 3.2 MMOL/L (ref 3.5–5.1)
PROT SERPL-MCNC: 5.8 G/DL (ref 6.4–8.2)
RBC # BLD AUTO: 3.46 M/UL (ref 3.8–5.2)
SODIUM SERPL-SCNC: 143 MMOL/L (ref 136–145)
SPECIMEN EXP DATE BLD: NORMAL
TROPONIN-HIGH SENSITIVITY: 5 NG/L (ref 0–51)
WBC # BLD AUTO: 8.9 K/UL (ref 3.6–11)

## 2022-05-16 PROCEDURE — 96374 THER/PROPH/DIAG INJ IV PUSH: CPT

## 2022-05-16 PROCEDURE — 84484 ASSAY OF TROPONIN QUANT: CPT

## 2022-05-16 PROCEDURE — 86900 BLOOD TYPING SEROLOGIC ABO: CPT

## 2022-05-16 PROCEDURE — 65270000029 HC RM PRIVATE

## 2022-05-16 PROCEDURE — 36415 COLL VENOUS BLD VENIPUNCTURE: CPT

## 2022-05-16 PROCEDURE — 93971 EXTREMITY STUDY: CPT

## 2022-05-16 PROCEDURE — 74011000636 HC RX REV CODE- 636: Performed by: EMERGENCY MEDICINE

## 2022-05-16 PROCEDURE — 96360 HYDRATION IV INFUSION INIT: CPT

## 2022-05-16 PROCEDURE — 93005 ELECTROCARDIOGRAM TRACING: CPT

## 2022-05-16 PROCEDURE — 83605 ASSAY OF LACTIC ACID: CPT

## 2022-05-16 PROCEDURE — 87040 BLOOD CULTURE FOR BACTERIA: CPT

## 2022-05-16 PROCEDURE — 85025 COMPLETE CBC W/AUTO DIFF WBC: CPT

## 2022-05-16 PROCEDURE — 71275 CT ANGIOGRAPHY CHEST: CPT

## 2022-05-16 PROCEDURE — 83880 ASSAY OF NATRIURETIC PEPTIDE: CPT

## 2022-05-16 PROCEDURE — 96361 HYDRATE IV INFUSION ADD-ON: CPT

## 2022-05-16 PROCEDURE — 93970 EXTREMITY STUDY: CPT

## 2022-05-16 PROCEDURE — 99285 EMERGENCY DEPT VISIT HI MDM: CPT

## 2022-05-16 PROCEDURE — 80053 COMPREHEN METABOLIC PANEL: CPT

## 2022-05-16 PROCEDURE — G0378 HOSPITAL OBSERVATION PER HR: HCPCS

## 2022-05-16 PROCEDURE — 74011250636 HC RX REV CODE- 250/636: Performed by: EMERGENCY MEDICINE

## 2022-05-16 PROCEDURE — 94761 N-INVAS EAR/PLS OXIMETRY MLT: CPT

## 2022-05-16 RX ORDER — LATANOPROST 50 UG/ML
1 SOLUTION/ DROPS OPHTHALMIC
Status: DISCONTINUED | OUTPATIENT
Start: 2022-05-16 | End: 2022-05-20 | Stop reason: HOSPADM

## 2022-05-16 RX ORDER — GABAPENTIN 400 MG/1
800 CAPSULE ORAL 3 TIMES DAILY
Status: DISCONTINUED | OUTPATIENT
Start: 2022-05-17 | End: 2022-05-20 | Stop reason: HOSPADM

## 2022-05-16 RX ORDER — ACETAMINOPHEN 325 MG/1
650 TABLET ORAL
Status: DISCONTINUED | OUTPATIENT
Start: 2022-05-16 | End: 2022-05-20 | Stop reason: HOSPADM

## 2022-05-16 RX ORDER — METFORMIN HYDROCHLORIDE 500 MG/1
500 TABLET, EXTENDED RELEASE ORAL
Status: DISCONTINUED | OUTPATIENT
Start: 2022-05-17 | End: 2022-05-20 | Stop reason: HOSPADM

## 2022-05-16 RX ORDER — TIMOLOL MALEATE 5 MG/ML
1 SOLUTION/ DROPS OPHTHALMIC 2 TIMES DAILY
Status: DISCONTINUED | OUTPATIENT
Start: 2022-05-19 | End: 2022-05-20 | Stop reason: HOSPADM

## 2022-05-16 RX ORDER — ENOXAPARIN SODIUM 100 MG/ML
40 INJECTION SUBCUTANEOUS DAILY
Status: DISCONTINUED | OUTPATIENT
Start: 2022-05-17 | End: 2022-05-20 | Stop reason: HOSPADM

## 2022-05-16 RX ORDER — ONDANSETRON 4 MG/1
4 TABLET, ORALLY DISINTEGRATING ORAL
Status: DISCONTINUED | OUTPATIENT
Start: 2022-05-16 | End: 2022-05-20 | Stop reason: HOSPADM

## 2022-05-16 RX ORDER — ACETAMINOPHEN 650 MG/1
650 SUPPOSITORY RECTAL
Status: DISCONTINUED | OUTPATIENT
Start: 2022-05-16 | End: 2022-05-20 | Stop reason: HOSPADM

## 2022-05-16 RX ORDER — FUROSEMIDE 10 MG/ML
20 INJECTION INTRAMUSCULAR; INTRAVENOUS ONCE
Status: COMPLETED | OUTPATIENT
Start: 2022-05-16 | End: 2022-05-16

## 2022-05-16 RX ORDER — TRAZODONE HYDROCHLORIDE 50 MG/1
100 TABLET ORAL
Status: DISCONTINUED | OUTPATIENT
Start: 2022-05-16 | End: 2022-05-20 | Stop reason: HOSPADM

## 2022-05-16 RX ORDER — ONDANSETRON 2 MG/ML
4 INJECTION INTRAMUSCULAR; INTRAVENOUS
Status: DISCONTINUED | OUTPATIENT
Start: 2022-05-16 | End: 2022-05-20 | Stop reason: HOSPADM

## 2022-05-16 RX ORDER — SERTRALINE HYDROCHLORIDE 50 MG/1
25 TABLET, FILM COATED ORAL DAILY
Status: DISCONTINUED | OUTPATIENT
Start: 2022-05-17 | End: 2022-05-20 | Stop reason: HOSPADM

## 2022-05-16 RX ORDER — MULTIVITAMIN
1 TABLET ORAL DAILY
Status: DISCONTINUED | OUTPATIENT
Start: 2022-05-18 | End: 2022-05-20 | Stop reason: HOSPADM

## 2022-05-16 RX ORDER — HYDROXYZINE PAMOATE 25 MG/1
50 CAPSULE ORAL
Status: DISCONTINUED | OUTPATIENT
Start: 2022-05-16 | End: 2022-05-20 | Stop reason: HOSPADM

## 2022-05-16 RX ORDER — FUROSEMIDE 40 MG/1
20 TABLET ORAL DAILY
Status: DISCONTINUED | OUTPATIENT
Start: 2022-05-17 | End: 2022-05-18

## 2022-05-16 RX ORDER — POLYETHYLENE GLYCOL 3350 17 G/17G
17 POWDER, FOR SOLUTION ORAL DAILY PRN
Status: DISCONTINUED | OUTPATIENT
Start: 2022-05-16 | End: 2022-05-20 | Stop reason: HOSPADM

## 2022-05-16 RX ORDER — DULOXETIN HYDROCHLORIDE 30 MG/1
60 CAPSULE, DELAYED RELEASE ORAL DAILY
Status: DISCONTINUED | OUTPATIENT
Start: 2022-05-17 | End: 2022-05-20 | Stop reason: HOSPADM

## 2022-05-16 RX ADMIN — IOPAMIDOL 100 ML: 755 INJECTION, SOLUTION INTRAVENOUS at 18:22

## 2022-05-16 RX ADMIN — FUROSEMIDE 20 MG: 10 INJECTION, SOLUTION INTRAMUSCULAR; INTRAVENOUS at 21:26

## 2022-05-16 RX ADMIN — SODIUM CHLORIDE 1000 ML: 9 INJECTION, SOLUTION INTRAVENOUS at 15:57

## 2022-05-16 NOTE — ED TRIAGE NOTES
Being treated for breast cancer, had total mastectomy last wk, today saw Dr. Cary Alberto who advised to come to ER for hypotension, dehydration, and swelling. Pt noted to have lower extremity edema.

## 2022-05-16 NOTE — ED NOTES
During assessment patient has bilateral PRANAV drains outputting serosanguinous  drainage, patient has 2+ pitting edema in lower extremities, pain complaining of pain in abdomen

## 2022-05-16 NOTE — Clinical Note
Patient Class[de-identified] OBSERVATION [306]   Type of Bed: Medical [8]   Cardiac Monitoring Required?: No   Reason for Observation: Peripheral Edema   Admitting Diagnosis: Peripheral edema [[id number]]   Admitting Physician: Kylie Rowe [[id number]]   Attending Physician: Kylie Rowe [[id number]]

## 2022-05-17 ENCOUNTER — APPOINTMENT (OUTPATIENT)
Dept: NON INVASIVE DIAGNOSTICS | Age: 57
DRG: 641 | End: 2022-05-17
Attending: FAMILY MEDICINE
Payer: MEDICARE

## 2022-05-17 LAB
ALBUMIN SERPL-MCNC: 2.4 G/DL (ref 3.5–5)
ALBUMIN/GLOB SERPL: 1 {RATIO} (ref 1.1–2.2)
ALP SERPL-CCNC: 64 U/L (ref 45–117)
ALT SERPL-CCNC: 12 U/L (ref 12–78)
ANION GAP SERPL CALC-SCNC: 7 MMOL/L (ref 5–15)
APPEARANCE UR: CLEAR
AST SERPL W P-5'-P-CCNC: 13 U/L (ref 15–37)
ATRIAL RATE: 73 BPM
BACTERIA URNS QL MICRO: NEGATIVE /HPF
BASOPHILS # BLD: 0.1 K/UL (ref 0–0.1)
BASOPHILS NFR BLD: 1 % (ref 0–1)
BILIRUB SERPL-MCNC: 0.3 MG/DL (ref 0.2–1)
BILIRUB UR QL: NEGATIVE
BUN SERPL-MCNC: 8 MG/DL (ref 6–20)
BUN/CREAT SERPL: 11 (ref 12–20)
CA-I BLD-MCNC: 8.7 MG/DL (ref 8.5–10.1)
CALCULATED P AXIS, ECG09: 45 DEGREES
CALCULATED R AXIS, ECG10: 4 DEGREES
CALCULATED T AXIS, ECG11: 33 DEGREES
CHLORIDE SERPL-SCNC: 111 MMOL/L (ref 97–108)
CO2 SERPL-SCNC: 25 MMOL/L (ref 21–32)
COLOR UR: ABNORMAL
CREAT SERPL-MCNC: 0.74 MG/DL (ref 0.55–1.02)
DIAGNOSIS, 93000: NORMAL
DIFFERENTIAL METHOD BLD: ABNORMAL
ECHO AO ROOT DIAM: 3.3 CM
ECHO AO ROOT INDEX: 1.73 CM/M2
ECHO AV PEAK GRADIENT: 5 MMHG
ECHO AV PEAK VELOCITY: 1.1 M/S
ECHO AV VELOCITY RATIO: 0.91
ECHO EST RA PRESSURE: 3 MMHG
ECHO LA DIAMETER INDEX: 1.2 CM/M2
ECHO LA DIAMETER: 2.3 CM
ECHO LA TO AORTIC ROOT RATIO: 0.7
ECHO LV E' LATERAL VELOCITY: 10 CM/S
ECHO LV E' SEPTAL VELOCITY: 7 CM/S
ECHO LV EJECTION FRACTION BIPLANE: 72 % (ref 55–100)
ECHO LV FRACTIONAL SHORTENING: 39 % (ref 28–44)
ECHO LV INTERNAL DIMENSION DIASTOLE INDEX: 1.73 CM/M2
ECHO LV INTERNAL DIMENSION DIASTOLIC: 3.3 CM (ref 3.9–5.3)
ECHO LV INTERNAL DIMENSION SYSTOLIC INDEX: 1.05 CM/M2
ECHO LV INTERNAL DIMENSION SYSTOLIC: 2 CM
ECHO LV IVSD: 1.2 CM (ref 0.6–0.9)
ECHO LV MASS 2D: 116.8 G (ref 67–162)
ECHO LV MASS INDEX 2D: 61.2 G/M2 (ref 43–95)
ECHO LV POSTERIOR WALL DIASTOLIC: 1.1 CM (ref 0.6–0.9)
ECHO LV RELATIVE WALL THICKNESS RATIO: 0.67
ECHO LVOT PEAK GRADIENT: 4 MMHG
ECHO LVOT PEAK VELOCITY: 1 M/S
ECHO MV A VELOCITY: 0.61 M/S
ECHO MV E DECELERATION TIME (DT): 208 MS
ECHO MV E VELOCITY: 0.69 M/S
ECHO MV E/A RATIO: 1.13
ECHO MV E/E' LATERAL: 6.9
ECHO MV E/E' RATIO (AVERAGED): 8.38
ECHO MV E/E' SEPTAL: 9.86
ECHO MV MAX VELOCITY: 0.9 M/S
ECHO MV MEAN GRADIENT: 2 MMHG
ECHO MV MEAN VELOCITY: 0.6 M/S
ECHO MV PEAK GRADIENT: 3 MMHG
ECHO MV VTI: 25.8 CM
ECHO PV MAX VELOCITY: 0.9 M/S
ECHO PV PEAK GRADIENT: 3 MMHG
ECHO PVEIN A DURATION: 116 MS
ECHO PVEIN A VELOCITY: 0.4 M/S
ECHO RIGHT VENTRICULAR SYSTOLIC PRESSURE (RVSP): 20 MMHG
ECHO RV INTERNAL DIMENSION: 2.4 CM
ECHO TV REGURGITANT MAX VELOCITY: 2.05 M/S
ECHO TV REGURGITANT PEAK GRADIENT: 17 MMHG
EOSINOPHIL # BLD: 0.3 K/UL (ref 0–0.4)
EOSINOPHIL NFR BLD: 3 % (ref 0–7)
ERYTHROCYTE [DISTWIDTH] IN BLOOD BY AUTOMATED COUNT: 14.4 % (ref 11.5–14.5)
GLOBULIN SER CALC-MCNC: 2.5 G/DL (ref 2–4)
GLUCOSE BLD STRIP.AUTO-MCNC: 100 MG/DL (ref 65–117)
GLUCOSE BLD STRIP.AUTO-MCNC: 102 MG/DL (ref 65–117)
GLUCOSE BLD STRIP.AUTO-MCNC: 85 MG/DL (ref 65–117)
GLUCOSE SERPL-MCNC: 92 MG/DL (ref 65–100)
GLUCOSE UR STRIP.AUTO-MCNC: 50 MG/DL
HCT VFR BLD AUTO: 28.3 % (ref 35–47)
HGB BLD-MCNC: 9.3 G/DL (ref 11.5–16)
HGB UR QL STRIP: NEGATIVE
IMM GRANULOCYTES # BLD AUTO: 0 K/UL (ref 0–0.04)
IMM GRANULOCYTES NFR BLD AUTO: 0 % (ref 0–0.5)
KETONES UR QL STRIP.AUTO: NEGATIVE MG/DL
LEUKOCYTE ESTERASE UR QL STRIP.AUTO: NEGATIVE
LYMPHOCYTES # BLD: 3.1 K/UL (ref 0.8–3.5)
LYMPHOCYTES NFR BLD: 35 % (ref 12–49)
MCH RBC QN AUTO: 29.8 PG (ref 26–34)
MCHC RBC AUTO-ENTMCNC: 32.9 G/DL (ref 30–36.5)
MCV RBC AUTO: 90.7 FL (ref 80–99)
MONOCYTES # BLD: 0.7 K/UL (ref 0–1)
MONOCYTES NFR BLD: 8 % (ref 5–13)
NEUTS SEG # BLD: 4.6 K/UL (ref 1.8–8)
NEUTS SEG NFR BLD: 53 % (ref 32–75)
NITRITE UR QL STRIP.AUTO: NEGATIVE
NRBC # BLD: 0 K/UL (ref 0–0.01)
NRBC BLD-RTO: 0 PER 100 WBC
P-R INTERVAL, ECG05: 150 MS
PERFORMED BY, TECHID: NORMAL
PH UR STRIP: 6 [PH] (ref 5–8)
PLATELET # BLD AUTO: 398 K/UL (ref 150–400)
PMV BLD AUTO: 9.8 FL (ref 8.9–12.9)
POTASSIUM SERPL-SCNC: 2.9 MMOL/L (ref 3.5–5.1)
PROT SERPL-MCNC: 4.9 G/DL (ref 6.4–8.2)
PROT UR STRIP-MCNC: NEGATIVE MG/DL
Q-T INTERVAL, ECG07: 422 MS
QRS DURATION, ECG06: 72 MS
QTC CALCULATION (BEZET), ECG08: 464 MS
RBC # BLD AUTO: 3.12 M/UL (ref 3.8–5.2)
RBC #/AREA URNS HPF: ABNORMAL /HPF (ref 0–5)
SODIUM SERPL-SCNC: 143 MMOL/L (ref 136–145)
SP GR UR REFRACTOMETRY: 1.01 (ref 1–1.03)
TROPONIN-HIGH SENSITIVITY: 6 NG/L (ref 0–51)
UROBILINOGEN UR QL STRIP.AUTO: 0.1 EU/DL (ref 0.1–1)
VENTRICULAR RATE, ECG03: 73 BPM
WBC # BLD AUTO: 8.8 K/UL (ref 3.6–11)
WBC URNS QL MICRO: ABNORMAL /HPF (ref 0–4)

## 2022-05-17 PROCEDURE — 81003 URINALYSIS AUTO W/O SCOPE: CPT

## 2022-05-17 PROCEDURE — 82962 GLUCOSE BLOOD TEST: CPT

## 2022-05-17 PROCEDURE — 93306 TTE W/DOPPLER COMPLETE: CPT

## 2022-05-17 PROCEDURE — 80053 COMPREHEN METABOLIC PANEL: CPT

## 2022-05-17 PROCEDURE — 36415 COLL VENOUS BLD VENIPUNCTURE: CPT

## 2022-05-17 PROCEDURE — 65270000029 HC RM PRIVATE

## 2022-05-17 PROCEDURE — 74011250636 HC RX REV CODE- 250/636: Performed by: FAMILY MEDICINE

## 2022-05-17 PROCEDURE — 74011000250 HC RX REV CODE- 250: Performed by: FAMILY MEDICINE

## 2022-05-17 PROCEDURE — 85025 COMPLETE CBC W/AUTO DIFF WBC: CPT

## 2022-05-17 PROCEDURE — 84484 ASSAY OF TROPONIN QUANT: CPT

## 2022-05-17 PROCEDURE — 74011250637 HC RX REV CODE- 250/637: Performed by: FAMILY MEDICINE

## 2022-05-17 RX ORDER — POTASSIUM CHLORIDE 750 MG/1
40 TABLET, FILM COATED, EXTENDED RELEASE ORAL EVERY 4 HOURS
Status: COMPLETED | OUTPATIENT
Start: 2022-05-17 | End: 2022-05-17

## 2022-05-17 RX ORDER — FUROSEMIDE 10 MG/ML
40 INJECTION INTRAMUSCULAR; INTRAVENOUS ONCE
Status: COMPLETED | OUTPATIENT
Start: 2022-05-17 | End: 2022-05-17

## 2022-05-17 RX ADMIN — POTASSIUM CHLORIDE 40 MEQ: 750 TABLET, FILM COATED, EXTENDED RELEASE ORAL at 09:19

## 2022-05-17 RX ADMIN — FUROSEMIDE 20 MG: 40 TABLET ORAL at 09:20

## 2022-05-17 RX ADMIN — ACETAMINOPHEN 650 MG: 325 TABLET ORAL at 09:19

## 2022-05-17 RX ADMIN — GABAPENTIN 800 MG: 400 CAPSULE ORAL at 09:20

## 2022-05-17 RX ADMIN — LATANOPROST 1 DROP: 50 SOLUTION OPHTHALMIC at 22:30

## 2022-05-17 RX ADMIN — HYDROXYZINE PAMOATE 50 MG: 25 CAPSULE ORAL at 00:08

## 2022-05-17 RX ADMIN — SERTRALINE HYDROCHLORIDE 25 MG: 50 TABLET ORAL at 09:20

## 2022-05-17 RX ADMIN — ENOXAPARIN SODIUM 40 MG: 100 INJECTION SUBCUTANEOUS at 09:20

## 2022-05-17 RX ADMIN — TRAZODONE HYDROCHLORIDE 100 MG: 50 TABLET ORAL at 22:38

## 2022-05-17 RX ADMIN — ACETAMINOPHEN 650 MG: 325 TABLET ORAL at 22:38

## 2022-05-17 RX ADMIN — HYDROXYZINE PAMOATE 50 MG: 25 CAPSULE ORAL at 22:29

## 2022-05-17 RX ADMIN — DULOXETINE 60 MG: 30 CAPSULE, DELAYED RELEASE ORAL at 09:20

## 2022-05-17 RX ADMIN — GABAPENTIN 800 MG: 400 CAPSULE ORAL at 16:43

## 2022-05-17 RX ADMIN — POTASSIUM CHLORIDE 40 MEQ: 750 TABLET, FILM COATED, EXTENDED RELEASE ORAL at 12:32

## 2022-05-17 RX ADMIN — GABAPENTIN 800 MG: 400 CAPSULE ORAL at 22:30

## 2022-05-17 RX ADMIN — FUROSEMIDE 40 MG: 10 INJECTION, SOLUTION INTRAMUSCULAR; INTRAVENOUS at 12:32

## 2022-05-17 NOTE — H&P
History and Physical    NAME: Joselo Garza   :  1965   MRN:  [id number]     Date/Time:  2022 7:37 AM    Patient PCP: Segun Karimi MD  ______________________________________________________________________             Subjective:     CHIEF COMPLAINT:   Peripheral edema       HISTORY OF PRESENT ILLNESS:       Patient is a 59y.o. year old female with a past medical history significant hypertension, CHF, DM, hyperlipidemia and Breast cancer s/p bilateral mastectomies last week presents to the ED with cc of peripheral edema and hypertension. Patient went to her 1 week follow-up with her breast surgeon who was concerned because her blood pressure was low and she had bilateral lower extremity edema which is new. States she has had issues in the past with lower limb swelling, but compression stockings were able to manage swelling appropriately. Since last week she has noticed the stockings have not helped, and that the swelling progressively worsened over the past week. She states her limbs are not particulary painful but feel \"tight\". Patient states she has had decreased appetite since January and this is unchanged. She states she does feel generally unwell but does not have any specific abdominal pain, vomiting or diarrhea. Patient is status post gastric bypass 2 years ago. Additionally patient endorses chest pressure but states it it may be from the chest binder she is wearing. She states the expanders in place are uncomfortable as well. Patient admitted to the hospital. Patient is hypertensive with bilateral swelling of lower extremities and right upper extremity (chronic lymphedema; worse than normal). CT was negative for PE or aortic abnormalities. Lower limb doppler U/S was negative bilaterally. EKG shows normal sinus rhythm with a rate of 73. Normal W[place], normal QRS, normal QTC. Low voltage. 1 L of IV fluid administered. Troponin and lactic acid negative.     Remarkable Labs   K: 2.9 BNP: 394   Hgb: 9.3       Past Medical History:   Diagnosis Date    Arthritis     Back/Neck problems    Burning with urination     Frequency    Cancer ([facility] [address] 2009    RIGHT breast    Congestive heart failure (HCC)     Depression     Including Post Partum    Diabetes ([facility] [address]     Dizziness     GERD (gastroesophageal reflux disease)     Glaucoma     Gout     Headache     Heart disease     Hypercholesterolemia     Hypertension     Morbid obesity (HCC)     Neuropathy     Shortness of breath     With activity    Sleep apnea     Sleep disorder     Difficulty falling asleep, night sweats    Stool color black         Past Surgical History:   Procedure Laterality Date    HX APPENDECTOMY      HX BREAST LUMPECTOMY  2009    RIGHT with SNBX    HX CHOLECYSTECTOMY      HX GASTRIC BYPASS  2020    HX GYN       x1, Bilateral tubal ligation, Dilation & Curettage    HX GYN      Hysterectomy (partial)    HX ORTHOPAEDIC      Procedure on shoulder    HX PREMALIG/BENIGN SKIN LESION EXCISION      Cyst removed from scalp    HX PREMALIG/BENIGN SKIN LESION EXCISION      Debridement of subcutaneous tissue    HX TONSILLECTOMY  1970    HX UROLOGICAL         Social History     Tobacco Use    Smoking status: Never Smoker    Smokeless tobacco: Never Used   Substance Use Topics    Alcohol use:  Yes     Alcohol/week: 1.7 standard drinks     Types: 2 Glasses of wine per week        Family History   Problem Relation Age of Onset    Hypertension Mother     Cancer Mother     Thyroid Disease Mother     Hypertension Father     Cancer Father     Diabetes Brother     Hypertension Brother     Cancer Brother     Stroke Brother     Lung Disease Daughter     Asthma Daughter     Hypertension Daughter     Thyroid Disease Daughter     Diabetes Son     Lung Disease Son     Hypertension Son     Stroke Maternal Grandmother     Dementia Maternal Grandmother     Hypertension Maternal Grandmother Allergies   Allergen Reactions    Januvia [Sitagliptin] Rash    Levorphanol Itching and Rash    Statins-Hmg-Coa Reductase Inhibitors Itching        Prior to Admission medications    Medication Sig Start Date End Date Taking? Authorizing Provider   metFORMIN ER (GLUCOPHAGE XR) 500 mg tablet TAKE 2 TABLET BY MOUTH TWICE DAILY DAILY WITH MEALS STOP SYNJARDY 11/19/21   Nahomi Barnes MD   semaglutide (Ozempic) 0.25 mg or 0.5 mg/dose (2 mg/1.5 ml) subq pen Inject 0.25mg once weekly for three weeks, then increase to 0.5mg once weekly 11/19/21   Nahomi Barnes MD   DULoxetine (CYMBALTA) 30 mg capsule duloxetine 30 mg capsule,delayed release   TAKE AS DIRECTED ALONG WITH 60MG CYMBALTA    Provider, Historical   DULoxetine (CYMBALTA) 60 mg capsule TAKE 1 CAPSULE BY MOUTH DAILY 7/3/21   Nahomi Barnes MD   hydrOXYzine pamoate (VISTARIL) 25 mg capsule Take 50 mg by mouth nightly. 4/8/21   Provider, Historical   sertraline (ZOLOFT) 25 mg tablet sertraline 25 mg tablet   TAKE 1 TABLET BY MOUTH EVERY DAY    Provider, Historical   b complex vitamins tablet Take 1 Tab by mouth daily. Provider, Historical   ascorbic acid-collagen (Collagen Plus Vitamin C) 125-740 mg cap Collagen Plus Vitamin C    Provider, Historical   IRON, FERROUS SULFATE, PO Iron (ferrous sulfate)    Provider, Historical   timoloL (BetimoL) 0.5 % ophthalmic solution Administer 1 Drop to both eyes two (2) times a day. use in affected eye(s)    Provider, Historical   multivitamin-min-iron-FA-vit K (Bariatric Multivitamins) 45 mg iron- 800 mcg-120 mcg cap Bariatric Multivitamins 1/23/20   Provider, Historical   carvediloL (COREG) 12.5 mg tablet Take 12.5 mg by mouth two (2) times a day. Provider, Historical   omega 3-dha-epa-fish oil (Fish Oil) 100-160-1,000 mg cap Take 3 mg by mouth daily.     Provider, Historical   triamcinolone acetonide (KENALOG) 0.1 % topical cream triamcinolone acetonide 0.1 % topical cream    Provider, Historical calcium-cholecalciferol, d3, 600 mg calcium- 200 unit cap Take 600 mg by mouth daily. Provider, Historical   oxyCODONE-acetaminophen (PERCOCET 10)  mg per tablet Take  Tabs by mouth daily. Provider, Historical   traZODone (DESYREL) 100 mg tablet TK 1 T PO QD HS 10/24/19   Provider, Historical   furosemide (LASIX) 20 mg tablet TK 1 T PO QD PRF WEIGHT GAIN OF 2 LBS OR MORE IN A DAY 11/10/19   Provider, Historical   potassium chloride (K-DUR, KLOR-CON) 20 mEq tablet TK 1 T PO QD PRN WHEN TAKING FUROSEMIDE 9/10/19   Provider, Historical   latanoprost (XALATAN) 0.005 % ophthalmic solution Administer 1 Drop to both eyes nightly. Provider, Historical   gabapentin (NEURONTIN) 800 mg tablet Take 1 Tab by mouth three (3) times daily. 3/24/17   Provider, Historical   biotin 500 mcg Cap Take  by mouth. Provider, Historical         Current Facility-Administered Medications:     potassium chloride SR (KLOR-CON 10) tablet 40 mEq, 40 mEq, Oral, Q4H, Fuad Fraser MD    DULoxetine (CYMBALTA) capsule 60 mg, 60 mg, Oral, DAILY, Fuad Fraser MD    metFORMIN ER (GLUCOPHAGE XR) tablet 500 mg, 500 mg, Oral, DAILY WITH DINNER, Fuad Fraser MD    semaglutide (OZEMPIC) 0.25 mg or 0.5 mg/dose (2 mg/1.5 ml) subq pen 0.25 mg, 0.25 mg, SubCUTAneous, Q7D, Desire Fraser MD    furosemide (LASIX) tablet 20 mg, 20 mg, Oral, DAILY, Desire Fraser MD    gabapentin (NEURONTIN) capsule 800 mg, 800 mg, Oral, TID, Fuad Fraser MD    hydrOXYzine pamoate (VISTARIL) capsule 50 mg, 50 mg, Oral, QHS, Desire Fraser MD, 50 mg at 05/17/22 0008    . PHARMACY TO SUBSTITUTE PER PROTOCOL (Reordered from: IRON, FERROUS SULFATE, PO), , , Per Protocol, Fuad Fraser MD    latanoprost (XALATAN) 0.005 % ophthalmic solution 1 Drop, 1 Drop, Both Eyes, QHS, Fuad Fraser MD    . PHARMACY TO SUBSTITUTE PER PROTOCOL (Reordered from: multivitamin-min-iron-FA-vit K (Bariatric Multivitamins) 45 mg iron- 800 mcg-120 mcg cap), , , Per Protocol, Jalil Lovett MD    . PHARMACY TO SUBSTITUTE PER PROTOCOL (Reordered from: omega 3-dha-epa-fish oil (Fish Oil) 100-160-1,000 mg cap), , , Per Protocol, Magaly Fraser MD    sertraline (ZOLOFT) tablet 25 mg, 25 mg, Oral, DAILY, Magaly Fraser MD    . PHARMACY TO SUBSTITUTE PER PROTOCOL (Reordered from: timoloL (BetimoL) 0.5 % ophthalmic solution), , , Per Protocol, Magaly Fraser MD    traZODone (DESYREL) tablet 100 mg, 100 mg, Oral, QHS PRN, Magaly Fraser MD    acetaminophen (TYLENOL) tablet 650 mg, 650 mg, Oral, Q6H PRN **OR** acetaminophen (TYLENOL) suppository 650 mg, 650 mg, Rectal, Q6H PRN, Magaly Fraser MD    polyethylene glycol (MIRALAX) packet 17 g, 17 g, Oral, DAILY PRN, Magaly Fraser MD    ondansetron (ZOFRAN ODT) tablet 4 mg, 4 mg, Oral, Q8H PRN **OR** ondansetron (ZOFRAN) injection 4 mg, 4 mg, IntraVENous, Q6H PRN, Desire Fraser MD    enoxaparin (LOVENOX) injection 40 mg, 40 mg, SubCUTAneous, DAILY, Desire Fraser MD    Current Outpatient Medications:     metFORMIN ER (GLUCOPHAGE XR) 500 mg tablet, TAKE 2 TABLET BY MOUTH TWICE DAILY DAILY WITH MEALS STOP SYNJARDY, Disp: 360 Tablet, Rfl: 1    semaglutide (Ozempic) 0.25 mg or 0.5 mg/dose (2 mg/1.5 ml) subq pen, Inject 0.25mg once weekly for three weeks, then increase to 0.5mg once weekly, Disp: 9 mL, Rfl: 1    DULoxetine (CYMBALTA) 30 mg capsule, duloxetine 30 mg capsule,delayed release  TAKE AS DIRECTED ALONG WITH 60MG CYMBALTA, Disp: , Rfl:     DULoxetine (CYMBALTA) 60 mg capsule, TAKE 1 CAPSULE BY MOUTH DAILY, Disp: 30 Capsule, Rfl: 6    hydrOXYzine pamoate (VISTARIL) 25 mg capsule, Take 50 mg by mouth nightly., Disp: , Rfl:     sertraline (ZOLOFT) 25 mg tablet, sertraline 25 mg tablet  TAKE 1 TABLET BY MOUTH EVERY DAY, Disp: , Rfl:     b complex vitamins tablet, Take 1 Tab by mouth daily. , Disp: , Rfl:     ascorbic acid-collagen (Collagen Plus Vitamin C) 125-740 mg cap, Collagen Plus Vitamin C, Disp: , Rfl:     IRON, FERROUS SULFATE, PO, Iron (ferrous sulfate), Disp: , Rfl:     timoloL (BetimoL) 0.5 % ophthalmic solution, Administer 1 Drop to both eyes two (2) times a day. use in affected eye(s), Disp: , Rfl:     multivitamin-min-iron-FA-vit K (Bariatric Multivitamins) 45 mg iron- 800 mcg-120 mcg cap, Bariatric Multivitamins, Disp: , Rfl:     carvediloL (COREG) 12.5 mg tablet, Take 12.5 mg by mouth two (2) times a day., Disp: , Rfl:     omega 3-dha-epa-fish oil (Fish Oil) 100-160-1,000 mg cap, Take 3 mg by mouth daily. , Disp: , Rfl:     triamcinolone acetonide (KENALOG) 0.1 % topical cream, triamcinolone acetonide 0.1 % topical cream, Disp: , Rfl:     calcium-cholecalciferol, d3, 600 mg calcium- 200 unit cap, Take 600 mg by mouth daily. , Disp: , Rfl:     oxyCODONE-acetaminophen (PERCOCET 10)  mg per tablet, Take  Tabs by mouth daily. , Disp: , Rfl:     traZODone (DESYREL) 100 mg tablet, TK 1 T PO QD HS, Disp: , Rfl: 1    furosemide (LASIX) 20 mg tablet, TK 1 T PO QD PRF WEIGHT GAIN OF 2 LBS OR MORE IN A DAY, Disp: , Rfl: 5    potassium chloride (K-DUR, KLOR-CON) 20 mEq tablet, TK 1 T PO QD PRN WHEN TAKING FUROSEMIDE, Disp: , Rfl: 5    latanoprost (XALATAN) 0.005 % ophthalmic solution, Administer 1 Drop to both eyes nightly., Disp: , Rfl:     gabapentin (NEURONTIN) 800 mg tablet, Take 1 Tab by mouth three (3) times daily. , Disp: , Rfl: 0    biotin 500 mcg Cap, Take  by mouth., Disp: , Rfl:     LAB DATA REVIEWED:    Recent Results (from the past 24 hour(s))   METABOLIC PANEL, COMPREHENSIVE    Collection Time: 05/16/22  4:04 PM   Result Value Ref Range    Sodium 143 136 - 145 mmol/L    Potassium 3.2 (L) 3.5 - 5.1 mmol/L    Chloride 109 (H) 97 - 108 mmol/L    CO2 27 21 - 32 mmol/L    Anion gap 7 5 - 15 mmol/L    Glucose 90 65 - 100 mg/dL    BUN 11 6 - 20 mg/dL    Creatinine 0.89 0.55 - 1.02 mg/dL    BUN/Creatinine ratio 12 12 - 20      GFR est AA >60 >60 ml/min/1.73m2    GFR est non-AA >60 >60 ml/min/1.73m2    Calcium 9.1 8.5 - 10.1 mg/dL    Bilirubin, total 0.3 0.2 - 1.0 mg/dL    AST (SGOT) 14 (L) 15 - 37 U/L    ALT (SGPT) 16 12 - 78 U/L    Alk. phosphatase 74 45 - 117 U/L    Protein, total 5.8 (L) 6.4 - 8.2 g/dL    Albumin 2.8 (L) 3.5 - 5.0 g/dL    Globulin 3.0 2.0 - 4.0 g/dL    A-G Ratio 0.9 (L) 1.1 - 2.2     LACTIC ACID    Collection Time: 05/16/22  4:04 PM   Result Value Ref Range    Lactic acid 1.2 0.4 - 2.0 mmol/L   TYPE & SCREEN    Collection Time: 05/16/22  4:04 PM   Result Value Ref Range    Crossmatch Expiration 05/19/2022,2359     ABO/Rh(D) Rufina Guild Positive     Antibody screen Negative    CBC WITH AUTOMATED DIFF    Collection Time: 05/16/22  4:04 PM   Result Value Ref Range    WBC 8.9 3.6 - 11.0 K/uL    RBC 3.46 (L) 3.80 - 5.20 M/uL    HGB 10.5 (L) 11.5 - 16.0 g/dL    HCT 31.4 (L) 35.0 - 47.0 %    MCV 90.8 80.0 - 99.0 FL    MCH 30.3 26.0 - 34.0 PG    MCHC 33.4 30.0 - 36.5 g/dL    RDW 14.6 (H) 11.5 - 14.5 %    PLATELET 140 (H) 307 - 400 K/uL    MPV 10.0 8.9 - 12.9 FL    NRBC 0.0 0.0  WBC    ABSOLUTE NRBC 0.00 0.00 - 0.01 K/uL    NEUTROPHILS 61 32 - 75 %    LYMPHOCYTES 30 12 - 49 %    MONOCYTES 7 5 - 13 %    EOSINOPHILS 2 0 - 7 %    BASOPHILS 0 0 - 1 %    IMMATURE GRANULOCYTES 0 0 - 0.5 %    ABS. NEUTROPHILS 5.4 1.8 - 8.0 K/UL    ABS. LYMPHOCYTES 2.7 0.8 - 3.5 K/UL    ABS. MONOCYTES 0.6 0.0 - 1.0 K/UL    ABS. EOSINOPHILS 0.2 0.0 - 0.4 K/UL    ABS. BASOPHILS 0.0 0.0 - 0.1 K/UL    ABS. IMM.  GRANS. 0.0 0.00 - 0.04 K/UL    DF AUTOMATED     NT-PRO BNP    Collection Time: 05/16/22  4:04 PM   Result Value Ref Range    NT pro- (H) <125 pg/mL   TROPONIN-HIGH SENSITIVITY    Collection Time: 05/16/22  4:04 PM   Result Value Ref Range    Troponin-High Sensitivity 5 0 - 51 ng/L   URINALYSIS W/ RFLX MICROSCOPIC    Collection Time: 05/17/22 12:15 AM   Result Value Ref Range    Color Yellow/Straw      Appearance Clear Clear      Specific gravity 1.008 1.003 - 1.030      pH (UA) 6.0 5.0 - 8.0      Protein Negative Negative mg/dL    Glucose 50 (A) Negative mg/dL    Ketone Negative Negative mg/dL    Bilirubin Negative Negative      Blood Negative Negative      Urobilinogen 0.1 0.1 - 1.0 EU/dL    Nitrites Negative Negative      Leukocyte Esterase Negative Negative      WBC 0-4 0 - 4 /hpf    RBC 0-5 0 - 5 /hpf    Bacteria Negative Negative /hpf   METABOLIC PANEL, COMPREHENSIVE    Collection Time: 05/17/22  3:52 AM   Result Value Ref Range    Sodium 143 136 - 145 mmol/L    Potassium 2.9 (L) 3.5 - 5.1 mmol/L    Chloride 111 (H) 97 - 108 mmol/L    CO2 25 21 - 32 mmol/L    Anion gap 7 5 - 15 mmol/L    Glucose 92 65 - 100 mg/dL    BUN 8 6 - 20 mg/dL    Creatinine 0.74 0.55 - 1.02 mg/dL    BUN/Creatinine ratio 11 (L) 12 - 20      GFR est AA >60 >60 ml/min/1.73m2    GFR est non-AA >60 >60 ml/min/1.73m2    Calcium 8.7 8.5 - 10.1 mg/dL    Bilirubin, total 0.3 0.2 - 1.0 mg/dL    AST (SGOT) 13 (L) 15 - 37 U/L    ALT (SGPT) 12 12 - 78 U/L    Alk. phosphatase 64 45 - 117 U/L    Protein, total 4.9 (L) 6.4 - 8.2 g/dL    Albumin 2.4 (L) 3.5 - 5.0 g/dL    Globulin 2.5 2.0 - 4.0 g/dL    A-G Ratio 1.0 (L) 1.1 - 2.2     CBC WITH AUTOMATED DIFF    Collection Time: 05/17/22  3:52 AM   Result Value Ref Range    WBC 8.8 3.6 - 11.0 K/uL    RBC 3.12 (L) 3.80 - 5.20 M/uL    HGB 9.3 (L) 11.5 - 16.0 g/dL    HCT 28.3 (L) 35.0 - 47.0 %    MCV 90.7 80.0 - 99.0 FL    MCH 29.8 26.0 - 34.0 PG    MCHC 32.9 30.0 - 36.5 g/dL    RDW 14.4 11.5 - 14.5 %    PLATELET 911 587 - 189 K/uL    MPV 9.8 8.9 - 12.9 FL    NRBC 0.0 0.0  WBC    ABSOLUTE NRBC 0.00 0.00 - 0.01 K/uL    NEUTROPHILS 53 32 - 75 %    LYMPHOCYTES 35 12 - 49 %    MONOCYTES 8 5 - 13 %    EOSINOPHILS 3 0 - 7 %    BASOPHILS 1 0 - 1 %    IMMATURE GRANULOCYTES 0 0 - 0.5 %    ABS. NEUTROPHILS 4.6 1.8 - 8.0 K/UL    ABS. LYMPHOCYTES 3.1 0.8 - 3.5 K/UL    ABS. MONOCYTES 0.7 0.0 - 1.0 K/UL    ABS. EOSINOPHILS 0.3 0.0 - 0.4 K/UL    ABS.  BASOPHILS 0.1 0.0 - 0.1 K/UL    ABS. IMM. GRANS. 0.0 0.00 - 0.04 K/UL    DF AUTOMATED         XR Results (most recent):  Results from Hospital Encounter encounter on 11/21/21    XR KNEE RT MIN 4 V    Narrative  4 views of the right knee. Minimal arthritic changes are seen. No fracture or  acute bony abnormality is seen. Joint spaces are well maintained. No joint  effusion is identified. Exam is otherwise unremarkable. Impression  Minimal osteoarthritis. CTA CHEST W OR W WO CONT   Final Result   No evidence of pulmonary embolism or acute aortic abnormalities. Postsurgical changes in the breasts. Mild prominence of the ascending aorta. DUPLEX LOWER EXT VENOUS BILAT   Final Result      DUPLEX UPPER EXT VENOUS RIGHT   Final Result           Review of Systems:  Constitutional: Negative for chills and fever. HENT: Negative. Eyes: Negative. Respiratory: Negative. Cardiovascular: Chest pain - likely secondary to recent bilateral mastectomy    Gastrointestinal: Anorexia since January. Negative for abdominal pain and nausea. Skin: Negative. Neurological: Headaches - on topamax; did not take today       Objective:   VITALS:    Visit Vitals  /75   Pulse 75   Temp 98.1 °F (36.7 °C)   Resp 16   Ht 5' 5\" (1.651 m)   Wt 185 lb (83.9 kg)   SpO2 99%   BMI 30.79 kg/m²       Physical Exam:   Constitutional: pt is oriented to person, place, and time. HENT:   Head: Normocephalic and atraumatic. Eyes: Pupils are equal, round, and reactive to light. EOM are normal.   Cardiovascular: Normal rate, regular rhythm and normal heart sounds. Pulmonary/Chest: Breath sounds normal. No wheezes. No rales. Chest binder in place. Leg swelling bilaterally   Exhibits no tenderness. Abdominal: Soft. Bowel sounds are normal. There is no abdominal tenderness. There is no rebound and no guarding. Musculoskeletal: Lower limb and ankle swelling bilaterally. Normal range of motion.  Right upper extremity edema, has a history of lymphedema due to her first episode of breast cancer   Neurological: pt is alert and oriented to person, place, and time. Alert. Normal strength. No cranial nerve deficit or sensory deficit. Displays a negative Romberg sign.         ASSESSMENT:  Peripheral edema   Breast cancer s/p bilateral mastectomy  Hypertension   Hyperlipidemia   Chronic headaches     PLAN:  Klor-con 40 mEq q4h   Cymbalta 60mg daily   Metformin ER 500mg daily with dinner   Lasix 20mg daily   Neurontin 800mg TID   Zoloft 25mg daily   Lovenox 40mg daily    Cardiology Consult - echo    Monitor vitals   Monitor swelling and pain status   Repeat CBC, CMP, BNP         ________________________________________________________________________    Signed: Kevon Fleischer

## 2022-05-17 NOTE — PROGRESS NOTES
Problem: Falls - Risk of  Goal: *Absence of Falls  Description: Document Vivek Crawfordhmann Fall Risk and appropriate interventions in the flowsheet.   Outcome: Progressing Towards Goal  Note: Fall Risk Interventions:                                Problem: Patient Education: Go to Patient Education Activity  Goal: Patient/Family Education  Outcome: Progressing Towards Goal

## 2022-05-17 NOTE — ROUTINE PROCESS
TRANSFER - OUT REPORT:    Verbal report given to [employer], RN(name) on Luis Garza  being transferred to 575(unit) for routine progression of care       Report consisted of patients Situation, Background, Assessment and   Recommendations(SBAR). Information from the following report(s) SBAR, ED Summary, [facility] and Recent Results was reviewed with the receiving nurse. Lines:   Peripheral IV 05/16/22 Anterior;Proximal;Right Antecubital (Active)        Opportunity for questions and clarification was provided.       Patient transported with:   Gemvara

## 2022-05-17 NOTE — ED PROVIDER NOTES
EMERGENCY DEPARTMENT HISTORY AND PHYSICAL EXAM      Date: 5/16/2022  Patient Name: Judie Garza      History of Presenting Illness     Chief Complaint   Patient presents with    Peripheral Edema    Hypotension       History Provided By: Patient    HPI: Ashvin Bates, 59 y.o. female with a past medical history significant hypertension, hyperlipidemia and Breast cancer s/p bilateral mastectomies last week presents to the ED with cc of peripheral edema and hypertension. Patient went to her 1 week follow-up with her breast surgeon who was concerned because her blood pressure was low and she had bilateral lower extremity edema which is new. Patient states she has had decreased appetite since January and this is unchanged. She states she does feel generally unwell but does not have any specific abdominal pain, vomiting or diarrhea. Patient is status post gastric bypass 2 years ago. Additionally patient endorses chest pressure but states it it may be from the chest binder she is wearing. She states the expanders in place are uncomfortable as well. There are no other complaints, changes, or physical findings at this time. PCP: Kenan Montoya MD    Current Outpatient Medications   Medication Sig Dispense Refill    metFORMIN ER (GLUCOPHAGE XR) 500 mg tablet TAKE 2 TABLET BY MOUTH TWICE DAILY DAILY WITH MEALS STOP SYNJARDY 360 Tablet 1    semaglutide (Ozempic) 0.25 mg or 0.5 mg/dose (2 mg/1.5 ml) subq pen Inject 0.25mg once weekly for three weeks, then increase to 0.5mg once weekly 9 mL 1    DULoxetine (CYMBALTA) 30 mg capsule duloxetine 30 mg capsule,delayed release   TAKE AS DIRECTED ALONG WITH 60MG CYMBALTA      DULoxetine (CYMBALTA) 60 mg capsule TAKE 1 CAPSULE BY MOUTH DAILY 30 Capsule 6    hydrOXYzine pamoate (VISTARIL) 25 mg capsule Take 50 mg by mouth nightly.       sertraline (ZOLOFT) 25 mg tablet sertraline 25 mg tablet   TAKE 1 TABLET BY MOUTH EVERY DAY      b complex vitamins tablet Take 1 Tab by mouth daily.  ascorbic acid-collagen (Collagen Plus Vitamin C) 125-740 mg cap Collagen Plus Vitamin C      IRON, FERROUS SULFATE, PO Iron (ferrous sulfate)      timoloL (BetimoL) 0.5 % ophthalmic solution Administer 1 Drop to both eyes two (2) times a day. use in affected eye(s)      multivitamin-min-iron-FA-vit K (Bariatric Multivitamins) 45 mg iron- 800 mcg-120 mcg cap Bariatric Multivitamins      carvediloL (COREG) 12.5 mg tablet Take 12.5 mg by mouth two (2) times a day.  omega 3-dha-epa-fish oil (Fish Oil) 100-160-1,000 mg cap Take 3 mg by mouth daily.  triamcinolone acetonide (KENALOG) 0.1 % topical cream triamcinolone acetonide 0.1 % topical cream      calcium-cholecalciferol, d3, 600 mg calcium- 200 unit cap Take 600 mg by mouth daily.  oxyCODONE-acetaminophen (PERCOCET 10)  mg per tablet Take  Tabs by mouth daily.  traZODone (DESYREL) 100 mg tablet TK 1 T PO QD HS  1    furosemide (LASIX) 20 mg tablet TK 1 T PO QD PRF WEIGHT GAIN OF 2 LBS OR MORE IN A DAY  5    potassium chloride (K-DUR, KLOR-CON) 20 mEq tablet TK 1 T PO QD PRN WHEN TAKING FUROSEMIDE  5    latanoprost (XALATAN) 0.005 % ophthalmic solution Administer 1 Drop to both eyes nightly.  gabapentin (NEURONTIN) 800 mg tablet Take 1 Tab by mouth three (3) times daily. 0    biotin 500 mcg Cap Take  by mouth.          Past History     Past Medical History:  Past Medical History:   Diagnosis Date    Arthritis     Back/Neck problems    Burning with urination     Frequency    Cancer ([facility] [address] 2009    RIGHT breast    Congestive heart failure (HCC)     Depression     Including Post Partum    Diabetes (HCC)     Dizziness     GERD (gastroesophageal reflux disease)     Glaucoma     Gout     Headache     Heart disease     Hypercholesterolemia     Hypertension     Morbid obesity (HCC)     Neuropathy     Shortness of breath     With activity    Sleep apnea     Sleep disorder     Difficulty falling asleep, night sweats    Stool color black        Past Surgical History:  Past Surgical History:   Procedure Laterality Date    HX APPENDECTOMY      HX BREAST LUMPECTOMY  2009    RIGHT with SNBX    HX CHOLECYSTECTOMY      HX GASTRIC BYPASS  2020    HX GYN       x1, Bilateral tubal ligation, Dilation & Curettage    HX GYN      Hysterectomy (partial)    HX ORTHOPAEDIC      Procedure on shoulder    HX PREMALIG/BENIGN SKIN LESION EXCISION      Cyst removed from scalp    HX PREMALIG/BENIGN SKIN LESION EXCISION      Debridement of subcutaneous tissue    HX TONSILLECTOMY  1970    HX UROLOGICAL         Family History:  Family History   Problem Relation Age of Onset    Hypertension Mother     Cancer Mother     Thyroid Disease Mother     Hypertension Father     Cancer Father     Diabetes Brother     Hypertension Brother     Cancer Brother     Stroke Brother     Lung Disease Daughter     Asthma Daughter     Hypertension Daughter     Thyroid Disease Daughter     Diabetes Son     Lung Disease Son     Hypertension Son     Stroke Maternal Grandmother     Dementia Maternal Grandmother     Hypertension Maternal Grandmother        Social History:  Social History     Tobacco Use    Smoking status: Never Smoker    Smokeless tobacco: Never Used   Substance Use Topics    Alcohol use: Yes     Alcohol/week: 1.7 standard drinks     Types: 2 Glasses of wine per week    Drug use: No       Allergies: Allergies   Allergen Reactions    Januvia [Sitagliptin] Rash    Levorphanol Itching and Rash    Statins-Hmg-Coa Reductase Inhibitors Itching         Review of Systems     Review of Systems   Constitutional: Negative for activity change and fever. HENT: Negative for rhinorrhea and sore throat. Eyes: Negative for visual disturbance. Respiratory: Negative for cough. Cardiovascular: Positive for leg swelling. Negative for chest pain.    Gastrointestinal: Negative for abdominal pain, diarrhea, nausea and vomiting. Genitourinary: Negative for dysuria. Musculoskeletal: Negative for arthralgias and myalgias. Skin: Negative for rash and wound. Neurological: Negative for syncope and headaches. Psychiatric/Behavioral: Negative for confusion. All other systems reviewed and are negative. Physical Exam     Physical Exam  Vitals and nursing note reviewed. Constitutional:       Appearance: Normal appearance. She is normal weight. HENT:      Head: Normocephalic and atraumatic. Nose: Nose normal.      Mouth/Throat:      Mouth: Mucous membranes are moist.   Eyes:      Conjunctiva/sclera: Conjunctivae normal.   Cardiovascular:      Rate and Rhythm: Normal rate. Pulses: Normal pulses. Comments: Chest binder in place  Pulmonary:      Effort: Pulmonary effort is normal. No respiratory distress. Musculoskeletal:         General: No swelling or deformity. Normal range of motion. Right lower leg: Edema present. Left lower leg: Edema present. Comments: Right upper extremity edema, has a history of lymphedema due to her first episode of breast cancer   Skin:     General: Skin is warm and dry. Capillary Refill: Capillary refill takes less than 2 seconds. Findings: No rash. Neurological:      General: No focal deficit present. Mental Status: She is alert and oriented to person, place, and time. Sensory: No sensory deficit. Motor: No weakness.    Psychiatric:         Mood and Affect: Mood normal.         Behavior: Behavior normal.         Lab and Diagnostic Study Results     Labs -     Recent Results (from the past 12 hour(s))   METABOLIC PANEL, COMPREHENSIVE    Collection Time: 05/16/22  4:04 PM   Result Value Ref Range    Sodium 143 136 - 145 mmol/L    Potassium 3.2 (L) 3.5 - 5.1 mmol/L    Chloride 109 (H) 97 - 108 mmol/L    CO2 27 21 - 32 mmol/L    Anion gap 7 5 - 15 mmol/L    Glucose 90 65 - 100 mg/dL    BUN 11 6 - 20 mg/dL    Creatinine 0. 89 0.55 - 1.02 mg/dL    BUN/Creatinine ratio 12 12 - 20      GFR est AA >60 >60 ml/min/1.73m2    GFR est non-AA >60 >60 ml/min/1.73m2    Calcium 9.1 8.5 - 10.1 mg/dL    Bilirubin, total 0.3 0.2 - 1.0 mg/dL    AST (SGOT) 14 (L) 15 - 37 U/L    ALT (SGPT) 16 12 - 78 U/L    Alk. phosphatase 74 45 - 117 U/L    Protein, total 5.8 (L) 6.4 - 8.2 g/dL    Albumin 2.8 (L) 3.5 - 5.0 g/dL    Globulin 3.0 2.0 - 4.0 g/dL    A-G Ratio 0.9 (L) 1.1 - 2.2     LACTIC ACID    Collection Time: 05/16/22  4:04 PM   Result Value Ref Range    Lactic acid 1.2 0.4 - 2.0 mmol/L   TYPE & SCREEN    Collection Time: 05/16/22  4:04 PM   Result Value Ref Range    Crossmatch Expiration 05/19/2022,2359     ABO/Rh(D) Henrine Epley Positive     Antibody screen Negative    CBC WITH AUTOMATED DIFF    Collection Time: 05/16/22  4:04 PM   Result Value Ref Range    WBC 8.9 3.6 - 11.0 K/uL    RBC 3.46 (L) 3.80 - 5.20 M/uL    HGB 10.5 (L) 11.5 - 16.0 g/dL    HCT 31.4 (L) 35.0 - 47.0 %    MCV 90.8 80.0 - 99.0 FL    MCH 30.3 26.0 - 34.0 PG    MCHC 33.4 30.0 - 36.5 g/dL    RDW 14.6 (H) 11.5 - 14.5 %    PLATELET 389 (H) 644 - 400 K/uL    MPV 10.0 8.9 - 12.9 FL    NRBC 0.0 0.0  WBC    ABSOLUTE NRBC 0.00 0.00 - 0.01 K/uL    NEUTROPHILS 61 32 - 75 %    LYMPHOCYTES 30 12 - 49 %    MONOCYTES 7 5 - 13 %    EOSINOPHILS 2 0 - 7 %    BASOPHILS 0 0 - 1 %    IMMATURE GRANULOCYTES 0 0 - 0.5 %    ABS. NEUTROPHILS 5.4 1.8 - 8.0 K/UL    ABS. LYMPHOCYTES 2.7 0.8 - 3.5 K/UL    ABS. MONOCYTES 0.6 0.0 - 1.0 K/UL    ABS. EOSINOPHILS 0.2 0.0 - 0.4 K/UL    ABS. BASOPHILS 0.0 0.0 - 0.1 K/UL    ABS. IMM.  GRANS. 0.0 0.00 - 0.04 K/UL    DF AUTOMATED     NT-PRO BNP    Collection Time: 05/16/22  4:04 PM   Result Value Ref Range    NT pro- (H) <125 pg/mL   TROPONIN-HIGH SENSITIVITY    Collection Time: 05/16/22  4:04 PM   Result Value Ref Range    Troponin-High Sensitivity 5 0 - 51 ng/L       Radiologic Studies -   [unfilled]  CT Results  (Last 48 hours)               05/16/22 4641 CTA CHEST W OR W WO CONT Final result    Impression:  No evidence of pulmonary embolism or acute aortic abnormalities. Postsurgical changes in the breasts. Mild prominence of the ascending aorta. Narrative:  PROCEDURE: CTA CHEST W OR W WO CONT       HISTORY:Short of breath, recent surgery, breast cancer       COMPARISON:Chest CT 23 March 2022       Department policy stipulates all CT scans at this facility are performed using   dose reduction optimization techniques as appropriate to the performed exam,   including the following: Automated exposure control, adjustments of the mA   and/or KVP according to the patient size, and the use of iterative   reconstruction technique. TECHNIQUE: Postcontrast CT angiogram of the chest is performed with 3-D   reconstructions and maximum intensity projection images (MIPS) generated. Study   done with IV contrast.       LIMITATIONS: None       LUNG PARENCHYMA/PLEURA: Normal   TRACHEA/BRONCHI: Normal   PULMONARY VESSELS: Normal. No evidence of pulmonary artery filling defects. MEDIASTINUM: Normal   HEART: Normal   AORTA/GREAT VESSELS: The ascending aorta is mildly dilated up to 3.6 cm in   diameter. This tapers over the arch. No dissection. ESOPHAGUS: Normal   AXILLAE: Normal   BONES/TISSUES: Bones show no acute or active process. Bilateral breast expanders   are present. UPPER ABDOMEN: No acute process. Gastric bypass surgery changes noted. OTHER: None               CXR Results  (Last 48 hours)    None          Medical Decision Making and ED Course   - I am the first and primary provider for this patient AND AM THE PRIMARY PROVIDER OF RECORD. - I reviewed the vital signs, available nursing notes, past medical history, past surgical history, family history and social history. - Initial assessment performed. The patients presenting problems have been discussed, and the staff are in agreement with the care plan formulated and outlined with them. I have encouraged them to ask questions as they arise throughout their visit. Vital Signs-Reviewed the patient's vital signs. Patient Vitals for the past 12 hrs:   Temp Pulse Resp BP SpO2   05/16/22 2120 -- 74 18 122/78 100 %   05/16/22 1914 -- 81 18 -- 100 %   05/16/22 1753 -- 78 16 116/82 97 %   05/16/22 1546 98.1 °F (36.7 °C) 85 18 91/66 100 %     Records Reviewed: Nursing Notes      ED Course:       ED Course as of 05/16/22 2243   Mon May 16, 2022   120 53-year-old female 1 week status post bilateral mastectomy presents after going to a checkup with her breast surgeon. They are concerned because patient is hypertensive and has swelling of her bilateral lower extremities. She is not on blood thinners. States she does have chest pressure but is not sure if it is because she has the chest binder on or something else. She has swelling of her right upper extremity as well as her bilateral lower extremities. She has a history of lymphedema in the right arm but it is worse than normal.  The lower extremity swelling is new. Here patient is hypotensive, 91/66. Will give 1 L of IV fluids, get labs including a CBC, CMP, lactate, BMP [LW]   1615 On chart review patient is baseline lower BP (SBP ) [LW]   1648 DVT negative. [LW]   0475 EKG performed at 1548, read at 1556. Normal sinus rhythm with a rate of 73. Normal NH, normal QRS, normal QTC. Low voltage. [LW]      ED Course User Index  [LW] Emigdio Fields MD         Consultations:       Consultations: -  Hospitalist Consultant: Dr. Salvador Kidd: We have asked for emergent assistance with regard to this patient. We have discussed the patients HPI, ROS, PE and results this far. They will come and evaluate the patient for admission. Disposition     Disposition: Admitted to Floor Medical Floor the case was discussed with the admitting physician Damon. Admitted      Diagnosis     Clinical Impression:   1.  Peripheral edema Attestations:    Paulo Phoenix MD    Please note that this dictation was completed with [employer], the computer voice recognition software. Quite often unanticipated grammatical, syntax, homophones, and other interpretive errors are inadvertently transcribed by the computer software. Please disregard these errors. Please excuse any errors that have escaped final proofreading. Thank you.

## 2022-05-17 NOTE — PROGRESS NOTES
Spiritual Care Assessment/Progress Note  [facility]      NAME: Rosa Everett      MRN: [id number]  AGE: 59 y.o.  SEX: female  Cheondoism Affiliation: Yazidism   Language: English     5/17/2022     Total Time (in minutes): 48     Spiritual Assessment begun in [facility] through conversation with:         [x]Patient        [] Family    [] Friend(s)        Reason for Consult: Advance medical directive consult     Spiritual beliefs: (Please include comment if needed)     [x] Identifies with a marty tradition:  Yazidism      [x] Supported by a marty community:            [] Claims no spiritual orientation:           [] Seeking spiritual identity:                [] Adheres to an individual form of spirituality:           [] Not able to assess:                           Identified resources for coping:      [] Prayer                               [] Music                  [] Guided Imagery     [x] Family/friends                 [] Pet visits     [] Devotional reading                         [] Unknown     [] Other:                                               Interventions offered during this visit: (See comments for more details)    Patient Interventions: Advance medical directive completed,Affirmation of emotions/emotional suffering,Affirmation of marty,Coping skills reviewed/reinforced,Initial/Spiritual assessment, patient floor,Normalization of emotional/spiritual concerns,Cheondoism beliefs/image of God discussed,Iconic (affirming the presence of God/Higher Power),Life review/legacy           Plan of Care:     [] Support spiritual and/or cultural needs    [] Support AMD and/or advance care planning process      [] Support grieving process   [] Coordinate Rites and/or Rituals    [] Coordination with community clergy   [] No spiritual needs identified at this time   [] Detailed Plan of Care below (See Comments)  [] Make referral to Music Therapy  [] Make referral to Pet Therapy     [] Make referral to Addiction services  [] Make referral to [facility]  [] Make referral to Spiritual Care Partner  [] No future visits requested        [x] Contact Spiritual Care for further referrals     Comments:  Visited patient in [place] [address] per AMD consult request.  Patient was alone during the visit. Patient expressed having a good relationship with God and shared about her local [employer] and her . She acknowledged her decision to complete an AMD seemed to recognize its importance. Provided chaplaincy and AMD education and assisted her to complete it according to her wishes and priorities. Explored her needs as well as facilitating storytelling. Affirmed her health care needs and spiritual resources. Advised of  availability. Advised nurse to contact [facility] for any further referrals. Contact chaplains for further referrals. Chaplain Troy Leventhal, M.Div.    can be reached by calling the  at [facility]  [phone]

## 2022-05-17 NOTE — ACP (ADVANCE CARE PLANNING)
Advance Care Planning   Advance Care Planning Inpatient Note  [facility]  Spiritual Care Department    Today's Date: 5/17/2022  Unit: [facility]    Received request from admission screening. Upon review of chart and communication with care team, patient's decision making abilities are not in question. Patient was/were present in the room during visit.     Goals of ACP Conversation:  Discuss Advance Care planning documents    Health Care Decision Makers:      Primary Decision Maker: Santi Garza - Spouse - [phone]      Summary:  Completed [address] [facility]    Advance Care Planning Documents (Patient Wishes) on file:  Healthcare Power of /Advance Directive appointment of Health care agent     Assessment:      Interventions:  Provided education on documents for clarity and greater understanding  Assisted in the completion of documents according to patient's wishes at this time    Care Preferences Communicated:  No    Outcomes/Plan:  ACP Discussion Completed  New Advance Directive completed  Returned original document(s) to patient, as well as copies for distribution to appointed agents  Copy of Advance Directive given to staff to scan into medical record    Chaplain Therese on 5/17/2022 at 11:55 AM

## 2022-05-17 NOTE — H&P
History and Physical    NAME: Dewain Holter Bessix   :  1965   MRN:  [id number]     Date/Time:  2022 7:37 AM    Patient PCP: Sebastian Hilario MD  ______________________________________________________________________             Subjective:     CHIEF COMPLAINT:   Peripheral edema       HISTORY OF PRESENT ILLNESS:       Patient is a 59y.o. year old female with a past medical history significant hypertension, CHF, DM, hyperlipidemia and Breast cancer s/p bilateral mastectomies last week presents to the ED with cc of peripheral edema and hypertension. Patient went to her 1 week follow-up with her breast surgeon who was concerned because her blood pressure was low and she had bilateral lower extremity edema which is new. States she has had issues in the past with lower limb swelling, but compression stockings were able to manage swelling appropriately. Since last week she has noticed the stockings have not helped, and that the swelling progressively worsened over the past week. She states her limbs are not particulary painful but feel \"tight\". Patient states she has had decreased appetite since January and this is unchanged. She states she does feel generally unwell but does not have any specific abdominal pain, vomiting or diarrhea. Patient is status post gastric bypass 2 years ago. Additionally patient endorses chest pressure but states it it may be from the chest binder she is wearing. She states the expanders in place are uncomfortable as well. Patient admitted to the hospital. Patient is hypertensive with bilateral swelling of lower extremities and right upper extremity (chronic lymphedema; worse than normal). CT was negative for PE or aortic abnormalities. Lower limb doppler U/S was negative bilaterally. EKG shows normal sinus rhythm with a rate of 73. Normal MT, normal QRS, normal QTC. Low voltage. 1 L of IV fluid administered. Troponin and lactic acid negative.     Remarkable Labs   K: 2.9 BNP: 394   Hgb: 9.3       Past Medical History:   Diagnosis Date    Arthritis     Back/Neck problems    Burning with urination     Frequency    Cancer ([facility] [address] 2009    RIGHT breast    Congestive heart failure (HCC)     Depression     Including Post Partum    Diabetes ([facility] [address]     Dizziness     GERD (gastroesophageal reflux disease)     Glaucoma     Gout     Headache     Heart disease     Hypercholesterolemia     Hypertension     Morbid obesity (HCC)     Neuropathy     Shortness of breath     With activity    Sleep apnea     Sleep disorder     Difficulty falling asleep, night sweats    Stool color black         Past Surgical History:   Procedure Laterality Date    HX APPENDECTOMY      HX BREAST LUMPECTOMY  2009    RIGHT with SNBX    HX CHOLECYSTECTOMY      HX GASTRIC BYPASS  2020    HX GYN       x1, Bilateral tubal ligation, Dilation & Curettage    HX GYN      Hysterectomy (partial)    HX ORTHOPAEDIC      Procedure on shoulder    HX PREMALIG/BENIGN SKIN LESION EXCISION      Cyst removed from scalp    HX PREMALIG/BENIGN SKIN LESION EXCISION      Debridement of subcutaneous tissue    HX TONSILLECTOMY  1970    HX UROLOGICAL         Social History     Tobacco Use    Smoking status: Never Smoker    Smokeless tobacco: Never Used   Substance Use Topics    Alcohol use:  Yes     Alcohol/week: 1.7 standard drinks     Types: 2 Glasses of wine per week        Family History   Problem Relation Age of Onset    Hypertension Mother     Cancer Mother     Thyroid Disease Mother     Hypertension Father     Cancer Father     Diabetes Brother     Hypertension Brother     Cancer Brother     Stroke Brother     Lung Disease Daughter     Asthma Daughter     Hypertension Daughter     Thyroid Disease Daughter     Diabetes Son     Lung Disease Son     Hypertension Son     Stroke Maternal Grandmother     Dementia Maternal Grandmother     Hypertension Maternal Grandmother Allergies   Allergen Reactions    Januvia [Sitagliptin] Rash    Levorphanol Itching and Rash    Statins-Hmg-Coa Reductase Inhibitors Itching        Prior to Admission medications    Medication Sig Start Date End Date Taking? Authorizing Provider   metFORMIN ER (GLUCOPHAGE XR) 500 mg tablet TAKE 2 TABLET BY MOUTH TWICE DAILY DAILY WITH MEALS STOP SYNJARDY 11/19/21   Adin Gaxiola MD   semaglutide (Ozempic) 0.25 mg or 0.5 mg/dose (2 mg/1.5 ml) subq pen Inject 0.25mg once weekly for three weeks, then increase to 0.5mg once weekly 11/19/21   Adin Gaxiola MD   DULoxetine (CYMBALTA) 30 mg capsule duloxetine 30 mg capsule,delayed release   TAKE AS DIRECTED ALONG WITH 60MG CYMBALTA    Provider, Historical   DULoxetine (CYMBALTA) 60 mg capsule TAKE 1 CAPSULE BY MOUTH DAILY 7/3/21   Adin Gaxiola MD   hydrOXYzine pamoate (VISTARIL) 25 mg capsule Take 50 mg by mouth nightly. 4/8/21   Provider, Historical   sertraline (ZOLOFT) 25 mg tablet sertraline 25 mg tablet   TAKE 1 TABLET BY MOUTH EVERY DAY    Provider, Historical   b complex vitamins tablet Take 1 Tab by mouth daily. Provider, Historical   ascorbic acid-collagen (Collagen Plus Vitamin C) 125-740 mg cap Collagen Plus Vitamin C    Provider, Historical   IRON, FERROUS SULFATE, PO Iron (ferrous sulfate)    Provider, Historical   timoloL (BetimoL) 0.5 % ophthalmic solution Administer 1 Drop to both eyes two (2) times a day. use in affected eye(s)    Provider, Historical   multivitamin-min-iron-FA-vit K (Bariatric Multivitamins) 45 mg iron- 800 mcg-120 mcg cap Bariatric Multivitamins 1/23/20   Provider, Historical   carvediloL (COREG) 12.5 mg tablet Take 12.5 mg by mouth two (2) times a day. Provider, Historical   omega 3-dha-epa-fish oil (Fish Oil) 100-160-1,000 mg cap Take 3 mg by mouth daily.     Provider, Historical   triamcinolone acetonide (KENALOG) 0.1 % topical cream triamcinolone acetonide 0.1 % topical cream    Provider, Historical calcium-cholecalciferol, d3, 600 mg calcium- 200 unit cap Take 600 mg by mouth daily. Provider, Historical   oxyCODONE-acetaminophen (PERCOCET 10)  mg per tablet Take  Tabs by mouth daily. Provider, Historical   traZODone (DESYREL) 100 mg tablet TK 1 T PO QD HS 10/24/19   Provider, Historical   furosemide (LASIX) 20 mg tablet TK 1 T PO QD PRF WEIGHT GAIN OF 2 LBS OR MORE IN A DAY 11/10/19   Provider, Historical   potassium chloride (K-DUR, KLOR-CON) 20 mEq tablet TK 1 T PO QD PRN WHEN TAKING FUROSEMIDE 9/10/19   Provider, Historical   latanoprost (XALATAN) 0.005 % ophthalmic solution Administer 1 Drop to both eyes nightly. Provider, Historical   gabapentin (NEURONTIN) 800 mg tablet Take 1 Tab by mouth three (3) times daily. 3/24/17   Provider, Historical   biotin 500 mcg Cap Take  by mouth. Provider, Historical         Current Facility-Administered Medications:     potassium chloride SR (KLOR-CON 10) tablet 40 mEq, 40 mEq, Oral, Q4H, Desire Fraser MD, 40 mEq at 05/17/22 0919    DULoxetine (CYMBALTA) capsule 60 mg, 60 mg, Oral, DAILY, Desire Fraser MD, 60 mg at 05/17/22 0920    metFORMIN ER (GLUCOPHAGE XR) tablet 500 mg, 500 mg, Oral, DAILY WITH DINNER, Frances Fraser MD    semaglutide (OZEMPIC) 0.25 mg or 0.5 mg/dose (2 mg/1.5 ml) subq pen 0.25 mg, 0.25 mg, SubCUTAneous, Q7D, Desire Fraser MD    furosemide (LASIX) tablet 20 mg, 20 mg, Oral, DAILY, Desire Fraser MD, 20 mg at 05/17/22 0920    gabapentin (NEURONTIN) capsule 800 mg, 800 mg, Oral, TID, Desire Fraser MD, 800 mg at 05/17/22 0920    hydrOXYzine pamoate (VISTARIL) capsule 50 mg, 50 mg, Oral, QHS, Desire Fraser MD, 50 mg at 05/17/22 0008    . PHARMACY TO SUBSTITUTE PER PROTOCOL (Reordered from: IRON, FERROUS SULFATE, PO), , , Per Protocol, Frances Fraser MD    latanoprost (XALATAN) 0.005 % ophthalmic solution 1 Drop, 1 Drop, Both Eyes, QHS, Frances Fraser MD    . PHARMACY TO SUBSTITUTE PER PROTOCOL (Reordered from: multivitamin-min-iron-FA-vit K (Bariatric Multivitamins) 45 mg iron- 800 mcg-120 mcg cap), , , Per Protocol, MD Catina Sood  . PHARMACY TO SUBSTITUTE PER PROTOCOL (Reordered from: omega 3-dha-epa-fish oil (Fish Oil) 100-160-1,000 mg cap), , , Per Protocol, Helen Fraser MD    sertraline (ZOLOFT) tablet 25 mg, 25 mg, Oral, DAILY, Desire Fraser MD, 25 mg at 05/17/22 0920    . PHARMACY TO SUBSTITUTE PER PROTOCOL (Reordered from: timoloL (BetimoL) 0.5 % ophthalmic solution), , , Per Protocol, Helen Fraser MD    traZODone (DESYREL) tablet 100 mg, 100 mg, Oral, QHS PRN, Helen Fraser MD    acetaminophen (TYLENOL) tablet 650 mg, 650 mg, Oral, Q6H PRN, 650 mg at 05/17/22 0919 **OR** acetaminophen (TYLENOL) suppository 650 mg, 650 mg, Rectal, Q6H PRN, Desire Fraser MD    polyethylene glycol (MIRALAX) packet 17 g, 17 g, Oral, DAILY PRN, Helen Fraser MD    ondansetron (ZOFRAN ODT) tablet 4 mg, 4 mg, Oral, Q8H PRN **OR** ondansetron (ZOFRAN) injection 4 mg, 4 mg, IntraVENous, Q6H PRN, Desire Fraser MD    enoxaparin (LOVENOX) injection 40 mg, 40 mg, SubCUTAneous, DAILY, Desire Fraser MD, 40 mg at 05/17/22 0920    LAB DATA REVIEWED:    Recent Results (from the past 24 hour(s))   METABOLIC PANEL, COMPREHENSIVE    Collection Time: 05/16/22  4:04 PM   Result Value Ref Range    Sodium 143 136 - 145 mmol/L    Potassium 3.2 (L) 3.5 - 5.1 mmol/L    Chloride 109 (H) 97 - 108 mmol/L    CO2 27 21 - 32 mmol/L    Anion gap 7 5 - 15 mmol/L    Glucose 90 65 - 100 mg/dL    BUN 11 6 - 20 mg/dL    Creatinine 0.89 0.55 - 1.02 mg/dL    BUN/Creatinine ratio 12 12 - 20      GFR est AA >60 >60 ml/min/1.73m2    GFR est non-AA >60 >60 ml/min/1.73m2    Calcium 9.1 8.5 - 10.1 mg/dL    Bilirubin, total 0.3 0.2 - 1.0 mg/dL    AST (SGOT) 14 (L) 15 - 37 U/L    ALT (SGPT) 16 12 - 78 U/L    Alk.  phosphatase 74 45 - 117 U/L    Protein, total 5.8 (L) 6.4 - 8.2 g/dL    Albumin 2.8 (L) 3.5 - 5.0 g/dL    Globulin 3.0 2.0 - 4.0 g/dL    A-G Ratio 0.9 (L) 1.1 - 2.2     LACTIC ACID    Collection Time: 05/16/22  4:04 PM   Result Value Ref Range    Lactic acid 1.2 0.4 - 2.0 mmol/L   TYPE & SCREEN    Collection Time: 05/16/22  4:04 PM   Result Value Ref Range    Crossmatch Expiration 05/19/2022,2359     ABO/Rh(D) Javier Booze Positive     Antibody screen Negative    CBC WITH AUTOMATED DIFF    Collection Time: 05/16/22  4:04 PM   Result Value Ref Range    WBC 8.9 3.6 - 11.0 K/uL    RBC 3.46 (L) 3.80 - 5.20 M/uL    HGB 10.5 (L) 11.5 - 16.0 g/dL    HCT 31.4 (L) 35.0 - 47.0 %    MCV 90.8 80.0 - 99.0 FL    MCH 30.3 26.0 - 34.0 PG    MCHC 33.4 30.0 - 36.5 g/dL    RDW 14.6 (H) 11.5 - 14.5 %    PLATELET 677 (H) 761 - 400 K/uL    MPV 10.0 8.9 - 12.9 FL    NRBC 0.0 0.0  WBC    ABSOLUTE NRBC 0.00 0.00 - 0.01 K/uL    NEUTROPHILS 61 32 - 75 %    LYMPHOCYTES 30 12 - 49 %    MONOCYTES 7 5 - 13 %    EOSINOPHILS 2 0 - 7 %    BASOPHILS 0 0 - 1 %    IMMATURE GRANULOCYTES 0 0 - 0.5 %    ABS. NEUTROPHILS 5.4 1.8 - 8.0 K/UL    ABS. LYMPHOCYTES 2.7 0.8 - 3.5 K/UL    ABS. MONOCYTES 0.6 0.0 - 1.0 K/UL    ABS. EOSINOPHILS 0.2 0.0 - 0.4 K/UL    ABS. BASOPHILS 0.0 0.0 - 0.1 K/UL    ABS. IMM.  GRANS. 0.0 0.00 - 0.04 K/UL    DF AUTOMATED     NT-PRO BNP    Collection Time: 05/16/22  4:04 PM   Result Value Ref Range    NT pro- (H) <125 pg/mL   TROPONIN-HIGH SENSITIVITY    Collection Time: 05/16/22  4:04 PM   Result Value Ref Range    Troponin-High Sensitivity 5 0 - 51 ng/L   URINALYSIS W/ RFLX MICROSCOPIC    Collection Time: 05/17/22 12:15 AM   Result Value Ref Range    Color Yellow/Straw      Appearance Clear Clear      Specific gravity 1.008 1.003 - 1.030      pH (UA) 6.0 5.0 - 8.0      Protein Negative Negative mg/dL    Glucose 50 (A) Negative mg/dL    Ketone Negative Negative mg/dL    Bilirubin Negative Negative      Blood Negative Negative      Urobilinogen 0.1 0.1 - 1.0 EU/dL    Nitrites Negative Negative      Leukocyte Esterase Negative Negative      WBC 0-4 0 - 4 /hpf    RBC 0-5 0 - 5 /hpf    Bacteria Negative Negative /hpf   METABOLIC PANEL, COMPREHENSIVE    Collection Time: 05/17/22  3:52 AM   Result Value Ref Range    Sodium 143 136 - 145 mmol/L    Potassium 2.9 (L) 3.5 - 5.1 mmol/L    Chloride 111 (H) 97 - 108 mmol/L    CO2 25 21 - 32 mmol/L    Anion gap 7 5 - 15 mmol/L    Glucose 92 65 - 100 mg/dL    BUN 8 6 - 20 mg/dL    Creatinine 0.74 0.55 - 1.02 mg/dL    BUN/Creatinine ratio 11 (L) 12 - 20      GFR est AA >60 >60 ml/min/1.73m2    GFR est non-AA >60 >60 ml/min/1.73m2    Calcium 8.7 8.5 - 10.1 mg/dL    Bilirubin, total 0.3 0.2 - 1.0 mg/dL    AST (SGOT) 13 (L) 15 - 37 U/L    ALT (SGPT) 12 12 - 78 U/L    Alk. phosphatase 64 45 - 117 U/L    Protein, total 4.9 (L) 6.4 - 8.2 g/dL    Albumin 2.4 (L) 3.5 - 5.0 g/dL    Globulin 2.5 2.0 - 4.0 g/dL    A-G Ratio 1.0 (L) 1.1 - 2.2     CBC WITH AUTOMATED DIFF    Collection Time: 05/17/22  3:52 AM   Result Value Ref Range    WBC 8.8 3.6 - 11.0 K/uL    RBC 3.12 (L) 3.80 - 5.20 M/uL    HGB 9.3 (L) 11.5 - 16.0 g/dL    HCT 28.3 (L) 35.0 - 47.0 %    MCV 90.7 80.0 - 99.0 FL    MCH 29.8 26.0 - 34.0 PG    MCHC 32.9 30.0 - 36.5 g/dL    RDW 14.4 11.5 - 14.5 %    PLATELET 212 895 - 448 K/uL    MPV 9.8 8.9 - 12.9 FL    NRBC 0.0 0.0  WBC    ABSOLUTE NRBC 0.00 0.00 - 0.01 K/uL    NEUTROPHILS 53 32 - 75 %    LYMPHOCYTES 35 12 - 49 %    MONOCYTES 8 5 - 13 %    EOSINOPHILS 3 0 - 7 %    BASOPHILS 1 0 - 1 %    IMMATURE GRANULOCYTES 0 0 - 0.5 %    ABS. NEUTROPHILS 4.6 1.8 - 8.0 K/UL    ABS. LYMPHOCYTES 3.1 0.8 - 3.5 K/UL    ABS. MONOCYTES 0.7 0.0 - 1.0 K/UL    ABS. EOSINOPHILS 0.3 0.0 - 0.4 K/UL    ABS. BASOPHILS 0.1 0.0 - 0.1 K/UL    ABS. IMM.  GRANS. 0.0 0.00 - 0.04 K/UL    DF AUTOMATED     GLUCOSE, POC    Collection Time: 05/17/22  8:08 AM   Result Value Ref Range    Glucose (POC) 85 65 - 117 mg/dL    Performed by Joseline ARNETT Results (most recent):  Results from [facility] [place] Encounter encounter on 11/21/21    XR KNEE RT MIN 4 V    Narrative  4 views of the right knee. Minimal arthritic changes are seen. No fracture or  acute bony abnormality is seen. Joint spaces are well maintained. No joint  effusion is identified. Exam is otherwise unremarkable. Impression  Minimal osteoarthritis. CTA CHEST W OR W WO CONT   Final Result   No evidence of pulmonary embolism or acute aortic abnormalities. Postsurgical changes in the breasts. Mild prominence of the ascending aorta. DUPLEX LOWER EXT VENOUS BILAT   Final Result      DUPLEX UPPER EXT VENOUS RIGHT   Final Result           Review of Systems:  Constitutional: Negative for chills and fever. HENT: Negative. Eyes: Negative. Respiratory: Negative. Cardiovascular: Chest pain - likely secondary to recent bilateral mastectomy    Gastrointestinal: Anorexia since January. Negative for abdominal pain and nausea. Skin: Negative. Neurological: Headaches - on topamax; did not take today       Objective:   VITALS:    Visit Vitals  BP (!) 145/100   Pulse 81   Temp 98.3 °F (36.8 °C)   Resp 18   Ht 5' 5\" (1.651 m)   Wt 83.9 kg (185 lb)   SpO2 100%   BMI 30.79 kg/m²       Physical Exam:   Constitutional: pt is oriented to person, place, and time. HENT:   Head: Normocephalic and atraumatic. Eyes: Pupils are equal, round, and reactive to light. EOM are normal.   Cardiovascular: Normal rate, regular rhythm and normal heart sounds. Pulmonary/Chest: Breath sounds normal. No wheezes. No rales. Chest binder in place. Leg swelling bilaterally   Exhibits no tenderness. Abdominal: Soft. Bowel sounds are normal. There is no abdominal tenderness. There is no rebound and no guarding. Musculoskeletal: Lower limb and ankle swelling bilaterally. Normal range of motion. Right upper extremity edema, has a history of lymphedema due to her first episode of breast cancer   Neurological: pt is alert and oriented to person, place, and time. Alert.  Normal strength. No cranial nerve deficit or sensory deficit. Displays a negative Romberg sign. ASSESSMENT:  Peripheral edema   Breast cancer s/p bilateral mastectomy  Hypertension   Hyperlipidemia   Chronic headaches   CHF  Type 2 diabetes  Hypokalemia    PLAN:  Klor-con 40 mEq q4h   Cymbalta 60mg daily   Metformin ER 500mg daily with dinner   Lasix 20mg daily   Neurontin 800mg TID   Zoloft 25mg daily   Lovenox 40mg daily    Echo    Monitor vitals   Monitor swelling and pain status   Repeat CBC, CMP, BNP         Current Facility-Administered Medications:     potassium chloride SR (KLOR-CON 10) tablet 40 mEq, 40 mEq, Oral, Q4H, Desire Fraser MD, 40 mEq at 05/17/22 0919    DULoxetine (CYMBALTA) capsule 60 mg, 60 mg, Oral, DAILY, Desire Fraser MD, 60 mg at 05/17/22 0920    metFORMIN ER (GLUCOPHAGE XR) tablet 500 mg, 500 mg, Oral, DAILY WITH DINNER, Kerwin Fraser MD    semaglutide (OZEMPIC) 0.25 mg or 0.5 mg/dose (2 mg/1.5 ml) subq pen 0.25 mg, 0.25 mg, SubCUTAneous, Q7D, Desire Fraser MD    furosemide (LASIX) tablet 20 mg, 20 mg, Oral, DAILY, Desire Fraser MD, 20 mg at 05/17/22 0920    gabapentin (NEURONTIN) capsule 800 mg, 800 mg, Oral, TID, Desire Fraser MD, 800 mg at 05/17/22 0920    hydrOXYzine pamoate (VISTARIL) capsule 50 mg, 50 mg, Oral, QHS, Desire Fraser MD, 50 mg at 05/17/22 0008    . PHARMACY TO SUBSTITUTE PER PROTOCOL (Reordered from: IRON, FERROUS SULFATE, PO), , , Per Protocol, Kerwin Fraser MD    latanoprost (XALATAN) 0.005 % ophthalmic solution 1 Drop, 1 Drop, Both Eyes, QHS, Kerwin Fraser MD    . PHARMACY TO SUBSTITUTE PER PROTOCOL (Reordered from: multivitamin-min-iron-FA-vit K (Bariatric Multivitamins) 45 mg iron- 800 mcg-120 mcg cap), , , Per Protocol, MD Ilana Wilkerson [place]  . PHARMACY TO SUBSTITUTE PER PROTOCOL (Reordered from: omega 3-dha-epa-fish oil (Fish Oil) 100-160-1,000 mg cap), , , Per Protocol, Kerwin Fraser MD    sertraline (ZOLOFT) tablet 25 mg, 25 mg, Oral, DAILY, Desire Fraser MD, 25 mg at 05/17/22 0920    . PHARMACY TO SUBSTITUTE PER PROTOCOL (Reordered from: timoloL (BetimoL) 0.5 % ophthalmic solution), , , Per Protocol, Shanelle Fraser MD    traZODone (DESYREL) tablet 100 mg, 100 mg, Oral, QHS PRN, Shanelle Fraser MD    acetaminophen (TYLENOL) tablet 650 mg, 650 mg, Oral, Q6H PRN, 650 mg at 05/17/22 0919 **OR** acetaminophen (TYLENOL) suppository 650 mg, 650 mg, Rectal, Q6H PRN, Shanelle Fraser MD    polyethylene glycol (MIRALAX) packet 17 g, 17 g, Oral, DAILY PRN, Shanelle Fraser MD    ondansetron (ZOFRAN ODT) tablet 4 mg, 4 mg, Oral, Q8H PRN **OR** ondansetron (ZOFRAN) injection 4 mg, 4 mg, IntraVENous, Q6H PRN, Desire Fraser MD    enoxaparin (LOVENOX) injection 40 mg, 40 mg, SubCUTAneous, DAILY, Desire Fraser MD, 40 mg at 05/17/22 3119    ________________________________________________________________________    Signed: Bronwyn Pineda MD details…

## 2022-05-17 NOTE — ED NOTES
Spoke with Dr. Lovely Gonzáles regarding patient potassium 2.9. Verbal orders with readback for 40 meq PO q4 hours x two doses.

## 2022-05-18 ENCOUNTER — TELEPHONE (OUTPATIENT)
Dept: ENDOCRINOLOGY | Age: 57
End: 2022-05-18

## 2022-05-18 DIAGNOSIS — N17.9 ACUTE RENAL FAILURE, UNSPECIFIED ACUTE RENAL FAILURE TYPE (HCC): ICD-10-CM

## 2022-05-18 DIAGNOSIS — E78.2 MIXED HYPERLIPIDEMIA: ICD-10-CM

## 2022-05-18 DIAGNOSIS — Z79.4 UNCONTROLLED TYPE 2 DIABETES MELLITUS WITH HYPERGLYCEMIA, WITH LONG-TERM CURRENT USE OF INSULIN (HCC): ICD-10-CM

## 2022-05-18 DIAGNOSIS — R60.9 PERIPHERAL EDEMA: ICD-10-CM

## 2022-05-18 DIAGNOSIS — E87.1 HYPONATREMIA: ICD-10-CM

## 2022-05-18 DIAGNOSIS — E11.65 UNCONTROLLED TYPE 2 DIABETES MELLITUS WITH HYPERGLYCEMIA, WITH LONG-TERM CURRENT USE OF INSULIN (HCC): ICD-10-CM

## 2022-05-18 DIAGNOSIS — I10 ESSENTIAL HYPERTENSION: Primary | ICD-10-CM

## 2022-05-18 DIAGNOSIS — Z98.84 STATUS POST BARIATRIC SURGERY: ICD-10-CM

## 2022-05-18 DIAGNOSIS — E53.8 B12 DEFICIENCY: ICD-10-CM

## 2022-05-18 DIAGNOSIS — G62.9 NEUROPATHY: ICD-10-CM

## 2022-05-18 LAB
ALBUMIN SERPL-MCNC: 2.4 G/DL (ref 3.5–5)
ALBUMIN/GLOB SERPL: 0.9 {RATIO} (ref 1.1–2.2)
ALP SERPL-CCNC: 69 U/L (ref 45–117)
ALT SERPL-CCNC: 11 U/L (ref 12–78)
ANION GAP SERPL CALC-SCNC: 5 MMOL/L (ref 5–15)
AST SERPL W P-5'-P-CCNC: 7 U/L (ref 15–37)
BILIRUB SERPL-MCNC: 0.2 MG/DL (ref 0.2–1)
BNP SERPL-MCNC: 134 PG/ML
BUN SERPL-MCNC: 9 MG/DL (ref 6–20)
BUN/CREAT SERPL: 12 (ref 12–20)
CA-I BLD-MCNC: 8.7 MG/DL (ref 8.5–10.1)
CHLORIDE SERPL-SCNC: 110 MMOL/L (ref 97–108)
CO2 SERPL-SCNC: 28 MMOL/L (ref 21–32)
CREAT SERPL-MCNC: 0.77 MG/DL (ref 0.55–1.02)
ERYTHROCYTE [DISTWIDTH] IN BLOOD BY AUTOMATED COUNT: 14.3 % (ref 11.5–14.5)
GLOBULIN SER CALC-MCNC: 2.8 G/DL (ref 2–4)
GLUCOSE BLD STRIP.AUTO-MCNC: 133 MG/DL (ref 65–117)
GLUCOSE SERPL-MCNC: 130 MG/DL (ref 65–100)
HCT VFR BLD AUTO: 29.4 % (ref 35–47)
HGB BLD-MCNC: 9.8 G/DL (ref 11.5–16)
MCH RBC QN AUTO: 30.2 PG (ref 26–34)
MCHC RBC AUTO-ENTMCNC: 33.3 G/DL (ref 30–36.5)
MCV RBC AUTO: 90.7 FL (ref 80–99)
NRBC # BLD: 0 K/UL (ref 0–0.01)
NRBC BLD-RTO: 0 PER 100 WBC
PERFORMED BY, TECHID: ABNORMAL
PLATELET # BLD AUTO: 427 K/UL (ref 150–400)
PMV BLD AUTO: 10.1 FL (ref 8.9–12.9)
POTASSIUM SERPL-SCNC: 3.6 MMOL/L (ref 3.5–5.1)
PROT SERPL-MCNC: 5.2 G/DL (ref 6.4–8.2)
RBC # BLD AUTO: 3.24 M/UL (ref 3.8–5.2)
SODIUM SERPL-SCNC: 143 MMOL/L (ref 136–145)
WBC # BLD AUTO: 9.1 K/UL (ref 3.6–11)

## 2022-05-18 PROCEDURE — 97530 THERAPEUTIC ACTIVITIES: CPT

## 2022-05-18 PROCEDURE — 36415 COLL VENOUS BLD VENIPUNCTURE: CPT

## 2022-05-18 PROCEDURE — 97165 OT EVAL LOW COMPLEX 30 MIN: CPT

## 2022-05-18 PROCEDURE — 65270000029 HC RM PRIVATE

## 2022-05-18 PROCEDURE — 85027 COMPLETE CBC AUTOMATED: CPT

## 2022-05-18 PROCEDURE — 83880 ASSAY OF NATRIURETIC PEPTIDE: CPT

## 2022-05-18 PROCEDURE — 74011250637 HC RX REV CODE- 250/637: Performed by: FAMILY MEDICINE

## 2022-05-18 PROCEDURE — 80053 COMPREHEN METABOLIC PANEL: CPT

## 2022-05-18 PROCEDURE — 74011250636 HC RX REV CODE- 250/636: Performed by: FAMILY MEDICINE

## 2022-05-18 PROCEDURE — 82962 GLUCOSE BLOOD TEST: CPT

## 2022-05-18 RX ORDER — CARVEDILOL 3.12 MG/1
3.12 TABLET ORAL 2 TIMES DAILY WITH MEALS
Status: DISCONTINUED | OUTPATIENT
Start: 2022-05-18 | End: 2022-05-18

## 2022-05-18 RX ORDER — CYCLOBENZAPRINE HCL 10 MG
10 TABLET ORAL
Status: DISCONTINUED | OUTPATIENT
Start: 2022-05-18 | End: 2022-05-20 | Stop reason: HOSPADM

## 2022-05-18 RX ORDER — CARVEDILOL 12.5 MG/1
12.5 TABLET ORAL 2 TIMES DAILY WITH MEALS
Status: DISCONTINUED | OUTPATIENT
Start: 2022-05-18 | End: 2022-05-20 | Stop reason: HOSPADM

## 2022-05-18 RX ORDER — DOXYCYCLINE 100 MG/1
100 CAPSULE ORAL EVERY 12 HOURS
Status: DISCONTINUED | OUTPATIENT
Start: 2022-05-18 | End: 2022-05-20 | Stop reason: HOSPADM

## 2022-05-18 RX ORDER — OXYCODONE HYDROCHLORIDE 5 MG/1
5 TABLET ORAL
Status: DISCONTINUED | OUTPATIENT
Start: 2022-05-18 | End: 2022-05-20 | Stop reason: HOSPADM

## 2022-05-18 RX ORDER — TOPIRAMATE 25 MG/1
25 TABLET ORAL 2 TIMES DAILY WITH MEALS
Status: DISCONTINUED | OUTPATIENT
Start: 2022-05-18 | End: 2022-05-20 | Stop reason: HOSPADM

## 2022-05-18 RX ORDER — FUROSEMIDE 10 MG/ML
40 INJECTION INTRAMUSCULAR; INTRAVENOUS DAILY
Status: DISCONTINUED | OUTPATIENT
Start: 2022-05-18 | End: 2022-05-20 | Stop reason: HOSPADM

## 2022-05-18 RX ADMIN — DOXYCYCLINE HYCLATE 100 MG: 100 CAPSULE ORAL at 12:34

## 2022-05-18 RX ADMIN — DULOXETINE 60 MG: 30 CAPSULE, DELAYED RELEASE ORAL at 08:42

## 2022-05-18 RX ADMIN — ENOXAPARIN SODIUM 40 MG: 100 INJECTION SUBCUTANEOUS at 08:42

## 2022-05-18 RX ADMIN — DOXYCYCLINE HYCLATE 100 MG: 100 CAPSULE ORAL at 20:28

## 2022-05-18 RX ADMIN — CYCLOBENZAPRINE 10 MG: 10 TABLET, FILM COATED ORAL at 12:34

## 2022-05-18 RX ADMIN — GABAPENTIN 800 MG: 400 CAPSULE ORAL at 18:19

## 2022-05-18 RX ADMIN — OXYCODONE 5 MG: 5 TABLET ORAL at 11:09

## 2022-05-18 RX ADMIN — LATANOPROST 1 DROP: 50 SOLUTION OPHTHALMIC at 20:28

## 2022-05-18 RX ADMIN — CARVEDILOL 12.5 MG: 12.5 TABLET, FILM COATED ORAL at 12:34

## 2022-05-18 RX ADMIN — TOPIRAMATE 25 MG: 25 TABLET, FILM COATED ORAL at 18:19

## 2022-05-18 RX ADMIN — SERTRALINE HYDROCHLORIDE 25 MG: 50 TABLET ORAL at 08:43

## 2022-05-18 RX ADMIN — GABAPENTIN 800 MG: 400 CAPSULE ORAL at 08:43

## 2022-05-18 RX ADMIN — FUROSEMIDE 20 MG: 40 TABLET ORAL at 08:43

## 2022-05-18 RX ADMIN — CARVEDILOL 12.5 MG: 12.5 TABLET, FILM COATED ORAL at 18:19

## 2022-05-18 RX ADMIN — OXYCODONE 5 MG: 5 TABLET ORAL at 18:19

## 2022-05-18 RX ADMIN — GABAPENTIN 800 MG: 400 CAPSULE ORAL at 20:28

## 2022-05-18 RX ADMIN — FUROSEMIDE 40 MG: 10 INJECTION, SOLUTION INTRAMUSCULAR; INTRAVENOUS at 12:34

## 2022-05-18 RX ADMIN — HYDROXYZINE PAMOATE 50 MG: 25 CAPSULE ORAL at 20:28

## 2022-05-18 RX ADMIN — TOPIRAMATE 25 MG: 25 TABLET, FILM COATED ORAL at 12:34

## 2022-05-18 RX ADMIN — MULTIVITAMIN TABLET 1 TABLET: TABLET at 08:42

## 2022-05-18 RX ADMIN — TRAZODONE HYDROCHLORIDE 100 MG: 50 TABLET ORAL at 20:28

## 2022-05-18 NOTE — PROGRESS NOTES
PT attempted, however pt wanting to finish eating at this time. PT to continue to follow for PT evaluation 5/19/22.

## 2022-05-18 NOTE — PROGRESS NOTES
Problem: Falls - Risk of  Goal: *Absence of Falls  Description: Document Luis Barnett Fall Risk and appropriate interventions in the flowsheet. 5/18/2022 0343 by Varsha Win RN  Outcome: Progressing Towards Goal  Note: Fall Risk Interventions:  Mobility Interventions: OT consult for ADLs,PT Consult for mobility concerns         Medication Interventions: Bed/chair exit alarm,Teach patient to arise slowly         History of Falls Interventions: Investigate reason for fall      5/18/2022 0343 by Varsha Win RN  Outcome: Progressing Towards Goal  Note: Fall Risk Interventions:  Mobility Interventions: OT consult for ADLs,PT Consult for mobility concerns         Medication Interventions: Bed/chair exit alarm,Teach patient to arise slowly         History of Falls Interventions:  Investigate reason for fall

## 2022-05-18 NOTE — PROGRESS NOTES
Comprehensive Nutrition Assessment    Type and Reason for Visit: Initial,Positive nutrition screen (MST 2)    Nutrition Recommendations/Plan:   1. Continue diet, update food preferences. 2. Ensure HP x2/d (320 kcal, 32 gm PRO)  3. Please document as % PO and ONS intake in I/Os. Malnutrition Assessment:  Malnutrition Status: Moderate malnutrition (05/18/22 1439)    Context:  Chronic illness     Findings of the 6 clinical characteristics of malnutrition:   Energy Intake:  75% or less est energy requirements for 1 month or longer  Weight Loss:  Mild weight loss (specify amount and time period) (>2 % over 1 week)     Body Fat Loss:  Unable to assess,     Muscle Mass Loss:  Unable to assess,    Fluid Accumulation:  Mild, Extremities   Strength:  Not performed     Nutrition Assessment:    Admitted for peripheral edema. Pt reported h/o poor appetite since (01/2022); baseline intake of </=50% of meals 2/2 altered taste and smell perceptions(meats, bread, some milk. Pt drinks Premier shakes PTA given PRO deficient meals. H/o unintended wt loss of 35 lbs x4 mths 2/2 poor intake. JCE=643 lbs, BFF=644.5 lbs at bedside today- sig. wt loss x1 wk. Continue diet and ONS. Labs: K 2.9, Cl 111, AST 13, TP 4.9, H/H 9.3/28. 3. Meds: MVI+B9, Lasix, tylenol. Nutrition Related Findings:    NFPE deferred. No V/D/C; +N 2/2 altered smell perception. No c/s issues per Pt. Last BM 5/17 per Pt- h/o C 2/2 meds. +2 pitting BLE edema. Wound Type: Surgical incision (B. Breast)     Current Nutrition Intake & Therapies:  Average Meal Intake: 26-50%  Average Supplement Intake: None ordered  ADULT DIET Regular; Low Fat/Low Chol/High Fiber/2 gm Na  ADULT ORAL NUTRITION SUPPLEMENT HS Snack; Standard High Calorie/High Protein    Anthropometric Measures:  Height: 5' 4.96\" (165 cm)  Ideal Body Weight (IBW): 125 lbs (57 kg)  Current Body Wt:  85.5 kg (188 lb 8 oz) (5/18, RD obtained), 150.8 % IBW.  Bed scale  Current BMI (kg/m2): 31.4  Usual Body Weight: 83.5 kg (184 lb)  % Weight Change (Calculated): 2.4   BMI Category: Obese class 1 (BMI 30.0-34. 9)    Estimated Daily Nutrient Needs:  Energy Requirements Based On: Kcal/kg  Weight Used for Energy Requirements: Current  Energy (kcal/day): 2138 kcal/d  Weight Used for Protein Requirements: Current  Protein (g/day): 103 gm/d  Method Used for Fluid Requirements: 1 ml/kcal  Fluid (ml/day): 1500 mL/d    Nutrition Diagnosis:   · Moderate malnutrition,In context of chronic illness related to catabolic illness,altered taste perception,early satiety as evidenced by intake 26-50%,poor intake prior to admission,weight loss 1-2% in 1 week,Criteria as identified in malnutrition assessment,nausea,localized or generalized fluid accumulation    Nutrition Interventions:   Food and/or Nutrient Delivery: Continue current diet,Start oral nutrition supplement  Nutrition Education/Counseling: Education not indicated  Coordination of Nutrition Care: Continue to monitor while inpatient  Plan of Care discussed with: RN, Pt, MD.    Goals:  Goals: PO intake 50% or greater,by next RD assessment    Nutrition Monitoring and Evaluation:   Behavioral-Environmental Outcomes: None identified  Food/Nutrient Intake Outcomes: Diet advancement/tolerance,Food and nutrient intake,Supplement intake  Physical Signs/Symptoms Outcomes: GI status,Nausea/vomiting,Weight,Fluid status or edema,Meal time behavior,Biochemical data,Constipation    Discharge Planning: Too soon to determine    Fabiana Torres RD  Contact: ext. [phone] or [employer].

## 2022-05-18 NOTE — PROGRESS NOTES
PROGRESS NOTE    Patient: Valentino Rodriguez MRN: [id number]  SSN: [id number]    YOB: 1965  Age: 59 y.o. Sex: female      Admit Date: 5/16/2022    LOS: 2 days       Subjective     Chief Complaint   Patient presents with    Peripheral Edema    Hypotension       HPI:   Patient is a 59y.o. year old female with a past medical history significant hypertension, CHF, DM, hyperlipidemia and Breast cancer s/p bilateral mastectomies last week presents to the ED with cc of peripheral edema and hypertension.  Patient went to her 1 week follow-up with her breast surgeon who was concerned because her blood pressure was low and she had bilateral lower extremity edema which is new. States she has had issues in the past with lower limb swelling, but compression stockings were able to manage swelling appropriately. Since last week she has noticed the stockings have not helped, and that the swelling progressively worsened over the past week. She states her limbs are not particulary painful but feel \"tight\". Patient states she has had decreased appetite since January and this is unchanged.  She states she does feel generally unwell but does not have any specific abdominal pain, vomiting or diarrhea.  Patient is status post gastric bypass 2 years ago.  Additionally patient endorses chest pressure but states it it may be from the chest binder she is wearing.  She states the expanders in place are uncomfortable as well.     Patient admitted to the hospital. Patient is hypertensive with bilateral swelling of lower extremities and right upper extremity (chronic lymphedema; worse than normal). CT was negative for PE or aortic abnormalities. Lower limb doppler U/S was negative bilaterally. EKG shows normal sinus rhythm with a rate of 73.  Normal NE, normal QRS, normal QTC. Low voltage. 1 L of IV fluid administered. Troponin and lactic acid negative.     Remarkable Labs at time of admission   K: 2.9   BNP: 394   Hgb: 9.3 5/18  Patient is found lying in bed awake and eating breakfast. Complains of worsening swelling and pain in bilateral ankles and feet. Swelling has worsened since yesterday. Chest pain from recent surgery remains. Echo shows 60-65% with normal function. Septal thickening noted. Awaiting labs       ROS  Constitutional: Negative for chills and fever. HENT: Negative. Eyes: Negative. Respiratory: Negative. Cardiovascular: Chest pain - likely secondary to recent bilateral mastectomy    Gastrointestinal: Anorexia since January. Negative for abdominal pain and nausea. Skin: Swelling and lower limb pain   Neurological: Negative     Objective     Visit Vitals  /78 (BP 1 Location: Left leg)   Pulse 80   Temp 98.7 °F (37.1 °C)   Resp 17   Ht 5' 4.96\" (1.65 m)   Wt 184 lb 15.5 oz (83.9 kg)   SpO2 99%   BMI 30.82 kg/m²      O2 Device: None (Room air)      Physical Exam  Constitutional: pt is oriented to person, place, and time. HENT:   Head: Normocephalic and atraumatic. Eyes: Pupils are equal, round, and reactive to light. EOM are normal.   Cardiovascular: Normal rate, regular rhythm and normal heart sounds. Pulmonary/Chest: Breath sounds normal. No wheezes. No rales. .   Exhibits no tenderness. Abdominal: Soft. Bowel sounds are normal. There is no abdominal tenderness. There is no rebound and no guarding. Musculoskeletal: Lower limb and ankle swelling bilaterally. Normal range of motion. Right upper extremity edema, has a history of lymphedema due to her first episode of breast cancer   Neurological: pt is alert and oriented to person, place, and time. Alert. Normal strength. No cranial nerve deficit or sensory deficit. Displays a negative Romberg sign.       Intake & Output:  Current Shift: No intake/output data recorded. Last three shifts: 05/16 1901 - 05/18 0700  In: 240 [P.O.:240]  Out: 130 [Drains:130]    Lab/Data Review:  Lab results reviewed.  For significant abnormal values and values requiring intervention, see assessment and plan.        24 Hour Results:    Recent Results (from the past 24 hour(s))   GLUCOSE, POC    Collection Time: 05/17/22  8:08 AM   Result Value Ref Range    Glucose (POC) 85 65 - 117 mg/dL    Performed by Ltanya Dural    GLUCOSE, POC    Collection Time: 05/17/22 12:04 PM   Result Value Ref Range    Glucose (POC) 100 65 - 117 mg/dL    Performed by Ltanya Dural    ECHO ADULT COMPLETE    Collection Time: 05/17/22  1:40 PM   Result Value Ref Range    AV Peak Velocity 1.1 m/s    AV Peak Gradient 5 mmHg    Aortic Root 3.3 cm    IVSd 1.2 (A) 0.6 - 0.9 cm    LVIDd 3.3 (A) 3.9 - 5.3 cm    LVIDs 2.0 cm    LVOT Peak Velocity 1.0 m/s    LVOT Peak Gradient 4 mmHg    LVPWd 1.1 (A) 0.6 - 0.9 cm    LV E' Lateral Velocity 10 cm/s    LV E' Septal Velocity 7 cm/s    LA Diameter 2.3 cm    MV E Wave Deceleration Time 208.0 ms    MV A Velocity 0.61 m/s    MV E Velocity 0.69 m/s    MV Mean Gradient 2 mmHg    MV VTI 25.8 cm    MV Mean Velocity 0.6 m/s    MV Max Velocity 0.9 m/s    MV Peak Gradient 3 mmHg    PV Max Velocity 0.9 m/s    PV Peak Gradient 3 mmHg    Pulm Vein A Duration 116.0 ms    Pulm Vein A Velocity 0.4 m/s    Est. RA Pressure 3 mmHg    RVIDd 2.4 cm    TR Max Velocity 2.05 m/s    TR Peak Gradient 17 mmHg    Fractional Shortening 2D 39 28 - 44 %    LVIDd Index 1.73 cm/m2    LVIDs Index 1.05 cm/m2    LV RWT Ratio 0.67     LV Mass 2D 116.8 67 - 162 g    LV Mass 2D Index 61.2 43 - 95 g/m2    MV E/A 1.13     E/E' Ratio (Averaged) 8.38     E/E' Lateral 6.90     E/E' Septal 9.86     LA Size Index 1.20 cm/m2    LA/AO Root Ratio 0.70     Ao Root Index 1.73 cm/m2    AV Velocity Ratio 0.91     RVSP 20 mmHg    EF BP 72 55 - 100 %   TROPONIN-HIGH SENSITIVITY    Collection Time: 05/17/22  4:33 PM   Result Value Ref Range    Troponin-High Sensitivity 6 0 - 51 ng/L   GLUCOSE, POC    Collection Time: 05/17/22  7:29 PM   Result Value Ref Range    Glucose (POC) 102 65 - 117 mg/dL    Performed by Yusef Irvin          Imaging:    CTA CHEST W OR W WO CONT   Final Result   No evidence of pulmonary embolism or acute aortic abnormalities. Postsurgical changes in the breasts. Mild prominence of the ascending aorta. DUPLEX LOWER EXT VENOUS BILAT   Final Result      DUPLEX UPPER EXT VENOUS RIGHT   Final Result             Assessment     Active Problems:    Peripheral edema (5/16/2022)      Leg edema (5/16/2022)      Peripheral edema   Breast cancer s/p bilateral mastectomy  Hypertension   Hyperlipidemia   Chronic headaches   CHF  Type 2 diabetes  Hypokalemia       Plan     Klor-con 40 mEq q4h   Cymbalta 60mg daily   Metformin ER 500mg daily with dinner   Lasix 40mg daily - INCREASE  Neurontin 800mg TID   Zoloft 25mg daily   Lovenox 40mg daily     Cardiology follow up echo   Monitor vitals   Monitor swelling and pain status   Repeat CBC, CMP, BNP   Order compression stockings for swelling relief     Possible discharge in 24-48 hours       Current Facility-Administered Medications:     DULoxetine (CYMBALTA) capsule 60 mg, 60 mg, Oral, DAILY, Desire Fraser MD, 60 mg at 05/17/22 0920    metFORMIN ER (GLUCOPHAGE XR) tablet 500 mg, 500 mg, Oral, DAILY WITH DINNER, Owen Fraser MD    semaglutide (OZEMPIC) 0.25 mg or 0.5 mg/dose (2 mg/1.5 ml) subq pen 0.25 mg, 0.25 mg, SubCUTAneous, Q7D, Desire Fraser MD    furosemide (LASIX) tablet 20 mg, 20 mg, Oral, DAILY, Desire Fraser MD, 20 mg at 05/17/22 0920    gabapentin (NEURONTIN) capsule 800 mg, 800 mg, Oral, TID, Desire Fraser MD, 800 mg at 05/17/22 2230    hydrOXYzine pamoate (VISTARIL) capsule 50 mg, 50 mg, Oral, QHS, Desire Fraser MD, 50 mg at 05/17/22 2229    . PHARMACY TO SUBSTITUTE PER PROTOCOL (Reordered from: IRON, FERROUS SULFATE, PO), , , Per Protocol, Owen Fraser MD    latanoprost (XALATAN) 0.005 % ophthalmic solution 1 Drop, 1 Drop, Both Eyes, QHS, Desire Fraser MD, 1 Drop at 05/17/22 1544    multivitamin with folic acid (ONE DAILY WITH FOLIC ACID) tablet 1 Tablet, 1 Tablet, Oral, DAILY, Messi Fraser MD    . PHARMACY TO SUBSTITUTE PER PROTOCOL (Reordered from: omega 3-dha-epa-fish oil (Fish Oil) 100-160-1,000 mg cap), , , Per Protocol, Messi Fraser MD    sertraline (ZOLOFT) tablet 25 mg, 25 mg, Oral, DAILY, Desire Fraser MD, 25 mg at 05/17/22 0920    . PHARMACY TO SUBSTITUTE PER PROTOCOL (Reordered from: timoloL (BetimoL) 0.5 % ophthalmic solution), , , Per Protocol, Messi Fraser MD    traZODone (DESYREL) tablet 100 mg, 100 mg, Oral, QHS PRN, Desire Fraser MD, 100 mg at 05/17/22 2238    acetaminophen (TYLENOL) tablet 650 mg, 650 mg, Oral, Q6H PRN, 650 mg at 05/17/22 2238 **OR** acetaminophen (TYLENOL) suppository 650 mg, 650 mg, Rectal, Q6H PRN, Messi Fraser MD    polyethylene glycol (MIRALAX) packet 17 g, 17 g, Oral, DAILY PRN, Messi Fraser MD    ondansetron (ZOFRAN ODT) tablet 4 mg, 4 mg, Oral, Q8H PRN **OR** ondansetron (ZOFRAN) injection 4 mg, 4 mg, IntraVENous, Q6H PRN, Desire Fraser MD    enoxaparin (LOVENOX) injection 40 mg, 40 mg, SubCUTAneous, DAILY, Desire Fraser MD, 40 mg at 05/17/22 0920    Current Outpatient Medications   Medication Instructions    ascorbic acid-collagen (Collagen Plus Vitamin C) 125-740 mg cap Collagen Plus Vitamin C    b complex vitamins tablet 1 Tablet, Oral, DAILY    biotin 500 mcg Cap Take  by mouth.     calcium-cholecalciferol, d3, 600 mg calcium- 200 unit cap 600 mg, Oral, DAILY    carvediloL (COREG) 12.5 mg, Oral, 2 TIMES DAILY    DULoxetine (CYMBALTA) 30 mg capsule duloxetine 30 mg capsule,delayed release   TAKE AS DIRECTED ALONG WITH 60MG CYMBALTA    DULoxetine (CYMBALTA) 60 mg capsule TAKE 1 CAPSULE BY MOUTH DAILY    furosemide (LASIX) 20 mg tablet TK 1 T PO QD PRF WEIGHT GAIN OF 2 LBS OR MORE IN A DAY    gabapentin (NEURONTIN) 800 mg tablet 1 Tablet, Oral, 3 TIMES DAILY    hydrOXYzine pamoate (VISTARIL) 50 mg, Oral, EVERY BEDTIME    IRON, FERROUS SULFATE, PO Iron (ferrous sulfate)    latanoprost (XALATAN) 0.005 % ophthalmic solution 1 Drop, Both Eyes, EVERY BEDTIME    metFORMIN ER (GLUCOPHAGE XR) 500 mg tablet TAKE 2 TABLET BY MOUTH TWICE DAILY DAILY WITH MEALS STOP SYNJARDY    multivitamin-min-iron-FA-vit K (Bariatric Multivitamins) 45 mg iron- 800 mcg-120 mcg cap Bariatric Multivitamins    omega 3-dha-epa-fish oil (Fish Oil) 100-160-1,000 mg cap 3 mg, Oral, DAILY    oxyCODONE-acetaminophen (PERCOCET 10)  mg per tablet  Tablets, Oral, DAILY    potassium chloride (K-DUR, KLOR-CON) 20 mEq tablet TK 1 T PO QD PRN WHEN TAKING FUROSEMIDE    semaglutide (Ozempic) 0.25 mg or 0.5 mg/dose (2 mg/1.5 ml) subq pen Inject 0.25mg once weekly for three weeks, then increase to 0.5mg once weekly    sertraline (ZOLOFT) 25 mg tablet sertraline 25 mg tablet   TAKE 1 TABLET BY MOUTH EVERY DAY    timoloL (BetimoL) 0.5 % ophthalmic solution 1 Drop, Both Eyes, 2 TIMES DAILY, use in affected eye(s)     traZODone (DESYREL) 100 mg tablet TK 1 T PO QD HS    triamcinolone acetonide (KENALOG) 0.1 % topical cream triamcinolone acetonide 0.1 % topical cream         Signed By: Celi Barrera     May 18, 2022

## 2022-05-18 NOTE — PROGRESS NOTES
Spiritual Care Assessment/Progress Note  [facility]      NAME: Bere Estrella      MRN: [id number]  AGE: 59 y.o.  SEX: female  Jew Affiliation: Spiritism   Language: English     5/18/2022     Total Time (in minutes): 12     Spiritual Assessment begun in [facility] through conversation with:         [x]Patient        [] Family    [] Friend(s)        Reason for Consult: Request by staff     Spiritual beliefs: (Please include comment if needed)     [x] Identifies with a tracey tradition: Spiritism         [] Supported by a tracey community:            [] Claims no spiritual orientation:           [] Seeking spiritual identity:                [] Adheres to an individual form of spirituality:           [] Not able to assess:                           Identified resources for coping:      [] Prayer                               [] Music                  [] Guided Imagery     [] Family/friends                 [] Pet visits     [] Devotional reading                         [] Unknown     [x] Other:  Tracey leaders                                           Interventions offered during this visit: (See comments for more details)    Patient Interventions: Initial/Spiritual assessment, patient floor,Normalization of emotional/spiritual concerns,Affirmation of tracey           Plan of Care:     [] Support spiritual and/or cultural needs    [] Support AMD and/or advance care planning process      [] Support grieving process   [] Coordinate Rites and/or Rituals    [] Coordination with community clergy   [] No spiritual needs identified at this time   [] Detailed Plan of Care below (See Comments)  [] Make referral to Music Therapy  [] Make referral to Pet Therapy     [] Make referral to Addiction services  [] Make referral to [facility]  [] Make referral to Spiritual Care Partner  [] No future visits requested        [x] Contact Spiritual Care for further referrals     Comments:  responded to RN and  referral. When  visited patient was on the phone, she put the caller on hold for a few moments and engaged in brief conversation with . Patient is a person of the [facility] and indicated that she receives strong support from her  and additional marty leader daily. Patient was smiling and expressed hope and no needs at this time.  provided words of caring and support. Patient expressed appreciation for visit.  consulted with RN post visit and  advised her to contact [facility] for any further referrals.     Visited by Melodie Gottron, [facility]    can be contacted by calling  and requesting  on call

## 2022-05-18 NOTE — PROGRESS NOTES
OCCUPATIONAL THERAPY EVALUATION/DISCHARGE  Patient: Gavi Presley (62 y.o. female)  Date: 5/18/2022  Primary Diagnosis: Peripheral edema [R60.9]  Leg edema [R60.0]        Precautions: falls       ASSESSMENT  Patient is 60 y/o female came to [facility] with peripheral edema and adm 5/16/2022 for peripheral edema, breast cancer, HTN. Pt has hx of right breast cancer (2009), per pt now with reoccuring breast cancer now s/p ghada mastectomies (prior week), depression, sleep apnea, glaucoma, neuropathy, GOUT, HLD, HTN, appendectomy, gastric bypass, cholecystectomy, chronic headaches. Pt received seated EOB A&Ox4 and agreeable for OT eval/tx. Per pt report, pt lives alone in one story home with 4 steps ghada rails to enter and is MI for self care (using shower chair) and functional transfers/mobility with use of rollator/RW/cane as needed. Pt currently presents close to/at baseline functional status for self care (MI LB dressing EOB, MI grooming standing sink, MI toileting/cloth mgmt, MI LB bathing seated on commode) and functional transfers/mobility (MI scooting, sit<->stand and toilet transfer with Rw). Pt able to recall ghada UE precautions as pt is s/p ghada mastectomy and demonstrating following precautions throughout evaluation/treatment. Pt with no acute OT needs at this time thus will D/C after OT eval. Recommend discharge to home with [address] [place] [address] home safety eval.       Current Level of Function (ADLs/self-care): MI    Other factors to consider for discharge: time since onset, close to baseline     PLAN :  Recommend with staff: no pain reported    Recommendation for discharge: (in order for the patient to meet his/her long term goals)  HHOT    This discharge recommendation:  Has been made in collaboration with the attending provider and/or case management    IF patient discharges home will need the following DME: none       SUBJECTIVE:   Patient stated Blair Boyle wasn't home for very long.     OBJECTIVE DATA SUMMARY:   HISTORY: Past Medical History:   Diagnosis Date    Arthritis     Back/Neck problems    Burning with urination     Frequency    Cancer ([facility] [address] 2009    RIGHT breast    Congestive heart failure (HCC)     Depression     Including Post Partum    Diabetes (HCC)     Dizziness     GERD (gastroesophageal reflux disease)     Glaucoma     Gout     Headache     Heart disease     Hypercholesterolemia     Hypertension     Morbid obesity ([facility] [address]     Neuropathy     Shortness of breath     With activity    Sleep apnea     Sleep disorder     Difficulty falling asleep, night sweats    Stool color black      Past Surgical History:   Procedure Laterality Date    HX APPENDECTOMY      HX BREAST LUMPECTOMY  2009    RIGHT with SNBX    HX CHOLECYSTECTOMY      HX GASTRIC BYPASS  2020    HX GYN       x1, Bilateral tubal ligation, Dilation & Curettage    HX GYN      Hysterectomy (partial)    HX ORTHOPAEDIC      Procedure on shoulder    HX PREMALIG/BENIGN SKIN LESION EXCISION      Cyst removed from scalp    HX PREMALIG/BENIGN SKIN LESION EXCISION      Debridement of subcutaneous tissue    HX TONSILLECTOMY  1970    HX UROLOGICAL         Prior Level of Function/Environment/Context: Pt MI for ADLS/IADLS, MI with mobility prior to admission. Expanded or extensive additional review of patient history:   Home Situation  Home Environment: Private residence  # Steps to Enter: 4  Rails to Enter: Yes  One/Two Story Residence: One story  Living Alone: Yes  Support Systems: Spouse/Significant Other  Patient Expects to be Discharged to[de-identified] Home  Current DME Used/Available at Home: Cane, straight,Walker, rollator,Walker, rolling    EXAMINATION OF PERFORMANCE DEFICITS:  Cognitive/Behavioral Status:  Neurologic State: Alert  Orientation Level: Oriented X4     Hearing: Auditory  Auditory Impairment: None    Range of Motion:  AROM: Within functional limits  PROM: Within functional limits     Strength:  Strength:  Within functional limits     Balance:  Sitting: Intact; Without support  Standing: Intact; With support    Functional Mobility and Transfers for ADLs:  Bed Mobility:  Scooting: Modified independent    Transfers:  Sit to Stand: Modified independent  Stand to Sit: Modified independent  Bed to Chair: Modified independent  Bathroom Mobility: Modified independent  Toilet Transfer : Modified independent  Assistive Device : Walker, rolling    ADL Assessment:     Oral Facial Hygiene/Grooming: Modified Independent    Bathing: Modified independent    Lower Body Dressing: Modified independent    Toileting: Modified independent     ADL Intervention and task modifications:     Grooming  Grooming Assistance: Modified independent  Position Performed: Standing  Washing Hands: Modified independent    Lower Body Bathing  Bathing Assistance: Modified independent  Perineal  : Modified independent  Position Performed: Seated in chair;Standing    Lower Body Dressing Assistance  Socks: Modified independent  Position Performed: Seated edge of bed    Toileting  Toileting Assistance: Modified independent  Bladder Hygiene: Modified independent  Bowel Hygiene: Modified indpendent  Clothing Management: Modified independent    [facility] [employer] AM-PACTM \"6 Clicks\"                                                       Daily Activity Inpatient Short Form  How much help from another person does the patient currently need. .. Total; A Lot A Little None   1. Putting on and taking off regular lower body clothing? []  1 []  2 []  3 [x]  4   2. Bathing (including washing, rinsing, drying)? []  1 []  2 []  3 [x]  4   3. Toileting, which includes using toilet, bedpan or urinal? [] 1 []  2 []  3 [x]  4   4. Putting on and taking off regular upper body clothing? []  1 []  2 []  3 [x]  4   5. Taking care of personal grooming such as brushing teeth? []  1 []  2 []  3 [x]  4   6. Eating meals? []  1 []  2 []  3 [x]  4   © 2007, Trustees of [facility] [employer], under license to [employer]  All rights reserved     Score: 24/24     Interpretation of Tool:  Represents clinically-significant functional categories (i.e. Activities of daily living). Percentage of Impairment CH    0%   CI    1-19% CJ    20-39% CK    40-59% CL    60-79% CM    80-99% CN     100%   AMPA  Score 6-24 24 23 20-22 15-19 10-14 7-9 6         Occupational Therapy Evaluation Charge Determination   History Examination Decision-Making   LOW Complexity : Brief history review  LOW Complexity : 1-3 performance deficits relating to physical, cognitive , or psychosocial skils that result in activity limitations and / or participation restrictions  LOW Complexity : No comorbidities that affect functional and no verbal or physical assistance needed to complete eval tasks       Based on the above components, the patient evaluation is determined to be of the following complexity level: LOW   Pain Rating:  No pain reported    Activity Tolerance: WNL  Please refer to the flowsheet for vital signs taken during this treatment. After treatment patient left in no apparent distress:    Sitting in chair and Call bell within reach    COMMUNICATION/EDUCATION:   The patients plan of care was discussed with: Registered nurse and Case management.      Thank you for this referral.  Jorge Yanez  Time Calculation: 46 mins

## 2022-05-18 NOTE — PROGRESS NOTES
PROGRESS NOTE    Patient: Sanna Guerrero MRN: [id number]  SSN: [id number]    YOB: 1965  Age: 59 y.o. Sex: female      Admit Date: 5/16/2022    LOS: 2 days       Subjective     Chief Complaint   Patient presents with    Peripheral Edema    Hypotension       HPI:   Patient is a 59y.o. year old female with a past medical history significant hypertension, CHF, DM, hyperlipidemia and Breast cancer s/p bilateral mastectomies last week presents to the ED with cc of peripheral edema and hypertension.  Patient went to her 1 week follow-up with her breast surgeon who was concerned because her blood pressure was low and she had bilateral lower extremity edema which is new. States she has had issues in the past with lower limb swelling, but compression stockings were able to manage swelling appropriately. Since last week she has noticed the stockings have not helped, and that the swelling progressively worsened over the past week. She states her limbs are not particulary painful but feel \"tight\". Patient states she has had decreased appetite since January and this is unchanged.  She states she does feel generally unwell but does not have any specific abdominal pain, vomiting or diarrhea.  Patient is status post gastric bypass 2 years ago.  Additionally patient endorses chest pressure but states it it may be from the chest binder she is wearing.  She states the expanders in place are uncomfortable as well.     Patient admitted to the hospital. Patient is hypertensive with bilateral swelling of lower extremities and right upper extremity (chronic lymphedema; worse than normal). CT was negative for PE or aortic abnormalities. Lower limb doppler U/S was negative bilaterally. EKG shows normal sinus rhythm with a rate of 73.  Normal OR, normal QRS, normal QTC. Low voltage. 1 L of IV fluid administered. Troponin and lactic acid negative.     Remarkable Labs at time of admission   K: 2.9   BNP: 394   Hgb: 9.3 5/18  Patient is found lying in bed awake and eating breakfast. Complains of worsening swelling and pain in bilateral ankles and feet. Swelling has worsened since yesterday. Chest pain from recent surgery remains. Echo shows 60-65% with normal function. Septal thickening noted. Awaiting labs       ROS  Constitutional: Negative for chills and fever. HENT: Negative. Eyes: Negative. Respiratory: Negative. Cardiovascular: Chest pain - likely secondary to recent bilateral mastectomy    Gastrointestinal: Anorexia since January. Negative for abdominal pain and nausea. Skin: Swelling and lower limb pain   Neurological: Negative     Objective     Visit Vitals  /68 (BP 1 Location: Left leg, BP Patient Position: At rest)   Pulse 79   Temp 98.3 °F (36.8 °C)   Resp 20   Ht 5' 4.96\" (1.65 m)   Wt 83.9 kg (184 lb 15.5 oz)   SpO2 100%   BMI 30.82 kg/m²      O2 Device: None (Room air)      Physical Exam  Constitutional: pt is oriented to person, place, and time. HENT:   Head: Normocephalic and atraumatic. Eyes: Pupils are equal, round, and reactive to light. EOM are normal.   Cardiovascular: Normal rate, regular rhythm and normal heart sounds. Pulmonary/Chest: Breath sounds normal. No wheezes. No rales. .   Exhibits no tenderness. Abdominal: Soft. Bowel sounds are normal. There is no abdominal tenderness. There is no rebound and no guarding. Musculoskeletal: Lower limb and ankle swelling bilaterally. Normal range of motion. Right upper extremity edema, has a history of lymphedema due to her first episode of breast cancer   Neurological: pt is alert and oriented to person, place, and time. Alert. Normal strength. No cranial nerve deficit or sensory deficit.  Displays a negative Romberg sign.       Intake & Output:  Current Shift: 05/18 0701 - 05/18 1900  In: -   Out: 35 [Drains:35]  Last three shifts: 05/16 1901 - 05/18 0700  In: 240 [P.O.:240]  Out: 130 [Drains:130]    Lab/Data Review:  Lab results reviewed. For significant abnormal values and values requiring intervention, see assessment and plan.        24 Hour Results:    Recent Results (from the past 24 hour(s))   GLUCOSE, POC    Collection Time: 05/17/22 12:04 PM   Result Value Ref Range    Glucose (POC) 100 65 - 117 mg/dL    Performed by Sohail Chapa    ECHO ADULT COMPLETE    Collection Time: 05/17/22  1:40 PM   Result Value Ref Range    AV Peak Velocity 1.1 m/s    AV Peak Gradient 5 mmHg    Aortic Root 3.3 cm    IVSd 1.2 (A) 0.6 - 0.9 cm    LVIDd 3.3 (A) 3.9 - 5.3 cm    LVIDs 2.0 cm    LVOT Peak Velocity 1.0 m/s    LVOT Peak Gradient 4 mmHg    LVPWd 1.1 (A) 0.6 - 0.9 cm    LV E' Lateral Velocity 10 cm/s    LV E' Septal Velocity 7 cm/s    LA Diameter 2.3 cm    MV E Wave Deceleration Time 208.0 ms    MV A Velocity 0.61 m/s    MV E Velocity 0.69 m/s    MV Mean Gradient 2 mmHg    MV VTI 25.8 cm    MV Mean Velocity 0.6 m/s    MV Max Velocity 0.9 m/s    MV Peak Gradient 3 mmHg    PV Max Velocity 0.9 m/s    PV Peak Gradient 3 mmHg    Pulm Vein A Duration 116.0 ms    Pulm Vein A Velocity 0.4 m/s    Est. RA Pressure 3 mmHg    RVIDd 2.4 cm    TR Max Velocity 2.05 m/s    TR Peak Gradient 17 mmHg    Fractional Shortening 2D 39 28 - 44 %    LVIDd Index 1.73 cm/m2    LVIDs Index 1.05 cm/m2    LV RWT Ratio 0.67     LV Mass 2D 116.8 67 - 162 g    LV Mass 2D Index 61.2 43 - 95 g/m2    MV E/A 1.13     E/E' Ratio (Averaged) 8.38     E/E' Lateral 6.90     E/E' Septal 9.86     LA Size Index 1.20 cm/m2    LA/AO Root Ratio 0.70     Ao Root Index 1.73 cm/m2    AV Velocity Ratio 0.91     RVSP 20 mmHg    EF BP 72 55 - 100 %   TROPONIN-HIGH SENSITIVITY    Collection Time: 05/17/22  4:33 PM   Result Value Ref Range    Troponin-High Sensitivity 6 0 - 51 ng/L   GLUCOSE, POC    Collection Time: 05/17/22  7:29 PM   Result Value Ref Range    Glucose (POC) 102 65 - 117 mg/dL    Performed by Damien Salvage          Imaging:    CTA CHEST W OR W WO CONT   Final Result   No evidence of pulmonary embolism or acute aortic abnormalities. Postsurgical changes in the breasts. Mild prominence of the ascending aorta.       DUPLEX LOWER EXT VENOUS BILAT   Final Result      DUPLEX UPPER EXT VENOUS RIGHT   Final Result             Assessment     Active Problems:    Peripheral edema (5/16/2022)      Leg edema (5/16/2022)      Peripheral edema   Breast cancer s/p bilateral mastectomy  Hypertension   Hyperlipidemia   Chronic headaches   CHF  Type 2 diabetes  Hypokalemia       Plan     Klor-con 40 mEq q4h   Cymbalta 60mg daily   Metformin ER 500mg daily with dinner     Start on Lasix 40 mg IV daily  Neurontin 800mg TID   Zoloft 25mg daily   Lovenox 40mg daily     Cardiology follow up echo   Monitor vitals   Monitor swelling and pain status   Repeat CBC, CMP, BNP   Order compression stockings for swelling relief     Possible discharge in 24-48 hours   OT consult    And started all on medication today      Current Facility-Administered Medications:     cyclobenzaprine (FLEXERIL) tablet 10 mg, 10 mg, Oral, TID PRN, Desire Fraser MD    oxyCODONE IR (ROXICODONE) tablet 5 mg, 5 mg, Oral, Q4H PRN, Desire Fraser MD, 5 mg at 05/18/22 1109    doxycycline (VIBRAMYCIN) capsule 100 mg, 100 mg, Oral, Q12H, Desire Fraser MD    topiramate (TOPAMAX) tablet 25 mg, 25 mg, Oral, BID WITH MEALS, Desire Fraser MD    carvediloL (COREG) tablet 12.5 mg, 12.5 mg, Oral, BID WITH MEALS, Magaly Fraser MD    *Please  patient's home medications from pharmacy at discharge*, 1 Each, Other, PRIOR TO DISCHARGE, Magaly Fraser MD    furosemide (LASIX) injection 40 mg, 40 mg, IntraVENous, DAILY, Magaly Fraser MD    DULoxetine (CYMBALTA) capsule 60 mg, 60 mg, Oral, DAILY, Desire Fraser MD, 60 mg at 05/18/22 0842    metFORMIN ER (GLUCOPHAGE XR) tablet 500 mg, 500 mg, Oral, DAILY WITH DINNER, Magaly Fraser MD    semaglutide (OZEMPIC) 0.25 mg or 0.5 mg/dose (2 mg/1.5 ml) subq pen 0.25 mg, 0.25 mg, SubCUTAneous, Q7D, Desire Fraser MD  [facility] by provider] furosemide (LASIX) tablet 20 mg, 20 mg, Oral, DAILY, Desire Fraser MD, 20 mg at 05/18/22 6567    gabapentin (NEURONTIN) capsule 800 mg, 800 mg, Oral, TID, Desire Fraser MD, 800 mg at 05/18/22 0536    hydrOXYzine pamoate (VISTARIL) capsule 50 mg, 50 mg, Oral, QHS, Desire Frsaer MD, 50 mg at 05/17/22 2229    . PHARMACY TO SUBSTITUTE PER PROTOCOL (Reordered from: IRON, FERROUS SULFATE, PO), , , Per Protocol, Kari Fraser MD    latanoprost (XALATAN) 0.005 % ophthalmic solution 1 Drop, 1 Drop, Both Eyes, QHS, Desire Fraser MD, 1 Drop at 05/17/22 2230    multivitamin with folic acid (ONE DAILY WITH FOLIC ACID) tablet 1 Tablet, 1 Tablet, Oral, DAILY, Desire Fraser MD, 1 Tablet at 05/18/22 0677    . PHARMACY TO SUBSTITUTE PER PROTOCOL (Reordered from: omega 3-dha-epa-fish oil (Fish Oil) 100-160-1,000 mg cap), , , Per Protocol, Kari Fraser MD    sertraline (ZOLOFT) tablet 25 mg, 25 mg, Oral, DAILY, Desire Fraser MD, 25 mg at 05/18/22 0843    . PHARMACY TO SUBSTITUTE PER PROTOCOL (Reordered from: timoloL (BetimoL) 0.5 % ophthalmic solution), , , Per Protocol, Kari Fraser MD    traZODone (DESYREL) tablet 100 mg, 100 mg, Oral, QHS PRN, Desire Fraser MD, 100 mg at 05/17/22 2238    acetaminophen (TYLENOL) tablet 650 mg, 650 mg, Oral, Q6H PRN, 650 mg at 05/17/22 2238 **OR** acetaminophen (TYLENOL) suppository 650 mg, 650 mg, Rectal, Q6H PRN, Kari Fraser MD    polyethylene glycol (MIRALAX) packet 17 g, 17 g, Oral, DAILY PRN, Kari Fraser MD    ondansetron (ZOFRAN ODT) tablet 4 mg, 4 mg, Oral, Q8H PRN **OR** ondansetron (ZOFRAN) injection 4 mg, 4 mg, IntraVENous, Q6H PRN, Desire Fraser MD    enoxaparin (LOVENOX) injection 40 mg, 40 mg, SubCUTAneous, DAILY, Desire Fraser MD, 40 mg at 05/18/22 4047    Current Outpatient Medications   Medication Instructions    ascorbic acid-collagen (Collagen Plus Vitamin C) 125-740 mg cap Collagen Plus Vitamin C    b complex vitamins tablet 1 Tablet, Oral, DAILY    biotin 500 mcg Cap Take  by mouth.     calcium-cholecalciferol, d3, 600 mg calcium- 200 unit cap 600 mg, Oral, DAILY    carvediloL (COREG) 12.5 mg, Oral, 2 TIMES DAILY    DULoxetine (CYMBALTA) 30 mg capsule duloxetine 30 mg capsule,delayed release   TAKE AS DIRECTED ALONG WITH 60MG CYMBALTA    DULoxetine (CYMBALTA) 60 mg capsule TAKE 1 CAPSULE BY MOUTH DAILY    furosemide (LASIX) 20 mg tablet TK 1 T PO QD PRF WEIGHT GAIN OF 2 LBS OR MORE IN A DAY    gabapentin (NEURONTIN) 800 mg tablet 1 Tablet, Oral, 3 TIMES DAILY    hydrOXYzine pamoate (VISTARIL) 50 mg, Oral, EVERY BEDTIME    IRON, FERROUS SULFATE, PO Iron (ferrous sulfate)    latanoprost (XALATAN) 0.005 % ophthalmic solution 1 Drop, Both Eyes, EVERY BEDTIME    metFORMIN ER (GLUCOPHAGE XR) 500 mg tablet TAKE 2 TABLET BY MOUTH TWICE DAILY DAILY WITH MEALS STOP SYNJARDY    multivitamin-min-iron-FA-vit K (Bariatric Multivitamins) 45 mg iron- 800 mcg-120 mcg cap Bariatric Multivitamins    omega 3-dha-epa-fish oil (Fish Oil) 100-160-1,000 mg cap 3 mg, Oral, DAILY    oxyCODONE-acetaminophen (PERCOCET 10)  mg per tablet  Tablets, Oral, DAILY    potassium chloride (K-DUR, KLOR-CON) 20 mEq tablet TK 1 T PO QD PRN WHEN TAKING FUROSEMIDE    semaglutide (Ozempic) 0.25 mg or 0.5 mg/dose (2 mg/1.5 ml) subq pen Inject 0.25mg once weekly for three weeks, then increase to 0.5mg once weekly    sertraline (ZOLOFT) 25 mg tablet sertraline 25 mg tablet   TAKE 1 TABLET BY MOUTH EVERY DAY    timoloL (BetimoL) 0.5 % ophthalmic solution 1 Drop, Both Eyes, 2 TIMES DAILY, use in affected eye(s)     traZODone (DESYREL) 100 mg tablet TK 1 T PO QD HS    triamcinolone acetonide (KENALOG) 0.1 % topical cream triamcinolone acetonide 0.1 % topical cream         Signed By: Veronica Fowler MD     May 18, 2022

## 2022-05-18 NOTE — PROGRESS NOTES
Problem: Falls - Risk of  Goal: *Absence of Falls  Description: Document Aureliano Boswell Fall Risk and appropriate interventions in the flowsheet. Outcome: Progressing Towards Goal  Note: Fall Risk Interventions:  Mobility Interventions: Bed/chair exit alarm         Medication Interventions: Bed/chair exit alarm    Elimination Interventions:  Toileting schedule/hourly rounds,Call light in reach,Bed/chair exit alarm    History of Falls Interventions: Bed/chair exit alarm         Problem: Patient Education: Go to Patient Education Activity  Goal: Patient/Family Education  Outcome: Progressing Towards Goal

## 2022-05-19 LAB
ALBUMIN SERPL-MCNC: 2.7 G/DL (ref 3.5–5)
ALBUMIN/GLOB SERPL: 0.9 {RATIO} (ref 1.1–2.2)
ALP SERPL-CCNC: 81 U/L (ref 45–117)
ALT SERPL-CCNC: 12 U/L (ref 12–78)
ANION GAP SERPL CALC-SCNC: 5 MMOL/L (ref 5–15)
AST SERPL W P-5'-P-CCNC: 11 U/L (ref 15–37)
BILIRUB SERPL-MCNC: 0.3 MG/DL (ref 0.2–1)
BUN SERPL-MCNC: 10 MG/DL (ref 6–20)
BUN/CREAT SERPL: 13 (ref 12–20)
CA-I BLD-MCNC: 9.1 MG/DL (ref 8.5–10.1)
CHLORIDE SERPL-SCNC: 106 MMOL/L (ref 97–108)
CO2 SERPL-SCNC: 30 MMOL/L (ref 21–32)
CREAT SERPL-MCNC: 0.78 MG/DL (ref 0.55–1.02)
ERYTHROCYTE [DISTWIDTH] IN BLOOD BY AUTOMATED COUNT: 14.4 % (ref 11.5–14.5)
GLOBULIN SER CALC-MCNC: 3.1 G/DL (ref 2–4)
GLUCOSE SERPL-MCNC: 96 MG/DL (ref 65–100)
HCT VFR BLD AUTO: 31.7 % (ref 35–47)
HGB BLD-MCNC: 10.1 G/DL (ref 11.5–16)
MCH RBC QN AUTO: 29.3 PG (ref 26–34)
MCHC RBC AUTO-ENTMCNC: 31.9 G/DL (ref 30–36.5)
MCV RBC AUTO: 91.9 FL (ref 80–99)
NRBC # BLD: 0 K/UL (ref 0–0.01)
NRBC BLD-RTO: 0 PER 100 WBC
PLATELET # BLD AUTO: 462 K/UL (ref 150–400)
PMV BLD AUTO: 9.8 FL (ref 8.9–12.9)
POTASSIUM SERPL-SCNC: 3.7 MMOL/L (ref 3.5–5.1)
PROT SERPL-MCNC: 5.8 G/DL (ref 6.4–8.2)
RBC # BLD AUTO: 3.45 M/UL (ref 3.8–5.2)
SODIUM SERPL-SCNC: 141 MMOL/L (ref 136–145)
WBC # BLD AUTO: 8 K/UL (ref 3.6–11)

## 2022-05-19 PROCEDURE — 85027 COMPLETE CBC AUTOMATED: CPT

## 2022-05-19 PROCEDURE — 36415 COLL VENOUS BLD VENIPUNCTURE: CPT

## 2022-05-19 PROCEDURE — 74011250636 HC RX REV CODE- 250/636: Performed by: FAMILY MEDICINE

## 2022-05-19 PROCEDURE — 74011250637 HC RX REV CODE- 250/637: Performed by: FAMILY MEDICINE

## 2022-05-19 PROCEDURE — 97161 PT EVAL LOW COMPLEX 20 MIN: CPT

## 2022-05-19 PROCEDURE — 74011000250 HC RX REV CODE- 250: Performed by: FAMILY MEDICINE

## 2022-05-19 PROCEDURE — 65270000029 HC RM PRIVATE

## 2022-05-19 PROCEDURE — 97530 THERAPEUTIC ACTIVITIES: CPT

## 2022-05-19 PROCEDURE — 80053 COMPREHEN METABOLIC PANEL: CPT

## 2022-05-19 RX ADMIN — MULTIVITAMIN TABLET 1 TABLET: TABLET at 09:34

## 2022-05-19 RX ADMIN — CARVEDILOL 12.5 MG: 12.5 TABLET, FILM COATED ORAL at 16:21

## 2022-05-19 RX ADMIN — OXYCODONE 5 MG: 5 TABLET ORAL at 13:39

## 2022-05-19 RX ADMIN — TIMOLOL MALEATE 1 DROP: 5 SOLUTION/ DROPS OPHTHALMIC at 20:53

## 2022-05-19 RX ADMIN — GABAPENTIN 800 MG: 400 CAPSULE ORAL at 20:52

## 2022-05-19 RX ADMIN — SERTRALINE HYDROCHLORIDE 25 MG: 50 TABLET ORAL at 09:33

## 2022-05-19 RX ADMIN — LATANOPROST 1 DROP: 50 SOLUTION OPHTHALMIC at 20:52

## 2022-05-19 RX ADMIN — DOXYCYCLINE HYCLATE 100 MG: 100 CAPSULE ORAL at 20:52

## 2022-05-19 RX ADMIN — GABAPENTIN 800 MG: 400 CAPSULE ORAL at 09:33

## 2022-05-19 RX ADMIN — TIMOLOL MALEATE 1 DROP: 5 SOLUTION/ DROPS OPHTHALMIC at 13:30

## 2022-05-19 RX ADMIN — OXYCODONE 5 MG: 5 TABLET ORAL at 06:40

## 2022-05-19 RX ADMIN — DULOXETINE 60 MG: 30 CAPSULE, DELAYED RELEASE ORAL at 09:34

## 2022-05-19 RX ADMIN — OXYCODONE 5 MG: 5 TABLET ORAL at 20:52

## 2022-05-19 RX ADMIN — ENOXAPARIN SODIUM 40 MG: 100 INJECTION SUBCUTANEOUS at 09:34

## 2022-05-19 RX ADMIN — DOXYCYCLINE HYCLATE 100 MG: 100 CAPSULE ORAL at 09:34

## 2022-05-19 RX ADMIN — CARVEDILOL 12.5 MG: 12.5 TABLET, FILM COATED ORAL at 09:34

## 2022-05-19 RX ADMIN — FUROSEMIDE 40 MG: 10 INJECTION, SOLUTION INTRAMUSCULAR; INTRAVENOUS at 09:33

## 2022-05-19 RX ADMIN — TRAZODONE HYDROCHLORIDE 100 MG: 50 TABLET ORAL at 20:52

## 2022-05-19 RX ADMIN — CYCLOBENZAPRINE 10 MG: 10 TABLET, FILM COATED ORAL at 18:48

## 2022-05-19 RX ADMIN — TOPIRAMATE 25 MG: 25 TABLET, FILM COATED ORAL at 16:21

## 2022-05-19 RX ADMIN — METFORMIN HYDROCHLORIDE 500 MG: 500 TABLET, EXTENDED RELEASE ORAL at 16:21

## 2022-05-19 RX ADMIN — GABAPENTIN 800 MG: 400 CAPSULE ORAL at 16:21

## 2022-05-19 RX ADMIN — HYDROXYZINE PAMOATE 50 MG: 25 CAPSULE ORAL at 20:52

## 2022-05-19 RX ADMIN — TOPIRAMATE 25 MG: 25 TABLET, FILM COATED ORAL at 09:33

## 2022-05-19 NOTE — PROGRESS NOTES
CM discussed [address] with patient at bedside. She declined yesterday stating she would like the services but her  does not want anyone in the home. CM suggested outpatient therapy but she declined. CM explained the benefits of Home health services. Patient recognize her need for the services. She would like CM to set up home health services with [facility]. Choice letter received, reviewed, signed and placed on chart. Referral sent.

## 2022-05-19 NOTE — PROGRESS NOTES
PROGRESS NOTE    Patient: Valentin Bernal MRN: [id number]  SSN: [id number]    YOB: 1965  Age: 59 y.o. Sex: female      Admit Date: 5/16/2022    LOS: 3 days       Subjective     Chief Complaint   Patient presents with    Peripheral Edema    Hypotension       HPI:   Patient is a 59y.o. year old female with a past medical history significant hypertension, CHF, DM, hyperlipidemia and Breast cancer s/p bilateral mastectomies last week presents to the ED with cc of peripheral edema and hypertension.  Patient went to her 1 week follow-up with her breast surgeon who was concerned because her blood pressure was low and she had bilateral lower extremity edema which is new. States she has had issues in the past with lower limb swelling, but compression stockings were able to manage swelling appropriately. Since last week she has noticed the stockings have not helped, and that the swelling progressively worsened over the past week. She states her limbs are not particulary painful but feel \"tight\". Patient states she has had decreased appetite since January and this is unchanged.  She states she does feel generally unwell but does not have any specific abdominal pain, vomiting or diarrhea.  Patient is status post gastric bypass 2 years ago.  Additionally patient endorses chest pressure but states it it may be from the chest binder she is wearing.  She states the expanders in place are uncomfortable as well.     Patient admitted to the hospital. Patient is hypertensive with bilateral swelling of lower extremities and right upper extremity (chronic lymphedema; worse than normal). CT was negative for PE or aortic abnormalities. Lower limb doppler U/S was negative bilaterally. EKG shows normal sinus rhythm with a rate of 73.  Normal MA, normal QRS, normal QTC. Low voltage. 1 L of IV fluid administered. Troponin and lactic acid negative.     Remarkable Labs at time of admission   K: 2.9   BNP: 394   Hgb: 9.3 5/18  Patient is found lying in bed awake and eating breakfast. Complains of worsening swelling and pain in bilateral ankles and feet. Swelling has worsened since yesterday. Chest pain from recent surgery remains. Echo shows 60-65% with normal function. Septal thickening noted. Awaiting labs     5/19  Patient is found lying in bed awake and eating breakfast. Complains of unchanged swelling and pain in bilateral ankles and feet. Chest pain from recent surgery remains. OT recommends home health OT for discharge - patient declined. Discussed with patient likely source of swelling as other acute causes have been ruled out during hospitalization workup. Patient states she saw a cardiologist in the past for lower limb swelling and declined venous closure procedures. Explained to patient that she could elect to manage her symptoms with compression stocking and elevation or pursue outpatient vein treatments. Remarkable labs    BNP: 134 ; decreasing      ROS  Constitutional: Negative for chills and fever. HENT: Negative. Eyes: Negative. Respiratory: Negative. Cardiovascular: Chest pain - likely secondary to recent bilateral mastectomy    Gastrointestinal: Anorexia since January. Negative for abdominal pain and nausea. Skin: Swelling and lower limb pain   Neurological: Negative     Objective     Visit Vitals  BP (!) 114/38 (BP 1 Location: Left leg, BP Patient Position: At rest;Lying)   Pulse 77   Temp 97.9 °F (36.6 °C)   Resp 16   Ht 5' 4.96\" (1.65 m)   Wt 83.9 kg (184 lb 15.5 oz)   SpO2 100%   BMI 30.82 kg/m²      O2 Device: None (Room air)      Physical Exam  Constitutional: pt is oriented to person, place, and time. HENT:   Head: Normocephalic and atraumatic. Eyes: Pupils are equal, round, and reactive to light. EOM are normal.   Cardiovascular: Normal rate, regular rhythm and normal heart sounds. Pulmonary/Chest: Breath sounds normal. No wheezes. No rales. .   Exhibits no tenderness.    Abdominal: Soft. Bowel sounds are normal. There is no abdominal tenderness. There is no rebound and no guarding. Musculoskeletal: Lower limb and ankle swelling bilaterally. Normal range of motion. Right upper extremity edema, has a history of lymphedema due to her first episode of breast cancer   Neurological: pt is alert and oriented to person, place, and time. Alert. Normal strength. No cranial nerve deficit or sensory deficit. Displays a negative Romberg sign.       Intake & Output:  Current Shift: 05/19 0701 - 05/19 1900  In: 250 [P.O.:250]  Out: -   Last three shifts: 05/17 1901 - 05/19 0700  In: 240 [P.O.:240]  Out: 785 [Urine:700; Drains:85]    Lab/Data Review:  Lab results reviewed. For significant abnormal values and values requiring intervention, see assessment and plan.        24 Hour Results:    Recent Results (from the past 24 hour(s))   GLUCOSE, POC    Collection Time: 05/18/22  3:03 PM   Result Value Ref Range    Glucose (POC) 133 (H) 65 - 117 mg/dL    Performed by Vignesh Del Angel    CBC W/O DIFF    Collection Time: 05/19/22  9:28 AM   Result Value Ref Range    WBC 8.0 3.6 - 11.0 K/uL    RBC 3.45 (L) 3.80 - 5.20 M/uL    HGB 10.1 (L) 11.5 - 16.0 g/dL    HCT 31.7 (L) 35.0 - 47.0 %    MCV 91.9 80.0 - 99.0 FL    MCH 29.3 26.0 - 34.0 PG    MCHC 31.9 30.0 - 36.5 g/dL    RDW 14.4 11.5 - 14.5 %    PLATELET 111 (H) 855 - 400 K/uL    MPV 9.8 8.9 - 12.9 FL    NRBC 0.0 0.0  WBC    ABSOLUTE NRBC 0.00 0.00 - 3.00 K/uL   METABOLIC PANEL, COMPREHENSIVE    Collection Time: 05/19/22  9:28 AM   Result Value Ref Range    Sodium 141 136 - 145 mmol/L    Potassium 3.7 3.5 - 5.1 mmol/L    Chloride 106 97 - 108 mmol/L    CO2 30 21 - 32 mmol/L    Anion gap 5 5 - 15 mmol/L    Glucose 96 65 - 100 mg/dL    BUN 10 6 - 20 mg/dL    Creatinine 0.78 0.55 - 1.02 mg/dL    BUN/Creatinine ratio 13 12 - 20      GFR est AA >60 >60 ml/min/1.73m2    GFR est non-AA >60 >60 ml/min/1.73m2    Calcium 9.1 8.5 - 10.1 mg/dL    Bilirubin, total 0.3 0.2 - 1.0 mg/dL    AST (SGOT) 11 (L) 15 - 37 U/L    ALT (SGPT) 12 12 - 78 U/L    Alk. phosphatase 81 45 - 117 U/L    Protein, total 5.8 (L) 6.4 - 8.2 g/dL    Albumin 2.7 (L) 3.5 - 5.0 g/dL    Globulin 3.1 2.0 - 4.0 g/dL    A-G Ratio 0.9 (L) 1.1 - 2.2           Imaging:    CTA CHEST W OR W WO CONT   Final Result   No evidence of pulmonary embolism or acute aortic abnormalities. Postsurgical changes in the breasts. Mild prominence of the ascending aorta.       DUPLEX LOWER EXT VENOUS BILAT   Final Result      DUPLEX UPPER EXT VENOUS RIGHT   Final Result             Assessment     Active Problems:    Peripheral edema (5/16/2022)      Leg edema (5/16/2022)      Peripheral edema   Breast cancer s/p bilateral mastectomy  Hypertension   Hyperlipidemia   Chronic headaches   CHF  Type 2 diabetes  Hypokalemia       Plan     Klor-con 40 mEq q4h   Cymbalta 60mg daily   Metformin ER 500mg daily with dinner     Start on Lasix 40 mg IV daily  Neurontin 800mg TID   Zoloft 25mg daily   Lovenox 40mg daily     Monitor vitals   Monitor swelling and pain status   Repeat CBC, CMP, BNP   Order compression stockings for swelling relief     Possible discharge in 24 hours         Current Facility-Administered Medications:     cyclobenzaprine (FLEXERIL) tablet 10 mg, 10 mg, Oral, TID PRN, Desire Farser MD, 10 mg at 05/18/22 1234    oxyCODONE IR (ROXICODONE) tablet 5 mg, 5 mg, Oral, Q4H PRN, Desire Fraser MD, 5 mg at 05/19/22 0640    doxycycline (VIBRAMYCIN) capsule 100 mg, 100 mg, Oral, Q12H, Desire Fraser MD, 100 mg at 05/19/22 0934    topiramate (TOPAMAX) tablet 25 mg, 25 mg, Oral, BID WITH MEALS, Desire Fraser MD, 25 mg at 05/19/22 0933    carvediloL (COREG) tablet 12.5 mg, 12.5 mg, Oral, BID WITH MEALS, Desire Fraser MD, 12.5 mg at 05/19/22 0934    *Please  patient's home medications from pharmacy at discharge*, 1 Each, Other, PRIOR TO DISCHARGE, Desire Fraser MD    furosemide (LASIX) injection 40 mg, 40 mg, IntraVENous, DAILY, Desire Fraser MD, 40 mg at 05/19/22 0933    DULoxetine (CYMBALTA) capsule 60 mg, 60 mg, Oral, DAILY, Desire Fraser MD, 60 mg at 05/19/22 0934    metFORMIN ER (GLUCOPHAGE XR) tablet 500 mg, 500 mg, Oral, DAILY WITH DINNER, Mohiuddin, Laban Mcardle, MD    semaglutide (OZEMPIC) 0.25 mg or 0.5 mg/dose (2 mg/1.5 ml) subq pen 0.25 mg, 0.25 mg, SubCUTAneous, Q7D, Desire Fraser MD    gabapentin (NEURONTIN) capsule 800 mg, 800 mg, Oral, TID, Desire Fraser MD, 800 mg at 05/19/22 0933    hydrOXYzine pamoate (VISTARIL) capsule 50 mg, 50 mg, Oral, QHS, Desire Fraser MD, 50 mg at 05/18/22 2028    . PHARMACY TO SUBSTITUTE PER PROTOCOL (Reordered from: IRON, FERROUS SULFATE, PO), , , Per Protocol, Mohiuddin, Laban Mcardle, MD    latanoprost (XALATAN) 0.005 % ophthalmic solution 1 Drop, 1 Drop, Both Eyes, QHS, Desire Fraser MD, 1 Drop at 05/18/22 2028    multivitamin with folic acid (ONE DAILY WITH FOLIC ACID) tablet 1 Tablet, 1 Tablet, Oral, DAILY, Desire Fraser MD, 1 Tablet at 05/19/22 0934    sertraline (ZOLOFT) tablet 25 mg, 25 mg, Oral, DAILY, Desire Fraser MD, 25 mg at 05/19/22 0933    timolol (TIMOPTIC) 0.5 % ophthalmic solution 1 Drop, 1 Drop, Both Eyes, BID, Mohiuddin, Laban Mcardle, MD    traZODone (DESYREL) tablet 100 mg, 100 mg, Oral, QHS PRN, Desire Fraser MD, 100 mg at 05/18/22 2028    acetaminophen (TYLENOL) tablet 650 mg, 650 mg, Oral, Q6H PRN, 650 mg at 05/17/22 2238 **OR** acetaminophen (TYLENOL) suppository 650 mg, 650 mg, Rectal, Q6H PRN, Mohiuddin, Laban Mcardle, MD    polyethylene glycol (MIRALAX) packet 17 g, 17 g, Oral, DAILY PRN, Mohiuddin, Laban Mcardle, MD    ondansetron (ZOFRAN ODT) tablet 4 mg, 4 mg, Oral, Q8H PRN **OR** ondansetron (ZOFRAN) injection 4 mg, 4 mg, IntraVENous, Q6H PRN, Desire Fraser MD    enoxaparin (LOVENOX) injection 40 mg, 40 mg, SubCUTAneous, DAILY, Desire Fraser MD, 40 mg at 05/19/22 7721    Current Outpatient Medications   Medication Instructions    ascorbic acid-collagen (Collagen Plus Vitamin C) 125-740 mg cap Collagen Plus Vitamin C    b complex vitamins tablet 1 Tablet, Oral, DAILY    biotin 500 mcg Cap Take  by mouth.     calcium-cholecalciferol, d3, 600 mg calcium- 200 unit cap 600 mg, Oral, DAILY    carvediloL (COREG) 12.5 mg, Oral, 2 TIMES DAILY    DULoxetine (CYMBALTA) 30 mg capsule duloxetine 30 mg capsule,delayed release   TAKE AS DIRECTED ALONG WITH 60MG CYMBALTA    DULoxetine (CYMBALTA) 60 mg capsule TAKE 1 CAPSULE BY MOUTH DAILY    furosemide (LASIX) 20 mg tablet TK 1 T PO QD PRF WEIGHT GAIN OF 2 LBS OR MORE IN A DAY    gabapentin (NEURONTIN) 800 mg tablet 1 Tablet, Oral, 3 TIMES DAILY    hydrOXYzine pamoate (VISTARIL) 50 mg, Oral, EVERY BEDTIME    IRON, FERROUS SULFATE, PO Iron (ferrous sulfate)    latanoprost (XALATAN) 0.005 % ophthalmic solution 1 Drop, Both Eyes, EVERY BEDTIME    metFORMIN ER (GLUCOPHAGE XR) 500 mg tablet TAKE 2 TABLET BY MOUTH TWICE DAILY DAILY WITH MEALS STOP SYNJARDY    multivitamin-min-iron-FA-vit K (Bariatric Multivitamins) 45 mg iron- 800 mcg-120 mcg cap Bariatric Multivitamins    omega 3-dha-epa-fish oil (Fish Oil) 100-160-1,000 mg cap 3 mg, Oral, DAILY    oxyCODONE-acetaminophen (PERCOCET 10)  mg per tablet  Tablets, Oral, DAILY    potassium chloride (K-DUR, KLOR-CON) 20 mEq tablet TK 1 T PO QD PRN WHEN TAKING FUROSEMIDE    semaglutide (Ozempic) 0.25 mg or 0.5 mg/dose (2 mg/1.5 ml) subq pen Inject 0.25mg once weekly for three weeks, then increase to 0.5mg once weekly    sertraline (ZOLOFT) 25 mg tablet sertraline 25 mg tablet   TAKE 1 TABLET BY MOUTH EVERY DAY    timoloL (BetimoL) 0.5 % ophthalmic solution 1 Drop, Both Eyes, 2 TIMES DAILY, use in affected eye(s)     traZODone (DESYREL) 100 mg tablet TK 1 T PO QD HS    triamcinolone acetonide (KENALOG) 0.1 % topical cream triamcinolone acetonide 0.1 % topical cream         Signed By: Evie Gr MD     May 19, 2022

## 2022-05-19 NOTE — PROGRESS NOTES
PHYSICAL THERAPY EVALUATION  Patient: Liban Peters (62 y.o. female)  Date: 5/19/2022  Primary Diagnosis: Peripheral edema [R60.9]  Leg edema [R60.0]        Precautions: Falls, Bilateral Masectomy precautions       ASSESSMENT  Pt is a 58 yo female admitted on 5/16/22/2022 for peripheral edema and hypertension that was found by her breast surgeon after during her 1 week follow-up after bilateral masectomies. Pt is currently being treated for peripheral edema and hypertension. PMH: Athritis, Cancer, CHF, Gout, Glaucoma, GERD, HTN, Sleep Apnea, Neuropathy, DM, Gout. Pt received ambulating in bathroom with RW upon PT arrival, agreeable to evaluation. Pt A&O x 4. Per pt report, pt resides with  in a 1 story house with 5 RIKKI, right handrails pt was independent for ADLS/IADLS, ambulating with SPC at mod I prior to admission. DME owned: [facility]. Rollator, Shower chair, removable shower head. Based on the objective data described below, the patient presents with generalized weakness, impaired functional mobility, impaired amb, impaired balance, and decreased activity tolerance. Prior to beginning of session, pt utilized restroom and performed toilet hygiene without need of assistance. Bed mobility and supine<>sit not assessed due to pt ambulating in bathroom and pt verbalizing that she does not need assistance getting out of bed. Pt required  Mod I sit <> stand transfers. Pt amb 350 feet (bed>hallway>bed) with gt belt, RW, and Mod I; demonstrating slow, shuffling gt pattern with no LOB or knee buckling noted. Pt did well with session today with additional time to perform functional mobility. At end of session, discussed with pt about HHPT services after discharge with pt declining services. Will maintain pt on caseload for 1 more session after PT evaluation due to perform HEP education and energy conservation techniques prior to discharge due to pt's current acute medical status.  Pt will benefit from continued skilled PT to address above deficits and return to PLOF. Current PT DC recommendation Home with family care. Current Level of Function Impacting Discharge (mobility/balance): SBA-Mod I    Other factors to consider for discharge: acute medical status, PMH      PLAN :  Recommendations and Planned Interventions: bed mobility training, transfer training, gait training, therapeutic exercises, neuromuscular re-education, patient and family training/education, and therapeutic activities      Recommend with staff: SBA    Frequency/Duration: Patient will be followed by physical therapy:  1-2x/week to address goals. Recommendation for discharge: (in order for the patient to meet his/her long term goals)  Home with Family Care    This discharge recommendation:  Has been made in collaboration with the attending provider and/or case management    IF patient discharges home will need the following DME: to be determined (TBD)         SUBJECTIVE:   Patient stated I am doing good.     OBJECTIVE DATA SUMMARY:   HISTORY:    Past Medical History:   Diagnosis Date    Arthritis     Back/Neck problems    Burning with urination     Frequency    Cancer ([facility] [address] 2009    RIGHT breast    Congestive heart failure (HCC)     Depression     Including Post Partum    Diabetes ([facility] [address]     Dizziness     GERD (gastroesophageal reflux disease)     Glaucoma     Gout     Headache     Heart disease     Hypercholesterolemia     Hypertension     Morbid obesity (HCC)     Neuropathy     Shortness of breath     With activity    Sleep apnea     Sleep disorder     Difficulty falling asleep, night sweats    Stool color black      Past Surgical History:   Procedure Laterality Date    HX APPENDECTOMY      HX BREAST LUMPECTOMY  2009    RIGHT with SNBX    HX CHOLECYSTECTOMY      HX GASTRIC BYPASS  2020    HX GYN       x1, Bilateral tubal ligation, Dilation & Curettage    HX GYN      Hysterectomy (partial)    HX ORTHOPAEDIC Procedure on shoulder    HX PREMALIG/BENIGN SKIN LESION EXCISION      Cyst removed from scalp    HX PREMALIG/BENIGN SKIN LESION EXCISION      Debridement of subcutaneous tissue    HX TONSILLECTOMY  1970    HX UROLOGICAL         Home Situation  Home Environment: Private residence  # Steps to Enter: 4  Rails to Enter: Yes  One/Two Story Residence: One story  Living Alone: Yes  Support Systems: Spouse/Significant Other  Patient Expects to be Discharged to[de-identified] Home  Current DME Used/Available at Home: [address], [place], straight,Walker, rollator,Walker, rolling    EXAMINATION/PRESENTATION/DECISION MAKING:   Critical Behavior:  Neurologic State: Alert  Orientation Level: Oriented X4  Cognition: Other (comment) (reports some issues with memory)     Hearing: Auditory  Auditory Impairment: None  Skin:  intact where visible  Edema: generalized edema of R UE and B LE  Range Of Motion:  AROM: Within functional limits           PROM: Within functional limits           Strength:    Strength: Generally decreased, functional                         Functional Mobility:  Bed Mobility:              Transfers:  Sit to Stand: Modified independent  Stand to Sit: Modified independent                       Balance:   Sitting: Intact; Without support  Standing: Intact; With support  Ambulation/Gait Training:  Distance (ft): 350 Feet (ft) (bed>hallway>bed)  Assistive Device: Walker, rolling  Ambulation - Level of Assistance: Modified independent     Gait Description (WDL): Exceptions to WDL           Base of Support: Narrowed     Speed/Radha: Slow;Pace decreased (<100 feet/min)         Therapeutic Exercises:   Not completed during session    Functional Measure:  [facility] Mobility Inpatient Short Form  How much difficulty does the patient currently have. .. Unable A Lot A Little None   1. Turning over in bed (including adjusting bedclothes, sheets and blankets)? [] 1   [] 2   [] 3   [x] 4   2.   Sitting down on and standing up from a chair with arms ( e.g., wheelchair, bedside commode, etc.)   [] 1   [] 2   [] 3   [x] 4   3. Moving from lying on back to sitting on the side of the bed? [] 1   [] 2   [] 3   [x] 4          How much help from another person does the patient currently need. .. Total A Lot A Little None   4. Moving to and from a bed to a chair (including a wheelchair)? [] 1   [] 2   [] 3   [x] 4   5. Need to walk in hospital room? [] 1   [] 2   [] 3   [x] 4   6. Climbing 3-5 steps with a railing? [] 1   [] 2   [x] 3   [] 4   © 2007, Trustees of [facility], under license to [employer]. All rights reserved     Score:  Initial: 23/24 Most Recent: X (Date: 5/19/22 )   Interpretation of Tool:  Represents activities that are increasingly more difficult (i.e. Bed mobility, Transfers, Gait). Score 24 23 22-20 19-15 14-10 9-7 6   Modifier CH CI CJ CK CL CM CN         Physical Therapy Evaluation Charge Determination   History Examination Presentation Decision-Making   HIGH Complexity :3+ comorbidities / personal factors will impact the outcome/ POC  HIGH Complexity : 4+ Standardized tests and measures addressing body structure, function, activity limitation and / or participation in recreation  LOW Complexity : Stable, uncomplicated  Other outcome measures [facility] 6  Low      Based on the above components, the patient evaluation is determined to be of the following complexity level: LOW     Pain Rating:  Pt reports of 8/10 pain     Activity Tolerance:   Fair and requires rest breaks    After treatment patient left in no apparent distress:   Sitting at EOB with needs in reach  and nursing updated. GOALS:    Problem: Mobility Impaired (Adult and Pediatric)  Goal: *Acute Goals and Plan of Care (Insert Text)  Description: Pt stated goal: Go home back to her   Pt will be I with LE HEP in 7 days. Pt will perform bed mobility with independent in 7 days.   Pt will perform transfers with independent in 7 days. Pt will amb 500 feet with LRAD safely with Mod I in 7 days. Pt will ascend/descend 5 steps with R handrail and Mod I in 7 days to safely enter home. Outcome: Not Met       COMMUNICATION/EDUCATION:   The patients plan of care was discussed with: Occupational therapist, Registered nurse, and Case management. Fall prevention education was provided and the patient/caregiver indicated understanding., Patient/family have participated as able in goal setting and plan of care. , and Patient/family agree to work toward stated goals and plan of care. LUIS CARLOS Yepez, under direct supervision of Dhiraj Benitez, PT, DPT provided care and treatment of pt with verbal consent from pt received.         Thank you for this referral.  LUIS CARLOS Yepez, PT, DPT   Time Calculation: 33 mins

## 2022-05-19 NOTE — PROGRESS NOTES
Physician Progress Note      Weston Padilla  CSN #:                  [id number]  :                       1965  ADMIT DATE:       2022 3:42 PM  [address] [place] DATE:  RESPONDING  PROVIDER #:        Annette Manzanares MD          QUERY TEXT:    Dear Dr. Palmer Jay -    Patient admitted with extremity edema, breast cancer s/p recent total mastectomy. If possible, please document in progress notes and discharge summary if you are evaluating and /or treating any of the following: The medical record reflects the following:  Risk Factors: 59 F presented with peripheral edema, breast cancer s/p recent total mastectomy  Clinical Indicators: Nutrition assessment documented 22 notes patient with moderate malnutrition in the context of chronic illness AEB: energy intake  75% or less est energy requirements for 1 month or longer, weight loss >2% over 1 week, peripheral edema; patient reports poor appetite since 2022; total protein 5.2; albumin 2.4  Treatment: labs, Nutrition consult, IV Lasix, Ensure HP BID, MVI, Zofran    ASPEN Criteria:  https://aspenjournals. onlinelibrary. reyna. com/doi/full/10.1177/0357408767463505    Thank you,  PRISCILA HaN, RN, CRCR  Clinical   [email or web address] or contact via [employer]  Options provided:  -- Protein calorie malnutrition mild  -- Protein calorie malnutrition moderate  -- Protein calorie malnutrition severe  -- Other - I will add my own diagnosis  -- Disagree - Not applicable / Not valid  -- Disagree - Clinically unable to determine / Unknown  -- Refer to Clinical Documentation Reviewer    PROVIDER RESPONSE TEXT:    This patient has moderate protein calorie malnutrition.     Query created by: Peri Hung on 2022 7:49 AM      Electronically signed by:  Annette Manzanares MD 2022 10:19 AM

## 2022-05-19 NOTE — PROGRESS NOTES
Care plan reviewed. Problem: Falls - Risk of  Goal: *Absence of Falls  Description: Document Rebecca Pena Fall Risk and appropriate interventions in the flowsheet.   Outcome: Progressing Towards Goal  Note: Fall Risk Interventions:  Mobility Interventions: Bed/chair exit alarm,Patient to call before getting OOB         Medication Interventions: Teach patient to arise slowly    Elimination Interventions: Call light in reach    History of Falls Interventions: Bed/chair exit alarm         Problem: Patient Education: Go to Patient Education Activity  Goal: Patient/Family Education  Outcome: Progressing Towards Goal

## 2022-05-19 NOTE — PROGRESS NOTES
PROGRESS NOTE    Patient: Kaley Ward MRN: [id number]  SSN: [id number]    YOB: 1965  Age: 59 y.o. Sex: female      Admit Date: 5/16/2022    LOS: 3 days       Subjective     Chief Complaint   Patient presents with    Peripheral Edema    Hypotension       HPI:   Patient is a 59y.o. year old female with a past medical history significant hypertension, CHF, DM, hyperlipidemia and Breast cancer s/p bilateral mastectomies last week presents to the ED with cc of peripheral edema and hypertension.  Patient went to her 1 week follow-up with her breast surgeon who was concerned because her blood pressure was low and she had bilateral lower extremity edema which is new. States she has had issues in the past with lower limb swelling, but compression stockings were able to manage swelling appropriately. Since last week she has noticed the stockings have not helped, and that the swelling progressively worsened over the past week. She states her limbs are not particulary painful but feel \"tight\". Patient states she has had decreased appetite since January and this is unchanged.  She states she does feel generally unwell but does not have any specific abdominal pain, vomiting or diarrhea.  Patient is status post gastric bypass 2 years ago.  Additionally patient endorses chest pressure but states it it may be from the chest binder she is wearing.  She states the expanders in place are uncomfortable as well.     Patient admitted to the hospital. Patient is hypertensive with bilateral swelling of lower extremities and right upper extremity (chronic lymphedema; worse than normal). CT was negative for PE or aortic abnormalities. Lower limb doppler U/S was negative bilaterally. EKG shows normal sinus rhythm with a rate of 73.  Normal RI, normal QRS, normal QTC. Low voltage. 1 L of IV fluid administered. Troponin and lactic acid negative.     Remarkable Labs at time of admission   K: 2.9   BNP: 394   Hgb: 9.3 5/18  Patient is found lying in bed awake and eating breakfast. Complains of worsening swelling and pain in bilateral ankles and feet. Swelling has worsened since yesterday. Chest pain from recent surgery remains. Echo shows 60-65% with normal function. Septal thickening noted. Awaiting labs     5/19  Patient is found lying in bed awake and eating breakfast. Complains of unchanged swelling and pain in bilateral ankles and feet. Chest pain from recent surgery remains. OT recommends home health OT for discharge - patient declined. Discussed with patient likely source of swelling as other acute causes have been ruled out during hospitalization workup. Patient states she saw a cardiologist in the past for lower limb swelling and declined venous closure procedures. Explained to patient that she could elect to manage her symptoms with compression stocking and elevation or pursue outpatient vein treatments. Remarkable labs    BNP: 134 ; decreasing      ROS  Constitutional: Negative for chills and fever. HENT: Negative. Eyes: Negative. Respiratory: Negative. Cardiovascular: Chest pain - likely secondary to recent bilateral mastectomy    Gastrointestinal: Anorexia since January. Negative for abdominal pain and nausea. Skin: Swelling and lower limb pain   Neurological: Negative     Objective     Visit Vitals  /84   Pulse 84   Temp 98.2 °F (36.8 °C)   Resp 20   Ht 5' 4.96\" (1.65 m)   Wt 184 lb 15.5 oz (83.9 kg)   SpO2 98%   BMI 30.82 kg/m²      O2 Device: None (Room air)      Physical Exam  Constitutional: pt is oriented to person, place, and time. HENT:   Head: Normocephalic and atraumatic. Eyes: Pupils are equal, round, and reactive to light. EOM are normal.   Cardiovascular: Normal rate, regular rhythm and normal heart sounds. Pulmonary/Chest: Breath sounds normal. No wheezes. No rales. .   Exhibits no tenderness. Abdominal: Soft. Bowel sounds are normal. There is no abdominal tenderness. There is no rebound and no guarding. Musculoskeletal: Lower limb and ankle swelling bilaterally. Normal range of motion. Right upper extremity edema, has a history of lymphedema due to her first episode of breast cancer   Neurological: pt is alert and oriented to person, place, and time. Alert. Normal strength. No cranial nerve deficit or sensory deficit. Displays a negative Romberg sign.       Intake & Output:  Current Shift: No intake/output data recorded. Last three shifts: 05/17 1901 - 05/19 0700  In: 240 [P.O.:240]  Out: 785 [Urine:700; Drains:85]    Lab/Data Review:  Lab results reviewed. For significant abnormal values and values requiring intervention, see assessment and plan. 24 Hour Results:    Recent Results (from the past 24 hour(s))   CBC W/O DIFF    Collection Time: 05/18/22 10:58 AM   Result Value Ref Range    WBC 9.1 3.6 - 11.0 K/uL    RBC 3.24 (L) 3.80 - 5.20 M/uL    HGB 9.8 (L) 11.5 - 16.0 g/dL    HCT 29.4 (L) 35.0 - 47.0 %    MCV 90.7 80.0 - 99.0 FL    MCH 30.2 26.0 - 34.0 PG    MCHC 33.3 30.0 - 36.5 g/dL    RDW 14.3 11.5 - 14.5 %    PLATELET 833 (H) 706 - 400 K/uL    MPV 10.1 8.9 - 12.9 FL    NRBC 0.0 0.0  WBC    ABSOLUTE NRBC 0.00 0.00 - 0.01 K/uL   NT-PRO BNP    Collection Time: 05/18/22 11:06 AM   Result Value Ref Range    NT pro- (H) <514 pg/mL   METABOLIC PANEL, COMPREHENSIVE    Collection Time: 05/18/22 11:06 AM   Result Value Ref Range    Sodium 143 136 - 145 mmol/L    Potassium 3.6 3.5 - 5.1 mmol/L    Chloride 110 (H) 97 - 108 mmol/L    CO2 28 21 - 32 mmol/L    Anion gap 5 5 - 15 mmol/L    Glucose 130 (H) 65 - 100 mg/dL    BUN 9 6 - 20 mg/dL    Creatinine 0.77 0.55 - 1.02 mg/dL    BUN/Creatinine ratio 12 12 - 20      GFR est AA >60 >60 ml/min/1.73m2    GFR est non-AA >60 >60 ml/min/1.73m2    Calcium 8.7 8.5 - 10.1 mg/dL    Bilirubin, total 0.2 0.2 - 1.0 mg/dL    AST (SGOT) 7 (L) 15 - 37 U/L    ALT (SGPT) 11 (L) 12 - 78 U/L    Alk.  phosphatase 69 45 - 117 U/L Protein, total 5.2 (L) 6.4 - 8.2 g/dL    Albumin 2.4 (L) 3.5 - 5.0 g/dL    Globulin 2.8 2.0 - 4.0 g/dL    A-G Ratio 0.9 (L) 1.1 - 2.2     GLUCOSE, POC    Collection Time: 05/18/22  3:03 PM   Result Value Ref Range    Glucose (POC) 133 (H) 65 - 117 mg/dL    Performed by Mylene Tinoco          Imaging:    CTA CHEST W OR W WO CONT   Final Result   No evidence of pulmonary embolism or acute aortic abnormalities. Postsurgical changes in the breasts. Mild prominence of the ascending aorta.       DUPLEX LOWER EXT VENOUS BILAT   Final Result      DUPLEX UPPER EXT VENOUS RIGHT   Final Result             Assessment     Active Problems:    Peripheral edema (5/16/2022)      Leg edema (5/16/2022)      Peripheral edema   Breast cancer s/p bilateral mastectomy  Hypertension   Hyperlipidemia   Chronic headaches   CHF  Type 2 diabetes  Hypokalemia       Plan     Klor-con 40 mEq q4h   Cymbalta 60mg daily   Metformin ER 500mg daily with dinner     Start on Lasix 40 mg IV daily  Neurontin 800mg TID   Zoloft 25mg daily   Lovenox 40mg daily     Monitor vitals   Monitor swelling and pain status   Repeat CBC, CMP, BNP   Order compression stockings for swelling relief     Possible discharge in 24-48 hours         Current Facility-Administered Medications:     cyclobenzaprine (FLEXERIL) tablet 10 mg, 10 mg, Oral, TID PRN, Desire Fraser MD, 10 mg at 05/18/22 1234    oxyCODONE IR (ROXICODONE) tablet 5 mg, 5 mg, Oral, Q4H PRN, Desire Fraser MD, 5 mg at 05/19/22 0640    doxycycline (VIBRAMYCIN) capsule 100 mg, 100 mg, Oral, Q12H, Desire Fraser MD, 100 mg at 05/18/22 2028    topiramate (TOPAMAX) tablet 25 mg, 25 mg, Oral, BID WITH MEALS, Desire Fraser MD, 25 mg at 05/18/22 1819    carvediloL (COREG) tablet 12.5 mg, 12.5 mg, Oral, BID WITH MEALS, Desire Fraser MD, 12.5 mg at 05/18/22 1819    *Please  patient's home medications from pharmacy at discharge*, 1 Each, Other, PRIOR TO DISCHARGE, Mati Fraser, MD    furosemide (LASIX) injection 40 mg, 40 mg, IntraVENous, DAILY, Desire Fraser MD, 40 mg at 05/18/22 1234    DULoxetine (CYMBALTA) capsule 60 mg, 60 mg, Oral, DAILY, Desire Fraser MD, 60 mg at 05/18/22 2053    metFORMIN ER (GLUCOPHAGE XR) tablet 500 mg, 500 mg, Oral, DAILY WITH DINNER, Lucas Fraser MD    semaglutide (OZEMPIC) 0.25 mg or 0.5 mg/dose (2 mg/1.5 ml) subq pen 0.25 mg, 0.25 mg, SubCUTAneous, Q7D, Desire Fraser MD    gabapentin (NEURONTIN) capsule 800 mg, 800 mg, Oral, TID, Desire Fraser MD, 800 mg at 05/18/22 2028    hydrOXYzine pamoate (VISTARIL) capsule 50 mg, 50 mg, Oral, QHS, Desire Fraser MD, 50 mg at 05/18/22 2028    . PHARMACY TO SUBSTITUTE PER PROTOCOL (Reordered from: IRON, FERROUS SULFATE, PO), , , Per Protocol, Lucas Fraser MD    latanoprost (XALATAN) 0.005 % ophthalmic solution 1 Drop, 1 Drop, Both Eyes, QHS, Desire Fraser MD, 1 Drop at 05/18/22 2028    multivitamin with folic acid (ONE DAILY WITH FOLIC ACID) tablet 1 Tablet, 1 Tablet, Oral, DAILY, Desire Fraser MD, 1 Tablet at 05/18/22 2458    . PHARMACY TO SUBSTITUTE PER PROTOCOL (Reordered from: omega 3-dha-epa-fish oil (Fish Oil) 100-160-1,000 mg cap), , , Per Protocol, Lucas Fraser MD    sertraline (ZOLOFT) tablet 25 mg, 25 mg, Oral, DAILY, Desire Fraser MD, 25 mg at 05/18/22 0843    . PHARMACY TO SUBSTITUTE PER PROTOCOL (Reordered from: timoloL (BetimoL) 0.5 % ophthalmic solution), , , Per Protocol, Lucas Fraser MD    traZODone (DESYREL) tablet 100 mg, 100 mg, Oral, QHS PRN, Desire Fraser MD, 100 mg at 05/18/22 2028    acetaminophen (TYLENOL) tablet 650 mg, 650 mg, Oral, Q6H PRN, 650 mg at 05/17/22 2238 **OR** acetaminophen (TYLENOL) suppository 650 mg, 650 mg, Rectal, Q6H PRN, Desire Fraser MD    polyethylene glycol (MIRALAX) packet 17 g, 17 g, Oral, DAILY PRN, Desire Fraser MD    ondansetron (ZOFRAN ODT) tablet 4 mg, 4 mg, Oral, Q8H PRN **OR** ondansetron [facility]) injection 4 mg, 4 mg, IntraVENous, Q6H PRN, Desire Fraser MD    enoxaparin (LOVENOX) injection 40 mg, 40 mg, SubCUTAneous, DAILY, Desire Fraser MD, 40 mg at 05/18/22 0748    Current Outpatient Medications   Medication Instructions    ascorbic acid-collagen (Collagen Plus Vitamin C) 125-740 mg cap Collagen Plus Vitamin C    b complex vitamins tablet 1 Tablet, Oral, DAILY    biotin 500 mcg Cap Take  by mouth.     calcium-cholecalciferol, d3, 600 mg calcium- 200 unit cap 600 mg, Oral, DAILY    carvediloL (COREG) 12.5 mg, Oral, 2 TIMES DAILY    DULoxetine (CYMBALTA) 30 mg capsule duloxetine 30 mg capsule,delayed release   TAKE AS DIRECTED ALONG WITH 60MG CYMBALTA    DULoxetine (CYMBALTA) 60 mg capsule TAKE 1 CAPSULE BY MOUTH DAILY    furosemide (LASIX) 20 mg tablet TK 1 T PO QD PRF WEIGHT GAIN OF 2 LBS OR MORE IN A DAY    gabapentin (NEURONTIN) 800 mg tablet 1 Tablet, Oral, 3 TIMES DAILY    hydrOXYzine pamoate (VISTARIL) 50 mg, Oral, EVERY BEDTIME    IRON, FERROUS SULFATE, PO Iron (ferrous sulfate)    latanoprost (XALATAN) 0.005 % ophthalmic solution 1 Drop, Both Eyes, EVERY BEDTIME    metFORMIN ER (GLUCOPHAGE XR) 500 mg tablet TAKE 2 TABLET BY MOUTH TWICE DAILY DAILY WITH MEALS STOP SYNJARDY    multivitamin-min-iron-FA-vit K (Bariatric Multivitamins) 45 mg iron- 800 mcg-120 mcg cap Bariatric Multivitamins    omega 3-dha-epa-fish oil (Fish Oil) 100-160-1,000 mg cap 3 mg, Oral, DAILY    oxyCODONE-acetaminophen (PERCOCET 10)  mg per tablet  Tablets, Oral, DAILY    potassium chloride (K-DUR, KLOR-CON) 20 mEq tablet TK 1 T PO QD PRN WHEN TAKING FUROSEMIDE    semaglutide (Ozempic) 0.25 mg or 0.5 mg/dose (2 mg/1.5 ml) subq pen Inject 0.25mg once weekly for three weeks, then increase to 0.5mg once weekly    sertraline (ZOLOFT) 25 mg tablet sertraline 25 mg tablet   TAKE 1 TABLET BY MOUTH EVERY DAY    timoloL (BetimoL) 0.5 % ophthalmic solution 1 Drop, Both Eyes, 2 TIMES DAILY, use in affected eye(s)     traZODone (DESYREL) 100 mg tablet TK 1 T PO QD HS    triamcinolone acetonide (KENALOG) 0.1 % topical cream triamcinolone acetonide 0.1 % topical cream         Signed By: Jeff Neal     May 19, 2022

## 2022-05-20 VITALS
RESPIRATION RATE: 18 BRPM | BODY MASS INDEX: 31.52 KG/M2 | SYSTOLIC BLOOD PRESSURE: 123 MMHG | DIASTOLIC BLOOD PRESSURE: 93 MMHG | HEART RATE: 80 BPM | HEIGHT: 65 IN | TEMPERATURE: 98.4 F | WEIGHT: 189.15 LBS | OXYGEN SATURATION: 96 %

## 2022-05-20 LAB
ALBUMIN SERPL-MCNC: 2.3 G/DL (ref 3.5–5)
ALBUMIN/GLOB SERPL: 0.9 {RATIO} (ref 1.1–2.2)
ALP SERPL-CCNC: 67 U/L (ref 45–117)
ALT SERPL-CCNC: 10 U/L (ref 12–78)
ANION GAP SERPL CALC-SCNC: 4 MMOL/L (ref 5–15)
AST SERPL W P-5'-P-CCNC: 10 U/L (ref 15–37)
BILIRUB SERPL-MCNC: 0.2 MG/DL (ref 0.2–1)
BUN SERPL-MCNC: 13 MG/DL (ref 6–20)
BUN/CREAT SERPL: 18 (ref 12–20)
CA-I BLD-MCNC: 8.6 MG/DL (ref 8.5–10.1)
CHLORIDE SERPL-SCNC: 108 MMOL/L (ref 97–108)
CO2 SERPL-SCNC: 30 MMOL/L (ref 21–32)
CREAT SERPL-MCNC: 0.71 MG/DL (ref 0.55–1.02)
ERYTHROCYTE [DISTWIDTH] IN BLOOD BY AUTOMATED COUNT: 14.2 % (ref 11.5–14.5)
GLOBULIN SER CALC-MCNC: 2.7 G/DL (ref 2–4)
GLUCOSE SERPL-MCNC: 88 MG/DL (ref 65–100)
HCT VFR BLD AUTO: 29.2 % (ref 35–47)
HGB BLD-MCNC: 9.4 G/DL (ref 11.5–16)
MCH RBC QN AUTO: 30.2 PG (ref 26–34)
MCHC RBC AUTO-ENTMCNC: 32.2 G/DL (ref 30–36.5)
MCV RBC AUTO: 93.9 FL (ref 80–99)
NRBC # BLD: 0 K/UL (ref 0–0.01)
NRBC BLD-RTO: 0 PER 100 WBC
PLATELET # BLD AUTO: 395 K/UL (ref 150–400)
PMV BLD AUTO: 9.9 FL (ref 8.9–12.9)
POTASSIUM SERPL-SCNC: 3.6 MMOL/L (ref 3.5–5.1)
PROT SERPL-MCNC: 5 G/DL (ref 6.4–8.2)
RBC # BLD AUTO: 3.11 M/UL (ref 3.8–5.2)
SODIUM SERPL-SCNC: 142 MMOL/L (ref 136–145)
WBC # BLD AUTO: 7.8 K/UL (ref 3.6–11)

## 2022-05-20 PROCEDURE — 97530 THERAPEUTIC ACTIVITIES: CPT

## 2022-05-20 PROCEDURE — 80053 COMPREHEN METABOLIC PANEL: CPT

## 2022-05-20 PROCEDURE — 36415 COLL VENOUS BLD VENIPUNCTURE: CPT

## 2022-05-20 PROCEDURE — 74011250636 HC RX REV CODE- 250/636: Performed by: FAMILY MEDICINE

## 2022-05-20 PROCEDURE — 85027 COMPLETE CBC AUTOMATED: CPT

## 2022-05-20 PROCEDURE — 74011250637 HC RX REV CODE- 250/637: Performed by: FAMILY MEDICINE

## 2022-05-20 RX ORDER — DOXYCYCLINE 100 MG/1
100 CAPSULE ORAL EVERY 12 HOURS
Qty: 10 CAPSULE | Refills: 0 | Status: SHIPPED | OUTPATIENT
Start: 2022-05-20 | End: 2022-07-21

## 2022-05-20 RX ORDER — FUROSEMIDE 40 MG/1
TABLET ORAL
Qty: 15 TABLET | Refills: 0 | Status: SHIPPED | OUTPATIENT
Start: 2022-05-20 | End: 2022-07-21

## 2022-05-20 RX ORDER — TOPIRAMATE 25 MG/1
25 TABLET ORAL 2 TIMES DAILY WITH MEALS
Qty: 60 TABLET | Refills: 0 | Status: SHIPPED | OUTPATIENT
Start: 2022-05-20

## 2022-05-20 RX ADMIN — GABAPENTIN 800 MG: 400 CAPSULE ORAL at 09:05

## 2022-05-20 RX ADMIN — CARVEDILOL 12.5 MG: 12.5 TABLET, FILM COATED ORAL at 09:05

## 2022-05-20 RX ADMIN — MULTIVITAMIN TABLET 1 TABLET: TABLET at 09:05

## 2022-05-20 RX ADMIN — DULOXETINE 60 MG: 30 CAPSULE, DELAYED RELEASE ORAL at 09:05

## 2022-05-20 RX ADMIN — ENOXAPARIN SODIUM 40 MG: 100 INJECTION SUBCUTANEOUS at 09:00

## 2022-05-20 RX ADMIN — FUROSEMIDE 40 MG: 10 INJECTION, SOLUTION INTRAMUSCULAR; INTRAVENOUS at 09:05

## 2022-05-20 RX ADMIN — SERTRALINE HYDROCHLORIDE 25 MG: 50 TABLET ORAL at 09:05

## 2022-05-20 RX ADMIN — OXYCODONE 5 MG: 5 TABLET ORAL at 06:31

## 2022-05-20 RX ADMIN — TIMOLOL MALEATE 1 DROP: 5 SOLUTION/ DROPS OPHTHALMIC at 09:00

## 2022-05-20 RX ADMIN — TOPIRAMATE 25 MG: 25 TABLET, FILM COATED ORAL at 09:05

## 2022-05-20 RX ADMIN — DOXYCYCLINE HYCLATE 100 MG: 100 CAPSULE ORAL at 09:00

## 2022-05-20 NOTE — PROGRESS NOTES
Patient to discharge home today with [facility].  to transport at discharge. Primary nurse aware.

## 2022-05-20 NOTE — DISCHARGE SUMMARY
Discharge Summary       PATIENT ID: Gus Busby  MRN: [id number]   YOB: 1965    DATE OF ADMISSION: 5/16/2022  3:42 PM    DATE OF DISCHARGE:   PRIMARY CARE PROVIDER: Елена Bergeron MD     ATTENDING PHYSICIAN: Bea Torres  DISCHARGING PROVIDER: Bea Torres      CONSULTATIONS: None    PROCEDURES/SURGERIES: * No surgery found *    ADMITTING DIAGNOSES:    Patient Active Problem List    Diagnosis Date Noted    Peripheral edema 05/16/2022    Leg edema 05/16/2022    Body mass index 40.0-44.9, adult ([address] 10/19/2020    Chronic ulcer of skin ([address] 08/13/2020    Obstructive sleep apnea syndrome 08/13/2020    Radiation injury 08/13/2020    Status post bariatric surgery 07/30/2020    Severe obesity ([address] 07/30/2020    Neuropathy 05/25/2018    Encounter for long-term (current) use of insulin ([address] 04/09/2016    Mixed hyperlipidemia 04/09/2016    Essential hypertension 04/09/2016    Type II diabetes mellitus, uncontrolled 04/09/2016    Neuropathy due to chemotherapeutic drug ([address] 04/03/2014    Breast cancer, stage 2 04/01/2011       DISCHARGE DIAGNOSES / PLAN:      Peripheral edema   Breast cancer s/p bilateral mastectomy  Hypertension   Hyperlipidemia   Chronic headaches   CHF  Type 2 diabetes  Hypokalemia      DISCHARGE MEDICATIONS:  Current Discharge Medication List      START taking these medications    Details   doxycycline (VIBRAMYCIN) 100 mg capsule Take 1 Capsule by mouth every twelve (12) hours. Qty: 10 Capsule, Refills: 0  Start date: 5/20/2022      topiramate (TOPAMAX) 25 mg tablet Take 1 Tablet by mouth two (2) times daily (with meals).   Qty: 60 Tablet, Refills: 0  Start date: 5/20/2022         CONTINUE these medications which have CHANGED    Details   furosemide (Lasix) 40 mg tablet 1 tab daily  Qty: 15 Tablet, Refills: 0  Start date: 5/20/2022         CONTINUE these medications which have NOT CHANGED    Details   metFORMIN ER (GLUCOPHAGE XR) 500 mg tablet TAKE 2 TABLET BY MOUTH TWICE DAILY DAILY WITH MEALS STOP SYNJARDY  Qty: 360 Tablet, Refills: 1    Associated Diagnoses: Uncontrolled type 2 diabetes mellitus with hyperglycemia, with long-term current use of insulin ([address] S/P gastric bypass; Neuropathy      semaglutide (Ozempic) 0.25 mg or 0.5 mg/dose (2 mg/1.5 ml) subq pen Inject 0.25mg once weekly for three weeks, then increase to 0.5mg once weekly  Qty: 9 mL, Refills: 1    Associated Diagnoses: Uncontrolled type 2 diabetes mellitus with hyperglycemia, with long-term current use of insulin ([address] S/P gastric bypass; Neuropathy      DULoxetine (CYMBALTA) 60 mg capsule TAKE 1 CAPSULE BY MOUTH DAILY  Qty: 30 Capsule, Refills: 6      hydrOXYzine pamoate (VISTARIL) 25 mg capsule Take 50 mg by mouth nightly. sertraline (ZOLOFT) 25 mg tablet sertraline 25 mg tablet   TAKE 1 TABLET BY MOUTH EVERY DAY      b complex vitamins tablet Take 1 Tab by mouth daily. ascorbic acid-collagen (Collagen Plus Vitamin C) 125-740 mg cap Collagen Plus Vitamin C      IRON, FERROUS SULFATE, PO Iron (ferrous sulfate)      timoloL (BetimoL) 0.5 % ophthalmic solution Administer 1 Drop to both eyes two (2) times a day. use in affected eye(s)      multivitamin-min-iron-FA-vit K (Bariatric Multivitamins) 45 mg iron- 800 mcg-120 mcg cap Bariatric Multivitamins      carvediloL (COREG) 12.5 mg tablet Take 12.5 mg by mouth two (2) times a day. omega 3-dha-epa-fish oil (Fish Oil) 100-160-1,000 mg cap Take 3 mg by mouth daily. triamcinolone acetonide (KENALOG) 0.1 % topical cream triamcinolone acetonide 0.1 % topical cream      calcium-cholecalciferol, d3, 600 mg calcium- 200 unit cap Take 600 mg by mouth daily. oxyCODONE-acetaminophen (PERCOCET 10)  mg per tablet Take  Tabs by mouth daily.       traZODone (DESYREL) 100 mg tablet TK 1 T PO QD HS  Refills: 1      latanoprost (XALATAN) 0.005 % ophthalmic solution Administer 1 Drop to both eyes nightly.      gabapentin (NEURONTIN) 800 mg tablet Take 1 Tab by mouth three (3) times daily. Refills: 0      biotin 500 mcg Cap Take  by mouth. STOP taking these medications       potassium chloride (K-DUR, KLOR-CON) 20 mEq tablet Comments:   Reason for Stopping:                 NOTIFY YOUR PHYSICIAN FOR ANY OF THE FOLLOWING:   Fever over 101 degrees for 24 hours. Chest pain, shortness of breath, fever, chills, nausea, vomiting, diarrhea, change in mentation, falling, weakness, bleeding. Severe pain or pain not relieved by medications. Or, any other signs or symptoms that you may have questions about. DISPOSITION:  x  Home With:   OT  PT  HH  RN       Long term SNF/Inpatient Rehab    Independent/assisted living    Hospice    Other:       PATIENT CONDITION AT DISCHARGE: Stable      PHYSICAL EXAMINATION AT DISCHARGE:  General:          Alert, cooperative, no distress, appears stated age. HEENT:           Atraumatic, anicteric sclerae, pink conjunctivae                          No oral ulcers, mucosa moist, throat clear, dentition fair  Neck:               Supple, symmetrical  Lungs:             Clear to auscultation bilaterally. No Wheezing or Rhonchi. No rales. Chest wall:      No tenderness  No Accessory muscle use. Heart:              Regular  rhythm,  No  murmur   No edema  Abdomen:        Soft, non-tender. Not distended. Bowel sounds normal  Extremities:     No cyanosis. No clubbing,                            Skin turgor normal, Capillary refill normal  Skin:                Not pale. Not Jaundiced  No rashes   Psych:             Not anxious or agitated. Neurologic:      Alert, moves all extremities, answers questions appropriately and responds to commands     CTA CHEST W OR W WO CONT   Final Result   No evidence of pulmonary embolism or acute aortic abnormalities. Postsurgical changes in the breasts. Mild prominence of the ascending aorta.       DUPLEX LOWER EXT VENOUS BILAT   Final Result      DUPLEX UPPER EXT VENOUS RIGHT   Final Result           Recent Results (from the past 24 hour(s))   CBC W/O DIFF    Collection Time: 05/20/22  6:16 AM   Result Value Ref Range    WBC 7.8 3.6 - 11.0 K/uL    RBC 3.11 (L) 3.80 - 5.20 M/uL    HGB 9.4 (L) 11.5 - 16.0 g/dL    HCT 29.2 (L) 35.0 - 47.0 %    MCV 93.9 80.0 - 99.0 FL    MCH 30.2 26.0 - 34.0 PG    MCHC 32.2 30.0 - 36.5 g/dL    RDW 14.2 11.5 - 14.5 %    PLATELET 814 467 - 466 K/uL    MPV 9.9 8.9 - 12.9 FL    NRBC 0.0 0.0  WBC    ABSOLUTE NRBC 0.00 0.00 - 4.65 K/uL   METABOLIC PANEL, COMPREHENSIVE    Collection Time: 05/20/22  6:16 AM   Result Value Ref Range    Sodium 142 136 - 145 mmol/L    Potassium 3.6 3.5 - 5.1 mmol/L    Chloride 108 97 - 108 mmol/L    CO2 30 21 - 32 mmol/L    Anion gap 4 (L) 5 - 15 mmol/L    Glucose 88 65 - 100 mg/dL    BUN 13 6 - 20 mg/dL    Creatinine 0.71 0.55 - 1.02 mg/dL    BUN/Creatinine ratio 18 12 - 20      GFR est AA >60 >60 ml/min/1.73m2    GFR est non-AA >60 >60 ml/min/1.73m2    Calcium 8.6 8.5 - 10.1 mg/dL    Bilirubin, total 0.2 0.2 - 1.0 mg/dL    AST (SGOT) 10 (L) 15 - 37 U/L    ALT (SGPT) 10 (L) 12 - 78 U/L    Alk. phosphatase 67 45 - 117 U/L    Protein, total 5.0 (L) 6.4 - 8.2 g/dL    Albumin 2.3 (L) 3.5 - 5.0 g/dL    Globulin 2.7 2.0 - 4.0 g/dL    A-G Ratio 0.9 (L) 1.1 - 2.2            HOSPITAL COURSE:  Patient is a 54 y.o. year old female with a past medical history significant hypertension, CHF, DM, hyperlipidemia and Breast cancer s/p bilateral mastectomies last week presents to the ED with cc of peripheral edema and hypertension.  Patient went to her 1 week follow-up with her breast surgeon who was concerned because her blood pressure was low and she had bilateral lower extremity edema which is new. States she has had issues in the past with lower limb swelling, but compression stockings were able to manage swelling appropriately.  Since last week she has noticed the stockings have not helped, and that the swelling progressively worsened over the past week. She states her limbs are not particulary painful but feel \"tight\". Patient states she has had decreased appetite since January and this is unchanged.  She states she does feel generally unwell but does not have any specific abdominal pain, vomiting or diarrhea.  Patient is status post gastric bypass 2 years ago.  Additionally patient endorses chest pressure but states it it may be from the chest binder she is wearing.  She states the expanders in place are uncomfortable as well.     Patient admitted to the hospital. Patient is hypertensive with bilateral swelling of lower extremities and right upper extremity (chronic lymphedema; worse than normal). CT was negative for PE or aortic abnormalities. Lower limb doppler U/S was negative bilaterally. EKG shows normal sinus rhythm with a rate of 73.  Normal MO, normal QRS, normal QTC. Low voltage. 1 L of IV fluid administered. Troponin and lactic acid negative.    Remarkable Labs at time of admission   K: 2.9   BNP: 394   Hgb: 9.3      5/18  Patient is found lying in bed awake and eating breakfast. Complains of worsening swelling and pain in bilateral ankles and feet. Swelling has worsened since yesterday. Chest pain from recent surgery remains. Echo shows 60-65% with normal function. Septal thickening noted. Awaiting labs      5/19  Patient is found lying in bed awake and eating breakfast. Complains of unchanged swelling and pain in bilateral ankles and feet. Chest pain from recent surgery remains. OT recommends home health OT for discharge - patient declined. Discussed with patient likely source of swelling as other acute causes have been ruled out during hospitalization workup. Patient states she saw a cardiologist in the past for lower limb swelling and declined venous closure procedures. Explained to patient that she could elect to manage her symptoms with compression stocking and elevation or pursue outpatient vein treatments.    Remarkable labs BNP: 134 ; decreasing       5/20  Was seen by PT yesterday - recommends discharge home with family help as patient has declined all other services. Patient found sitting in bed eating breakfast. No changes in swelling, but patient is now wearing compression stockings. Plan is to discharge patient today.    Remarkable labs      Hgb: 9.4     Patient was treated with IV Lasix patient edema much improved patient denies any chest pain shortness of breath able to walk without shortness of breath discharge home and follow-up with oncology regarding breast cancer    Medication reconciliation done    Discharge patient 35 minutes 50% time spent counseling and coordination of care  Signed:   Bhavik Mistry MD  5/20/2022  2:45 PM

## 2022-05-20 NOTE — PROGRESS NOTES
Discharge plan of care/case management plan validated with provider discharge order. Discharge medications reviewed with patient and appropriate educational materials and side effects teaching were provided. IV removed catheter intact. Tele box removed and returned. Pt awaiting  for ride.

## 2022-05-20 NOTE — DISCHARGE INSTRUCTIONS
Discharge Instructions       PATIENT ID: Rhianna Dumas  MRN: [id number]   YOB: 1965    DATE OF ADMISSION: 5/16/2022  3:42 PM    DATE OF DISCHARGE: 5/20/2022    PRIMARY CARE PROVIDER: Cindy Carcamo MD     ATTENDING PHYSICIAN: Cindy Carcamo MD  DISCHARGING PROVIDER: Elise Braun MD    To contact this individual call [phone] and ask the  to page. If unavailable ask to be transferred the Adult Hospitalist Department. DISCHARGE DIAGNOSES breast cancer leg edema    CONSULTATIONS: None    PROCEDURES/SURGERIES: * No surgery found *    PENDING TEST RESULTS:   At the time of discharge the following test results are still pending: None    FOLLOW UP APPOINTMENTS:   Follow-up Information     Follow up With Specialties Details Why Pearley Habermann, MD Family Medicine In 1 week  [address]  [phone]             ADDITIONAL CARE RECOMMENDATIONS: Follow-up with oncology    DIET: Diabetic Diet      ACTIVITY: {discharge activity:31107}    Wound care: Wound Care Order: submitted to [facility] Please view [email or web address]    EQUIPMENT needed: ***      DISCHARGE MEDICATIONS:   See Medication Reconciliation Form    · It is important that you take the medication exactly as they are prescribed. · Keep your medication in the bottles provided by the pharmacist and keep a list of the medication names, dosages, and times to be taken in your wallet. · Do not take other medications without consulting your doctor. NOTIFY YOUR PHYSICIAN FOR ANY OF THE FOLLOWING:   Fever over 101 degrees for 24 hours. Chest pain, shortness of breath, fever, chills, nausea, vomiting, diarrhea, change in mentation, falling, weakness, bleeding. Severe pain or pain not relieved by medications. Or, any other signs or symptoms that you may have questions about.       DISPOSITION:    Home With:   OT  PT  [facility]  RN       SNF/Inpatient Rehab/LTAC Independent/assisted living    Hospice    Other:         PROBLEM LIST Updated:  Yes ***       Signed:   Porfirio Somers MD  5/20/2022  2:44 PM     Discharge Instructions       PATIENT ID: Ashvin Bates  MRN: [id number]   YOB: 1965    DATE OF ADMISSION: 5/16/2022  3:42 PM    DATE OF DISCHARGE: 5/20/2022    PRIMARY CARE PROVIDER: Kenan Montoya MD     ATTENDING PHYSICIAN: Kenan Montoya MD  DISCHARGING PROVIDER: Porfirio Somers MD    To contact this individual call [phone] and ask the  to page. If unavailable ask to be transferred the Adult Hospitalist Department. DISCHARGE DIAGNOSES ***    CONSULTATIONS: None    PROCEDURES/SURGERIES: * No surgery found *    PENDING TEST RESULTS:   At the time of discharge the following test results are still pending: ***    FOLLOW UP APPOINTMENTS:   Follow-up Information     Follow up With Specialties Details Why Kate Waddell MD Family Medicine In 1 week  [address]  [phone]             ADDITIONAL CARE RECOMMENDATIONS: ***    DIET: {diet:34749}      ACTIVITY: {discharge activity:35816}    Wound care: {[facility] Wound Care Instructions:51620}    EQUIPMENT needed: ***      DISCHARGE MEDICATIONS:   See Medication Reconciliation Form    · It is important that you take the medication exactly as they are prescribed. · Keep your medication in the bottles provided by the pharmacist and keep a list of the medication names, dosages, and times to be taken in your wallet. · Do not take other medications without consulting your doctor. NOTIFY YOUR PHYSICIAN FOR ANY OF THE FOLLOWING:   Fever over 101 degrees for 24 hours. Chest pain, shortness of breath, fever, chills, nausea, vomiting, diarrhea, change in mentation, falling, weakness, bleeding. Severe pain or pain not relieved by medications. Or, any other signs or symptoms that you may have questions about.       DISPOSITION: Home With:   OT  PT  [facility]  RN       SNF/Inpatient Rehab/LTAC    Independent/assisted living    Hospice    Other:         PROBLEM LIST Updated:  Yes ***       Signed:   Jaime Dennison MD  5/20/2022  2:44 PM

## 2022-05-20 NOTE — PROGRESS NOTES
PROGRESS NOTE    Patient: Claudia Gaming MRN: [id number]  SSN: [id number]    YOB: 1965  Age: 59 y.o. Sex: female      Admit Date: 5/16/2022    LOS: 4 days       Subjective     Chief Complaint   Patient presents with    Peripheral Edema    Hypotension       HPI:   Patient is a 59y.o. year old female with a past medical history significant hypertension, CHF, DM, hyperlipidemia and Breast cancer s/p bilateral mastectomies last week presents to the ED with cc of peripheral edema and hypertension.  Patient went to her 1 week follow-up with her breast surgeon who was concerned because her blood pressure was low and she had bilateral lower extremity edema which is new. States she has had issues in the past with lower limb swelling, but compression stockings were able to manage swelling appropriately. Since last week she has noticed the stockings have not helped, and that the swelling progressively worsened over the past week. She states her limbs are not particulary painful but feel \"tight\". Patient states she has had decreased appetite since January and this is unchanged.  She states she does feel generally unwell but does not have any specific abdominal pain, vomiting or diarrhea.  Patient is status post gastric bypass 2 years ago.  Additionally patient endorses chest pressure but states it it may be from the chest binder she is wearing.  She states the expanders in place are uncomfortable as well.     Patient admitted to the hospital. Patient is hypertensive with bilateral swelling of lower extremities and right upper extremity (chronic lymphedema; worse than normal). CT was negative for PE or aortic abnormalities. Lower limb doppler U/S was negative bilaterally. EKG shows normal sinus rhythm with a rate of 73.  Normal NJ, normal QRS, normal QTC. Low voltage. 1 L of IV fluid administered. Troponin and lactic acid negative.     Remarkable Labs at time of admission   K: 2.9   BNP: 394   Hgb: 9.3 5/18  Patient is found lying in bed awake and eating breakfast. Complains of worsening swelling and pain in bilateral ankles and feet. Swelling has worsened since yesterday. Chest pain from recent surgery remains. Echo shows 60-65% with normal function. Septal thickening noted. Awaiting labs     5/19  Patient is found lying in bed awake and eating breakfast. Complains of unchanged swelling and pain in bilateral ankles and feet. Chest pain from recent surgery remains. OT recommends home health OT for discharge - patient declined. Discussed with patient likely source of swelling as other acute causes have been ruled out during hospitalization workup. Patient states she saw a cardiologist in the past for lower limb swelling and declined venous closure procedures. Explained to patient that she could elect to manage her symptoms with compression stocking and elevation or pursue outpatient vein treatments. Remarkable labs    BNP: 134 ; decreasing      5/20  Was seen by PT yesterday - recommends discharge home with family help as patient has declined all other services. Patient found sitting in bed eating breakfast. No changes in swelling, but patient is now wearing compression stockings. Plan is to discharge patient today. Remarkable labs      Hgb: 9.4     ROS  Constitutional: Negative for chills and fever. HENT: Negative. Eyes: Negative. Respiratory: Negative. Cardiovascular: Chest pain - likely secondary to recent bilateral mastectomy    Gastrointestinal: Negative for abdominal pain and nausea. Skin: Swelling and lower limb pain   Neurological: Negative     Objective     Visit Vitals  /74 (BP 1 Location: Left upper arm, BP Patient Position: At rest)   Pulse 95   Temp 98.3 °F (36.8 °C)   Resp 17   Ht 5' 4.96\" (1.65 m)   Wt 189 lb 2.5 oz (85.8 kg)   SpO2 98%   BMI 31.51 kg/m²      O2 Device: None (Room air)      Physical Exam  Constitutional: pt is oriented to person, place, and time.    HENT: Head: Normocephalic and atraumatic. Eyes: Pupils are equal, round, and reactive to light. EOM are normal.   Cardiovascular: Normal rate, regular rhythm and normal heart sounds. Pulmonary/Chest: Breath sounds normal. No wheezes. No rales. .   Exhibits no tenderness. Abdominal: Soft. Bowel sounds are normal. There is no abdominal tenderness. There is no rebound and no guarding. Musculoskeletal: Lower limb and ankle swelling bilaterally. Normal range of motion. Right upper extremity edema, has a history of lymphedema due to her first episode of breast cancer   Neurological: pt is alert and oriented to person, place, and time. Alert. Normal strength. No cranial nerve deficit or sensory deficit. Displays a negative Romberg sign.       Intake & Output:  Current Shift: 05/20 0701 - 05/20 1900  In: -   Out: 41 [Drains:41]  Last three shifts: 05/18 1901 - 05/20 0700  In: 1110 [P.O.:1100; I.V.:10]  Out: 1505 [Urine:1450; Drains:55]    Lab/Data Review:  Lab results reviewed. For significant abnormal values and values requiring intervention, see assessment and plan.        24 Hour Results:    Recent Results (from the past 24 hour(s))   CBC W/O DIFF    Collection Time: 05/20/22  6:16 AM   Result Value Ref Range    WBC 7.8 3.6 - 11.0 K/uL    RBC 3.11 (L) 3.80 - 5.20 M/uL    HGB 9.4 (L) 11.5 - 16.0 g/dL    HCT 29.2 (L) 35.0 - 47.0 %    MCV 93.9 80.0 - 99.0 FL    MCH 30.2 26.0 - 34.0 PG    MCHC 32.2 30.0 - 36.5 g/dL    RDW 14.2 11.5 - 14.5 %    PLATELET 578 450 - 664 K/uL    MPV 9.9 8.9 - 12.9 FL    NRBC 0.0 0.0  WBC    ABSOLUTE NRBC 0.00 0.00 - 9.40 K/uL   METABOLIC PANEL, COMPREHENSIVE    Collection Time: 05/20/22  6:16 AM   Result Value Ref Range    Sodium 142 136 - 145 mmol/L    Potassium 3.6 3.5 - 5.1 mmol/L    Chloride 108 97 - 108 mmol/L    CO2 30 21 - 32 mmol/L    Anion gap 4 (L) 5 - 15 mmol/L    Glucose 88 65 - 100 mg/dL    BUN 13 6 - 20 mg/dL    Creatinine 0.71 0.55 - 1.02 mg/dL    BUN/Creatinine ratio 18 12 - 20      GFR est AA >60 >60 ml/min/1.73m2    GFR est non-AA >60 >60 ml/min/1.73m2    Calcium 8.6 8.5 - 10.1 mg/dL    Bilirubin, total 0.2 0.2 - 1.0 mg/dL    AST (SGOT) 10 (L) 15 - 37 U/L    ALT (SGPT) 10 (L) 12 - 78 U/L    Alk. phosphatase 67 45 - 117 U/L    Protein, total 5.0 (L) 6.4 - 8.2 g/dL    Albumin 2.3 (L) 3.5 - 5.0 g/dL    Globulin 2.7 2.0 - 4.0 g/dL    A-G Ratio 0.9 (L) 1.1 - 2.2           Imaging:    CTA CHEST W OR W WO CONT   Final Result   No evidence of pulmonary embolism or acute aortic abnormalities. Postsurgical changes in the breasts. Mild prominence of the ascending aorta.       DUPLEX LOWER EXT VENOUS BILAT   Final Result      DUPLEX UPPER EXT VENOUS RIGHT   Final Result             Assessment     Active Problems:    Peripheral edema (5/16/2022)      Leg edema (5/16/2022)      Peripheral edema   Breast cancer s/p bilateral mastectomy  Hypertension   Hyperlipidemia   Chronic headaches   CHF  Type 2 diabetes  Hypokalemia       Plan     Cymbalta 60mg daily   Metformin ER 500mg daily with dinner   Lasix 40 mg IV daily  Neurontin 800mg TID   Zoloft 25mg daily   Lovenox 40mg daily     Monitor vitals     Possible discharge in 24 hours   OT consult         Current Facility-Administered Medications:     cyclobenzaprine (FLEXERIL) tablet 10 mg, 10 mg, Oral, TID PRN, Desire Fraser MD, 10 mg at 05/19/22 1848    oxyCODONE IR (ROXICODONE) tablet 5 mg, 5 mg, Oral, Q4H PRN, Desire Fraser MD, 5 mg at 05/20/22 0631    doxycycline (VIBRAMYCIN) capsule 100 mg, 100 mg, Oral, Q12H, Desire Fraser MD, 100 mg at 05/20/22 0900    topiramate (TOPAMAX) tablet 25 mg, 25 mg, Oral, BID WITH MEALS, Desire Fraser MD, 25 mg at 05/20/22 0905    carvediloL (COREG) tablet 12.5 mg, 12.5 mg, Oral, BID WITH MEALS, Desire Fraser MD, 12.5 mg at 05/20/22 6684    *Please  patient's home medications from pharmacy at discharge*, 1 Each, Other, PRIOR TO DISCHARGE, Mati Fraser MD    furosemide (LASIX) injection 40 mg, 40 mg, IntraVENous, DAILY, Desire Fraser MD, 40 mg at 05/20/22 6968    DULoxetine (CYMBALTA) capsule 60 mg, 60 mg, Oral, DAILY, Desire Fraser MD, 60 mg at 05/20/22 1975    metFORMIN ER (GLUCOPHAGE XR) tablet 500 mg, 500 mg, Oral, DAILY WITH DINNER, Desire Fraser MD, 500 mg at 05/19/22 1621    semaglutide (OZEMPIC) 0.25 mg or 0.5 mg/dose (2 mg/1.5 ml) subq pen 0.25 mg, 0.25 mg, SubCUTAneous, Q7D, Desire Fraser MD    gabapentin (NEURONTIN) capsule 800 mg, 800 mg, Oral, TID, Desire Fraser MD, 800 mg at 05/20/22 7401    hydrOXYzine pamoate (VISTARIL) capsule 50 mg, 50 mg, Oral, QHS, Desire Fraser MD, 50 mg at 05/19/22 2052    . PHARMACY TO SUBSTITUTE PER PROTOCOL (Reordered from: IRON, FERROUS SULFATE, PO), , , Per Protocol, Betty Fraser MD    latanoprost (XALATAN) 0.005 % ophthalmic solution 1 Drop, 1 Drop, Both Eyes, QHS, Desire Fraser MD, 1 Drop at 05/19/22 2052    multivitamin with folic acid (ONE DAILY WITH FOLIC ACID) tablet 1 Tablet, 1 Tablet, Oral, DAILY, Desire Fraser MD, 1 Tablet at 05/20/22 0394    sertraline (ZOLOFT) tablet 25 mg, 25 mg, Oral, DAILY, Desire Fraser MD, 25 mg at 05/20/22 0905    timolol (TIMOPTIC) 0.5 % ophthalmic solution 1 Drop, 1 Drop, Both Eyes, BID, Desire Fraser MD, 1 Drop at 05/20/22 0900    traZODone (DESYREL) tablet 100 mg, 100 mg, Oral, QHS PRN, Desire Fraser MD, 100 mg at 05/19/22 2052    acetaminophen (TYLENOL) tablet 650 mg, 650 mg, Oral, Q6H PRN, 650 mg at 05/17/22 2238 **OR** acetaminophen (TYLENOL) suppository 650 mg, 650 mg, Rectal, Q6H PRN, Betty Fraser MD    polyethylene glycol (MIRALAX) packet 17 g, 17 g, Oral, DAILY PRN, Betty Fraser MD    ondansetron (ZOFRAN ODT) tablet 4 mg, 4 mg, Oral, Q8H PRN **OR** ondansetron (ZOFRAN) injection 4 mg, 4 mg, IntraVENous, Q6H PRN, Desire Fraser MD    enoxaparin (LOVENOX) injection 40 mg, 40 mg, SubCUTAneous, DAILY, Betty Fraser MD, 40 mg at 05/20/22 0900    Current Outpatient Medications   Medication Instructions    ascorbic acid-collagen (Collagen Plus Vitamin C) 125-740 mg cap Collagen Plus Vitamin C    b complex vitamins tablet 1 Tablet, Oral, DAILY    biotin 500 mcg Cap Take  by mouth.     calcium-cholecalciferol, d3, 600 mg calcium- 200 unit cap 600 mg, Oral, DAILY    carvediloL (COREG) 12.5 mg, Oral, 2 TIMES DAILY    DULoxetine (CYMBALTA) 30 mg capsule duloxetine 30 mg capsule,delayed release   TAKE AS DIRECTED ALONG WITH 60MG CYMBALTA    DULoxetine (CYMBALTA) 60 mg capsule TAKE 1 CAPSULE BY MOUTH DAILY    furosemide (LASIX) 20 mg tablet TK 1 T PO QD PRF WEIGHT GAIN OF 2 LBS OR MORE IN A DAY    gabapentin (NEURONTIN) 800 mg tablet 1 Tablet, Oral, 3 TIMES DAILY    hydrOXYzine pamoate (VISTARIL) 50 mg, Oral, EVERY BEDTIME    IRON, FERROUS SULFATE, PO Iron (ferrous sulfate)    latanoprost (XALATAN) 0.005 % ophthalmic solution 1 Drop, Both Eyes, EVERY BEDTIME    metFORMIN ER (GLUCOPHAGE XR) 500 mg tablet TAKE 2 TABLET BY MOUTH TWICE DAILY DAILY WITH MEALS STOP SYNJARDY    multivitamin-min-iron-FA-vit K (Bariatric Multivitamins) 45 mg iron- 800 mcg-120 mcg cap Bariatric Multivitamins    omega 3-dha-epa-fish oil (Fish Oil) 100-160-1,000 mg cap 3 mg, Oral, DAILY    oxyCODONE-acetaminophen (PERCOCET 10)  mg per tablet  Tablets, Oral, DAILY    potassium chloride (K-DUR, KLOR-CON) 20 mEq tablet TK 1 T PO QD PRN WHEN TAKING FUROSEMIDE    semaglutide (Ozempic) 0.25 mg or 0.5 mg/dose (2 mg/1.5 ml) subq pen Inject 0.25mg once weekly for three weeks, then increase to 0.5mg once weekly    sertraline (ZOLOFT) 25 mg tablet sertraline 25 mg tablet   TAKE 1 TABLET BY MOUTH EVERY DAY    timoloL (BetimoL) 0.5 % ophthalmic solution 1 Drop, Both Eyes, 2 TIMES DAILY, use in affected eye(s)     traZODone (DESYREL) 100 mg tablet TK 1 T PO QD HS    triamcinolone acetonide (KENALOG) 0.1 % topical cream triamcinolone acetonide 0.1 % topical cream         Signed By: Zev Schumacher     May 20, 2022

## 2022-05-20 NOTE — PROGRESS NOTES
PHYSICAL THERAPY TREATMENT  Patient: Rosa Everett (62 y.o. female)  Date: 5/20/2022  Diagnosis: Peripheral edema [R60.9]  Leg edema [R60.0] <principal problem not specified>       Precautions:    Chart, physical therapy assessment, plan of care and goals were reviewed. ASSESSMENT  Patient continues with skilled PT services and is progressing towards goals. Pt sitting at EOB upon PT arrival, agreeable to session. Pt required SBA for sit <> stand transfers. Prior to ambulation, pt performed the following exercises for HEP: seated hip ABD, seated marches, seated LAQs, seated glute sets, and seated ankle pumps (See therex section). Afterwards, supine HEP was reviewed with pt as well as energy conservation techniques with pt verbalizing understanding and accepting informational papers with the information mentioned before. Pt amb 200 (bed>hallway>bed) feet with RW and SBA, demonstrates slow, step through gait pattern with no LOB or knee buckling noted and increased paced compared to pace seen at evaluation. Pt did well with session today currently needing no assistance for ambulation. Will continue to benefit from skilled PT services, and will continue to progress as tolerated. Current Level of Function Impacting Discharge (mobility/balance): SBA    Other factors to consider for discharge: acute medical status, PMH         PLAN :  Patient continues to benefit from skilled intervention to address the above impairments. Continue treatment per established plan of care to address goals. Frequency/Duration: Patient will be followed by physical therapy:  1-2x/week to address goals.     Recommendation for discharge: (in order for the patient to meet his/her long term goals)  Home with Family Care    This discharge recommendation:  Has been made in collaboration with the attending provider and/or case management    IF patient discharges home will need the following DME: none       SUBJECTIVE:   Patient stated my morning has been okay.     OBJECTIVE DATA SUMMARY:   Critical Behavior:  Neurologic State: Alert  Orientation Level: Oriented X4  Cognition: Follows commands     Functional Mobility Training:  Bed Mobility:           Scooting: Stand-by assistance        Transfers:  Sit to Stand: Stand-by assistance  Stand to Sit: Stand-by assistance                             Balance:  Sitting: Intact; Without support  Standing: Impaired; With support  Standing - Static: Good;Constant support  Standing - Dynamic : Good;Constant support  Ambulation/Gait Training:  Distance (ft): 200 Feet (ft) (Bed>hallway>bed)  Assistive Device: Walker, rolling  Ambulation - Level of Assistance: Stand-by assistance     Gait Description (WDL): Exceptions to WDL           Base of Support: Narrowed     Speed/Radha: Slow;Pace decreased (<100 feet/min)            Therapeutic Exercises:   Pt completed the following      EXERCISE   Sets   Reps   Active Active Assist   Passive Self ROM   Comments   Ankle Pumps 1 5 [x] [] [] [] Seated   Quad Sets/Glut Sets 1 5 [x] [] [] [] Glute sets seated   Hamstring Sets   [] [] [] []    Short Arc Quads   [] [] [] []    Heel Slides   [] [] [] []    Straight Leg Raises   [] [] [] []    Hip abd/add 1 5 [x] [] [] [] seated   Long Arc Quads 1 5 [x] [] [] [] seated   Marching 1 5 [x] [] [] [] seated      [] [] [] []       Pain Rating:    Problem: Mobility Impaired (Adult and Pediatric)  Goal: *Acute Goals and Plan of Care (Insert Text)  Description: Pt stated goal: Go home back to her   Pt will be I with LE HEP in 7 days. Pt will perform bed mobility with independent in 7 days. Pt will perform transfers with independent in 7 days. Pt will amb 500 feet with LRAD safely with Mod I in 7 days. Pt will ascend/descend 5 steps with R handrail and Mod I in 7 days to safely enter home.       Outcome: Progressing Towards Goal       Activity Tolerance:   Good and requires rest breaks    After treatment patient left in no apparent distress:   Sitting at EOB with needs in reach and nursing updated    GOALS:    Problem: Mobility Impaired (Adult and Pediatric)  Goal: *Acute Goals and Plan of Care (Insert Text)  Description: Pt stated goal: Go home back to her   Pt will be I with LE HEP in 7 days. Pt will perform bed mobility with independent in 7 days. Pt will perform transfers with independent in 7 days. Pt will amb 500 feet with LRAD safely with Mod I in 7 days. Pt will ascend/descend 5 steps with R handrail and Mod I in 7 days to safely enter home. Outcome: Progressing Towards Goal       COMMUNICATION/COLLABORATION:   The patients plan of care was discussed with: Occupational therapist, Registered nurse, and Case management. LUIS CARLOS Lopez, under direct supervision of Mony Crews, PT, DPT provided care and treatment of pt with verbal consent from pt received.        LUIS CARLOS Lopez, PT, DPT   Time Calculation: 27 mins

## 2022-05-23 LAB
BACTERIA SPEC CULT: NORMAL
SPECIAL REQUESTS,SREQ: NORMAL

## 2022-06-03 DIAGNOSIS — Z98.84 STATUS POST BARIATRIC SURGERY: ICD-10-CM

## 2022-06-03 DIAGNOSIS — Z86.39 HISTORY OF NUTRITIONAL DEFICIENCY: ICD-10-CM

## 2022-06-03 DIAGNOSIS — E56.9 VITAMIN DEFICIENCY, UNSPECIFIED: Primary | ICD-10-CM

## 2022-06-03 DIAGNOSIS — K91.2 POSTSURGICAL MALABSORPTION, NOT ELSEWHERE CLASSIFIED: ICD-10-CM

## 2022-06-03 NOTE — PROGRESS NOTES
Physician Progress Note      Lauren Alonso  CSN #:                  [id number]  :                       1965  ADMIT DATE:       2022 3:42 PM  [address] [place] DATE:        2022 6:00 PM  RESPONDING  PROVIDER #:        Collette Mcgee MD          QUERY TEXT:    Dr. Marisel Smith,  Pt admitted with generalized edema. Pt noted to have chronic lymphedema. If possible, please document in progress notes and discharge summary the relationship, if any, between edema and fluid volume overload/chronic lymphedema. The medical record reflects the following:  Risk Factors: recent bilateral mastectomy x 1 week ago  Clinical Indicators: low BP and BLE edema which is new, Hx of lower limb swelling  Treatment: IV Lasix, compression garments    Thank you,  Catherene Sandhoff, RN, CRCR  [email or web address]  You can also contact me via [employer]. Options provided:  -- Edema due to fluid volume overload  -- Edema due to chronic lymphedema  -- Other - I will add my own diagnosis  -- Disagree - Not applicable / Not valid  -- Disagree - Clinically unable to determine / Unknown  -- Refer to Clinical Documentation Reviewer    PROVIDER RESPONSE TEXT:    This patient has edema due to fluid volume overload. Query created by:  Herbert Lott on 6/3/2022 6:57 AM      Electronically signed by:  Collette Mcgee MD 6/3/2022 11:05 AM

## 2022-06-15 DIAGNOSIS — E56.9 VITAMIN DEFICIENCY, UNSPECIFIED: ICD-10-CM

## 2022-06-15 DIAGNOSIS — K91.2 POSTSURGICAL MALABSORPTION, NOT ELSEWHERE CLASSIFIED: ICD-10-CM

## 2022-06-15 DIAGNOSIS — Z86.39 HISTORY OF NUTRITIONAL DEFICIENCY: ICD-10-CM

## 2022-06-15 DIAGNOSIS — Z98.84 STATUS POST BARIATRIC SURGERY: ICD-10-CM

## 2022-07-07 ENCOUNTER — OFFICE VISIT (OUTPATIENT)
Dept: SURGERY | Age: 57
End: 2022-07-07
Payer: MEDICARE

## 2022-07-07 VITALS
DIASTOLIC BLOOD PRESSURE: 62 MMHG | HEIGHT: 65 IN | WEIGHT: 164.2 LBS | TEMPERATURE: 97.2 F | HEART RATE: 97 BPM | OXYGEN SATURATION: 98 % | SYSTOLIC BLOOD PRESSURE: 92 MMHG | BODY MASS INDEX: 27.36 KG/M2

## 2022-07-07 DIAGNOSIS — Z71.3 DIETARY COUNSELING: ICD-10-CM

## 2022-07-07 DIAGNOSIS — Z98.84 STATUS POST BARIATRIC SURGERY: Primary | ICD-10-CM

## 2022-07-07 PROCEDURE — G8417 CALC BMI ABV UP PARAM F/U: HCPCS | Performed by: SURGERY

## 2022-07-07 PROCEDURE — 3017F COLORECTAL CA SCREEN DOC REV: CPT | Performed by: SURGERY

## 2022-07-07 PROCEDURE — 99213 OFFICE O/P EST LOW 20 MIN: CPT | Performed by: SURGERY

## 2022-07-07 PROCEDURE — G8427 DOCREV CUR MEDS BY ELIG CLIN: HCPCS | Performed by: SURGERY

## 2022-07-07 PROCEDURE — G8510 SCR DEP NEG, NO PLAN REQD: HCPCS | Performed by: SURGERY

## 2022-07-07 PROCEDURE — G9899 SCRN MAM PERF RSLTS DOC: HCPCS | Performed by: SURGERY

## 2022-07-07 NOTE — PROGRESS NOTES
Chief Complaint   Patient presents with    Post OP Follow Up     follow up post op gastric bypass       Visit Vitals  BP 92/62 (BP 1 Location: Left upper arm, BP Patient Position: Sitting, BP Cuff Size: Adult)   Pulse 97   Temp 97.2 °F (36.2 °C) (Temporal)   Ht 5' 5\" (1.651 m)   Wt 164 lb 3.2 oz (74.5 kg)   SpO2 98%   BMI 27.32 kg/m²   first bp reading 89/69 left upper arm    1. Have you been to the ER, urgent care clinic since your last visit? Hospitalized since your last visit? No    2. Have you seen or consulted any other health care providers outside of the [address] [place] [address] since your last visit? Include any pap smears or colon screening.  No

## 2022-07-11 NOTE — PROGRESS NOTES
Carolina Fuentes  [address] [place] [address], \[facility]\"", [facility]    Bariatric Surgery Follow Up    Patient Name: Nova Pena (10 y.o., female)    Patient Address: [address] [place] [id number]    PCP: French Medina MD     Patient contact numbers:     Home Phone  Work [facility] Phone    [phone]       Subjective:      Nova Pena is a 59 y.o. female, who presents for follow up. She underwent a laparoscopic gastric bypass by Dr. Mateusz Delgado on 1/21/2020. She has been following with his practice, but is now unsatisfied with her care and wishes to stay in this area for further care. Her last follow up with him was November 2021. She states she continues to have issues with tolerating oral intake. She states she has a low appetite and typically eats one small meal per day. She usually prefers vegetables, but has recently discovered that some vegetables may interact with her chemotherapy. I have recommended that she discuss this with her oncologist. She underwent bilateral mastectomy with spacer insertion in May and has started on chemotherapy. This will continue until later this year and she will then undergo breast reconstruction. She wasn't able to follow up with our dieticians previously due to her other health conditions, but is now interested in speaking with them and attending support groups.     Past Medical History:   Diagnosis Date    Arthritis     Back/Neck problems    Burning with urination     Frequency    Cancer ([facility] [address] 2009    RIGHT breast    Congestive heart failure (HCC)     Depression     Including Post Partum    Diabetes ([facility] [address]     Dizziness     GERD (gastroesophageal reflux disease)     Glaucoma     Gout     Headache     Heart disease     Hypercholesterolemia     Hypertension     Morbid obesity (HCC)     Neuropathy     Shortness of breath With activity    Sleep apnea     Sleep disorder     Difficulty falling asleep, night sweats    Stool color black        Past Surgical History:   Procedure Laterality Date    HX APPENDECTOMY      HX BREAST LUMPECTOMY      RIGHT with SNBX    HX CHOLECYSTECTOMY      HX GASTRIC BYPASS  2020    HX GYN       x1, Bilateral tubal ligation, Dilation & Curettage    HX GYN      Hysterectomy (partial)    HX ORTHOPAEDIC      Procedure on shoulder    HX PREMALIG/BENIGN SKIN LESION EXCISION      Cyst removed from scalp    HX PREMALIG/BENIGN SKIN LESION EXCISION      Debridement of subcutaneous tissue    HX TONSILLECTOMY  1970    HX UROLOGICAL         Family History   Problem Relation Age of Onset    Hypertension Mother     Cancer Mother     Thyroid Disease Mother     Hypertension Father     Cancer Father     Diabetes Brother     Hypertension Brother     Cancer Brother     Stroke Brother     Lung Disease Daughter     Asthma Daughter     Hypertension Daughter     Thyroid Disease Daughter     Diabetes Son     Lung Disease Son     Hypertension Son     Stroke Maternal Grandmother     Dementia Maternal Grandmother     Hypertension Maternal Grandmother        Social History     Tobacco Use    Smoking status: Never Smoker    Smokeless tobacco: Never Used   Vaping Use    Vaping Use: Never used   Substance Use Topics    Alcohol use: Yes     Alcohol/week: 1.7 standard drinks     Types: 2 Glasses of wine per week    Drug use: No       Current Outpatient Medications   Medication Sig Dispense Refill    doxycycline (VIBRAMYCIN) 100 mg capsule Take 1 Capsule by mouth every twelve (12) hours. 10 Capsule 0    topiramate (TOPAMAX) 25 mg tablet Take 1 Tablet by mouth two (2) times daily (with meals).  60 Tablet 0    furosemide (Lasix) 40 mg tablet 1 tab daily 15 Tablet 0    metFORMIN ER (GLUCOPHAGE XR) 500 mg tablet TAKE 2 TABLET BY MOUTH TWICE DAILY DAILY WITH MEALS STOP SYNJARDY 360 Tablet 1    semaglutide (Ozempic) 0.25 mg or 0.5 mg/dose (2 mg/1.5 ml) subq pen Inject 0.25mg once weekly for three weeks, then increase to 0.5mg once weekly 9 mL 1    DULoxetine (CYMBALTA) 60 mg capsule TAKE 1 CAPSULE BY MOUTH DAILY 30 Capsule 6    hydrOXYzine pamoate (VISTARIL) 25 mg capsule Take 50 mg by mouth nightly.  sertraline (ZOLOFT) 25 mg tablet sertraline 25 mg tablet   TAKE 1 TABLET BY MOUTH EVERY DAY      b complex vitamins tablet Take 1 Tab by mouth daily.  ascorbic acid-collagen (Collagen Plus Vitamin C) 125-740 mg cap Collagen Plus Vitamin C      IRON, FERROUS SULFATE, PO Iron (ferrous sulfate)      timoloL (BetimoL) 0.5 % ophthalmic solution Administer 1 Drop to both eyes two (2) times a day. use in affected eye(s)      multivitamin-min-iron-FA-vit K (Bariatric Multivitamins) 45 mg iron- 800 mcg-120 mcg cap Bariatric Multivitamins      omega 3-dha-epa-fish oil (Fish Oil) 100-160-1,000 mg cap Take 3 mg by mouth daily.  triamcinolone acetonide (KENALOG) 0.1 % topical cream triamcinolone acetonide 0.1 % topical cream      calcium-cholecalciferol, d3, 600 mg calcium- 200 unit cap Take 600 mg by mouth daily.  traZODone (DESYREL) 100 mg tablet TK 1 T PO QD HS  1    latanoprost (XALATAN) 0.005 % ophthalmic solution Administer 1 Drop to both eyes nightly.  gabapentin (NEURONTIN) 800 mg tablet Take 1 Tab by mouth three (3) times daily. 0    biotin 500 mcg Cap Take  by mouth. Allergies   Allergen Reactions    Januvia [Sitagliptin] Rash    Levorphanol Itching and Rash    Statins-Hmg-Coa Reductase Inhibitors Itching       Review of Systems  Review of Systems   Constitutional: Negative for chills, fever, malaise/fatigue and weight loss. HENT: Negative for congestion, ear pain and sore throat. Eyes: Negative for blurred vision and redness. Respiratory: Negative for cough, shortness of breath and wheezing.     Cardiovascular: Negative for chest pain, palpitations and leg swelling. Gastrointestinal: Negative for abdominal pain, heartburn, nausea and vomiting. Genitourinary: Negative for dysuria, frequency and hematuria. Musculoskeletal: Positive for back pain and myalgias. Negative for joint pain. Skin: Negative for itching and rash. Neurological: Negative for dizziness, seizures and headaches. Endo/Heme/Allergies: Does not bruise/bleed easily. Psychiatric/Behavioral: Positive for depression. +excessive sleeping       Objective:     Temp: 97.2 °F (36.2 °C) (07/07/22 1158) Pulse (Heart Rate): 97 (07/07/22 1158) Resp: (not recorded) BP: 92/62 (07/07/22 1203) O2 Sat (%): 98 % (07/07/22 1158) Weight: 164 lb 3.2 oz (74.5 kg) (07/07/22 1158)     Physical Exam:  General:  Alert, cooperative, no distress. Head:  Normocephalic, without obvious abnormality, atraumatic. Eyes:  Conjunctivae/corneas clear. Pupils equal, round, reactive to light. Extraocular movements intact. Lungs:   Clear to auscultation bilaterally. Chest wall:  S/p bilateral mastectomy with spacers in place   Heart:  Regular rate and rhythm   Abdomen:   Soft, non-tender. Well healed surgical incisions   Extremities: Extremities normal, atraumatic, no cyanosis or edema. Pulses: 2+ and symmetric all extremities. Skin: Skin color, texture, turgor normal. No rashes or lesions. Neurologic: CNII-XII intact. Normal strength, sensation, and reflexes throughout.      Weight History:  Consult weight (9/9/2019): 259 lbs  Preop weight (1/21/2020): 251 lbs  3/31/2022: 185 lbs  7/7/2022: 162 lbs  Total weight loss: 89 lbs    Labs Reviewed:  Lab Results   Component Value Date/Time    WBC 7.8 05/20/2022 06:16 AM    HGB 9.4 (L) 05/20/2022 06:16 AM    HCT 29.2 (L) 05/20/2022 06:16 AM    PLATELET 470 89/22/2575 06:16 AM    MCV 93.9 05/20/2022 06:16 AM     Lab Results   Component Value Date/Time    Sodium 142 05/20/2022 06:16 AM    Potassium 3.6 05/20/2022 06:16 AM    Chloride 108 05/20/2022 06:16 AM    CO2 30 05/20/2022 06:16 AM    Anion gap 4 (L) 05/20/2022 06:16 AM    Glucose 88 05/20/2022 06:16 AM    BUN 13 05/20/2022 06:16 AM    Creatinine 0.71 05/20/2022 06:16 AM    BUN/Creatinine ratio 18 05/20/2022 06:16 AM    GFR est AA >60 05/20/2022 06:16 AM    GFR est non-AA >60 05/20/2022 06:16 AM    Calcium 8.6 05/20/2022 06:16 AM     Lab Results   Component Value Date/Time    ALT (SGPT) 10 (L) 05/20/2022 06:16 AM    AST (SGOT) 10 (L) 05/20/2022 06:16 AM    Alk. phosphatase 67 05/20/2022 06:16 AM    Bilirubin, total 0.2 05/20/2022 06:16 AM     Lab Results   Component Value Date/Time    Vitamin D 25-Hydroxy 58.4 05/10/2021 09:20 AM       Lab Results   Component Value Date/Time    Vitamin B12 >2000 (H) 11/01/2021 03:25 PM    Folate 15.1 11/01/2021 03:25 PM     Lab Results   Component Value Date/Time    Calcium 8.6 05/20/2022 06:16 AM    Phosphorus 4.2 09/14/2020 12:44 PM     Lab Results   Component Value Date/Time    TSH 1.420 05/29/2019 08:52 AM     Cholesterol, total   Date Value Ref Range Status   11/01/2021 241 (H) 100 - 199 mg/dL Final   05/10/2021 245 (H) <200 MG/DL Final   11/12/2020 219 (H) <200 MG/DL Final     HDL Cholesterol   Date Value Ref Range Status   11/01/2021 62 >39 mg/dL Final   05/10/2021 58 MG/DL Final     Comment:     Based on NCEP ATP III, HDL Cholesterol <40 mg/dL is considered low and >60  mg/dL is elevated. 11/12/2020 64 MG/DL Final     Comment:     Based on NCEP ATP III, HDL Cholesterol <40 mg/dL is considered low and >60  mg/dL is elevated.        LDL,Direct   Date Value Ref Range Status   03/12/2019 149 (H) 0 - 99 mg/dL Final     Lab Results   Component Value Date/Time    Iron 52 09/14/2020 12:44 PM    TIBC 321 09/14/2020 12:44 PM    Iron % saturation 16 09/14/2020 12:44 PM    Ferritin 108 09/14/2020 12:44 PM     Lab Results   Component Value Date/Time    Hemoglobin A1c 7.1 (H) 11/01/2021 03:25 PM    Hemoglobin A1c (POC) 6.3 05/25/2018 09:13 AM    Hemoglobin A1c, External 9.0 03/18/2016 12:00 AM         Assessment:     S/P laparoscopic Misael en Y gastric bypass surgery on 1/21/2020. Overall doing well with 89 lbs total weight loss since surgery. Continues to have issues with appetite. Problem List Items Addressed This Visit        Other    Status post bariatric surgery - Primary      Other Visit Diagnoses     Dietary counseling              Plan:     1. Increase fluid intake to daily goal of 64 oz.  2. Increase protein intake to daily goal of 60 g- she is currently using mostly protein supplements to achieve protein intake  3. Increase daily exercise. 4. Vitamins: continue current  5. Return to clinic in 6 months for routine follow up. Get labs done. 6. Information sent via [employer] regarding contacting dieticians and handout for support groups was provided to the patient. 7. Continue current follow up with oncology, recommend to seek out their  with regards to food interactions with current chemo.       Frandy Fuentes, DO  7/7/2022

## 2022-07-15 ENCOUNTER — NURSE NAVIGATOR (OUTPATIENT)
Dept: CASE MANAGEMENT | Age: 57
End: 2022-07-15

## 2022-07-18 NOTE — NURSE NAVIGATOR
[employer]  Breast Navigator Encounter    Name: Arabella Root  Age: 59 y.o.  : 1965  Diagnosis: Right breast IDC; ER-/GA-/HER2-    Encounter type:  []Initial Navigator Encounter  [x]Patient Initiated  []Navigator Follow-up []Pre-op  []Post-op []Check-in Prior to First Treatment []Treatment Modality Change   []Other:     Narrative:   Received a call from SAILAJA MURRAY, as well as from pt stating she had been trying to reach me via e-mail without success. I never received the e-mails nor were they in my junk folder. I apologized and asked how I could help. Pt inquiring about support groups. Support groups offered through [facility] are currently on hold so I sent her support group info from [facility]. I explain the contact for these is Jaswinder Obrien and her info is on the flyer. Pt appreciative.      Interdisciplinary Team:  Med-Onc:   Surg-Onc: Dr. Natalie Bennett MD  Rad-Onc:  Plastics: Dr. Renu Morillo MD  :   Nurse Navigator:  Gavi Sher, RN      PRISCILA BrownN, RN, [facility]IA [facility]  Breast Cancer Nurse [address] [place] [address]  W: [phone]  F: [phone]  [email or web address]   Good Help to Those in [facility]

## 2022-07-19 ENCOUNTER — HOSPITAL ENCOUNTER (INPATIENT)
Age: 57
LOS: 2 days | Discharge: HOME | DRG: 641 | End: 2022-07-21
Attending: EMERGENCY MEDICINE | Admitting: FAMILY MEDICINE
Payer: MEDICARE

## 2022-07-19 ENCOUNTER — APPOINTMENT (OUTPATIENT)
Dept: GENERAL RADIOLOGY | Age: 57
DRG: 641 | End: 2022-07-19
Attending: EMERGENCY MEDICINE
Payer: MEDICARE

## 2022-07-19 DIAGNOSIS — I95.89 HYPOTENSION DUE TO HYPOVOLEMIA: Primary | ICD-10-CM

## 2022-07-19 DIAGNOSIS — E86.1 HYPOTENSION DUE TO HYPOVOLEMIA: Primary | ICD-10-CM

## 2022-07-19 DIAGNOSIS — D72.823 LEUKEMOID REACTION: ICD-10-CM

## 2022-07-19 PROBLEM — I95.9 HYPOTENSION: Status: ACTIVE | Noted: 2022-07-19

## 2022-07-19 PROBLEM — D72.829 LEUKOCYTOSIS: Status: ACTIVE | Noted: 2022-07-19

## 2022-07-19 LAB
ALBUMIN SERPL-MCNC: 3.1 G/DL (ref 3.5–5)
ALBUMIN/GLOB SERPL: 1.1 {RATIO} (ref 1.1–2.2)
ALP SERPL-CCNC: 90 U/L (ref 45–117)
ALT SERPL-CCNC: 15 U/L (ref 12–78)
ANION GAP SERPL CALC-SCNC: 8 MMOL/L (ref 5–15)
APPEARANCE UR: ABNORMAL
AST SERPL W P-5'-P-CCNC: 12 U/L (ref 15–37)
BACTERIA URNS QL MICRO: NEGATIVE /HPF
BASOPHILS # BLD: 0 K/UL (ref 0–0.1)
BASOPHILS NFR BLD: 0 % (ref 0–1)
BILIRUB SERPL-MCNC: 0.2 MG/DL (ref 0.2–1)
BILIRUB UR QL: NEGATIVE
BUN SERPL-MCNC: 21 MG/DL (ref 6–20)
BUN/CREAT SERPL: 27 (ref 12–20)
CA-I BLD-MCNC: 9.4 MG/DL (ref 8.5–10.1)
CAOX CRY URNS QL MICRO: ABNORMAL
CHLORIDE SERPL-SCNC: 109 MMOL/L (ref 97–108)
CO2 SERPL-SCNC: 21 MMOL/L (ref 21–32)
COLOR UR: ABNORMAL
CREAT SERPL-MCNC: 0.78 MG/DL (ref 0.55–1.02)
DIFFERENTIAL METHOD BLD: ABNORMAL
EOSINOPHIL # BLD: 0 K/UL (ref 0–0.4)
EOSINOPHIL NFR BLD: 0 % (ref 0–7)
ERYTHROCYTE [DISTWIDTH] IN BLOOD BY AUTOMATED COUNT: 15.1 % (ref 11.5–14.5)
GLOBULIN SER CALC-MCNC: 2.9 G/DL (ref 2–4)
GLUCOSE SERPL-MCNC: 80 MG/DL (ref 65–100)
GLUCOSE UR STRIP.AUTO-MCNC: NEGATIVE MG/DL
HCT VFR BLD AUTO: 27.2 % (ref 35–47)
HGB BLD-MCNC: 9 G/DL (ref 11.5–16)
HGB UR QL STRIP: NEGATIVE
IMM GRANULOCYTES # BLD AUTO: 0 K/UL
IMM GRANULOCYTES NFR BLD AUTO: 0 %
KETONES UR QL STRIP.AUTO: 5 MG/DL
LACTATE SERPL-SCNC: 0.5 MMOL/L (ref 0.4–2)
LEUKOCYTE ESTERASE UR QL STRIP.AUTO: ABNORMAL
LYMPHOCYTES # BLD: 5.8 K/UL (ref 0.8–3.5)
LYMPHOCYTES NFR BLD: 18 % (ref 12–49)
MCH RBC QN AUTO: 30.8 PG (ref 26–34)
MCHC RBC AUTO-ENTMCNC: 33.1 G/DL (ref 30–36.5)
MCV RBC AUTO: 93.2 FL (ref 80–99)
MONOCYTES # BLD: 3.2 K/UL (ref 0–1)
MONOCYTES NFR BLD: 10 % (ref 5–13)
MUCOUS THREADS URNS QL MICRO: ABNORMAL /LPF
NEUTS BAND NFR BLD MANUAL: 1 % (ref 0–6)
NEUTS SEG # BLD: 23.3 K/UL (ref 1.8–8)
NEUTS SEG NFR BLD: 71 % (ref 32–75)
NITRITE UR QL STRIP.AUTO: NEGATIVE
NRBC # BLD: 0.04 K/UL (ref 0–0.01)
NRBC BLD-RTO: 0.1 PER 100 WBC
PH UR STRIP: 5 [PH] (ref 5–8)
PLATELET # BLD AUTO: 451 K/UL (ref 150–400)
PMV BLD AUTO: 11 FL (ref 8.9–12.9)
POTASSIUM SERPL-SCNC: 3.6 MMOL/L (ref 3.5–5.1)
PROT SERPL-MCNC: 6 G/DL (ref 6.4–8.2)
PROT UR STRIP-MCNC: 30 MG/DL
RBC # BLD AUTO: 2.92 M/UL (ref 3.8–5.2)
RBC #/AREA URNS HPF: ABNORMAL /HPF (ref 0–5)
RBC MORPH BLD: ABNORMAL
SODIUM SERPL-SCNC: 138 MMOL/L (ref 136–145)
SP GR UR REFRACTOMETRY: 1.03 (ref 1–1.03)
TROPONIN-HIGH SENSITIVITY: 4 NG/L (ref 0–51)
UROBILINOGEN UR QL STRIP.AUTO: 0.1 EU/DL (ref 0.1–1)
WBC # BLD AUTO: 32.3 K/UL (ref 3.6–11)
WBC URNS QL MICRO: ABNORMAL /HPF (ref 0–4)

## 2022-07-19 PROCEDURE — 74011250637 HC RX REV CODE- 250/637: Performed by: EMERGENCY MEDICINE

## 2022-07-19 PROCEDURE — 36415 COLL VENOUS BLD VENIPUNCTURE: CPT

## 2022-07-19 PROCEDURE — 65270000029 HC RM PRIVATE

## 2022-07-19 PROCEDURE — 80053 COMPREHEN METABOLIC PANEL: CPT

## 2022-07-19 PROCEDURE — 74011250636 HC RX REV CODE- 250/636: Performed by: FAMILY MEDICINE

## 2022-07-19 PROCEDURE — 87635 SARS-COV-2 COVID-19 AMP PRB: CPT

## 2022-07-19 PROCEDURE — 74011000250 HC RX REV CODE- 250: Performed by: EMERGENCY MEDICINE

## 2022-07-19 PROCEDURE — 81001 URINALYSIS AUTO W/SCOPE: CPT

## 2022-07-19 PROCEDURE — 84484 ASSAY OF TROPONIN QUANT: CPT

## 2022-07-19 PROCEDURE — 85025 COMPLETE CBC W/AUTO DIFF WBC: CPT

## 2022-07-19 PROCEDURE — 71045 X-RAY EXAM CHEST 1 VIEW: CPT

## 2022-07-19 PROCEDURE — 83605 ASSAY OF LACTIC ACID: CPT

## 2022-07-19 PROCEDURE — 99285 EMERGENCY DEPT VISIT HI MDM: CPT

## 2022-07-19 PROCEDURE — 87040 BLOOD CULTURE FOR BACTERIA: CPT

## 2022-07-19 PROCEDURE — 93005 ELECTROCARDIOGRAM TRACING: CPT

## 2022-07-19 PROCEDURE — 74011250636 HC RX REV CODE- 250/636: Performed by: EMERGENCY MEDICINE

## 2022-07-19 RX ORDER — POLYETHYLENE GLYCOL 3350 17 G/17G
17 POWDER, FOR SOLUTION ORAL DAILY PRN
Status: DISCONTINUED | OUTPATIENT
Start: 2022-07-19 | End: 2022-07-21 | Stop reason: HOSPADM

## 2022-07-19 RX ORDER — ACETAMINOPHEN 325 MG/1
650 TABLET ORAL
Status: DISCONTINUED | OUTPATIENT
Start: 2022-07-19 | End: 2022-07-21 | Stop reason: HOSPADM

## 2022-07-19 RX ORDER — ONDANSETRON 4 MG/1
4 TABLET, ORALLY DISINTEGRATING ORAL
Status: DISCONTINUED | OUTPATIENT
Start: 2022-07-19 | End: 2022-07-21 | Stop reason: HOSPADM

## 2022-07-19 RX ORDER — TIMOLOL MALEATE 5 MG/ML
1 SOLUTION/ DROPS OPHTHALMIC 2 TIMES DAILY
Status: DISCONTINUED | OUTPATIENT
Start: 2022-07-20 | End: 2022-07-21 | Stop reason: HOSPADM

## 2022-07-19 RX ORDER — LATANOPROST 50 UG/ML
1 SOLUTION/ DROPS OPHTHALMIC
Status: DISCONTINUED | OUTPATIENT
Start: 2022-07-20 | End: 2022-07-21 | Stop reason: HOSPADM

## 2022-07-19 RX ORDER — MULTIVITAMIN
1 TABLET ORAL DAILY
Status: DISCONTINUED | OUTPATIENT
Start: 2022-07-20 | End: 2022-07-21 | Stop reason: HOSPADM

## 2022-07-19 RX ORDER — ONDANSETRON 2 MG/ML
4 INJECTION INTRAMUSCULAR; INTRAVENOUS
Status: DISCONTINUED | OUTPATIENT
Start: 2022-07-19 | End: 2022-07-21 | Stop reason: HOSPADM

## 2022-07-19 RX ORDER — GABAPENTIN 400 MG/1
800 CAPSULE ORAL 3 TIMES DAILY
Status: DISCONTINUED | OUTPATIENT
Start: 2022-07-20 | End: 2022-07-21 | Stop reason: HOSPADM

## 2022-07-19 RX ORDER — ACETAMINOPHEN 650 MG/1
650 SUPPOSITORY RECTAL
Status: DISCONTINUED | OUTPATIENT
Start: 2022-07-19 | End: 2022-07-21 | Stop reason: HOSPADM

## 2022-07-19 RX ORDER — TRAMADOL HYDROCHLORIDE 50 MG/1
50 TABLET ORAL
Status: COMPLETED | OUTPATIENT
Start: 2022-07-19 | End: 2022-07-19

## 2022-07-19 RX ORDER — METFORMIN HYDROCHLORIDE 500 MG/1
1000 TABLET, EXTENDED RELEASE ORAL 2 TIMES DAILY
Status: DISCONTINUED | OUTPATIENT
Start: 2022-07-20 | End: 2022-07-21 | Stop reason: HOSPADM

## 2022-07-19 RX ORDER — ENOXAPARIN SODIUM 100 MG/ML
40 INJECTION SUBCUTANEOUS DAILY
Status: DISCONTINUED | OUTPATIENT
Start: 2022-07-20 | End: 2022-07-21 | Stop reason: HOSPADM

## 2022-07-19 RX ORDER — DULOXETIN HYDROCHLORIDE 30 MG/1
60 CAPSULE, DELAYED RELEASE ORAL DAILY
Status: DISCONTINUED | OUTPATIENT
Start: 2022-07-20 | End: 2022-07-21 | Stop reason: HOSPADM

## 2022-07-19 RX ORDER — SERTRALINE HYDROCHLORIDE 25 MG/1
25 TABLET, FILM COATED ORAL DAILY
Status: DISCONTINUED | OUTPATIENT
Start: 2022-07-20 | End: 2022-07-21 | Stop reason: HOSPADM

## 2022-07-19 RX ORDER — HYDROXYZINE PAMOATE 50 MG/1
50 CAPSULE ORAL
Status: DISCONTINUED | OUTPATIENT
Start: 2022-07-19 | End: 2022-07-21 | Stop reason: HOSPADM

## 2022-07-19 RX ORDER — TOPIRAMATE 25 MG/1
25 TABLET ORAL 2 TIMES DAILY WITH MEALS
Status: DISCONTINUED | OUTPATIENT
Start: 2022-07-20 | End: 2022-07-21 | Stop reason: HOSPADM

## 2022-07-19 RX ORDER — INSULIN LISPRO 100 [IU]/ML
INJECTION, SOLUTION INTRAVENOUS; SUBCUTANEOUS
Status: DISCONTINUED | OUTPATIENT
Start: 2022-07-20 | End: 2022-07-21 | Stop reason: HOSPADM

## 2022-07-19 RX ORDER — MAGNESIUM SULFATE 100 %
4 CRYSTALS MISCELLANEOUS AS NEEDED
Status: DISCONTINUED | OUTPATIENT
Start: 2022-07-19 | End: 2022-07-21 | Stop reason: HOSPADM

## 2022-07-19 RX ORDER — LANOLIN ALCOHOL/MO/W.PET/CERES
325 CREAM (GRAM) TOPICAL DAILY
Status: DISCONTINUED | OUTPATIENT
Start: 2022-07-20 | End: 2022-07-21 | Stop reason: HOSPADM

## 2022-07-19 RX ORDER — TRAZODONE HYDROCHLORIDE 50 MG/1
50 TABLET ORAL
Status: DISCONTINUED | OUTPATIENT
Start: 2022-07-19 | End: 2022-07-21 | Stop reason: HOSPADM

## 2022-07-19 RX ORDER — SODIUM CHLORIDE 9 MG/ML
75 INJECTION, SOLUTION INTRAVENOUS CONTINUOUS
Status: DISCONTINUED | OUTPATIENT
Start: 2022-07-19 | End: 2022-07-21 | Stop reason: HOSPADM

## 2022-07-19 RX ADMIN — CEFTRIAXONE SODIUM 2 G: 2 INJECTION, POWDER, FOR SOLUTION INTRAMUSCULAR; INTRAVENOUS at 23:15

## 2022-07-19 RX ADMIN — SODIUM CHLORIDE 1000 ML: 9 INJECTION, SOLUTION INTRAVENOUS at 18:53

## 2022-07-19 RX ADMIN — SODIUM CHLORIDE 75 ML/HR: 9 INJECTION, SOLUTION INTRAVENOUS at 01:12

## 2022-07-19 RX ADMIN — TRAMADOL HYDROCHLORIDE 50 MG: 50 TABLET ORAL at 21:29

## 2022-07-19 RX ADMIN — SODIUM CHLORIDE 1000 ML: 9 INJECTION, SOLUTION INTRAVENOUS at 20:23

## 2022-07-19 RX ADMIN — SODIUM CHLORIDE 1000 ML: 9 INJECTION, SOLUTION INTRAVENOUS at 21:29

## 2022-07-19 NOTE — ED PROVIDER NOTES
EMERGENCY DEPARTMENT HISTORY AND PHYSICAL EXAM      Date: 7/19/2022  Patient Name: Becky Garza    History of Presenting Illness     Chief Complaint   Patient presents with    Hypotension       History Provided By: Patient    HPI: Gary Treadwell, 59 y.o. female with a significant past medical history of gastric bypass and triple negative breast cancer for which she is currently undergoing chemotherapy presents to the ED with weakness and dizziness going on for the past year or more likely secondary to dehydration. Patient was seen and evaluated by her PCP at the cancer center yesterday and told to come to the emergency room. She waited until today found that her blood pressure was low and then came in. She denies any fever, chills, nausea, vomiting, chest pain, shortness of breath, rash, diarrhea, headache, night sweats. She says she has mild dizziness at times. There are no other complaints, changes, or physical findings at this time. PCP: Amaury Hawkins MD    No current facility-administered medications on file prior to encounter. Current Outpatient Medications on File Prior to Encounter   Medication Sig Dispense Refill    doxycycline (VIBRAMYCIN) 100 mg capsule Take 1 Capsule by mouth every twelve (12) hours. 10 Capsule 0    topiramate (TOPAMAX) 25 mg tablet Take 1 Tablet by mouth two (2) times daily (with meals). 60 Tablet 0    furosemide (Lasix) 40 mg tablet 1 tab daily 15 Tablet 0    metFORMIN ER (GLUCOPHAGE XR) 500 mg tablet TAKE 2 TABLET BY MOUTH TWICE DAILY DAILY WITH MEALS STOP SYNJARDY 360 Tablet 1    semaglutide (Ozempic) 0.25 mg or 0.5 mg/dose (2 mg/1.5 ml) subq pen Inject 0.25mg once weekly for three weeks, then increase to 0.5mg once weekly 9 mL 1    DULoxetine (CYMBALTA) 60 mg capsule TAKE 1 CAPSULE BY MOUTH DAILY 30 Capsule 6    hydrOXYzine pamoate (VISTARIL) 25 mg capsule Take 50 mg by mouth nightly.       sertraline (ZOLOFT) 25 mg tablet sertraline 25 mg tablet   TAKE 1 TABLET BY MOUTH EVERY DAY      b complex vitamins tablet Take 1 Tab by mouth daily.  ascorbic acid-collagen (Collagen Plus Vitamin C) 125-740 mg cap Collagen Plus Vitamin C      IRON, FERROUS SULFATE, PO Iron (ferrous sulfate)      timoloL (BetimoL) 0.5 % ophthalmic solution Administer 1 Drop to both eyes two (2) times a day. use in affected eye(s)      multivitamin-min-iron-FA-vit K (Bariatric Multivitamins) 45 mg iron- 800 mcg-120 mcg cap Bariatric Multivitamins      omega 3-dha-epa-fish oil (Fish Oil) 100-160-1,000 mg cap Take 3 mg by mouth daily.  triamcinolone acetonide (KENALOG) 0.1 % topical cream triamcinolone acetonide 0.1 % topical cream      calcium-cholecalciferol, d3, 600 mg calcium- 200 unit cap Take 600 mg by mouth daily.  traZODone (DESYREL) 100 mg tablet TK 1 T PO QD HS  1    latanoprost (XALATAN) 0.005 % ophthalmic solution Administer 1 Drop to both eyes nightly.  gabapentin (NEURONTIN) 800 mg tablet Take 1 Tab by mouth three (3) times daily. 0    biotin 500 mcg Cap Take  by mouth.          Past History     Past Medical History:  Past Medical History:   Diagnosis Date    Arthritis     Back/Neck problems    Burning with urination     Frequency    Cancer ([address] 2009    RIGHT breast    Congestive heart failure (HCC)     Depression     Including Post Partum    Diabetes ([address]     Dizziness     GERD (gastroesophageal reflux disease)     Glaucoma     Gout     Headache     Heart disease     Hypercholesterolemia     Hypertension     Morbid obesity (HCC)     Neuropathy     Shortness of breath     With activity    Sleep apnea     Sleep disorder     Difficulty falling asleep, night sweats    Stool color black        Past Surgical History:  Past Surgical History:   Procedure Laterality Date    HX APPENDECTOMY      HX BREAST LUMPECTOMY      RIGHT with SNBX    HX CHOLECYSTECTOMY      HX GASTRIC BYPASS  2020    HX GYN       x1, Bilateral tubal ligation, Dilation & Curettage    HX GYN      Hysterectomy (partial)    HX ORTHOPAEDIC      Procedure on shoulder    HX PREMALIG/BENIGN SKIN LESION EXCISION      Cyst removed from scalp    HX PREMALIG/BENIGN SKIN LESION EXCISION      Debridement of subcutaneous tissue    HX TONSILLECTOMY  1970    HX UROLOGICAL         Family History:  Family History   Problem Relation Age of Onset    Hypertension Mother     Cancer Mother     Thyroid Disease Mother     Hypertension Father     Cancer Father     Diabetes Brother     Hypertension Brother     Cancer Brother     Stroke Brother     Lung Disease Daughter     Asthma Daughter     Hypertension Daughter     Thyroid Disease Daughter     Diabetes Son     Lung Disease Son     Hypertension Son     Stroke Maternal Grandmother     Dementia Maternal Grandmother     Hypertension Maternal Grandmother        Social History:  Social History     Tobacco Use    Smoking status: Never Smoker    Smokeless tobacco: Never Used   Vaping Use    Vaping Use: Never used   Substance Use Topics    Alcohol use: Yes     Alcohol/week: 1.7 standard drinks     Types: 2 Glasses of wine per week    Drug use: No       Allergies: Allergies   Allergen Reactions    Januvia [Sitagliptin] Rash    Levorphanol Itching and Rash    Statins-Hmg-Coa Reductase Inhibitors Itching       Review of Systems   Review of Systems   Constitutional: Positive for fatigue. Negative for appetite change, chills and fever. HENT: Negative. Negative for congestion and sinus pain. Eyes: Negative. Negative for pain and visual disturbance. Respiratory: Negative. Negative for chest tightness and shortness of breath. Cardiovascular: Negative. Negative for chest pain. Gastrointestinal: Negative. Negative for abdominal pain, diarrhea, nausea and vomiting. Genitourinary: Negative. Negative for difficulty urinating. No discharge   Musculoskeletal: Negative. Negative for arthralgias. Skin: Negative. Negative for rash. Neurological: Positive for dizziness. Negative for weakness and headaches. Hematological: Negative. Psychiatric/Behavioral: Negative. Negative for agitation. The patient is not nervous/anxious. All other systems reviewed and are negative. Physical Exam   Physical Exam  Vitals and nursing note reviewed. Constitutional:       General: She is not in acute distress. Appearance: She is well-developed. HENT:      Head: Normocephalic and atraumatic. Nose: Nose normal.      Mouth/Throat:      Mouth: Mucous membranes are moist.      Pharynx: Oropharynx is clear. No oropharyngeal exudate. Eyes:      General:         Right eye: No discharge. Left eye: No discharge. Conjunctiva/sclera: Conjunctivae normal.      Pupils: Pupils are equal, round, and reactive to light. Cardiovascular:      Rate and Rhythm: Normal rate and regular rhythm. Chest Wall: PMI is not displaced. No thrill. Heart sounds: Normal heart sounds. No murmur heard. No friction rub. No gallop. Pulmonary:      Effort: Pulmonary effort is normal. No respiratory distress. Breath sounds: Normal breath sounds. No wheezing or rales. Chest:      Chest wall: No tenderness. Abdominal:      General: Bowel sounds are normal. There is no distension. Palpations: Abdomen is soft. There is no mass. Tenderness: There is no abdominal tenderness. There is no guarding or rebound. Musculoskeletal:         General: Normal range of motion. Cervical back: Normal range of motion and neck supple. Lymphadenopathy:      Cervical: No cervical adenopathy. Skin:     General: Skin is warm and dry. Capillary Refill: Capillary refill takes less than 2 seconds. Findings: No erythema or rash. Neurological:      Mental Status: She is alert and oriented to person, place, and time. Cranial Nerves: No cranial nerve deficit.       Coordination: Coordination normal.   Psychiatric:         Mood and Affect: Mood normal.         Behavior: Behavior normal.         Lab and Diagnostic Study Results   Labs -     Recent Results (from the past 12 hour(s))   CBC WITH AUTOMATED DIFF    Collection Time: 07/19/22  6:56 PM   Result Value Ref Range    WBC 32.3 (H) 3.6 - 11.0 K/uL    RBC 2.92 (L) 3.80 - 5.20 M/uL    HGB 9.0 (L) 11.5 - 16.0 g/dL    HCT 27.2 (L) 35.0 - 47.0 %    MCV 93.2 80.0 - 99.0 FL    MCH 30.8 26.0 - 34.0 PG    MCHC 33.1 30.0 - 36.5 g/dL    RDW 15.1 (H) 11.5 - 14.5 %    PLATELET 822 (H) 596 - 400 K/uL    MPV 11.0 8.9 - 12.9 FL    NRBC 0.1 (H) 0.0  WBC    ABSOLUTE NRBC 0.04 (H) 0.00 - 0.01 K/uL    NEUTROPHILS 71 32 - 75 %    BAND NEUTROPHILS 1 0 - 6 %    LYMPHOCYTES 18 12 - 49 %    MONOCYTES 10 5 - 13 %    EOSINOPHILS 0 0 - 7 %    BASOPHILS 0 0 - 1 %    IMMATURE GRANULOCYTES 0 %    ABS. NEUTROPHILS 23.3 (H) 1.8 - 8.0 K/UL    ABS. LYMPHOCYTES 5.8 (H) 0.8 - 3.5 K/UL    ABS. MONOCYTES 3.2 (H) 0.0 - 1.0 K/UL    ABS. EOSINOPHILS 0.0 0.0 - 0.4 K/UL    ABS. BASOPHILS 0.0 0.0 - 0.1 K/UL    ABS. IMM. GRANS. 0.0 K/UL    DF Manual      RBC COMMENTS Hypochromia  1+       METABOLIC PANEL, COMPREHENSIVE    Collection Time: 07/19/22  6:56 PM   Result Value Ref Range    Sodium 138 136 - 145 mmol/L    Potassium 3.6 3.5 - 5.1 mmol/L    Chloride 109 (H) 97 - 108 mmol/L    CO2 21 21 - 32 mmol/L    Anion gap 8 5 - 15 mmol/L    Glucose 80 65 - 100 mg/dL    BUN 21 (H) 6 - 20 mg/dL    Creatinine 0.78 0.55 - 1.02 mg/dL    BUN/Creatinine ratio 27 (H) 12 - 20      GFR est AA >60 >60 ml/min/1.73m2    GFR est non-AA >60 >60 ml/min/1.73m2    Calcium 9.4 8.5 - 10.1 mg/dL    Bilirubin, total 0.2 0.2 - 1.0 mg/dL    AST (SGOT) 12 (L) 15 - 37 U/L    ALT (SGPT) 15 12 - 78 U/L    Alk.  phosphatase 90 45 - 117 U/L    Protein, total 6.0 (L) 6.4 - 8.2 g/dL    Albumin 3.1 (L) 3.5 - 5.0 g/dL    Globulin 2.9 2.0 - 4.0 g/dL    A-G Ratio 1.1 1.1 - 2.2     TROPONIN-HIGH SENSITIVITY    Collection Time: 07/19/22  6:56 PM   Result Value Ref Range    Troponin-High Sensitivity 4 0 - 51 ng/L   URINALYSIS W/MICROSCOPIC    Collection Time: 07/19/22  8:19 PM   Result Value Ref Range    Color Kalrissa      Appearance Turbid (A) Clear      Specific gravity 1.029 1.003 - 1.030      pH (UA) 5.0 5.0 - 8.0      Protein 30 (A) Negative mg/dL    Glucose Negative Negative mg/dL    Ketone 5 (A) Negative mg/dL    Bilirubin Negative Negative      Blood Negative Negative      Urobilinogen 0.1 0.1 - 1.0 EU/dL    Nitrites Negative Negative      Leukocyte Esterase Small (A) Negative      WBC 5-10 0 - 4 /hpf    RBC 0-5 0 - 5 /hpf    Bacteria Negative Negative /hpf    Mucus Trace /lpf    CA Oxalate crystals 1+    LACTIC ACID    Collection Time: 07/19/22  8:19 PM   Result Value Ref Range    Lactic acid 0.5 0.4 - 2.0 mmol/L       Radiologic Studies -   @lastxrresult@  CT Results  (Last 48 hours)    None        CXR Results  (Last 48 hours)               07/19/22 1814  XR CHEST PORT Final result    Impression:  No acute cardiopulmonary process. Narrative:  EXAM: XR CHEST PORT       INDICATION: Chest Pain       COMPARISON: 11/21/2021       FINDINGS: A portable AP radiograph of the chest was obtained at 1804 hours. Bilateral breast expanders and left IJ Port-A-Cath are noted. . The lungs are   clear. The cardiac and mediastinal contours and pulmonary vascularity are   normal.  The bones and soft tissues are grossly within normal limits. Medical Decision Making and ED Course   - I am the first provider for this patient. I reviewed the vital signs, available nursing notes, past medical history, past surgical history, family history and social history. - Initial assessment performed. The patients presenting problems have been discussed, and they are in agreement with the care plan formulated and outlined with them. I have encouraged them to ask questions as they arise throughout their visit.     Vital Signs-Reviewed the patient's vital signs. Patient Vitals for the past 12 hrs:   Temp Pulse Resp BP SpO2   07/19/22 2023 -- 67 -- (!) 95/57 100 %   07/19/22 1800 -- -- -- -- 100 %   07/19/22 1756 -- 76 16 100/70 100 %   07/19/22 1719 97.9 °F (36.6 °C) 90 16 (!) 87/61 100 %       Differential Diagnosis & Medical Decision Making Provider Note:   We will assess with basic cardiac labs EKG and treat with IV fluid  MDM     ED Course:   ED Course as of 07/19/22 2313 Tue Jul 19, 2022 1949 Blood pressure is improving but she does have significantly elevated white blood cell count. Will treat with another fluid bolus [CS]   1949 Will add lactate and UA [CS]      ED Course User Index  [CS] Jazmine Albarado MD     Is no evidence of sepsis at this point in time. There is no source of infection, she is not tachycardic, she is not febrile and has an isolated white blood cell count which I think is likely secondary to leukemoid reaction from chemotherapeutic agents. We will go ahead and culture the patient will admit to the hospital and I discussed the care with Dr. Sukh Tesfaye who is in agreement with the care plan as outlined. I have also consulted oncology    Procedures   Performed by: Antonio Miles MD  Procedures      Disposition   Disposition: Condition stable  Admitted    Diagnosis/Clinical Impression     Clinical Impression:   1. Hypotension due to hypovolemia    2. Leukemoid reaction        Attestations: I, Antonio Miles MD, am the primary clinician of record. Please note that this dictation was completed with [employer], the [employer] voice recognition software. Quite often unanticipated grammatical, syntax, homophones, and other interpretive errors are inadvertently transcribed by the computer software. Please disregard these errors. Please excuse any errors that have escaped final proofreading. Thank you.

## 2022-07-19 NOTE — Clinical Note
Status[de-identified] INPATIENT [101]   Type of Bed: Medical [8]   Inpatient Hospitalization Certified Necessary for the Following Reasons: 9.  Other (further clarification in H&P documentation)   Admitting Diagnosis: Hypotension [292205]   Admitting Physician: Rosa Maria Smith [[id number]]   Attending Physician: Rosa Maria Smith [[id number]]   Estimated Length of Stay: 2 Midnights   Discharge Plan[de-identified] Home with Office Follow-up

## 2022-07-20 ENCOUNTER — APPOINTMENT (OUTPATIENT)
Dept: GENERAL RADIOLOGY | Age: 57
DRG: 641 | End: 2022-07-20
Attending: FAMILY MEDICINE
Payer: MEDICARE

## 2022-07-20 LAB
ALBUMIN SERPL-MCNC: 2.7 G/DL (ref 3.5–5)
ALBUMIN/GLOB SERPL: 1.1 {RATIO} (ref 1.1–2.2)
ALP SERPL-CCNC: 81 U/L (ref 45–117)
ALT SERPL-CCNC: 13 U/L (ref 12–78)
ANION GAP SERPL CALC-SCNC: 7 MMOL/L (ref 5–15)
AST SERPL W P-5'-P-CCNC: 10 U/L (ref 15–37)
ATRIAL RATE: 90 BPM
BASOPHILS # BLD: 0 K/UL (ref 0–0.1)
BASOPHILS NFR BLD: 0 % (ref 0–1)
BILIRUB SERPL-MCNC: 0.1 MG/DL (ref 0.2–1)
BUN SERPL-MCNC: 17 MG/DL (ref 6–20)
BUN/CREAT SERPL: 20 (ref 12–20)
CA-I BLD-MCNC: 8.4 MG/DL (ref 8.5–10.1)
CALCULATED P AXIS, ECG09: 48 DEGREES
CALCULATED R AXIS, ECG10: 5 DEGREES
CALCULATED T AXIS, ECG11: 39 DEGREES
CHLORIDE SERPL-SCNC: 112 MMOL/L (ref 97–108)
CO2 SERPL-SCNC: 22 MMOL/L (ref 21–32)
COVID-19 RAPID TEST, COVR: NOT DETECTED
CREAT SERPL-MCNC: 0.83 MG/DL (ref 0.55–1.02)
DIAGNOSIS, 93000: NORMAL
DIFFERENTIAL METHOD BLD: ABNORMAL
EOSINOPHIL # BLD: 0 K/UL (ref 0–0.4)
EOSINOPHIL NFR BLD: 0 % (ref 0–7)
ERYTHROCYTE [DISTWIDTH] IN BLOOD BY AUTOMATED COUNT: 15.4 % (ref 11.5–14.5)
EST. AVERAGE GLUCOSE BLD GHB EST-MCNC: 117 MG/DL
GLOBULIN SER CALC-MCNC: 2.5 G/DL (ref 2–4)
GLUCOSE BLD STRIP.AUTO-MCNC: 102 MG/DL (ref 65–117)
GLUCOSE BLD STRIP.AUTO-MCNC: 107 MG/DL (ref 65–117)
GLUCOSE BLD STRIP.AUTO-MCNC: 111 MG/DL (ref 65–117)
GLUCOSE BLD STRIP.AUTO-MCNC: 97 MG/DL (ref 65–117)
GLUCOSE SERPL-MCNC: 107 MG/DL (ref 65–100)
HBA1C MFR BLD: 5.7 % (ref 4–5.6)
HCT VFR BLD AUTO: 25.2 % (ref 35–47)
HGB BLD-MCNC: 8.3 G/DL (ref 11.5–16)
IMM GRANULOCYTES # BLD AUTO: 0 K/UL
IMM GRANULOCYTES NFR BLD AUTO: 0 %
LYMPHOCYTES # BLD: 5.9 K/UL (ref 0.8–3.5)
LYMPHOCYTES NFR BLD: 20 % (ref 12–49)
MCH RBC QN AUTO: 31.3 PG (ref 26–34)
MCHC RBC AUTO-ENTMCNC: 32.9 G/DL (ref 30–36.5)
MCV RBC AUTO: 95.1 FL (ref 80–99)
MONOCYTES # BLD: 1.2 K/UL (ref 0–1)
MONOCYTES NFR BLD: 4 % (ref 5–13)
MYELOCYTES NFR BLD MANUAL: 1 %
NEUTS BAND NFR BLD MANUAL: 1 % (ref 0–6)
NEUTS SEG # BLD: 22.2 K/UL (ref 1.8–8)
NEUTS SEG NFR BLD: 74 % (ref 32–75)
NRBC # BLD: 0 K/UL (ref 0–0.01)
NRBC BLD-RTO: 0 PER 100 WBC
P-R INTERVAL, ECG05: 126 MS
PERFORMED BY, TECHID: NORMAL
PLATELET # BLD AUTO: 412 K/UL (ref 150–400)
PLATELET COMMENTS,PCOM: ABNORMAL
PMV BLD AUTO: 10.9 FL (ref 8.9–12.9)
POTASSIUM SERPL-SCNC: 3.3 MMOL/L (ref 3.5–5.1)
PROT SERPL-MCNC: 5.2 G/DL (ref 6.4–8.2)
Q-T INTERVAL, ECG07: 364 MS
QRS DURATION, ECG06: 76 MS
QTC CALCULATION (BEZET), ECG08: 445 MS
RBC # BLD AUTO: 2.65 M/UL (ref 3.8–5.2)
RBC MORPH BLD: ABNORMAL
SODIUM SERPL-SCNC: 141 MMOL/L (ref 136–145)
VENTRICULAR RATE, ECG03: 90 BPM
WBC # BLD AUTO: 29.6 K/UL (ref 3.6–11)

## 2022-07-20 PROCEDURE — 36415 COLL VENOUS BLD VENIPUNCTURE: CPT

## 2022-07-20 PROCEDURE — 80053 COMPREHEN METABOLIC PANEL: CPT

## 2022-07-20 PROCEDURE — 85025 COMPLETE CBC W/AUTO DIFF WBC: CPT

## 2022-07-20 PROCEDURE — 87040 BLOOD CULTURE FOR BACTERIA: CPT

## 2022-07-20 PROCEDURE — 83036 HEMOGLOBIN GLYCOSYLATED A1C: CPT

## 2022-07-20 PROCEDURE — 74011000250 HC RX REV CODE- 250: Performed by: FAMILY MEDICINE

## 2022-07-20 PROCEDURE — 72110 X-RAY EXAM L-2 SPINE 4/>VWS: CPT

## 2022-07-20 PROCEDURE — 82962 GLUCOSE BLOOD TEST: CPT

## 2022-07-20 PROCEDURE — 65270000029 HC RM PRIVATE

## 2022-07-20 PROCEDURE — 74011250637 HC RX REV CODE- 250/637: Performed by: FAMILY MEDICINE

## 2022-07-20 PROCEDURE — 73562 X-RAY EXAM OF KNEE 3: CPT

## 2022-07-20 PROCEDURE — 74011250636 HC RX REV CODE- 250/636: Performed by: FAMILY MEDICINE

## 2022-07-20 PROCEDURE — 74011000258 HC RX REV CODE- 258: Performed by: FAMILY MEDICINE

## 2022-07-20 RX ADMIN — FERROUS SULFATE TAB 325 MG (65 MG ELEMENTAL FE) 325 MG: 325 (65 FE) TAB at 09:08

## 2022-07-20 RX ADMIN — TOPIRAMATE 25 MG: 25 TABLET, FILM COATED ORAL at 16:58

## 2022-07-20 RX ADMIN — ACETAMINOPHEN 650 MG: 325 TABLET, FILM COATED ORAL at 13:42

## 2022-07-20 RX ADMIN — LATANOPROST 1 DROP: 50 SOLUTION OPHTHALMIC at 22:00

## 2022-07-20 RX ADMIN — TOPIRAMATE 25 MG: 25 TABLET, FILM COATED ORAL at 09:08

## 2022-07-20 RX ADMIN — METFORMIN HYDROCHLORIDE 1000 MG: 500 TABLET, EXTENDED RELEASE ORAL at 09:07

## 2022-07-20 RX ADMIN — SODIUM CHLORIDE 75 ML/HR: 9 INJECTION, SOLUTION INTRAVENOUS at 09:11

## 2022-07-20 RX ADMIN — MULTIVITAMIN TABLET 1 TABLET: TABLET at 09:08

## 2022-07-20 RX ADMIN — METFORMIN HYDROCHLORIDE 1000 MG: 500 TABLET, EXTENDED RELEASE ORAL at 23:03

## 2022-07-20 RX ADMIN — GABAPENTIN 800 MG: 400 CAPSULE ORAL at 23:03

## 2022-07-20 RX ADMIN — ENOXAPARIN SODIUM 40 MG: 100 INJECTION SUBCUTANEOUS at 09:07

## 2022-07-20 RX ADMIN — TIMOLOL MALEATE 1 DROP: 5 SOLUTION OPHTHALMIC at 10:30

## 2022-07-20 RX ADMIN — PIPERACILLIN AND TAZOBACTAM 3.38 G: 3; .375 INJECTION, POWDER, FOR SOLUTION INTRAVENOUS at 15:31

## 2022-07-20 RX ADMIN — SERTRALINE 25 MG: 25 TABLET, FILM COATED ORAL at 09:08

## 2022-07-20 RX ADMIN — HYDROXYZINE PAMOATE 50 MG: 50 CAPSULE ORAL at 23:03

## 2022-07-20 RX ADMIN — PIPERACILLIN AND TAZOBACTAM 3.38 G: 3; .375 INJECTION, POWDER, FOR SOLUTION INTRAVENOUS at 23:03

## 2022-07-20 RX ADMIN — SODIUM CHLORIDE 75 ML/HR: 9 INJECTION, SOLUTION INTRAVENOUS at 15:31

## 2022-07-20 RX ADMIN — DULOXETINE 60 MG: 30 CAPSULE, DELAYED RELEASE ORAL at 09:07

## 2022-07-20 RX ADMIN — GABAPENTIN 800 MG: 400 CAPSULE ORAL at 09:07

## 2022-07-20 RX ADMIN — ACETAMINOPHEN 650 MG: 325 TABLET, FILM COATED ORAL at 06:43

## 2022-07-20 RX ADMIN — PIPERACILLIN AND TAZOBACTAM 3.38 G: 3; .375 INJECTION, POWDER, FOR SOLUTION INTRAVENOUS at 06:39

## 2022-07-20 RX ADMIN — TIMOLOL MALEATE 1 DROP: 5 SOLUTION OPHTHALMIC at 21:00

## 2022-07-20 RX ADMIN — GABAPENTIN 800 MG: 400 CAPSULE ORAL at 15:31

## 2022-07-20 NOTE — H&P
History and Physical    NAME: Emmy Garza   :  1965   MRN:  [id number]     Date/Time:  2022 11:00 AM    Patient PCP: Elizabeth Edge MD  ______________________________________________________________________             Subjective:     CHIEF COMPLAINT:     Hypotension    HISTORY OF PRESENT ILLNESS:       Patient is a 59y.o. year old female with significant past medical history of breast cancer status post bilateral mastectomy hypertension hyperlipidemia history of gastric bypass CHF type 2 diabetes patient recently going through chemotherapy last chemotherapy 2 weeks ago came to the ER complaining of generalized weakness dizziness seen by ER physician patient was hypotensive blood work shows leukocytosis  Patient was seen by oncologist yesterday sent to the ER patient denies any fever chills cough congestion nausea vomiting diarrhea constipation patient was admitted for hypotension leukocytosis    Past Medical History:   Diagnosis Date    Arthritis     Back/Neck problems    Burning with urination     Frequency    Cancer ([address] 2009    RIGHT breast    Congestive heart failure ([address]     Depression     Including Post Partum    Diabetes ([address]     Dizziness     GERD (gastroesophageal reflux disease)     Glaucoma     Gout     Headache     Heart disease     Hypercholesterolemia     Hypertension     Morbid obesity ([address]     Neuropathy     Shortness of breath     With activity    Sleep apnea     Sleep disorder     Difficulty falling asleep, night sweats    Stool color black         Past Surgical History:   Procedure Laterality Date    HX APPENDECTOMY      HX BREAST LUMPECTOMY      RIGHT with SNBX    HX CHOLECYSTECTOMY      HX GASTRIC BYPASS  2020    HX GYN       x1, Bilateral tubal ligation, Dilation & Curettage    HX GYN      Hysterectomy (partial)    HX ORTHOPAEDIC      Procedure on shoulder    HX PREMALIG/BENIGN SKIN LESION EXCISION      Cyst removed from scalp    HX PREMALIG/BENIGN SKIN LESION EXCISION      Debridement of subcutaneous tissue    HX TONSILLECTOMY  1970    HX UROLOGICAL         Social History     Tobacco Use    Smoking status: Never    Smokeless tobacco: Never   Substance Use Topics    Alcohol use: Yes     Alcohol/week: 1.7 standard drinks     Types: 2 Glasses of wine per week        Family History   Problem Relation Age of Onset    Hypertension Mother     Cancer Mother     Thyroid Disease Mother     Hypertension Father     Cancer Father     Diabetes Brother     Hypertension Brother     Cancer Brother     Stroke Brother     Lung Disease Daughter     Asthma Daughter     Hypertension Daughter     Thyroid Disease Daughter     Diabetes Son     Lung Disease Son     Hypertension Son     Stroke Maternal Grandmother     Dementia Maternal Grandmother     Hypertension Maternal Grandmother        Allergies   Allergen Reactions    Januvia [Sitagliptin] Rash    Levorphanol Itching and Rash    Statins-Hmg-Coa Reductase Inhibitors Itching        Prior to Admission medications    Medication Sig Start Date End Date Taking? Authorizing Provider   doxycycline (VIBRAMYCIN) 100 mg capsule Take 1 Capsule by mouth every twelve (12) hours. 5/20/22   Savanna Fraser MD   topiramate (TOPAMAX) 25 mg tablet Take 1 Tablet by mouth two (2) times daily (with meals). 5/20/22   Tiffany Rodriguez MD   furosemide (Lasix) 40 mg tablet 1 tab daily 5/20/22   Tiffany Rodriguez MD   metFORMIN ER (GLUCOPHAGE XR) 500 mg tablet TAKE 2 TABLET BY MOUTH TWICE DAILY DAILY WITH MEALS STOP SYNJARDY 11/19/21   Conchita Ribeiro MD   semaglutide (Ozempic) 0.25 mg or 0.5 mg/dose (2 mg/1.5 ml) subq pen Inject 0.25mg once weekly for three weeks, then increase to 0.5mg once weekly 11/19/21   Conchita Ribeiro MD   DULoxetine (CYMBALTA) 60 mg capsule TAKE 1 CAPSULE BY MOUTH DAILY 7/3/21   Conchita Ribeiro MD   hydrOXYzine pamoate (VISTARIL) 25 mg capsule Take 50 mg by mouth nightly.  4/8/21   Provider, Historical   sertraline (ZOLOFT) 25 mg tablet sertraline 25 mg tablet   TAKE 1 TABLET BY MOUTH EVERY DAY    Provider, Historical   b complex vitamins tablet Take 1 Tab by mouth daily. Provider, Historical   ascorbic acid-collagen (Collagen Plus Vitamin C) 125-740 mg cap Collagen Plus Vitamin C    Provider, Historical   IRON, FERROUS SULFATE, PO Iron (ferrous sulfate)    Provider, Historical   timoloL (BetimoL) 0.5 % ophthalmic solution Administer 1 Drop to both eyes two (2) times a day. use in affected eye(s)    Provider, Historical   multivitamin-min-iron-FA-vit K (Bariatric Multivitamins) 45 mg iron- 800 mcg-120 mcg cap Bariatric Multivitamins 1/23/20   Provider, Historical   omega 3-dha-epa-fish oil (Fish Oil) 100-160-1,000 mg cap Take 3 mg by mouth daily. Provider, Historical   triamcinolone acetonide (KENALOG) 0.1 % topical cream triamcinolone acetonide 0.1 % topical cream    Provider, Historical   calcium-cholecalciferol, d3, 600 mg calcium- 200 unit cap Take 600 mg by mouth daily. Provider, Historical   traZODone (DESYREL) 100 mg tablet TK 1 T PO QD HS 10/24/19   Provider, Historical   latanoprost (XALATAN) 0.005 % ophthalmic solution Administer 1 Drop to both eyes nightly. Provider, Historical   gabapentin (NEURONTIN) 800 mg tablet Take 1 Tab by mouth three (3) times daily. 3/24/17   Provider, Historical   biotin 500 mcg Cap Take  by mouth.     Provider, Historical         Current Facility-Administered Medications:     piperacillin-tazobactam (ZOSYN) 4.5 g in 0.9% sodium chloride (MBP/ADV) 100 mL MBP, 4.5 g, IntraVENous, ONCE, Desire Fraser MD    piperacillin-tazobactam (ZOSYN) 3.375 g in 0.9% sodium chloride (MBP/ADV) 100 mL MBP, 3.375 g, IntraVENous, Q8H, Desire Fraser MD, Last Rate: 25 mL/hr at 07/20/22 0639, 3.375 g at 07/20/22 4285    DULoxetine (CYMBALTA) capsule 60 mg, 60 mg, Oral, DAILY, Desire Fraser MD, 60 mg at 07/20/22 0907    metFORMIN ER (GLUCOPHAGE XR) tablet 1,000 mg, 1,000 mg, Oral, BID, Evelio, Manisha Palumbo MD, 1,000 mg at 07/20/22 8363    topiramate (TOPAMAX) tablet 25 mg, 25 mg, Oral, BID WITH MEALS, Manisha Fraser MD, 25 mg at 07/20/22 0908    gabapentin (NEURONTIN) capsule 800 mg, 800 mg, Oral, TID, Manisha Fraser MD, 800 mg at 07/20/22 2008    hydrOXYzine pamoate (VISTARIL) capsule 50 mg, 50 mg, Oral, QHS, Desire Fraser MD    ferrous sulfate tablet 325 mg, 325 mg, Oral, DAILY, Desire Fraser MD, 325 mg at 07/20/22 0908    latanoprost (XALATAN) 0.005 % ophthalmic solution 1 Drop, 1 Drop, Both Eyes, QHS, Desire Fraser MD    multivitamin with folic acid (ONE DAILY WITH FOLIC ACID) tablet 1 Tablet, 1 Tablet, Oral, DAILY, Desire Fraser MD, 1 Tablet at 07/20/22 0908    sertraline (ZOLOFT) tablet 25 mg, 25 mg, Oral, DAILY, Kya Estrada MD, 25 mg at 07/20/22 0908    timolol (TIMOPTIC) 0.5 % ophthalmic solution 1 Drop, 1 Drop, Both Eyes, BID, Manisha Fraser MD    traZODone (DESYREL) tablet 50 mg, 50 mg, Oral, QHS PRN, Kya Estrada MD    insulin lispro (HUMALOG) injection, , SubCUTAneous, AC&HS, Manisha Fraser MD    glucose chewable tablet 16 g, 4 Tablet, Oral, PRN, Kya Estrada MD    glucagon (GLUCAGEN) injection 1 mg, 1 mg, IntraMUSCular, PRN, Kya Estrada MD    0.9% sodium chloride infusion, 75 mL/hr, IntraVENous, CONTINUOUS, Desire Fraser MD, Last Rate: 75 mL/hr at 07/20/22 0911, 75 mL/hr at 07/20/22 0911    acetaminophen (TYLENOL) tablet 650 mg, 650 mg, Oral, Q6H PRN, 650 mg at 07/20/22 9456 **OR** acetaminophen (TYLENOL) suppository 650 mg, 650 mg, Rectal, Q6H PRN, Desire Fraser MD    polyethylene glycol (MIRALAX) packet 17 g, 17 g, Oral, DAILY PRN, Desire Fraser MD    ondansetron (ZOFRAN ODT) tablet 4 mg, 4 mg, Oral, Q8H PRN **OR** ondansetron (ZOFRAN) injection 4 mg, 4 mg, IntraVENous, Q6H PRN, Desire Fraser MD    enoxaparin (LOVENOX) injection 40 mg, 40 mg, SubCUTAneous, DAILY, Desire Fraser MD, 40 mg at 07/20/22 0907    LAB DATA REVIEWED: Recent Results (from the past 24 hour(s))   EKG, 12 LEAD, INITIAL    Collection Time: 07/19/22  5:22 PM   Result Value Ref Range    Ventricular Rate 90 BPM    Atrial Rate 90 BPM    P-R Interval 126 ms    QRS Duration 76 ms    Q-T Interval 364 ms    QTC Calculation (Bezet) 445 ms    Calculated P Axis 48 degrees    Calculated R Axis 5 degrees    Calculated T Axis 39 degrees    Diagnosis       Normal sinus rhythm  Normal ECG  No previous ECGs available  Confirmed by Jesusita RAMIREZ MD ([id number]) on 7/20/2022 8:55:14 AM     CBC WITH AUTOMATED DIFF    Collection Time: 07/19/22  6:56 PM   Result Value Ref Range    WBC 32.3 (H) 3.6 - 11.0 K/uL    RBC 2.92 (L) 3.80 - 5.20 M/uL    HGB 9.0 (L) 11.5 - 16.0 g/dL    HCT 27.2 (L) 35.0 - 47.0 %    MCV 93.2 80.0 - 99.0 FL    MCH 30.8 26.0 - 34.0 PG    MCHC 33.1 30.0 - 36.5 g/dL    RDW 15.1 (H) 11.5 - 14.5 %    PLATELET 932 (H) 478 - 400 K/uL    MPV 11.0 8.9 - 12.9 FL    NRBC 0.1 (H) 0.0  WBC    ABSOLUTE NRBC 0.04 (H) 0.00 - 0.01 K/uL    NEUTROPHILS 71 32 - 75 %    BAND NEUTROPHILS 1 0 - 6 %    LYMPHOCYTES 18 12 - 49 %    MONOCYTES 10 5 - 13 %    EOSINOPHILS 0 0 - 7 %    BASOPHILS 0 0 - 1 %    IMMATURE GRANULOCYTES 0 %    ABS. NEUTROPHILS 23.3 (H) 1.8 - 8.0 K/UL    ABS. LYMPHOCYTES 5.8 (H) 0.8 - 3.5 K/UL    ABS. MONOCYTES 3.2 (H) 0.0 - 1.0 K/UL    ABS. EOSINOPHILS 0.0 0.0 - 0.4 K/UL    ABS. BASOPHILS 0.0 0.0 - 0.1 K/UL    ABS. IMM.  GRANS. 0.0 K/UL    DF Manual      RBC COMMENTS Hypochromia  1+       METABOLIC PANEL, COMPREHENSIVE    Collection Time: 07/19/22  6:56 PM   Result Value Ref Range    Sodium 138 136 - 145 mmol/L    Potassium 3.6 3.5 - 5.1 mmol/L    Chloride 109 (H) 97 - 108 mmol/L    CO2 21 21 - 32 mmol/L    Anion gap 8 5 - 15 mmol/L    Glucose 80 65 - 100 mg/dL    BUN 21 (H) 6 - 20 mg/dL    Creatinine 0.78 0.55 - 1.02 mg/dL    BUN/Creatinine ratio 27 (H) 12 - 20      GFR est AA >60 >60 ml/min/1.73m2    GFR est non-AA >60 >60 ml/min/1.73m2    Calcium 9.4 8.5 - 10.1 mg/dL    Bilirubin, total 0.2 0.2 - 1.0 mg/dL    AST (SGOT) 12 (L) 15 - 37 U/L    ALT (SGPT) 15 12 - 78 U/L    Alk. phosphatase 90 45 - 117 U/L    Protein, total 6.0 (L) 6.4 - 8.2 g/dL    Albumin 3.1 (L) 3.5 - 5.0 g/dL    Globulin 2.9 2.0 - 4.0 g/dL    A-G Ratio 1.1 1.1 - 2.2     TROPONIN-HIGH SENSITIVITY    Collection Time: 07/19/22  6:56 PM   Result Value Ref Range    Troponin-High Sensitivity 4 0 - 51 ng/L   URINALYSIS W/MICROSCOPIC    Collection Time: 07/19/22  8:19 PM   Result Value Ref Range    Color Klarissa      Appearance Turbid (A) Clear      Specific gravity 1.029 1.003 - 1.030      pH (UA) 5.0 5.0 - 8.0      Protein 30 (A) Negative mg/dL    Glucose Negative Negative mg/dL    Ketone 5 (A) Negative mg/dL    Bilirubin Negative Negative      Blood Negative Negative      Urobilinogen 0.1 0.1 - 1.0 EU/dL    Nitrites Negative Negative      Leukocyte Esterase Small (A) Negative      WBC 5-10 0 - 4 /hpf    RBC 0-5 0 - 5 /hpf    Bacteria Negative Negative /hpf    Mucus Trace /lpf    CA Oxalate crystals 1+    LACTIC ACID    Collection Time: 07/19/22  8:19 PM   Result Value Ref Range    Lactic acid 0.5 0.4 - 2.0 mmol/L   COVID-19 RAPID TEST    Collection Time: 07/19/22 11:08 PM   Result Value Ref Range    COVID-19 rapid test Not Detected Not Detected     METABOLIC PANEL, COMPREHENSIVE    Collection Time: 07/20/22  5:36 AM   Result Value Ref Range    Sodium 141 136 - 145 mmol/L    Potassium 3.3 (L) 3.5 - 5.1 mmol/L    Chloride 112 (H) 97 - 108 mmol/L    CO2 22 21 - 32 mmol/L    Anion gap 7 5 - 15 mmol/L    Glucose 107 (H) 65 - 100 mg/dL    BUN 17 6 - 20 mg/dL    Creatinine 0.83 0.55 - 1.02 mg/dL    BUN/Creatinine ratio 20 12 - 20      GFR est AA >60 >60 ml/min/1.73m2    GFR est non-AA >60 >60 ml/min/1.73m2    Calcium 8.4 (L) 8.5 - 10.1 mg/dL    Bilirubin, total 0.1 (L) 0.2 - 1.0 mg/dL    AST (SGOT) 10 (L) 15 - 37 U/L    ALT (SGPT) 13 12 - 78 U/L    Alk.  phosphatase 81 45 - 117 U/L    Protein, total 5.2 (L) 6.4 - 8.2 g/dL    Albumin 2.7 (L) 3.5 - 5.0 g/dL    Globulin 2.5 2.0 - 4.0 g/dL    A-G Ratio 1.1 1.1 - 2.2     CBC WITH AUTOMATED DIFF    Collection Time: 07/20/22  5:36 AM   Result Value Ref Range    WBC 29.6 (H) 3.6 - 11.0 K/uL    RBC 2.65 (L) 3.80 - 5.20 M/uL    HGB 8.3 (L) 11.5 - 16.0 g/dL    HCT 25.2 (L) 35.0 - 47.0 %    MCV 95.1 80.0 - 99.0 FL    MCH 31.3 26.0 - 34.0 PG    MCHC 32.9 30.0 - 36.5 g/dL    RDW 15.4 (H) 11.5 - 14.5 %    PLATELET 266 (H) 992 - 400 K/uL    MPV 10.9 8.9 - 12.9 FL    NRBC 0.0 0.0  WBC    ABSOLUTE NRBC 0.00 0.00 - 0.01 K/uL    NEUTROPHILS 74 32 - 75 %    BAND NEUTROPHILS 1 0 - 6 %    LYMPHOCYTES 20 12 - 49 %    MONOCYTES 4 (L) 5 - 13 %    EOSINOPHILS 0 0 - 7 %    BASOPHILS 0 0 - 1 %    MYELOCYTES 1 (H) 0 %    IMMATURE GRANULOCYTES 0 %    ABS. NEUTROPHILS 22.2 (H) 1.8 - 8.0 K/UL    ABS. LYMPHOCYTES 5.9 (H) 0.8 - 3.5 K/UL    ABS. MONOCYTES 1.2 (H) 0.0 - 1.0 K/UL    ABS. EOSINOPHILS 0.0 0.0 - 0.4 K/UL    ABS. BASOPHILS 0.0 0.0 - 0.1 K/UL    ABS. IMM. GRANS. 0.0 K/UL    DF Manual      PLATELET COMMENTS Large Platelets      RBC COMMENTS Polychromasia  1+       GLUCOSE, POC    Collection Time: 07/20/22  7:52 AM   Result Value Ref Range    Glucose (POC) 107 65 - 117 mg/dL    Performed by Florina Copper        XR Results (most recent):  Results from Hospital Encounter encounter on 07/19/22    XR CHEST PORT    Narrative  EXAM: XR CHEST PORT    INDICATION: Chest Pain    COMPARISON: 11/21/2021    FINDINGS: A portable AP radiograph of the chest was obtained at 1804 hours. Bilateral breast expanders and left IJ Port-A-Cath are noted. . The lungs are  clear. The cardiac and mediastinal contours and pulmonary vascularity are  normal.  The bones and soft tissues are grossly within normal limits. Impression  No acute cardiopulmonary process. XR CHEST PORT   Final Result   No acute cardiopulmonary process.            Review of Systems:  Constitutional: Generalized weakness tiredness dizziness negative for chills and fever. HENT: Negative. Eyes: Negative. Respiratory: Negative. Cardiovascular: Negative. Gastrointestinal: Negative for abdominal pain and nausea. Skin: Negative. Neurological: Negative. Objective:   VITALS:    Visit Vitals  /68 (BP 1 Location: Left upper arm, BP Patient Position: At rest)   Pulse 80   Temp 98.1 °F (36.7 °C)   Resp 18   Ht 5' 5\" (1.651 m)   Wt 81.7 kg (180 lb 1.9 oz)   SpO2 100%   BMI 29.97 kg/m²       Physical Exam:   Constitutional: pt is oriented to person, place, and time. HENT:   Head: Normocephalic and atraumatic. Eyes: Pupils are equal, round, and reactive to light. EOM are normal.   Cardiovascular: Normal rate, regular rhythm and normal heart sounds. Pulmonary/Chest: Breath sounds normal. No wheezes. No rales. Exhibits no tenderness. Abdominal: Soft. Bowel sounds are normal. There is no abdominal tenderness. There is no rebound and no guarding. Musculoskeletal: Normal range of motion. Neurological: pt is alert and oriented to person, place, and time. Alert. Normal strength. No cranial nerve deficit or sensory deficit. Displays a negative Romberg sign.         ASSESSMENT & PLAN:    Hypotension  Leukocytosis  Bilateral breast cancer status postmastectomy ongoing chemo  History of gastric bypass  History of hypertension  Hyperlipidemia  Chronic headache  Type 2 diabetes  Depression and anxiety  Back pain and left leg knee pain        Patient admitted to medical telemetry floor  Start on IV fluids order blood cultures urine cultures start on IV Zosyn  Continue Cymbalta 60 mg daily  Lovenox 40 mg subcu daily for DVT prophylaxis  Neurontin 800 mg 3 times a day  Sliding scale insulin  Metformin 1000 mg twice a day  Multivitamin daily  Zoloft 25 mg daily  Timolol eyedrops 2 times a day  Topamax 25 mg twice a day      ID consult and oncology consult      Current Facility-Administered Medications:     piperacillin-tazobactam (ZOSYN) 4.5 g in 0.9% sodium chloride (MBP/ADV) 100 mL MBP, 4.5 g, IntraVENous, ONCE, Desire Fraser MD    piperacillin-tazobactam (ZOSYN) 3.375 g in 0.9% sodium chloride (MBP/ADV) 100 mL MBP, 3.375 g, IntraVENous, Q8H, Desire Fraser MD, Last Rate: 25 mL/hr at 07/20/22 0639, 3.375 g at 07/20/22 2594    DULoxetine (CYMBALTA) capsule 60 mg, 60 mg, Oral, DAILY, Desire Fraser MD, 60 mg at 07/20/22 0907    metFORMIN ER (GLUCOPHAGE XR) tablet 1,000 mg, 1,000 mg, Oral, BID, Desire Fraser MD, 1,000 mg at 07/20/22 1501    topiramate (TOPAMAX) tablet 25 mg, 25 mg, Oral, BID WITH MEALS, Desire Fraser MD, 25 mg at 07/20/22 0908    gabapentin (NEURONTIN) capsule 800 mg, 800 mg, Oral, TID, Desire Fraser MD, 800 mg at 07/20/22 5796    hydrOXYzine pamoate (VISTARIL) capsule 50 mg, 50 mg, Oral, QHS, Desire Fraser MD    ferrous sulfate tablet 325 mg, 325 mg, Oral, DAILY, Desire Fraser MD, 325 mg at 07/20/22 0908    latanoprost (XALATAN) 0.005 % ophthalmic solution 1 Drop, 1 Drop, Both Eyes, QHS, Desire Fraser MD    multivitamin with folic acid (ONE DAILY WITH FOLIC ACID) tablet 1 Tablet, 1 Tablet, Oral, DAILY, Desire Fraser MD, 1 Tablet at 07/20/22 0908    sertraline (ZOLOFT) tablet 25 mg, 25 mg, Oral, DAILY, Desire Fraser MD, 25 mg at 07/20/22 0908    timolol (TIMOPTIC) 0.5 % ophthalmic solution 1 Drop, 1 Drop, Both Eyes, BID, Desire Fraser MD    traZODone (DESYREL) tablet 50 mg, 50 mg, Oral, QHS PRN, Desire Fraser MD    insulin lispro (HUMALOG) injection, , SubCUTAneous, AC&HS, Desire Fraser MD    glucose chewable tablet 16 g, 4 Tablet, Oral, PRN, Desire Fraser MD    glucagon (GLUCAGEN) injection 1 mg, 1 mg, IntraMUSCular, PRN, Catalina López MD    0.9% sodium chloride infusion, 75 mL/hr, IntraVENous, CONTINUOUS, Desire Fraser MD, Last Rate: 75 mL/hr at 07/20/22 0911, 75 mL/hr at 07/20/22 0911    acetaminophen (TYLENOL) tablet 650 mg, 650 mg, Oral, Q6H PRN, 650 mg at 07/20/22 0643 **OR** acetaminophen (TYLENOL) suppository 650 mg, 650 mg, Rectal, Q6H PRN, Desire Fraser MD    polyethylene glycol (MIRALAX) packet 17 g, 17 g, Oral, DAILY PRN, Desire Fraser MD    ondansetron (ZOFRAN ODT) tablet 4 mg, 4 mg, Oral, Q8H PRN **OR** ondansetron (ZOFRAN) injection 4 mg, 4 mg, IntraVENous, Q6H PRN, Desire Fraser MD    enoxaparin (LOVENOX) injection 40 mg, 40 mg, SubCUTAneous, DAILY, Desire Fraser MD, 40 mg at 07/20/22 8519         ________________________________________________________________________    Signed: Dina Ramos MD

## 2022-07-20 NOTE — ED NOTES
TRANSFER - OUT REPORT:    Verbal report given to Kaveh Gan RN on Luis B Amarisix  being transferred to [facility](unit) for routine progression of care       Report consisted of patients Situation, Background, Assessment and   Recommendations(SBAR). Information from the following report(s) SBAR, Kardex, ED Summary, [facility] and Recent Results was reviewed with the receiving nurse. Lines:   Venous Access Device (Active)        Opportunity for questions and clarification was provided.       Patient transported with:   [employer]

## 2022-07-20 NOTE — CONSULTS
Hematology/Oncology Consult    Patient: Jesus Marshall MRN: [id number]     YOB: 1965  Age: 59 y.o. Sex: female      HPI      Jesus Marshall is a 59 y.o. female who is being seen for breast cancer currently on chemotherapy. Ms. Deya Gomez is a 61-year-old female patient with history of right-sided triple negative breast cancer diagnosed in  for which she underwent lumpectomy and adjuvant chemotherapy. Recently diagnosed with triple negative breast cancer in the same breast.  She is now status post bilateral mastectomy. She is on adjuvant chemotherapy with docetaxel and cyclophosphamide under the direction of Dr. Melanie Coleman. She received cycle 2 of chemotherapy on 2022 and has been receiving G-CSF until 2022. She was advised to proceed to the emergency room due to generalized weakness and also multiple falls at home. Patient reportedly has baseline neuropathy from her prior chemotherapy but her falls have worsened recently due to increased weakness. She is admitted for possible dehydration. She also reports left knee pain and swelling from recent falls.     Past Medical History:   Diagnosis Date    Arthritis     Back/Neck problems    Burning with urination     Frequency    Cancer ([address] 2009    RIGHT breast    Congestive heart failure (HCC)     Depression     Including Post Partum    Diabetes (HCC)     Dizziness     GERD (gastroesophageal reflux disease)     Glaucoma     Gout     Headache     Heart disease     Hypercholesterolemia     Hypertension     Morbid obesity ([address]     Neuropathy     Shortness of breath     With activity    Sleep apnea     Sleep disorder     Difficulty falling asleep, night sweats    Stool color black      Past Surgical History:   Procedure Laterality Date    HX APPENDECTOMY      HX BREAST LUMPECTOMY      RIGHT with SNBX    HX CHOLECYSTECTOMY      HX GASTRIC BYPASS  2020    HX GYN       x1, Bilateral tubal ligation, Dilation & Curettage    HX GYN      Hysterectomy (partial)    HX ORTHOPAEDIC      Procedure on shoulder    HX PREMALIG/BENIGN SKIN LESION EXCISION      Cyst removed from scalp    HX PREMALIG/BENIGN SKIN LESION EXCISION      Debridement of subcutaneous tissue    HX TONSILLECTOMY  1970    HX UROLOGICAL        Family History   Problem Relation Age of Onset    Hypertension Mother     Cancer Mother     Thyroid Disease Mother     Hypertension Father     Cancer Father     Diabetes Brother     Hypertension Brother     Cancer Brother     Stroke Brother     Lung Disease Daughter     Asthma Daughter     Hypertension Daughter     Thyroid Disease Daughter     Diabetes Son     Lung Disease Son     Hypertension Son     Stroke Maternal Grandmother     Dementia Maternal Grandmother     Hypertension Maternal Grandmother      Social History     Tobacco Use    Smoking status: Never    Smokeless tobacco: Never   Substance Use Topics    Alcohol use:  Yes     Alcohol/week: 1.7 standard drinks     Types: 2 Glasses of wine per week      Current Facility-Administered Medications   Medication Dose Route Frequency Provider Last Rate Last Admin    piperacillin-tazobactam (ZOSYN) 3.375 g in 0.9% sodium chloride (MBP/ADV) 100 mL MBP  3.375 g IntraVENous Q8H Desire Fraser MD 25 mL/hr at 07/20/22 1531 3.375 g at 07/20/22 1531    DULoxetine (CYMBALTA) capsule 60 mg  60 mg Oral DAILY Desire Fraser MD   60 mg at 07/20/22 6878    metFORMIN ER (GLUCOPHAGE XR) tablet 1,000 mg  1,000 mg Oral BID Desire Fraser MD   1,000 mg at 07/20/22 1183    topiramate (TOPAMAX) tablet 25 mg  25 mg Oral BID WITH MEALS Desire Fraser MD   25 mg at 07/20/22 0908    gabapentin (NEURONTIN) capsule 800 mg  800 mg Oral TID Jose Fraser MD   800 mg at 07/20/22 1531    hydrOXYzine pamoate (VISTARIL) capsule 50 mg  50 mg Oral QHS Desire Fraser MD        ferrous sulfate tablet 325 mg  325 mg Oral DAILY Desire Fraser MD   325 mg at 07/20/22 0908    latanoprost (XALATAN) 0.005 % ophthalmic solution 1 Drop  1 Drop Both Eyes QHS Desire Fraser MD        multivitamin with folic acid (ONE DAILY WITH FOLIC ACID) tablet 1 Tablet  1 Tablet Oral DAILY Desire Fraser MD   1 Tablet at 07/20/22 0908    sertraline (ZOLOFT) tablet 25 mg  25 mg Oral DAILY Desire Fraser MD   25 mg at 07/20/22 0908    timolol (TIMOPTIC) 0.5 % ophthalmic solution 1 Drop  1 Drop Both Eyes BID Desire Fraser MD   1 Drop at 07/20/22 1030    traZODone (DESYREL) tablet 50 mg  50 mg Oral QHS PRN Se Fraser MD        insulin lispro (HUMALOG) injection   SubCUTAneous AC&HS Yusef Miller MD        glucose chewable tablet 16 g  4 Tablet Oral PRN Se Fraser MD        glucagon (GLUCAGEN) injection 1 mg  1 mg IntraMUSCular PRN Yusef Miller MD        0.9% sodium chloride infusion  75 mL/hr IntraVENous CONTINUOUS Desire Fraser MD 75 mL/hr at 07/20/22 1531 75 mL/hr at 07/20/22 1531    acetaminophen (TYLENOL) tablet 650 mg  650 mg Oral Q6H PRN Se Fraser MD   650 mg at 07/20/22 1342    Or    acetaminophen (TYLENOL) suppository 650 mg  650 mg Rectal Q6H PRN Se Fraser MD        polyethylene glycol (MIRALAX) packet 17 g  17 g Oral DAILY PRN Se Fraser MD        ondansetron (ZOFRAN ODT) tablet 4 mg  4 mg Oral Q8H PRN Se Fraser MD        Or    ondansetron (ZOFRAN) injection 4 mg  4 mg IntraVENous Q6H PRN Desire Fraser MD        enoxaparin (LOVENOX) injection 40 mg  40 mg SubCUTAneous DAILY Desire Fraser MD   40 mg at 07/20/22 0907        Allergies   Allergen Reactions    Januvia [Sitagliptin] Rash    Levorphanol Itching and Rash    Statins-Hmg-Coa Reductase Inhibitors Itching       Review of Systems:  Constitutional No fevers, chills, night sweats, excessive fatigue or weight loss. Allergic/Immunologic No recent allergic reactions   Eyes No significant visual difficulties. No diplopia.    ENMT No problems with hearing, no sore throat, no sinus drainage. Endocrine No hot flashes or night sweats. No cold intolerance, polyuria, or polydipsia   Hematologic/Lymphatic No easy bruising or bleeding. The patient denies any tender or palpable lymph nodes   Breasts No abnormal masses of breast, nipple discharge or pain. Respiratory No dyspnea on exertion, orthopnea, chest pain, cough or hemoptysis. Cardiovascular No anginal chest pain, irregular heart beat, tachycardia, palpitations or orthopnea. Gastrointestinal No nausea, vomiting, diarrhea, constipation, cramping, dysphagia, reflux, heartburn, GI bleeding, or early satiety. No change in bowel habits. Genitourinary (M) No hematuria, dysuria, increased frequency, urgency, hesitancy or incontinence. Musculoskeletal No joint pain, swelling or redness. No decreased range of motion. Integumentary No chronic rashes, inflammation, ulcerations, pruritus, petechiae, purpura, ecchymoses, or skin changes. Neurologic No headache, blurred vision, and no areas of focal weakness or numbness. Normal gait. No sensory problems. Psychiatric No insomnia, depression, magdalena or mood swings. No psychotropic drugs. Objective:     Vitals:    07/20/22 0031 07/20/22 0738 07/20/22 0907 07/20/22 1458   BP: (!) 87/60 102/68  105/74   Pulse: 82 80  79   Resp: 16 18  17   Temp: 97.7 °F (36.5 °C) 98.1 °F (36.7 °C)  97.3 °F (36.3 °C)   SpO2: 100% 100% 100% 100%   Weight: 81.7 kg (180 lb 1.9 oz)      Height:            Physical Exam:  Constitutional Alert, cooperative, oriented. Mood and affect appropriate. Appears close to chronological age. Well nourished. Well developed. Head Normocephalic; no scars   Eyes Conjunctivae and sclerae are clear and without icterus. Pupils are reactive and equal.   ENMT Sinuses are nontender. No oral exudates, ulcers, masses, thrush or mucositis. Oropharynx clear. Tongue normal.   Neck Supple without masses or thyromegaly. No jugular venous distension.    Hematologic/Lymphatic No petechiae or purpura. No tender or palpable lymph nodes in the cervical, supraclavicular, axillary or inguinal area. Respiratory Lungs are clear to auscultation without rhonchi or wheezing. Cardiovascular Regular rate and rhythm of heart without murmurs, gallops or rubs. Chest / Line Site Chest is symmetric with no chest wall deformities. Abdomen Non-tender, non-distended, no masses, ascites or hepatosplenomegaly. Good bowel sounds. No guarding or rebound tenderness. No pulsatile masses. Musculoskeletal No tenderness or swelling, normal range of motion without obvious weakness. Extremities No visible deformities, no cyanosis, clubbing or edema. Skin No rashes, scars, or lesions suggestive of malignancy. No petechiae, purpura, or ecchymoses. No excoriations. Neurologic No sensory or motor deficits, normal cerebellar function, normal gait, cranial nerves intact. Psychiatric Alert and oriented times three. Coherent speech. Verbalizes understanding of our discussions today. Lab/Data Review:  Recent Labs     07/20/22  0536 07/19/22  1856   WBC 29.6* 32.3*   HGB 8.3* 9.0*   HCT 25.2* 27.2*   * 451*     Recent Labs     07/20/22  0536 07/19/22  1856    138   K 3.3* 3.6   * 109*   CO2 22 21   * 80   BUN 17 21*   CREA 0.83 0.78   CA 8.4* 9.4   ALB 2.7* 3.1*   TBILI 0.1* 0.2   ALT 13 15     No results for input(s): PH, PCO2, PO2, HCO3, FIO2 in the last 72 hours.   Recent Results (from the past 24 hour(s))   EKG, 12 LEAD, INITIAL    Collection Time: 07/19/22  5:22 PM   Result Value Ref Range    Ventricular Rate 90 BPM    Atrial Rate 90 BPM    P-R Interval 126 ms    QRS Duration 76 ms    Q-T Interval 364 ms    QTC Calculation (Bezet) 445 ms    Calculated P Axis 48 degrees    Calculated R Axis 5 degrees    Calculated T Axis 39 degrees    Diagnosis       Normal sinus rhythm  Normal ECG  No previous ECGs available  Confirmed by ASHLEY RODRIGUEZ, José Miguel Ruiz ([id number]) on 7/20/2022 8:55:14 AM     CBC WITH AUTOMATED DIFF    Collection Time: 07/19/22  6:56 PM   Result Value Ref Range    WBC 32.3 (H) 3.6 - 11.0 K/uL    RBC 2.92 (L) 3.80 - 5.20 M/uL    HGB 9.0 (L) 11.5 - 16.0 g/dL    HCT 27.2 (L) 35.0 - 47.0 %    MCV 93.2 80.0 - 99.0 FL    MCH 30.8 26.0 - 34.0 PG    MCHC 33.1 30.0 - 36.5 g/dL    RDW 15.1 (H) 11.5 - 14.5 %    PLATELET 888 (H) 481 - 400 K/uL    MPV 11.0 8.9 - 12.9 FL    NRBC 0.1 (H) 0.0  WBC    ABSOLUTE NRBC 0.04 (H) 0.00 - 0.01 K/uL    NEUTROPHILS 71 32 - 75 %    BAND NEUTROPHILS 1 0 - 6 %    LYMPHOCYTES 18 12 - 49 %    MONOCYTES 10 5 - 13 %    EOSINOPHILS 0 0 - 7 %    BASOPHILS 0 0 - 1 %    IMMATURE GRANULOCYTES 0 %    ABS. NEUTROPHILS 23.3 (H) 1.8 - 8.0 K/UL    ABS. LYMPHOCYTES 5.8 (H) 0.8 - 3.5 K/UL    ABS. MONOCYTES 3.2 (H) 0.0 - 1.0 K/UL    ABS. EOSINOPHILS 0.0 0.0 - 0.4 K/UL    ABS. BASOPHILS 0.0 0.0 - 0.1 K/UL    ABS. IMM. GRANS. 0.0 K/UL    DF Manual      RBC COMMENTS Hypochromia  1+       METABOLIC PANEL, COMPREHENSIVE    Collection Time: 07/19/22  6:56 PM   Result Value Ref Range    Sodium 138 136 - 145 mmol/L    Potassium 3.6 3.5 - 5.1 mmol/L    Chloride 109 (H) 97 - 108 mmol/L    CO2 21 21 - 32 mmol/L    Anion gap 8 5 - 15 mmol/L    Glucose 80 65 - 100 mg/dL    BUN 21 (H) 6 - 20 mg/dL    Creatinine 0.78 0.55 - 1.02 mg/dL    BUN/Creatinine ratio 27 (H) 12 - 20      GFR est AA >60 >60 ml/min/1.73m2    GFR est non-AA >60 >60 ml/min/1.73m2    Calcium 9.4 8.5 - 10.1 mg/dL    Bilirubin, total 0.2 0.2 - 1.0 mg/dL    AST (SGOT) 12 (L) 15 - 37 U/L    ALT (SGPT) 15 12 - 78 U/L    Alk.  phosphatase 90 45 - 117 U/L    Protein, total 6.0 (L) 6.4 - 8.2 g/dL    Albumin 3.1 (L) 3.5 - 5.0 g/dL    Globulin 2.9 2.0 - 4.0 g/dL    A-G Ratio 1.1 1.1 - 2.2     TROPONIN-HIGH SENSITIVITY    Collection Time: 07/19/22  6:56 PM   Result Value Ref Range    Troponin-High Sensitivity 4 0 - 51 ng/L   URINALYSIS W/MICROSCOPIC    Collection Time: 07/19/22  8:19 PM   Result Value Ref Range    Color CIT Group Appearance Turbid (A) Clear      Specific gravity 1.029 1.003 - 1.030      pH (UA) 5.0 5.0 - 8.0      Protein 30 (A) Negative mg/dL    Glucose Negative Negative mg/dL    Ketone 5 (A) Negative mg/dL    Bilirubin Negative Negative      Blood Negative Negative      Urobilinogen 0.1 0.1 - 1.0 EU/dL    Nitrites Negative Negative      Leukocyte Esterase Small (A) Negative      WBC 5-10 0 - 4 /hpf    RBC 0-5 0 - 5 /hpf    Bacteria Negative Negative /hpf    Mucus Trace /lpf    CA Oxalate crystals 1+    LACTIC ACID    Collection Time: 07/19/22  8:19 PM   Result Value Ref Range    Lactic acid 0.5 0.4 - 2.0 mmol/L   COVID-19 RAPID TEST    Collection Time: 07/19/22 11:08 PM   Result Value Ref Range    COVID-19 rapid test Not Detected Not Detected     HEMOGLOBIN A1C WITH EAG    Collection Time: 07/20/22  5:36 AM   Result Value Ref Range    Hemoglobin A1c 5.7 (H) 4.0 - 5.6 %    Est. average glucose 275 mg/dL   METABOLIC PANEL, COMPREHENSIVE    Collection Time: 07/20/22  5:36 AM   Result Value Ref Range    Sodium 141 136 - 145 mmol/L    Potassium 3.3 (L) 3.5 - 5.1 mmol/L    Chloride 112 (H) 97 - 108 mmol/L    CO2 22 21 - 32 mmol/L    Anion gap 7 5 - 15 mmol/L    Glucose 107 (H) 65 - 100 mg/dL    BUN 17 6 - 20 mg/dL    Creatinine 0.83 0.55 - 1.02 mg/dL    BUN/Creatinine ratio 20 12 - 20      GFR est AA >60 >60 ml/min/1.73m2    GFR est non-AA >60 >60 ml/min/1.73m2    Calcium 8.4 (L) 8.5 - 10.1 mg/dL    Bilirubin, total 0.1 (L) 0.2 - 1.0 mg/dL    AST (SGOT) 10 (L) 15 - 37 U/L    ALT (SGPT) 13 12 - 78 U/L    Alk.  phosphatase 81 45 - 117 U/L    Protein, total 5.2 (L) 6.4 - 8.2 g/dL    Albumin 2.7 (L) 3.5 - 5.0 g/dL    Globulin 2.5 2.0 - 4.0 g/dL    A-G Ratio 1.1 1.1 - 2.2     CBC WITH AUTOMATED DIFF    Collection Time: 07/20/22  5:36 AM   Result Value Ref Range    WBC 29.6 (H) 3.6 - 11.0 K/uL    RBC 2.65 (L) 3.80 - 5.20 M/uL    HGB 8.3 (L) 11.5 - 16.0 g/dL    HCT 25.2 (L) 35.0 - 47.0 %    MCV 95.1 80.0 - 99.0 FL    MCH 31.3 26.0 - 34.0 PG    MCHC 32.9 30.0 - 36.5 g/dL    RDW 15.4 (H) 11.5 - 14.5 %    PLATELET 783 (H) 875 - 400 K/uL    MPV 10.9 8.9 - 12.9 FL    NRBC 0.0 0.0  WBC    ABSOLUTE NRBC 0.00 0.00 - 0.01 K/uL    NEUTROPHILS 74 32 - 75 %    BAND NEUTROPHILS 1 0 - 6 %    LYMPHOCYTES 20 12 - 49 %    MONOCYTES 4 (L) 5 - 13 %    EOSINOPHILS 0 0 - 7 %    BASOPHILS 0 0 - 1 %    MYELOCYTES 1 (H) 0 %    IMMATURE GRANULOCYTES 0 %    ABS. NEUTROPHILS 22.2 (H) 1.8 - 8.0 K/UL    ABS. LYMPHOCYTES 5.9 (H) 0.8 - 3.5 K/UL    ABS. MONOCYTES 1.2 (H) 0.0 - 1.0 K/UL    ABS. EOSINOPHILS 0.0 0.0 - 0.4 K/UL    ABS. BASOPHILS 0.0 0.0 - 0.1 K/UL    ABS. IMM. GRANS. 0.0 K/UL    DF Manual      PLATELET COMMENTS Large Platelets      RBC COMMENTS Polychromasia  1+       GLUCOSE, POC    Collection Time: 07/20/22  7:52 AM   Result Value Ref Range    Glucose (POC) 107 65 - 117 mg/dL    Performed by [address] [place] [address], POC    Collection Time: 07/20/22  1:14 PM   Result Value Ref Range    Glucose (POC) 97 65 - 117 mg/dL    Performed by [facility]         XR SPINE LUMB MIN 4 V    Result Date: 7/20/2022  1. Multilevel degenerative change has not significantly progressed in the interval     XR CHEST PORT    Result Date: 7/19/2022  No acute cardiopulmonary process. XR KNEE LT 3 V    Result Date: 7/20/2022  No acute abnormality.       Assessment and Plan:     Hospital Problems  Date Reviewed: 7/11/2022            Codes Class Noted POA    Hypotension ICD-10-CM: I95.9  ICD-9-CM: 458.9  7/19/2022 Unknown        Leukocytosis ICD-10-CM: D72.829  ICD-9-CM: 288.60  7/19/2022 Unknown           Right-sided triple negative breast cancer:  -Status post bilateral mastectomy  -Currently on adjuvant chemotherapy with docetaxel and cyclophosphamide, last treatment on 7/11/2022  -Received G-CSF until 7/18/2022  -Not currently neutropenic    Leukocytosis:  -Most likely secondary to G-CSF use  -Would expect counts to slowly decrease over next few days.    Anemia:  -Hemoglobin is 8.3 today which is lower than baseline  -Most likely secondary to recent chemotherapy    Generalized weakness:  -Most likely secondary to dehydration  -Agree with IV fluids    Left knee swelling and pain:  -Management per primary team.    Thank you for the consult.     Signed By: Cal Dailey MD     July 20, 2022

## 2022-07-20 NOTE — PROGRESS NOTES
Reason for Admission:   Hypotension                    RUR Score:     16%             PCP: First and Last name:   Ben Sumner MD     Name of Practice:    Are you a current patient: Yes/No: Yes   Approximate date of last visit:    Can you participate in a virtual visit if needed:     Do you (patient/family) have any concerns for transition/discharge? No              Plan for utilizing home health:   No    Current Advanced Directive/Advance Care Plan:  Full Code      Healthcare Decision Maker:   Click here to complete [address] including selection of the Healthcare Decision Maker Relationship (ie \"Primary\")            Primary Decision Maker: Santi Garza - Spouse - [phone]    Transition of Care Plan:          Cm met with pt at the bedside to complete discharge assessment. CM verified home address and emergency contact - Gurmeet Maravilla (spouse). Pt does not have a medical POA. Pt lives at home with . Pt has a cane, walker, and rolator. Pt is not current with home health. Cm will continue to follow.

## 2022-07-20 NOTE — PROGRESS NOTES
Bedside and Verbal shift change report given to Kaycee Pandya RN(oncoming nurse) by Jose Cruz Samson (offgoing nurse). Report included the following information medication, patient condition, and shift plan and discharge plan of action.

## 2022-07-21 VITALS
OXYGEN SATURATION: 99 % | RESPIRATION RATE: 16 BRPM | SYSTOLIC BLOOD PRESSURE: 101 MMHG | TEMPERATURE: 98.7 F | HEART RATE: 74 BPM | DIASTOLIC BLOOD PRESSURE: 71 MMHG | WEIGHT: 180.12 LBS | HEIGHT: 65 IN | BODY MASS INDEX: 30.01 KG/M2

## 2022-07-21 LAB
ALBUMIN SERPL-MCNC: 2.8 G/DL (ref 3.5–5)
ALBUMIN/GLOB SERPL: 1.1 {RATIO} (ref 1.1–2.2)
ALP SERPL-CCNC: 80 U/L (ref 45–117)
ALT SERPL-CCNC: 15 U/L (ref 12–78)
ANION GAP SERPL CALC-SCNC: 6 MMOL/L (ref 5–15)
AST SERPL W P-5'-P-CCNC: 11 U/L (ref 15–37)
BILIRUB SERPL-MCNC: 0.1 MG/DL (ref 0.2–1)
BUN SERPL-MCNC: 13 MG/DL (ref 6–20)
BUN/CREAT SERPL: 15 (ref 12–20)
CA-I BLD-MCNC: 8.5 MG/DL (ref 8.5–10.1)
CHLORIDE SERPL-SCNC: 114 MMOL/L (ref 97–108)
CO2 SERPL-SCNC: 21 MMOL/L (ref 21–32)
CREAT SERPL-MCNC: 0.84 MG/DL (ref 0.55–1.02)
ERYTHROCYTE [DISTWIDTH] IN BLOOD BY AUTOMATED COUNT: 15.8 % (ref 11.5–14.5)
GLOBULIN SER CALC-MCNC: 2.6 G/DL (ref 2–4)
GLUCOSE BLD STRIP.AUTO-MCNC: 107 MG/DL (ref 65–117)
GLUCOSE BLD STRIP.AUTO-MCNC: 84 MG/DL (ref 65–117)
GLUCOSE SERPL-MCNC: 90 MG/DL (ref 65–100)
HCT VFR BLD AUTO: 25.6 % (ref 35–47)
HGB BLD-MCNC: 8.4 G/DL (ref 11.5–16)
MCH RBC QN AUTO: 30.8 PG (ref 26–34)
MCHC RBC AUTO-ENTMCNC: 32.8 G/DL (ref 30–36.5)
MCV RBC AUTO: 93.8 FL (ref 80–99)
NRBC # BLD: 0.02 K/UL (ref 0–0.01)
NRBC BLD-RTO: 0.1 PER 100 WBC
PERFORMED BY, TECHID: NORMAL
PERFORMED BY, TECHID: NORMAL
PLATELET # BLD AUTO: 408 K/UL (ref 150–400)
PMV BLD AUTO: 10.7 FL (ref 8.9–12.9)
POTASSIUM SERPL-SCNC: 4.1 MMOL/L (ref 3.5–5.1)
PROT SERPL-MCNC: 5.4 G/DL (ref 6.4–8.2)
RBC # BLD AUTO: 2.73 M/UL (ref 3.8–5.2)
SODIUM SERPL-SCNC: 141 MMOL/L (ref 136–145)
WBC # BLD AUTO: 18.6 K/UL (ref 3.6–11)

## 2022-07-21 PROCEDURE — 82962 GLUCOSE BLOOD TEST: CPT

## 2022-07-21 PROCEDURE — 80053 COMPREHEN METABOLIC PANEL: CPT

## 2022-07-21 PROCEDURE — 74011250636 HC RX REV CODE- 250/636: Performed by: FAMILY MEDICINE

## 2022-07-21 PROCEDURE — 74011000258 HC RX REV CODE- 258: Performed by: FAMILY MEDICINE

## 2022-07-21 PROCEDURE — 74011250637 HC RX REV CODE- 250/637: Performed by: FAMILY MEDICINE

## 2022-07-21 PROCEDURE — 85027 COMPLETE CBC AUTOMATED: CPT

## 2022-07-21 PROCEDURE — 36415 COLL VENOUS BLD VENIPUNCTURE: CPT

## 2022-07-21 RX ORDER — AMOXICILLIN AND CLAVULANATE POTASSIUM 875; 125 MG/1; MG/1
1 TABLET, FILM COATED ORAL 2 TIMES DAILY
Qty: 20 TABLET | Refills: 0 | Status: SHIPPED | OUTPATIENT
Start: 2022-07-21 | End: 2022-10-17

## 2022-07-21 RX ORDER — HEPARIN 100 UNIT/ML
300 SYRINGE INTRAVENOUS AS NEEDED
Status: DISCONTINUED | OUTPATIENT
Start: 2022-07-21 | End: 2022-07-21 | Stop reason: HOSPADM

## 2022-07-21 RX ORDER — OXYCODONE AND ACETAMINOPHEN 5; 325 MG/1; MG/1
1 TABLET ORAL
Status: COMPLETED | OUTPATIENT
Start: 2022-07-21 | End: 2022-07-21

## 2022-07-21 RX ADMIN — TOPIRAMATE 25 MG: 25 TABLET, FILM COATED ORAL at 09:08

## 2022-07-21 RX ADMIN — FERROUS SULFATE TAB 325 MG (65 MG ELEMENTAL FE) 325 MG: 325 (65 FE) TAB at 09:09

## 2022-07-21 RX ADMIN — PIPERACILLIN AND TAZOBACTAM 3.38 G: 3; .375 INJECTION, POWDER, FOR SOLUTION INTRAVENOUS at 09:08

## 2022-07-21 RX ADMIN — METFORMIN HYDROCHLORIDE 1000 MG: 500 TABLET, EXTENDED RELEASE ORAL at 09:07

## 2022-07-21 RX ADMIN — DULOXETINE 60 MG: 30 CAPSULE, DELAYED RELEASE ORAL at 09:07

## 2022-07-21 RX ADMIN — SERTRALINE 25 MG: 25 TABLET, FILM COATED ORAL at 09:07

## 2022-07-21 RX ADMIN — ENOXAPARIN SODIUM 40 MG: 100 INJECTION SUBCUTANEOUS at 09:09

## 2022-07-21 RX ADMIN — MULTIVITAMIN TABLET 1 TABLET: TABLET at 09:07

## 2022-07-21 RX ADMIN — GABAPENTIN 800 MG: 400 CAPSULE ORAL at 09:07

## 2022-07-21 RX ADMIN — OXYCODONE AND ACETAMINOPHEN 1 TABLET: 5; 325 TABLET ORAL at 00:53

## 2022-07-21 RX ADMIN — ACETAMINOPHEN 650 MG: 325 TABLET, FILM COATED ORAL at 09:19

## 2022-07-21 RX ADMIN — TIMOLOL MALEATE 1 DROP: 5 SOLUTION OPHTHALMIC at 09:07

## 2022-07-21 RX ADMIN — Medication 300 UNITS: at 15:03

## 2022-07-21 NOTE — DISCHARGE INSTRUCTIONS
Discharge Instructions       PATIENT ID: Zaid Jones  MRN: [id number]   YOB: 1965    DATE OF ADMISSION: [unfilled]    DATE OF DISCHARGE: 7/21/2022    PRIMARY CARE PROVIDER: @PCP@     ATTENDING PHYSICIAN: [unfilled]  DISCHARGING PROVIDER: Kimberley Briones MD    To contact this individual call [phone] and ask the  to page. If unavailable ask to be transferred the Adult Hospitalist Department. DISCHARGE DIAGNOSES leukocytosis breast cancer    CONSULTATIONS: [unfilled]    PROCEDURES/SURGERIES: * No surgery found *    PENDING TEST RESULTS:   At the time of discharge the following test results are still pending: Cultures    FOLLOW UP APPOINTMENTS:   [unfilled]     ADDITIONAL CARE RECOMMENDATIONS: Follow-up with oncology regarding chemotherapy    DIET: Regular Diet      ACTIVITY: Activity as tolerated    Wound care: Wound Care Order: submitted to [facility] Please view [email or web address]    EQUIPMENT needed: ***      DISCHARGE MEDICATIONS:   See Medication Reconciliation Form    It is important that you take the medication exactly as they are prescribed. Keep your medication in the bottles provided by the pharmacist and keep a list of the medication names, dosages, and times to be taken in your wallet. Do not take other medications without consulting your doctor. NOTIFY YOUR PHYSICIAN FOR ANY OF THE FOLLOWING:   Fever over 101 degrees for 24 hours. Chest pain, shortness of breath, fever, chills, nausea, vomiting, diarrhea, change in mentation, falling, weakness, bleeding. Severe pain or pain not relieved by medications. Or, any other signs or symptoms that you may have questions about.       DISPOSITION:    Home With:   OT  PT  HH  RN       SNF/Inpatient Rehab/LTAC    Independent/assisted living    Hospice    Other:         PROBLEM LIST Updated:  Yes ***       Signed:   Kimberley Briones MD  7/21/2022  12:39 PM

## 2022-07-21 NOTE — PROGRESS NOTES
OT eval order received and acknowledged. Screen completed as pt is currently at baseline MI functional status for self care and functional transfers/mobility. Pr stating falls at home are due to \"passing out\" not weakness thus pt educated on home safety. Pt with no acute OT needs at this time thus will D/C from skilled OT services after screen. Recommend discharge to home with family care when medically appropriate as pt not interested in [facility] at this time. Please re-consult if pt status changes. Thank you.

## 2022-07-21 NOTE — PROGRESS NOTES
Patient discharged home self care discharge paperwork given and went over with patient. Port removed by [employer]. Patient leaving VIA wheelchair.

## 2022-07-21 NOTE — PROGRESS NOTES
Progress Note    Patient: Gary Treadwell MRN: [id number]  SSN: [id number]    YOB: 1965  Age: 59 y.o. Sex: female      Admit Date: 7/19/2022    LOS: 2 days     Subjective:     Patient is a 59y.o. year old female with significant past medical history of breast cancer status post bilateral mastectomy hypertension hyperlipidemia history of gastric bypass CHF type 2 diabetes patient recently going through chemotherapy last chemotherapy 2 weeks ago came to the ER complaining of generalized weakness dizziness seen by ER physician patient was hypotensive blood work shows leukocytosis  Patient was seen by oncologist yesterday sent to the ER patient denies any fever chills cough congestion nausea vomiting diarrhea constipation patient was admitted for hypotension leukocytosis    7/21  Pt is feeling unchanged from yesterday. She is in NAD but states that she just wants to know what the results of her studies are so she can go home. Objective:     Vitals:    07/20/22 0907 07/20/22 1458 07/20/22 2155 07/21/22 0905   BP:  105/74 95/66 101/71   Pulse:  79 74 74   Resp:  17 17 16   Temp:  97.3 °F (36.3 °C) 97.9 °F (36.6 °C) 98.7 °F (37.1 °C)   SpO2: 100% 100% 96% 99%   Weight:       Height:            Intake and Output:  Current Shift: No intake/output data recorded. Last three shifts: No intake/output data recorded. Physical Exam:   General:  Alert, cooperative, no distress, appears stated age. Eyes:  Conjunctivae/corneas clear. PERRL, EOMs intact. Fundi benign   Ears:  Normal TMs and external ear canals both ears. Nose: Nares normal. Septum midline. Mucosa normal. No drainage or sinus tenderness. Mouth/Throat: Lips, mucosa, and tongue normal. Teeth and gums normal.   Neck: Supple, symmetrical, trachea midline, no adenopathy, thyroid: no enlargment/tenderness/nodules, no carotid bruit and no JVD. Back:   Symmetric, no curvature. ROM normal. No CVA tenderness.    Lungs:   Clear to auscultation bilaterally. Heart:  Regular rate and rhythm, S1, S2 normal, no murmur, click, rub or gallop. Abdomen:   Soft, non-tender. Bowel sounds normal. No masses,  No organomegaly. Extremities: Extremities normal, atraumatic, no cyanosis or edema. Pulses: 2+ and symmetric all extremities. Skin: Skin color, texture, turgor normal. No rashes or lesions   Lymph nodes: Cervical, supraclavicular, and axillary nodes normal.   Neurologic: CNII-XII intact. Normal strength, sensation and reflexes throughout. Lab/Data Review: All lab results for the last 24 hours reviewed. Assessment:     Hypotension  Leukocytosis  Bilateral breast cancer status postmastectomy ongoing chemo  History of gastric bypass  History of hypertension  Hyperlipidemia  Chronic headache  Type 2 diabetes  Depression and anxiety  Back pain and left leg knee pain      Plan:     Continue IVF  Cymbalta 60 mg daily  Lovenox 40 mg subcu daily for DVT prophylaxis  Neurontin 800 mg 3 times a day  Sliding scale insulin  Metformin 1000 mg twice a day  Multivitamin daily  Zoloft 25 mg daily  Timolol eyedrops 2 times a day  Topamax 25 mg twice a day    Heme/Onc has seen the patient, believes her current conditions is related to her chemotherapy regimen. No new recommendations. Plan for discharge in 24-48 hours given stability of the patient.       Current Facility-Administered Medications:     piperacillin-tazobactam (ZOSYN) 3.375 g in 0.9% sodium chloride (MBP/ADV) 100 mL MBP, 3.375 g, IntraVENous, Q8H, Desire Fraser MD, Last Rate: 25 mL/hr at 07/21/22 0908, 3.375 g at 07/21/22 0908    DULoxetine (CYMBALTA) capsule 60 mg, 60 mg, Oral, DAILY, Desire Fraser MD, 60 mg at 07/21/22 6567    metFORMIN ER (GLUCOPHAGE XR) tablet 1,000 mg, 1,000 mg, Oral, BID, Desire Fraser MD, 1,000 mg at 07/21/22 8212    topiramate (TOPAMAX) tablet 25 mg, 25 mg, Oral, BID WITH MEALS, Desire Fraser MD, 25 mg at 07/21/22 0908    gabapentin (NEURONTIN) capsule 800 mg, 800 mg, Oral, TID, Desire Fraser MD, 800 mg at 07/21/22 8692    hydrOXYzine pamoate (VISTARIL) capsule 50 mg, 50 mg, Oral, QHS, Desire Fraser MD, 50 mg at 07/20/22 2303    ferrous sulfate tablet 325 mg, 325 mg, Oral, DAILY, Desire Fraser MD, 325 mg at 07/21/22 0909    latanoprost (XALATAN) 0.005 % ophthalmic solution 1 Drop, 1 Drop, Both Eyes, QHS, Amaury Hawkins MD, 1 Drop at 07/20/22 2200    multivitamin with folic acid (ONE DAILY WITH FOLIC ACID) tablet 1 Tablet, 1 Tablet, Oral, DAILY, Desire Fraser MD, 1 Tablet at 07/21/22 1881    sertraline (ZOLOFT) tablet 25 mg, 25 mg, Oral, DAILY, Amaury Hawkins MD, 25 mg at 07/21/22 0420    timolol (TIMOPTIC) 0.5 % ophthalmic solution 1 Drop, 1 Drop, Both Eyes, BID, Desire Fraser MD, 1 Drop at 07/21/22 3176    traZODone (DESYREL) tablet 50 mg, 50 mg, Oral, QHS PRN, Thanh Fraser MD    insulin lispro (HUMALOG) injection, , SubCUTAneous, AC&HS, Thanh Fraser MD    glucose chewable tablet 16 g, 4 Tablet, Oral, PRN, Desire Fraser MD    glucagon (GLUCAGEN) injection 1 mg, 1 mg, IntraMUSCular, PRN, Amaury Hawkins MD    0.9% sodium chloride infusion, 75 mL/hr, IntraVENous, CONTINUOUS, Desire Fraser MD, Last Rate: 75 mL/hr at 07/20/22 1531, 75 mL/hr at 07/20/22 1531    acetaminophen (TYLENOL) tablet 650 mg, 650 mg, Oral, Q6H PRN, 650 mg at 07/21/22 0919 **OR** acetaminophen (TYLENOL) suppository 650 mg, 650 mg, Rectal, Q6H PRN, Desire Fraser MD    polyethylene glycol (MIRALAX) packet 17 g, 17 g, Oral, DAILY PRN, Desire Fraser MD    ondansetron (ZOFRAN ODT) tablet 4 mg, 4 mg, Oral, Q8H PRN **OR** ondansetron (ZOFRAN) injection 4 mg, 4 mg, IntraVENous, Q6H PRN, Desire Fraser MD    enoxaparin (LOVENOX) injection 40 mg, 40 mg, SubCUTAneous, DAILY, Desire Fraser MD, 40 mg at 07/21/22 5966    Signed By: Jasiel Gill     July 21, 2022

## 2022-07-21 NOTE — PROGRESS NOTES
Hematology/Oncology   Progress Note    Patient: Josselin Potter MRN: [id number]     YOB: 1965  Age: 59 y.o. Sex: female      Admit Date: 7/19/2022    LOS: 2 days     Chief Complaint: Admitted with generalized weakness. Subjective:     Reports fatigue. Wants to go home. Constitutional No fevers, chills, night sweats, excessive fatigue or weight loss. Allergic/Immunologic No recent allergic reactions   Eyes No significant visual difficulties. No diplopia. ENMT No problems with hearing, no sore throat, no sinus drainage. Endocrine No hot flashes or night sweats. No cold intolerance, polyuria, or polydipsia   Hematologic/Lymphatic No easy bruising or bleeding. The patient denies any tender or palpable lymph nodes   Breasts No abnormal masses of breast, nipple discharge or pain. Respiratory No dyspnea on exertion, orthopnea, chest pain, cough or hemoptysis. Cardiovascular No anginal chest pain, irregular heart beat, tachycardia, palpitations or orthopnea. Gastrointestinal No nausea, vomiting, diarrhea, constipation, cramping, dysphagia, reflux, heartburn, GI bleeding, or early satiety. No change in bowel habits. Genitourinary (M) No hematuria, dysuria, increased frequency, urgency, hesitancy or incontinence. Musculoskeletal No joint pain, swelling or redness. No decreased range of motion. Integumentary No chronic rashes, inflammation, ulcerations, pruritus, petechiae, purpura, ecchymoses, or skin changes. Neurologic No headache, blurred vision, and no areas of focal weakness or numbness. Normal gait. No sensory problems. Psychiatric No insomnia, depression, magdalena or mood swings. No psychotropic drugs.         Current Facility-Administered Medications:     piperacillin-tazobactam (ZOSYN) 3.375 g in 0.9% sodium chloride (MBP/ADV) 100 mL MBP, 3.375 g, IntraVENous, Q8H, Desire Fraser MD, Last Rate: 25 mL/hr at 07/21/22 0908, 3.375 g at 07/21/22 0908    DULoxetine (CYMBALTA) capsule 60 mg, 60 mg, Oral, DAILY, Desire Fraser MD, 60 mg at 07/21/22 0907    metFORMIN ER (GLUCOPHAGE XR) tablet 1,000 mg, 1,000 mg, Oral, BID, Desire Fraser MD, 1,000 mg at 07/21/22 8483    topiramate (TOPAMAX) tablet 25 mg, 25 mg, Oral, BID WITH MEALS, Mateo Mclain MD, 25 mg at 07/21/22 0908    gabapentin (NEURONTIN) capsule 800 mg, 800 mg, Oral, TID, Desire Fraser MD, 800 mg at 07/21/22 3958    hydrOXYzine pamoate (VISTARIL) capsule 50 mg, 50 mg, Oral, QHS, Desire Fraser MD, 50 mg at 07/20/22 2303    ferrous sulfate tablet 325 mg, 325 mg, Oral, DAILY, Desire Fraser MD, 325 mg at 07/21/22 0909    latanoprost (XALATAN) 0.005 % ophthalmic solution 1 Drop, 1 Drop, Both Eyes, QHS, Mateo Mclain MD, 1 Drop at 07/20/22 2200    multivitamin with folic acid (ONE DAILY WITH FOLIC ACID) tablet 1 Tablet, 1 Tablet, Oral, DAILY, Desire Fraser MD, 1 Tablet at 07/21/22 5380    sertraline (ZOLOFT) tablet 25 mg, 25 mg, Oral, DAILY, Mateo Mclain MD, 25 mg at 07/21/22 1366    timolol (TIMOPTIC) 0.5 % ophthalmic solution 1 Drop, 1 Drop, Both Eyes, BID, Mateo Mclain MD, 1 Drop at 07/21/22 6657    traZODone (DESYREL) tablet 50 mg, 50 mg, Oral, QHS PRN, Mateo Mclain MD    insulin lispro (HUMALOG) injection, , SubCUTAneous, AC&HS, Desire Fraser MD    glucose chewable tablet 16 g, 4 Tablet, Oral, PRN, Desire Fraser MD    glucagon (GLUCAGEN) injection 1 mg, 1 mg, IntraMUSCular, PRN, Mateo Mclain MD    0.9% sodium chloride infusion, 75 mL/hr, IntraVENous, CONTINUOUS, Desire Fraser MD, Last Rate: 75 mL/hr at 07/20/22 1531, 75 mL/hr at 07/20/22 1531    acetaminophen (TYLENOL) tablet 650 mg, 650 mg, Oral, Q6H PRN, 650 mg at 07/21/22 0919 **OR** acetaminophen (TYLENOL) suppository 650 mg, 650 mg, Rectal, Q6H PRN, Desire Fraser MD    polyethylene glycol (MIRALAX) packet 17 g, 17 g, Oral, DAILY PRN, Desire Fraser MD    ondansetron (ZOFRAN ODT) tablet 4 mg, 4 mg, Oral, Q8H PRN **OR** ondansetron (ZOFRAN) injection 4 mg, 4 mg, IntraVENous, Q6H PRN, Desire Fraser MD    enoxaparin (LOVENOX) injection 40 mg, 40 mg, SubCUTAneous, DAILY, Desire Fraser MD, 40 mg at 07/21/22 0909     Objective:     Vitals:    07/20/22 0907 07/20/22 1458 07/20/22 2155 07/21/22 0905   BP:  105/74 95/66 101/71   Pulse:  79 74 74   Resp:  17 17 16   Temp:  97.3 °F (36.3 °C) 97.9 °F (36.6 °C) 98.7 °F (37.1 °C)   SpO2: 100% 100% 96% 99%   Weight:       Height:              Physical Exam:   Constitutional Alert, cooperative, oriented. Mood and affect appropriate. Appears close to chronological age. Well nourished. Well developed. Head Normocephalic; no scars   Eyes Conjunctivae and sclerae are clear and without icterus. Pupils are reactive and equal.   ENMT Sinuses are nontender. No oral exudates, ulcers, masses, thrush or mucositis. Oropharynx clear. Tongue normal.   Neck Supple without masses or thyromegaly. No jugular venous distension. Hematologic/Lymphatic No petechiae or purpura. No tender or palpable lymph nodes in the cervical, supraclavicular, axillary or inguinal area. Respiratory Lungs are clear to auscultation without rhonchi or wheezing. Cardiovascular Regular rate and rhythm of heart without murmurs, gallops or rubs. Chest / Line Site Chest is symmetric with no chest wall deformities. Abdomen Non-tender, non-distended, no masses, ascites or hepatosplenomegaly. Good bowel sounds. No guarding or rebound tenderness. No pulsatile masses. Musculoskeletal No tenderness or swelling, normal range of motion without obvious weakness. Extremities No visible deformities, no cyanosis, clubbing or edema. Skin No rashes, scars, or lesions suggestive of malignancy. No petechiae, purpura, or ecchymoses. No excoriations. Neurologic No sensory or motor deficits, normal cerebellar function, normal gait, cranial nerves intact. Psychiatric Alert and oriented times three.  Coherent speech. Verbalizes understanding of our discussions today. Lab/Data Review:  Recent Labs     07/20/22  0536 07/19/22  1856   WBC 29.6* 32.3*   HGB 8.3* 9.0*   HCT 25.2* 27.2*   * 451*     Recent Labs     07/20/22  0536 07/19/22  1856    138   K 3.3* 3.6   * 109*   CO2 22 21   * 80   BUN 17 21*   CREA 0.83 0.78   CA 8.4* 9.4   ALB 2.7* 3.1*   TBILI 0.1* 0.2   ALT 13 15     No results for input(s): PH, PCO2, PO2, HCO3, FIO2 in the last 72 hours. Recent Results (from the past 24 hour(s))   GLUCOSE, POC    Collection Time: 07/20/22  1:14 PM   Result Value Ref Range    Glucose (POC) 97 65 - 117 mg/dL    Performed by [facility], POC    Collection Time: 07/20/22  5:01 PM   Result Value Ref Range    Glucose (POC) 111 65 - 117 mg/dL    Performed by [address] [place] [address], POC    Collection Time: 07/20/22  9:52 PM   Result Value Ref Range    Glucose (POC) 102 65 - 117 mg/dL    Performed by [address] [place] [address], POC    Collection Time: 07/21/22  7:45 AM   Result Value Ref Range    Glucose (POC) 84 65 - 117 mg/dL    Performed by Yvon Powers (LPN)    GLUCOSE, POC    Collection Time: 07/21/22 11:17 AM   Result Value Ref Range    Glucose (POC) 107 65 - 117 mg/dL    Performed by Meliza Roblero         Radiology:   CT Results  (Last 48 hours)      None             Assessment and Plan: Active Problems:    Hypotension (7/19/2022)      Leukocytosis (7/19/2022)    Right-sided triple negative breast cancer:  -Status post bilateral mastectomy  -Currently on adjuvant chemotherapy with docetaxel and cyclophosphamide, last treatment on 7/11/2022  -Received G-CSF until 7/18/2022  -Not currently neutropenic    Leukocytosis:  -Most likely secondary to G-CSF use  -Would expect counts to slowly decrease over next few days.     Anemia:  -Hemoglobin is 8.3 which is lower than baseline  -Most likely secondary to recent chemotherapy    Generalized weakness:  -Most likely secondary to dehydration  -Agree with IV fluids    Left knee swelling and pain:  -Management per primary team    Follow-up with Dr. Juni Valentine on discharge.       Signed By: Jasmeet Yancey MD     July 21, 2022

## 2022-07-21 NOTE — PROGRESS NOTES
DC Plan: Home    Cm spoke OT. CM was informed pt declined HH. Discharge plan of care/case management plan validated with provider's discharge order.

## 2022-07-26 LAB
BACTERIA SPEC CULT: NORMAL
SPECIAL REQUESTS,SREQ: NORMAL

## 2022-07-27 LAB
BACTERIA SPEC CULT: NORMAL
BACTERIA SPEC CULT: NORMAL
SPECIAL REQUESTS,SREQ: NORMAL
SPECIAL REQUESTS,SREQ: NORMAL

## 2022-08-08 NOTE — PROGRESS NOTES
Physician Progress Note      Meme Collado  CSN #:                  [id number]  :                       1965  ADMIT DATE:       2022 5:23 PM  [address] DATE:        2022 3:46 PM  RESPONDING  PROVIDER #:        Bridgette Esquivel MD          QUERY TEXT:    Pt admitted with Hypotension. Pt noted to have Dehydration/ Hypovolemic. If possible, please document in progress notes and discharge summary the relationship, if any, between Hypotension and Dehydration. The medical record reflects the following:  Risk Factors: Dehydration, hypotension, Breast cancer, CHF, DM II  Clinical Indicators: ED Dr: Evelina Joseph due to hypovolemia. \" Hematology PN: \"Most likely secondary to dehydration. \"  Treatment: Hematology consulted, IV Fluids Bolus, BP Monitoring, Lab monitoring    Thank you,  PRISCILA RomeroN, RN, [facility], CDI Specialist  [phone] or [email or web address]  Options provided:  -- Hypotension due to Dehydration  -- Hypotension unrelated to Dehydration  -- Hypotension due to, please specify. -- Other - I will add my own diagnosis  -- Disagree - Not applicable / Not valid  -- Disagree - Clinically unable to determine / Unknown  -- Refer to Clinical Documentation Reviewer    PROVIDER RESPONSE TEXT:    This patient has Hypotension due to Dehydration.     Query created by: Kiet Ellington on 2022 2:10 PM      Electronically signed by:  Bridgette Esquivel MD 2022 9:25 AM

## 2022-08-16 ENCOUNTER — HOSPITAL ENCOUNTER (EMERGENCY)
Age: 57
Discharge: HOME | End: 2022-08-16
Attending: STUDENT IN AN ORGANIZED HEALTH CARE EDUCATION/TRAINING PROGRAM | Admitting: STUDENT IN AN ORGANIZED HEALTH CARE EDUCATION/TRAINING PROGRAM
Payer: MEDICARE

## 2022-08-16 VITALS
DIASTOLIC BLOOD PRESSURE: 80 MMHG | HEART RATE: 80 BPM | SYSTOLIC BLOOD PRESSURE: 97 MMHG | WEIGHT: 171 LBS | TEMPERATURE: 98.1 F | HEIGHT: 65 IN | BODY MASS INDEX: 28.49 KG/M2 | OXYGEN SATURATION: 100 % | RESPIRATION RATE: 16 BRPM

## 2022-08-16 DIAGNOSIS — R53.83 FATIGUE, UNSPECIFIED TYPE: ICD-10-CM

## 2022-08-16 DIAGNOSIS — E86.0 DEHYDRATION: Primary | ICD-10-CM

## 2022-08-16 LAB
ALBUMIN SERPL-MCNC: 3.2 G/DL (ref 3.5–5)
ALBUMIN/GLOB SERPL: 0.9 {RATIO} (ref 1.1–2.2)
ALP SERPL-CCNC: 83 U/L (ref 45–117)
ALT SERPL-CCNC: 36 U/L (ref 12–78)
ANION GAP SERPL CALC-SCNC: 3 MMOL/L (ref 5–15)
APPEARANCE UR: CLEAR
AST SERPL W P-5'-P-CCNC: 23 U/L (ref 15–37)
BACTERIA URNS QL MICRO: NEGATIVE /HPF
BASOPHILS # BLD: 0.3 K/UL (ref 0–0.1)
BASOPHILS NFR BLD: 2 % (ref 0–1)
BILIRUB SERPL-MCNC: 0.3 MG/DL (ref 0.2–1)
BILIRUB UR QL: NEGATIVE
BNP SERPL-MCNC: 88 PG/ML
BUN SERPL-MCNC: 11 MG/DL (ref 6–20)
BUN/CREAT SERPL: 14 (ref 12–20)
CA-I BLD-MCNC: 8.9 MG/DL (ref 8.5–10.1)
CHLORIDE SERPL-SCNC: 105 MMOL/L (ref 97–108)
CO2 SERPL-SCNC: 27 MMOL/L (ref 21–32)
COLOR UR: ABNORMAL
CREAT SERPL-MCNC: 0.78 MG/DL (ref 0.55–1.02)
DIFFERENTIAL METHOD BLD: ABNORMAL
EOSINOPHIL # BLD: 0 K/UL (ref 0–0.4)
EOSINOPHIL NFR BLD: 0 % (ref 0–7)
ERYTHROCYTE [DISTWIDTH] IN BLOOD BY AUTOMATED COUNT: 15.3 % (ref 11.5–14.5)
GLOBULIN SER CALC-MCNC: 3.7 G/DL (ref 2–4)
GLUCOSE SERPL-MCNC: 91 MG/DL (ref 65–100)
GLUCOSE UR STRIP.AUTO-MCNC: NORMAL MG/DL
HCT VFR BLD AUTO: 28.9 % (ref 35–47)
HGB BLD-MCNC: 9.4 G/DL (ref 11.5–16)
HGB UR QL STRIP: NEGATIVE
IMM GRANULOCYTES # BLD AUTO: 0 K/UL
IMM GRANULOCYTES NFR BLD AUTO: 0 %
KETONES UR QL STRIP.AUTO: NEGATIVE MG/DL
LACTATE SERPL-SCNC: 1.6 MMOL/L (ref 0.4–2)
LEUKOCYTE ESTERASE UR QL STRIP.AUTO: NEGATIVE
LYMPHOCYTES # BLD: 4.2 K/UL (ref 0.8–3.5)
LYMPHOCYTES NFR BLD: 29 % (ref 12–49)
MCH RBC QN AUTO: 32 PG (ref 26–34)
MCHC RBC AUTO-ENTMCNC: 32.5 G/DL (ref 30–36.5)
MCV RBC AUTO: 98.3 FL (ref 80–99)
MONOCYTES # BLD: 2.2 K/UL (ref 0–1)
MONOCYTES NFR BLD: 15 % (ref 5–13)
MUCOUS THREADS URNS QL MICRO: ABNORMAL /LPF
NEUTS SEG # BLD: 7.7 K/UL (ref 1.8–8)
NEUTS SEG NFR BLD: 54 % (ref 32–75)
NITRITE UR QL STRIP.AUTO: NEGATIVE
NRBC # BLD: 0 K/UL (ref 0–0.01)
NRBC BLD-RTO: 0 PER 100 WBC
PH UR STRIP: 5 [PH] (ref 5–8)
PLATELET # BLD AUTO: 367 K/UL (ref 150–400)
PMV BLD AUTO: 10.5 FL (ref 8.9–12.9)
POTASSIUM SERPL-SCNC: 4.1 MMOL/L (ref 3.5–5.1)
PROT SERPL-MCNC: 6.9 G/DL (ref 6.4–8.2)
PROT UR STRIP-MCNC: NEGATIVE MG/DL
RBC # BLD AUTO: 2.94 M/UL (ref 3.8–5.2)
RBC #/AREA URNS HPF: ABNORMAL /HPF (ref 0–5)
RBC MORPH BLD: ABNORMAL
SODIUM SERPL-SCNC: 135 MMOL/L (ref 136–145)
SP GR UR REFRACTOMETRY: 1.01 (ref 1–1.03)
SP GR UR REFRACTOMETRY: 1.01 (ref 1–1.03)
UA: UC IF INDICATED,UAUC: ABNORMAL
UROBILINOGEN UR QL STRIP.AUTO: NORMAL EU/DL (ref 0.1–1)
WBC # BLD AUTO: 14.4 K/UL (ref 3.6–11)
WBC URNS QL MICRO: ABNORMAL /HPF (ref 0–4)

## 2022-08-16 PROCEDURE — 36415 COLL VENOUS BLD VENIPUNCTURE: CPT

## 2022-08-16 PROCEDURE — 96361 HYDRATE IV INFUSION ADD-ON: CPT

## 2022-08-16 PROCEDURE — 81001 URINALYSIS AUTO W/SCOPE: CPT

## 2022-08-16 PROCEDURE — 83605 ASSAY OF LACTIC ACID: CPT

## 2022-08-16 PROCEDURE — 80053 COMPREHEN METABOLIC PANEL: CPT

## 2022-08-16 PROCEDURE — 96360 HYDRATION IV INFUSION INIT: CPT

## 2022-08-16 PROCEDURE — 74011250637 HC RX REV CODE- 250/637: Performed by: STUDENT IN AN ORGANIZED HEALTH CARE EDUCATION/TRAINING PROGRAM

## 2022-08-16 PROCEDURE — 85025 COMPLETE CBC W/AUTO DIFF WBC: CPT

## 2022-08-16 PROCEDURE — 83880 ASSAY OF NATRIURETIC PEPTIDE: CPT

## 2022-08-16 PROCEDURE — 99284 EMERGENCY DEPT VISIT MOD MDM: CPT

## 2022-08-16 PROCEDURE — 74011250636 HC RX REV CODE- 250/636: Performed by: STUDENT IN AN ORGANIZED HEALTH CARE EDUCATION/TRAINING PROGRAM

## 2022-08-16 RX ORDER — ACETAMINOPHEN 325 MG/1
650 TABLET ORAL
Status: COMPLETED | OUTPATIENT
Start: 2022-08-16 | End: 2022-08-16

## 2022-08-16 RX ADMIN — SODIUM CHLORIDE 1000 ML: 9 INJECTION, SOLUTION INTRAVENOUS at 15:12

## 2022-08-16 RX ADMIN — ACETAMINOPHEN 650 MG: 325 TABLET, FILM COATED ORAL at 15:11

## 2022-08-16 NOTE — ED PROVIDER NOTES
[address]  EMERGENCY DEPARTMENT ENCOUNTER NOTE        Date: 8/16/2022  Patient Name: Naren Garza      History of Presenting Illness     Chief Complaint   Patient presents with    Hypotension       History Provided By: Patient    HPI: Jacklyn Borja, 59 y.o. female with PMH of HTN, HLD, DM, peripheral neuropathy, cardiomyopathy with CHF, and breast cancer currently on chemotherapy presents to the ED with fatigue and hypotension. Patient was seen at the cancer center today while vital signs were obtained patient was noted to be hypotensive and was sent to the ED for further evaluation work-up. Patient reports generalized body aches from the chemotherapy and reports that has unchanged from prior. She has some nausea that has improved. Patient reports decreased appetite due to chemotherapy and has not been eating and drinking adequately recently. She is denying specific area of tenderness or pain. No chest pain, abdominal pain, vomiting, changes in her bowel habits. She also is denying any shortness of breath, fever and cough. There are no other complaints, changes, or physical findings at this time. PCP: Kya Estrada MD    Current Outpatient Medications   Medication Sig Dispense Refill    amoxicillin-clavulanate (Augmentin) 875-125 mg per tablet Take 1 Tablet by mouth two (2) times a day. 20 Tablet 0    topiramate (TOPAMAX) 25 mg tablet Take 1 Tablet by mouth two (2) times daily (with meals). 60 Tablet 0    metFORMIN ER (GLUCOPHAGE XR) 500 mg tablet TAKE 2 TABLET BY MOUTH TWICE DAILY DAILY WITH MEALS STOP SYNJARDY 360 Tablet 1    semaglutide (Ozempic) 0.25 mg or 0.5 mg/dose (2 mg/1.5 ml) subq pen Inject 0.25mg once weekly for three weeks, then increase to 0.5mg once weekly 9 mL 1    DULoxetine (CYMBALTA) 60 mg capsule TAKE 1 CAPSULE BY MOUTH DAILY 30 Capsule 6    hydrOXYzine pamoate (VISTARIL) 25 mg capsule Take 50 mg by mouth nightly.       sertraline (ZOLOFT) 25 mg tablet sertraline 25 mg tablet   TAKE 1 TABLET BY MOUTH EVERY DAY      b complex vitamins tablet Take 1 Tab by mouth daily. ascorbic acid-collagen (Collagen Plus Vitamin C) 125-740 mg cap Collagen Plus Vitamin C      IRON, FERROUS SULFATE, PO Iron (ferrous sulfate)      timoloL (BetimoL) 0.5 % ophthalmic solution Administer 1 Drop to both eyes two (2) times a day. use in affected eye(s)      multivitamin-min-iron-FA-vit K (Bariatric Multivitamins) 45 mg iron- 800 mcg-120 mcg cap Bariatric Multivitamins      omega 3-dha-epa-fish oil (Fish Oil) 100-160-1,000 mg cap Take 3 mg by mouth daily. triamcinolone acetonide (KENALOG) 0.1 % topical cream triamcinolone acetonide 0.1 % topical cream      calcium-cholecalciferol, d3, 600 mg calcium- 200 unit cap Take 600 mg by mouth daily. traZODone (DESYREL) 100 mg tablet TK 1 T PO QD HS  1    latanoprost (XALATAN) 0.005 % ophthalmic solution Administer 1 Drop to both eyes nightly.      gabapentin (NEURONTIN) 800 mg tablet Take 1 Tab by mouth three (3) times daily. 0    biotin 500 mcg Cap Take  by mouth.          Past History     Past Medical History:  Past Medical History:   Diagnosis Date    Arthritis     Back/Neck problems    Burning with urination     Frequency    Cancer ([address] 2009    RIGHT breast    Congestive heart failure (HCC)     Depression     Including Post Partum    Diabetes (HCC)     Dizziness     GERD (gastroesophageal reflux disease)     Glaucoma     Gout     Headache     Heart disease     Hypercholesterolemia     Hypertension     Morbid obesity ([address]     Neuropathy     Shortness of breath     With activity    Sleep apnea     Sleep disorder     Difficulty falling asleep, night sweats    Stool color black        Past Surgical History:  Past Surgical History:   Procedure Laterality Date    HX APPENDECTOMY      HX BREAST LUMPECTOMY  2009    RIGHT with SNBX    HX CHOLECYSTECTOMY      HX GASTRIC BYPASS  2020    HX GYN       x1, Bilateral tubal ligation, Dilation & Curettage    HX GYN      Hysterectomy (partial)    HX ORTHOPAEDIC      Procedure on shoulder    HX PREMALIG/BENIGN SKIN LESION EXCISION      Cyst removed from scalp    HX PREMALIG/BENIGN SKIN LESION EXCISION      Debridement of subcutaneous tissue    HX TONSILLECTOMY  1970    HX UROLOGICAL         Family History:  Family History   Problem Relation Age of Onset    Hypertension Mother     Cancer Mother     Thyroid Disease Mother     Hypertension Father     Cancer Father     Diabetes Brother     Hypertension Brother     Cancer Brother     Stroke Brother     Lung Disease Daughter     Asthma Daughter     Hypertension Daughter     Thyroid Disease Daughter     Diabetes Son     Lung Disease Son     Hypertension Son     Stroke Maternal Grandmother     Dementia Maternal Grandmother     Hypertension Maternal Grandmother        Social History:  Social History     Tobacco Use    Smoking status: Never    Smokeless tobacco: Never   Vaping Use    Vaping Use: Never used   Substance Use Topics    Alcohol use: Yes     Alcohol/week: 1.7 standard drinks     Types: 2 Glasses of wine per week    Drug use: No       Allergies: Allergies   Allergen Reactions    Januvia [Sitagliptin] Rash    Levorphanol Itching and Rash    Statins-Hmg-Coa Reductase Inhibitors Itching         Review of Systems     Review of Systems    A 10 point review of system was performed and was negative except as noted above in HPI    Physical Exam     Physical Exam  Constitutional:       Comments: Nontoxic but tired appearing -American female who is speaking full sentences and is in no acute distress or discomfort. She is not toxic nor ill-appearing. HENT:      Head: Normocephalic and atraumatic. Eyes:      Extraocular Movements: Extraocular movements intact. Conjunctiva/sclera: Conjunctivae normal.   Cardiovascular:      Rate and Rhythm: Normal rate and regular rhythm.    Pulmonary:      Effort: Pulmonary effort is normal.      Breath sounds: Normal breath sounds. Abdominal:      Palpations: Abdomen is soft. Tenderness: There is no abdominal tenderness. Skin:     General: Skin is warm and dry. Neurological:      Mental Status: She is alert and oriented to person, place, and time. Psychiatric:         Mood and Affect: Mood normal.         Behavior: Behavior normal.       Lab and Diagnostic Study Results     Labs -     Recent Results (from the past 12 hour(s))   CBC WITH AUTOMATED DIFF    Collection Time: 08/16/22  2:57 PM   Result Value Ref Range    WBC 14.4 (H) 3.6 - 11.0 K/uL    RBC 2.94 (L) 3.80 - 5.20 M/uL    HGB 9.4 (L) 11.5 - 16.0 g/dL    HCT 28.9 (L) 35.0 - 47.0 %    MCV 98.3 80.0 - 99.0 FL    MCH 32.0 26.0 - 34.0 PG    MCHC 32.5 30.0 - 36.5 g/dL    RDW 15.3 (H) 11.5 - 14.5 %    PLATELET 704 528 - 519 K/uL    MPV 10.5 8.9 - 12.9 FL    NRBC 0.0 0.0  WBC    ABSOLUTE NRBC 0.00 0.00 - 0.01 K/uL    NEUTROPHILS 54 32 - 75 %    LYMPHOCYTES 29 12 - 49 %    MONOCYTES 15 (H) 5 - 13 %    EOSINOPHILS 0 0 - 7 %    BASOPHILS 2 (H) 0 - 1 %    IMMATURE GRANULOCYTES 0 %    ABS. NEUTROPHILS 7.7 1.8 - 8.0 K/UL    ABS. LYMPHOCYTES 4.2 (H) 0.8 - 3.5 K/UL    ABS. MONOCYTES 2.2 (H) 0.0 - 1.0 K/UL    ABS. EOSINOPHILS 0.0 0.0 - 0.4 K/UL    ABS. BASOPHILS 0.3 (H) 0.0 - 0.1 K/UL    ABS. IMM.  GRANS. 0.0 K/UL    DF Manual      RBC COMMENTS Anisocytosis  1+       METABOLIC PANEL, COMPREHENSIVE    Collection Time: 08/16/22  2:57 PM   Result Value Ref Range    Sodium 135 (L) 136 - 145 mmol/L    Potassium 4.1 3.5 - 5.1 mmol/L    Chloride 105 97 - 108 mmol/L    CO2 27 21 - 32 mmol/L    Anion gap 3 (L) 5 - 15 mmol/L    Glucose 91 65 - 100 mg/dL    BUN 11 6 - 20 mg/dL    Creatinine 0.78 0.55 - 1.02 mg/dL    BUN/Creatinine ratio 14 12 - 20      GFR est AA >60 >60 ml/min/1.73m2    GFR est non-AA >60 >60 ml/min/1.73m2    Calcium 8.9 8.5 - 10.1 mg/dL    Bilirubin, total 0.3 0.2 - 1.0 mg/dL    AST (SGOT) 23 15 - 37 U/L    ALT (SGPT) 36 12 - 78 U/L    Alk. phosphatase 83 45 - 117 U/L    Protein, total 6.9 6.4 - 8.2 g/dL    Albumin 3.2 (L) 3.5 - 5.0 g/dL    Globulin 3.7 2.0 - 4.0 g/dL    A-G Ratio 0.9 (L) 1.1 - 2.2     LACTIC ACID    Collection Time: 08/16/22  2:57 PM   Result Value Ref Range    Lactic acid 1.6 0.4 - 2.0 mmol/L   NT-PRO BNP    Collection Time: 08/16/22  2:57 PM   Result Value Ref Range    NT pro-BNP 88 <125 pg/mL   URINALYSIS W/ REFLEX CULTURE    Collection Time: 08/16/22  7:50 PM    Specimen: Urine   Result Value Ref Range    Color Straw     Appearance Clear Clear    Specific gravity 1.010 1.003 - 1.030      Specific gravity 1.010 1.003 - 1.030      pH (UA) 5.0 5.0 - 8.0      Protein Negative Negative mg/dL    Glucose Normal (A) Negative mg/dL    Ketone Negative Negative mg/dL    Bilirubin Negative Negative      Blood Negative Negative      Urobilinogen Normal 0.1 - 1.0 EU/dL    Nitrites Negative Negative      Leukocyte Esterase Negative Negative      WBC 0-4 0 - 4 /hpf    RBC 0-5 0 - 5 /hpf    Bacteria Negative Negative /hpf    UA:UC IF INDICATED Culture not indicated by UA result Culture not indicated by UA result      Mucus Trace (A) Negative /lpf       Radiologic Studies -   [unfilled]  CT Results  (Last 48 hours)      None          CXR Results  (Last 48 hours)      None            Medical Decision Making and ED Course   - I am the first and primary provider for this patient AND AM THE PRIMARY PROVIDER OF RECORD. - I reviewed the vital signs, available nursing notes, past medical history, past surgical history, family history and social history. - Initial assessment performed. The patients presenting problems have been discussed, and the staff are in agreement with the care plan formulated and outlined with them. I have encouraged them to ask questions as they arise throughout their visit. Vital Signs-Reviewed the patient's vital signs.     Patient Vitals for the past 24 hrs:   Temp Pulse Resp BP SpO2   08/16/22 2055 -- 80 16 97/80 --   08/16/22 2048 -- 75 13 98/70 --   08/16/22 2008 -- 75 14 90/65 100 %   08/16/22 1938 -- 75 12 (!) 86/67 100 %   08/16/22 1828 -- 72 14 90/70 100 %   08/16/22 1410 98.1 °F (36.7 °C) 76 16 (!) 85/62 100 %       Records Reviewed: Nursing Notes and Old Medical Records    Provider Notes (Medical Decision Making):         ED Course as of 08/16/22 2202   Tue Aug 16, 2022   1420 Patient with complex past medical history as above including breast cancer currently on chemotherapy presents with hypotension and fatigue. She attributes this to decreased oral intake in terms of food and hydration. Since she started chemotherapy she reports that she has been having anorexia that she is discussing with her cancer doctor. Otherwise, patient does not have any other complaints that might suggest alternative etiology. We will get labs and intravenously hydrate the patient reassessed. [AA]   1546 WBC(!): 14.4  Improved from baseline [AA]   1547 HGB(!): 9.4  No significant change from baseline [AA]   1547 PLATELET: 095 [AA]   1610 NT pro-BNP: 88 [AA]   1610 Creatinine: 0.78 [AA]   1610 Potassium: 4.1 [AA]   1610 Sodium(!): 135 [AA]   1610 Lactic acid: 1.6 [AA]   1840 BP: 90/70 [AA]   2100 ED work-up as negative for any acute findings. After intravenous hydration, patient feels better. Her blood pressure has improved. Will discharge to follow-up as an outpatient with her PMD.  Discussed with the patient that the reason for her visit today is very likely related to her decreased p.o. intake and dehydration. Did encourage her to increase her oral intake and drink plenty of fluid and follow the doctor she verbalized understanding. At time of discharge, patient appears clinically well, HD stable, and able to move in the ED without assistance. [AA]      ED Course User Index  [AA] Tobi Saavedra MD         Diagnosis     Clinical Impression:   1. Dehydration    2.  Fatigue, unspecified type          Disposition Disposition: Condition improved  DC- Adult Discharges: All of the diagnostic tests were reviewed and questions answered. Diagnosis, care plan and treatment options were discussed. The patient understands the instructions and will follow up as directed. The patients results have been reviewed with them. They have been counseled regarding their diagnosis. The patient verbally convey understanding and agreement of the signs, symptoms, diagnosis, treatment and prognosis and additionally agrees to follow up as recommended with their PCP in 24 - 48 hours. They also agree with the care-plan and convey that all of their questions have been answered. I have also put together some discharge instructions for them that include: 1) educational information regarding their diagnosis, 2) how to care for their diagnosis at home, as well a 3) list of reasons why they would want to return to the ED prior to their follow-up appointment, should their condition change. Discharged      DISCHARGE PLAN:  1. Follow-up Information       Follow up With Specialties Details Why [address] EMERGENCY DEPT Emergency Medicine Go to  As needed, If symptoms worsen [address] [facility]    Michelle Rush MD Family Medicine Schedule an appointment as soon as possible for a visit in 3 days For reevaluation, Discuss your visit to the ER [address] [facility]  [facility] [address]  [phone]            2. Return to ED if worse   3. Discharge Medication List as of 8/16/2022  8:59 PM            Attestations: Rand Charles MD    Please note that this dictation was completed with [email or web address], the computer voice recognition software. Quite often unanticipated grammatical, syntax, homophones, and other interpretive errors are inadvertently transcribed by the computer software. Please disregard these errors. Please excuse any errors that have escaped final proofreading. Thank you.

## 2022-08-17 NOTE — DISCHARGE INSTRUCTIONS
Thank you! Thank you for allowing me to care for you in the emergency department. It is my goal to provide you with excellent care. If you have not received excellent quality care, please ask to speak to the nurse manager. Please fill out the survey that will come to you by mail or email since we listen to your feedback! Below you will find a list of your tests from today's visit. Should you have any questions, please do not hesitate to call the emergency department. Labs  Recent Results (from the past 12 hour(s))   CBC WITH AUTOMATED DIFF    Collection Time: 08/16/22  2:57 PM   Result Value Ref Range    WBC 14.4 (H) 3.6 - 11.0 K/uL    RBC 2.94 (L) 3.80 - 5.20 M/uL    HGB 9.4 (L) 11.5 - 16.0 g/dL    HCT 28.9 (L) 35.0 - 47.0 %    MCV 98.3 80.0 - 99.0 FL    MCH 32.0 26.0 - 34.0 PG    MCHC 32.5 30.0 - 36.5 g/dL    RDW 15.3 (H) 11.5 - 14.5 %    PLATELET 217 482 - 743 K/uL    MPV 10.5 8.9 - 12.9 FL    NRBC 0.0 0.0  WBC    ABSOLUTE NRBC 0.00 0.00 - 0.01 K/uL    NEUTROPHILS 54 32 - 75 %    LYMPHOCYTES 29 12 - 49 %    MONOCYTES 15 (H) 5 - 13 %    EOSINOPHILS 0 0 - 7 %    BASOPHILS 2 (H) 0 - 1 %    IMMATURE GRANULOCYTES 0 %    ABS. NEUTROPHILS 7.7 1.8 - 8.0 K/UL    ABS. LYMPHOCYTES 4.2 (H) 0.8 - 3.5 K/UL    ABS. MONOCYTES 2.2 (H) 0.0 - 1.0 K/UL    ABS. EOSINOPHILS 0.0 0.0 - 0.4 K/UL    ABS. BASOPHILS 0.3 (H) 0.0 - 0.1 K/UL    ABS. IMM.  GRANS. 0.0 K/UL    DF Manual      RBC COMMENTS Anisocytosis  1+       METABOLIC PANEL, COMPREHENSIVE    Collection Time: 08/16/22  2:57 PM   Result Value Ref Range    Sodium 135 (L) 136 - 145 mmol/L    Potassium 4.1 3.5 - 5.1 mmol/L    Chloride 105 97 - 108 mmol/L    CO2 27 21 - 32 mmol/L    Anion gap 3 (L) 5 - 15 mmol/L    Glucose 91 65 - 100 mg/dL    BUN 11 6 - 20 mg/dL    Creatinine 0.78 0.55 - 1.02 mg/dL    BUN/Creatinine ratio 14 12 - 20      GFR est AA >60 >60 ml/min/1.73m2    GFR est non-AA >60 >60 ml/min/1.73m2    Calcium 8.9 8.5 - 10.1 mg/dL    Bilirubin, total 0.3 0.2 - 1.0 mg/dL    AST (SGOT) 23 15 - 37 U/L    ALT (SGPT) 36 12 - 78 U/L    Alk. phosphatase 83 45 - 117 U/L    Protein, total 6.9 6.4 - 8.2 g/dL    Albumin 3.2 (L) 3.5 - 5.0 g/dL    Globulin 3.7 2.0 - 4.0 g/dL    A-G Ratio 0.9 (L) 1.1 - 2.2     LACTIC ACID    Collection Time: 08/16/22  2:57 PM   Result Value Ref Range    Lactic acid 1.6 0.4 - 2.0 mmol/L   NT-PRO BNP    Collection Time: 08/16/22  2:57 PM   Result Value Ref Range    NT pro-BNP 88 <125 pg/mL   URINALYSIS W/ REFLEX CULTURE    Collection Time: 08/16/22  7:50 PM    Specimen: Urine   Result Value Ref Range    Color Straw     Appearance Clear Clear    Specific gravity 1.010 1.003 - 1.030      Specific gravity 1.010 1.003 - 1.030      pH (UA) 5.0 5.0 - 8.0      Protein Negative Negative mg/dL    Glucose Normal (A) Negative mg/dL    Ketone Negative Negative mg/dL    Bilirubin Negative Negative      Blood Negative Negative      Urobilinogen Normal 0.1 - 1.0 EU/dL    Nitrites Negative Negative      Leukocyte Esterase Negative Negative      WBC PENDING /hpf    RBC PENDING /hpf    Epithelial cells PENDING /lpf    Bacteria PENDING /hpf    UA:UC IF INDICATED PENDING        Radiologic Studies  No orders to display     CT Results  (Last 48 hours)      None          CXR Results  (Last 48 hours)      None          ------------------------------------------------------------------------------------------------------------  The exam and treatment you received in the Emergency Department were for an urgent problem and are not intended as complete care. It is important that you follow-up with a doctor, nurse practitioner, or physician assistant to:  (1) confirm your diagnosis,  (2) re-evaluation of changes in your illness and treatment, and  (3) for ongoing care. Please take your discharge instructions with you when you go to your follow-up appointment. If you have any problem arranging a follow-up appointment, contact the Emergency Department.   If your symptoms become worse or you do not improve as expected and you are unable to reach your health care provider, please return to the Emergency Department. We are available 24 hours a day. If a prescription has been provided, please have it filled as soon as possible to prevent a delay in treatment. If you have any questions or reservations about taking the medication due to side effects or interactions with other medications, please call your primary care provider or contact the ER.

## 2022-09-02 ENCOUNTER — TRANSCRIBE ORDER (OUTPATIENT)
Dept: SCHEDULING | Age: 57
End: 2022-09-02

## 2022-09-02 DIAGNOSIS — Z85.3 PERSONAL HISTORY OF MALIGNANT NEOPLASM OF BREAST: Primary | ICD-10-CM

## 2022-09-02 DIAGNOSIS — Z90.13 STATUS POST BILATERAL MASTECTOMY: ICD-10-CM

## 2022-09-29 ENCOUNTER — HOSPITAL ENCOUNTER (OUTPATIENT)
Dept: CT IMAGING | Age: 57
Discharge: HOME | End: 2022-09-29
Attending: PLASTIC SURGERY
Payer: MEDICARE

## 2022-09-29 DIAGNOSIS — Z90.13 STATUS POST BILATERAL MASTECTOMY: ICD-10-CM

## 2022-09-29 DIAGNOSIS — Z85.3 PERSONAL HISTORY OF MALIGNANT NEOPLASM OF BREAST: ICD-10-CM

## 2022-09-29 PROCEDURE — 74011000636 HC RX REV CODE- 636: Performed by: PLASTIC SURGERY

## 2022-09-29 PROCEDURE — 74174 CTA ABD&PLVS W/CONTRAST: CPT

## 2022-09-29 RX ADMIN — IOPAMIDOL 100 ML: 755 INJECTION, SOLUTION INTRAVENOUS at 15:24

## 2022-10-17 ENCOUNTER — HOSPITAL ENCOUNTER (OUTPATIENT)
Dept: PREADMISSION TESTING | Age: 57
Discharge: HOME | End: 2022-10-17
Payer: MEDICARE

## 2022-10-17 VITALS
HEART RATE: 78 BPM | HEIGHT: 65 IN | BODY MASS INDEX: 28.65 KG/M2 | TEMPERATURE: 97.5 F | WEIGHT: 171.96 LBS | DIASTOLIC BLOOD PRESSURE: 63 MMHG | SYSTOLIC BLOOD PRESSURE: 101 MMHG | RESPIRATION RATE: 16 BRPM | OXYGEN SATURATION: 100 %

## 2022-10-17 LAB
ABO + RH BLD: NORMAL
ANION GAP SERPL CALC-SCNC: 4 MMOL/L (ref 5–15)
BASOPHILS # BLD: 0.1 K/UL (ref 0–0.1)
BASOPHILS NFR BLD: 1 % (ref 0–1)
BLOOD GROUP ANTIBODIES SERPL: NORMAL
BUN SERPL-MCNC: 22 MG/DL (ref 6–20)
BUN/CREAT SERPL: 21 (ref 12–20)
CALCIUM SERPL-MCNC: 9.6 MG/DL (ref 8.5–10.1)
CHLORIDE SERPL-SCNC: 112 MMOL/L (ref 97–108)
CO2 SERPL-SCNC: 26 MMOL/L (ref 21–32)
CREAT SERPL-MCNC: 1.03 MG/DL (ref 0.55–1.02)
DIFFERENTIAL METHOD BLD: ABNORMAL
EOSINOPHIL # BLD: 0.1 K/UL (ref 0–0.4)
EOSINOPHIL NFR BLD: 2 % (ref 0–7)
ERYTHROCYTE [DISTWIDTH] IN BLOOD BY AUTOMATED COUNT: 13.2 % (ref 11.5–14.5)
GLUCOSE SERPL-MCNC: 85 MG/DL (ref 65–100)
HCT VFR BLD AUTO: 32.3 % (ref 35–47)
HGB BLD-MCNC: 10.3 G/DL (ref 11.5–16)
IMM GRANULOCYTES # BLD AUTO: 0 K/UL (ref 0–0.04)
IMM GRANULOCYTES NFR BLD AUTO: 0 % (ref 0–0.5)
LYMPHOCYTES # BLD: 1.9 K/UL (ref 0.8–3.5)
LYMPHOCYTES NFR BLD: 30 % (ref 12–49)
MCH RBC QN AUTO: 32 PG (ref 26–34)
MCHC RBC AUTO-ENTMCNC: 31.9 G/DL (ref 30–36.5)
MCV RBC AUTO: 100.3 FL (ref 80–99)
MONOCYTES # BLD: 0.4 K/UL (ref 0–1)
MONOCYTES NFR BLD: 7 % (ref 5–13)
NEUTS SEG # BLD: 3.8 K/UL (ref 1.8–8)
NEUTS SEG NFR BLD: 60 % (ref 32–75)
NRBC # BLD: 0 K/UL (ref 0–0.01)
NRBC BLD-RTO: 0 PER 100 WBC
PLATELET # BLD AUTO: 309 K/UL (ref 150–400)
PMV BLD AUTO: 10.1 FL (ref 8.9–12.9)
POTASSIUM SERPL-SCNC: 4.3 MMOL/L (ref 3.5–5.1)
RBC # BLD AUTO: 3.22 M/UL (ref 3.8–5.2)
SODIUM SERPL-SCNC: 142 MMOL/L (ref 136–145)
SPECIMEN EXP DATE BLD: NORMAL
WBC # BLD AUTO: 6.2 K/UL (ref 3.6–11)

## 2022-10-17 PROCEDURE — 85025 COMPLETE CBC W/AUTO DIFF WBC: CPT

## 2022-10-17 PROCEDURE — 80048 BASIC METABOLIC PNL TOTAL CA: CPT

## 2022-10-17 PROCEDURE — 36415 COLL VENOUS BLD VENIPUNCTURE: CPT

## 2022-10-17 PROCEDURE — 93005 ELECTROCARDIOGRAM TRACING: CPT

## 2022-10-17 PROCEDURE — 86900 BLOOD TYPING SEROLOGIC ABO: CPT

## 2022-10-17 RX ORDER — POTASSIUM CHLORIDE 1500 MG/1
20 TABLET, FILM COATED, EXTENDED RELEASE ORAL
COMMUNITY

## 2022-10-17 RX ORDER — SEMAGLUTIDE 1.34 MG/ML
0.5 INJECTION, SOLUTION SUBCUTANEOUS
COMMUNITY

## 2022-10-17 RX ORDER — ZOLPIDEM TARTRATE 5 MG/1
5 TABLET ORAL
COMMUNITY

## 2022-10-17 RX ORDER — FUROSEMIDE 20 MG/1
20 TABLET ORAL
COMMUNITY

## 2022-10-17 RX ORDER — CARVEDILOL 6.25 MG/1
6.25 TABLET ORAL 2 TIMES DAILY
COMMUNITY

## 2022-10-17 RX ORDER — DULOXETIN HYDROCHLORIDE 60 MG/1
90 CAPSULE, DELAYED RELEASE ORAL
COMMUNITY

## 2022-10-17 NOTE — PERIOP NOTES
[facility]                   [facility] [address] [address] [facility]   MAIN OR                                  [phone]   MAIN PRE OP                          [phone]                                                                                AMBULATORY PRE OP          [phone]  PRE-ADMISSION TESTING    [phone]   Surgery Date:  Tuesday, 11/1/22       Is surgery arrival time given by surgeon? YES  NO  If NO, [address] staff will call you between 3 and 7pm the day before your surgery with your arrival time. (If your surgery is on a Monday, we will call you the Friday before.)    Call [phone] after 7pm Monday-Friday if you did not receive this call. INSTRUCTIONS BEFORE YOUR SURGERY   When You  Arrive Arrive at the 2nd [address] [facility] on the day of your surgery  Have your insurance card, photo ID, and any copayment (if needed)   Food   and   Drink NO food or drink after midnight the night before surgery    This means NO water, gum, mints, coffee, juice, etc.  No alcohol (beer, wine, liquor) 24 hours before and after surgery   Medications to   TAKE   Morning of Surgery MEDICATIONS TO TAKE THE MORNING OF SURGERY WITH A SIP OF WATER:   Carvedilol  Gabapentin  Topamax  Zoloft  Timolol Eye Drops  OTC dry eye drops   Medications  To  STOP      7 days before surgery Non-Steroidal anti-inflammatory Drugs (NSAID's): for example, Ibuprofen (Advil, Motrin), Naproxen (Aleve)  Aspirin, if taking for pain   Herbal supplements, vitamins, and fish oil  Other:  (Pain medications not listed above, including Tylenol may be taken)  Stop taking Omega 3, Biotin, Ascorbic Acid, Multivitamin, vitamin B   Blood  Thinners If you take  Aspirin, Plavix, Coumadin, or any blood-thinning or anti-blood clot medicine, talk to the doctor who prescribed the medications for pre-operative instructions.    Bathing Clothing  Jewelry  Valuables     If you shower the morning of surgery, please do not apply anything to your skin (lotions, powders, deodorant, or makeup, especially mascara)  . Do not shave or trim anywhere 24 hours before surgery  Wear your hair loose or down; no pony-tails, buns, or metal hair clips  Wear loose, comfortable, clean clothes  Wear glasses instead of contacts  Leave money, valuables, and jewelry, including body piercings, at home  If you were given an InfluxDB, bring it on day of surgery. Going Home - or Spending the Night SAME-DAY SURGERY: You must have a responsible adult drive you home and stay with you 24 hours after surgery  ADMITS: If your doctor is keeping you in the hospital after surgery, leave personal belongings/luggage in your car until you have a hospital room number. Hospital discharge time is 12 noon  Drivers must be here before 12 noon unless you are told differently   Special Instructions Bath with antibacterial Soap from now until after surgery. Diabetic patients:      eat a good protein snack before midnight the night before your surgery    DO NOT TAKE ANY DIABETIC MEDICINE THE MORNING OF SURGERY    Check your blood sugar the morning of surgery and if it is low you may use glucose tabs        Follow all instructions so your surgery wont be cancelled. Please, be on time. If a situation occurs and you are delayed the day of surgery, call [phone]     If your physical condition changes (like a fever, cold, flu, etc.) call your surgeon. Home medication(s) reviewed and verified via     LIST     during PAT appointment. The patient was contacted by    IN-PERSON  The patient verbalizes understanding of all instructions and   DOES NOT   need reinforcement.

## 2022-10-18 ENCOUNTER — APPOINTMENT (OUTPATIENT)
Dept: PHYSICAL THERAPY | Age: 57
End: 2022-10-18

## 2022-10-19 LAB
ATRIAL RATE: 66 BPM
BACTERIA SPEC CULT: NORMAL
BACTERIA SPEC CULT: NORMAL
CALCULATED P AXIS, ECG09: 9 DEGREES
CALCULATED R AXIS, ECG10: 26 DEGREES
CALCULATED T AXIS, ECG11: 53 DEGREES
DIAGNOSIS, 93000: NORMAL
P-R INTERVAL, ECG05: 154 MS
Q-T INTERVAL, ECG07: 408 MS
QRS DURATION, ECG06: 80 MS
QTC CALCULATION (BEZET), ECG08: 427 MS
SERVICE CMNT-IMP: NORMAL
VENTRICULAR RATE, ECG03: 66 BPM

## 2022-10-31 ENCOUNTER — ANESTHESIA EVENT (OUTPATIENT)
Dept: SURGERY | Age: 57
DRG: 585 | End: 2022-10-31
Payer: MEDICARE

## 2022-11-01 ENCOUNTER — ANESTHESIA (OUTPATIENT)
Dept: SURGERY | Age: 57
DRG: 585 | End: 2022-11-01
Payer: MEDICARE

## 2022-11-01 ENCOUNTER — APPOINTMENT (OUTPATIENT)
Dept: GENERAL RADIOLOGY | Age: 57
DRG: 585 | End: 2022-11-01
Attending: SURGERY
Payer: MEDICARE

## 2022-11-01 ENCOUNTER — HOSPITAL ENCOUNTER (INPATIENT)
Age: 57
LOS: 3 days | Discharge: NURSING, REHAB OR LONG-TERM CARE | DRG: 585 | End: 2022-11-04
Attending: PLASTIC SURGERY | Admitting: SURGERY
Payer: MEDICARE

## 2022-11-01 DIAGNOSIS — C50.911 MALIGNANT NEOPLASM OF RIGHT BREAST, STAGE 2 (HCC): Primary | ICD-10-CM

## 2022-11-01 LAB
ABO + RH BLD: NORMAL
BLOOD GROUP ANTIBODIES SERPL: NORMAL
GLUCOSE BLD STRIP.AUTO-MCNC: 109 MG/DL (ref 65–117)
GLUCOSE BLD STRIP.AUTO-MCNC: 121 MG/DL (ref 65–117)
GLUCOSE BLD STRIP.AUTO-MCNC: 77 MG/DL (ref 65–117)
SERVICE CMNT-IMP: ABNORMAL
SERVICE CMNT-IMP: NORMAL
SERVICE CMNT-IMP: NORMAL
SPECIMEN EXP DATE BLD: NORMAL

## 2022-11-01 PROCEDURE — 77030019940 HC BLNKT HYPOTHRM STRY -B: Performed by: SURGERY

## 2022-11-01 PROCEDURE — 74018 RADEX ABDOMEN 1 VIEW: CPT

## 2022-11-01 PROCEDURE — 77030025874 HC CLP HMSTAT MIC SUPFN TI SYNO -B: Performed by: SURGERY

## 2022-11-01 PROCEDURE — 77030020061 HC IV BLD WRMR ADMIN SET 3M -B: Performed by: ANESTHESIOLOGY

## 2022-11-01 PROCEDURE — 77030016441 HC APPL CLP LIG1 J&J -B: Performed by: SURGERY

## 2022-11-01 PROCEDURE — 86900 BLOOD TYPING SEROLOGIC ABO: CPT

## 2022-11-01 PROCEDURE — 74011250636 HC RX REV CODE- 250/636: Performed by: PLASTIC SURGERY

## 2022-11-01 PROCEDURE — 77030002933 HC SUT MCRYL J&J -A: Performed by: SURGERY

## 2022-11-01 PROCEDURE — 71045 X-RAY EXAM CHEST 1 VIEW: CPT

## 2022-11-01 PROCEDURE — 74011000250 HC RX REV CODE- 250: Performed by: SURGERY

## 2022-11-01 PROCEDURE — 0WQF0ZZ REPAIR ABDOMINAL WALL, OPEN APPROACH: ICD-10-PCS | Performed by: SURGERY

## 2022-11-01 PROCEDURE — 76060000051 HC ANESTHESIA 10 TO 10.5 HR: Performed by: SURGERY

## 2022-11-01 PROCEDURE — 77030031139 HC SUT VCRL2 J&J -A: Performed by: SURGERY

## 2022-11-01 PROCEDURE — 76210000006 HC OR PH I REC 0.5 TO 1 HR: Performed by: SURGERY

## 2022-11-01 PROCEDURE — 77030013705 HC HK ELAS STAY COOP -B: Performed by: SURGERY

## 2022-11-01 PROCEDURE — 82962 GLUCOSE BLOOD TEST: CPT

## 2022-11-01 PROCEDURE — 77030014336 HC CLP LIG TI SYNO -B: Performed by: SURGERY

## 2022-11-01 PROCEDURE — 77030040361 HC SLV COMPR DVT MDII -B

## 2022-11-01 PROCEDURE — 74011000258 HC RX REV CODE- 258: Performed by: SURGERY

## 2022-11-01 PROCEDURE — 77030035236 HC SUT PDS STRATFX BARB J&J -B: Performed by: SURGERY

## 2022-11-01 PROCEDURE — 2709999900 HC NON-CHARGEABLE SUPPLY: Performed by: SURGERY

## 2022-11-01 PROCEDURE — 74011000250 HC RX REV CODE- 250: Performed by: PLASTIC SURGERY

## 2022-11-01 PROCEDURE — 36415 COLL VENOUS BLD VENIPUNCTURE: CPT

## 2022-11-01 PROCEDURE — 77030029194 HC CPLR ANAST MICVAS DEV SYNO -D: Performed by: SURGERY

## 2022-11-01 PROCEDURE — 77030040504 HC DRN WND MDII -B: Performed by: SURGERY

## 2022-11-01 PROCEDURE — 0HRV077 REPLACEMENT OF BILATERAL BREAST USING DEEP INFERIOR EPIGASTRIC ARTERY PERFORATOR FLAP, OPEN APPROACH: ICD-10-PCS | Performed by: SURGERY

## 2022-11-01 PROCEDURE — 74011250637 HC RX REV CODE- 250/637: Performed by: SURGERY

## 2022-11-01 PROCEDURE — 0HPT0NZ REMOVAL OF TISSUE EXPANDER FROM RIGHT BREAST, OPEN APPROACH: ICD-10-PCS | Performed by: PLASTIC SURGERY

## 2022-11-01 PROCEDURE — P9045 ALBUMIN (HUMAN), 5%, 250 ML: HCPCS | Performed by: NURSE ANESTHETIST, CERTIFIED REGISTERED

## 2022-11-01 PROCEDURE — 83036 HEMOGLOBIN GLYCOSYLATED A1C: CPT

## 2022-11-01 PROCEDURE — 77030013474 HC CRD BPLR DISP ADLR -A: Performed by: SURGERY

## 2022-11-01 PROCEDURE — 77030005513 HC CATH URETH FOL11 MDII -B: Performed by: SURGERY

## 2022-11-01 PROCEDURE — 74011636637 HC RX REV CODE- 636/637: Performed by: SURGERY

## 2022-11-01 PROCEDURE — 77030030275 HC CLMP VSL DBL DISP SYNO -F: Performed by: SURGERY

## 2022-11-01 PROCEDURE — 77030008462 HC STPLR SKN PROX J&J -A: Performed by: SURGERY

## 2022-11-01 PROCEDURE — 74011250636 HC RX REV CODE- 250/636: Performed by: NURSE ANESTHETIST, CERTIFIED REGISTERED

## 2022-11-01 PROCEDURE — 77030027413 HC ADH SKN AESC -B: Performed by: SURGERY

## 2022-11-01 PROCEDURE — 77030025876 HC CLMP VSL SGL DISP SYNO -D: Performed by: SURGERY

## 2022-11-01 PROCEDURE — 77030008684 HC TU ET CUF COVD -B: Performed by: ANESTHESIOLOGY

## 2022-11-01 PROCEDURE — 77030040922 HC BLNKT HYPOTHRM STRY -A

## 2022-11-01 PROCEDURE — 77030008463 HC STPLR SKN PROX J&J -B: Performed by: SURGERY

## 2022-11-01 PROCEDURE — 77030010512 HC APPL CLP LIG J&J -C: Performed by: SURGERY

## 2022-11-01 PROCEDURE — 76010000163 HC OR TIME 10 TO 10.5 HR INTENSV-TIER 1: Performed by: SURGERY

## 2022-11-01 PROCEDURE — 74011250636 HC RX REV CODE- 250/636: Performed by: SURGERY

## 2022-11-01 PROCEDURE — 77030040506 HC DRN WND MDII -A: Performed by: SURGERY

## 2022-11-01 PROCEDURE — 74011000258 HC RX REV CODE- 258: Performed by: NURSE ANESTHETIST, CERTIFIED REGISTERED

## 2022-11-01 PROCEDURE — 77030002916 HC SUT ETHLN J&J -A: Performed by: SURGERY

## 2022-11-01 PROCEDURE — 77030011267 HC ELECTRD BLD COVD -A: Performed by: SURGERY

## 2022-11-01 PROCEDURE — 77030029262 HC BKGRN VISBLTY MAT SYNO -B: Performed by: SURGERY

## 2022-11-01 PROCEDURE — 77030040140 HC SENSOR STO2 OXMTR TISS VIOT -G1: Performed by: SURGERY

## 2022-11-01 PROCEDURE — 77030026438 HC STYL ET INTUB CARD -A: Performed by: ANESTHESIOLOGY

## 2022-11-01 PROCEDURE — C9290 INJ, BUPIVACAINE LIPOSOME: HCPCS | Performed by: SURGERY

## 2022-11-01 PROCEDURE — 77030002917 HC SUT ETHLN J&J -B: Performed by: SURGERY

## 2022-11-01 PROCEDURE — 0HB5XZZ EXCISION OF CHEST SKIN, EXTERNAL APPROACH: ICD-10-PCS | Performed by: PLASTIC SURGERY

## 2022-11-01 PROCEDURE — 0HPU0NZ REMOVAL OF TISSUE EXPANDER FROM LEFT BREAST, OPEN APPROACH: ICD-10-PCS | Performed by: PLASTIC SURGERY

## 2022-11-01 PROCEDURE — 65610000006 HC RM INTENSIVE CARE

## 2022-11-01 PROCEDURE — 74011000250 HC RX REV CODE- 250: Performed by: NURSE ANESTHETIST, CERTIFIED REGISTERED

## 2022-11-01 PROCEDURE — 74011250636 HC RX REV CODE- 250/636: Performed by: ANESTHESIOLOGY

## 2022-11-01 DEVICE — THE GEM MICROVASCULAR ANASTOMOTIC COUPLER DEVICE RINGS ARE MADE OF POLYETHYLENE AND SURGICAL GRADE STAINLESS STEEL PINS. A PROTECTIVE COVER AND JAW ASSEMBLY PROTECT THE RINGS AND ALLOW FOR EASY LOADING ONTO THE ANASTOMOTIC INSTRUMENT. BOTH THE PROTECTIVE COVER AND JAW ASSEMBLY ARE DISPOSABLE. THE GEM MICROVASCULAR ANASTOMOTIC COUPLER DEVICE IS SINGLE-USE AND AVAILABLE IN VARIOUS SIZES
Type: IMPLANTABLE DEVICE | Site: BREAST | Status: FUNCTIONAL
Brand: GEM MICROVASCULAR ANASTOMOTIC COUPLER

## 2022-11-01 RX ORDER — OXYCODONE HYDROCHLORIDE 5 MG/1
10 TABLET ORAL
Status: DISCONTINUED | OUTPATIENT
Start: 2022-11-01 | End: 2022-11-04 | Stop reason: HOSPADM

## 2022-11-01 RX ORDER — CARVEDILOL 6.25 MG/1
6.25 TABLET ORAL 2 TIMES DAILY
Status: DISCONTINUED | OUTPATIENT
Start: 2022-11-01 | End: 2022-11-02

## 2022-11-01 RX ORDER — ONDANSETRON 2 MG/ML
4 INJECTION INTRAMUSCULAR; INTRAVENOUS
Status: DISCONTINUED | OUTPATIENT
Start: 2022-11-01 | End: 2022-11-04 | Stop reason: HOSPADM

## 2022-11-01 RX ORDER — LIDOCAINE HYDROCHLORIDE AND EPINEPHRINE 20; 10 MG/ML; UG/ML
INJECTION, SOLUTION INFILTRATION; PERINEURAL AS NEEDED
Status: DISCONTINUED | OUTPATIENT
Start: 2022-11-01 | End: 2022-11-01 | Stop reason: HOSPADM

## 2022-11-01 RX ORDER — ALBUTEROL SULFATE 0.83 MG/ML
2.5 SOLUTION RESPIRATORY (INHALATION) AS NEEDED
Status: DISCONTINUED | OUTPATIENT
Start: 2022-11-01 | End: 2022-11-01 | Stop reason: HOSPADM

## 2022-11-01 RX ORDER — PROPOFOL 10 MG/ML
INJECTION, EMULSION INTRAVENOUS AS NEEDED
Status: DISCONTINUED | OUTPATIENT
Start: 2022-11-01 | End: 2022-11-01 | Stop reason: HOSPADM

## 2022-11-01 RX ORDER — PROPOFOL 10 MG/ML
INJECTION, EMULSION INTRAVENOUS
Status: DISCONTINUED | OUTPATIENT
Start: 2022-11-01 | End: 2022-11-01 | Stop reason: HOSPADM

## 2022-11-01 RX ORDER — DEXAMETHASONE SODIUM PHOSPHATE 4 MG/ML
INJECTION, SOLUTION INTRA-ARTICULAR; INTRALESIONAL; INTRAMUSCULAR; INTRAVENOUS; SOFT TISSUE AS NEEDED
Status: DISCONTINUED | OUTPATIENT
Start: 2022-11-01 | End: 2022-11-01 | Stop reason: HOSPADM

## 2022-11-01 RX ORDER — ONDANSETRON 2 MG/ML
INJECTION INTRAMUSCULAR; INTRAVENOUS AS NEEDED
Status: DISCONTINUED | OUTPATIENT
Start: 2022-11-01 | End: 2022-11-01 | Stop reason: HOSPADM

## 2022-11-01 RX ORDER — LIDOCAINE HYDROCHLORIDE 20 MG/ML
INJECTION, SOLUTION EPIDURAL; INFILTRATION; INTRACAUDAL; PERINEURAL AS NEEDED
Status: DISCONTINUED | OUTPATIENT
Start: 2022-11-01 | End: 2022-11-01 | Stop reason: HOSPADM

## 2022-11-01 RX ORDER — ENOXAPARIN SODIUM 100 MG/ML
40 INJECTION SUBCUTANEOUS EVERY 24 HOURS
Status: DISCONTINUED | OUTPATIENT
Start: 2022-11-01 | End: 2022-11-04 | Stop reason: HOSPADM

## 2022-11-01 RX ORDER — HYDROMORPHONE HYDROCHLORIDE 2 MG/ML
INJECTION, SOLUTION INTRAMUSCULAR; INTRAVENOUS; SUBCUTANEOUS AS NEEDED
Status: DISCONTINUED | OUTPATIENT
Start: 2022-11-01 | End: 2022-11-01 | Stop reason: HOSPADM

## 2022-11-01 RX ORDER — ALBUMIN HUMAN 50 G/1000ML
SOLUTION INTRAVENOUS AS NEEDED
Status: DISCONTINUED | OUTPATIENT
Start: 2022-11-01 | End: 2022-11-01 | Stop reason: HOSPADM

## 2022-11-01 RX ORDER — SODIUM CHLORIDE 0.9 % (FLUSH) 0.9 %
5-40 SYRINGE (ML) INJECTION AS NEEDED
Status: DISCONTINUED | OUTPATIENT
Start: 2022-11-01 | End: 2022-11-01 | Stop reason: HOSPADM

## 2022-11-01 RX ORDER — MORPHINE SULFATE 2 MG/ML
2 INJECTION, SOLUTION INTRAMUSCULAR; INTRAVENOUS
Status: DISCONTINUED | OUTPATIENT
Start: 2022-11-01 | End: 2022-11-04 | Stop reason: HOSPADM

## 2022-11-01 RX ORDER — MIDAZOLAM HYDROCHLORIDE 1 MG/ML
INJECTION, SOLUTION INTRAMUSCULAR; INTRAVENOUS AS NEEDED
Status: DISCONTINUED | OUTPATIENT
Start: 2022-11-01 | End: 2022-11-01 | Stop reason: HOSPADM

## 2022-11-01 RX ORDER — SODIUM CHLORIDE 0.9 % (FLUSH) 0.9 %
5-40 SYRINGE (ML) INJECTION AS NEEDED
Status: DISCONTINUED | OUTPATIENT
Start: 2022-11-01 | End: 2022-11-04 | Stop reason: HOSPADM

## 2022-11-01 RX ORDER — SUCCINYLCHOLINE CHLORIDE 20 MG/ML
INJECTION INTRAMUSCULAR; INTRAVENOUS AS NEEDED
Status: DISCONTINUED | OUTPATIENT
Start: 2022-11-01 | End: 2022-11-01 | Stop reason: HOSPADM

## 2022-11-01 RX ORDER — TIMOLOL MALEATE 5 MG/ML
1 SOLUTION/ DROPS OPHTHALMIC 2 TIMES DAILY
Status: DISCONTINUED | OUTPATIENT
Start: 2022-11-01 | End: 2022-11-04 | Stop reason: HOSPADM

## 2022-11-01 RX ORDER — FENTANYL CITRATE 50 UG/ML
INJECTION, SOLUTION INTRAMUSCULAR; INTRAVENOUS AS NEEDED
Status: DISCONTINUED | OUTPATIENT
Start: 2022-11-01 | End: 2022-11-01 | Stop reason: HOSPADM

## 2022-11-01 RX ORDER — INSULIN LISPRO 100 [IU]/ML
INJECTION, SOLUTION INTRAVENOUS; SUBCUTANEOUS EVERY 6 HOURS
Status: DISCONTINUED | OUTPATIENT
Start: 2022-11-02 | End: 2022-11-02

## 2022-11-01 RX ORDER — DIPHENHYDRAMINE HYDROCHLORIDE 50 MG/ML
12.5 INJECTION, SOLUTION INTRAMUSCULAR; INTRAVENOUS AS NEEDED
Status: DISCONTINUED | OUTPATIENT
Start: 2022-11-01 | End: 2022-11-01 | Stop reason: HOSPADM

## 2022-11-01 RX ORDER — HYDROXYZINE 25 MG/1
25 TABLET, FILM COATED ORAL
Status: DISCONTINUED | OUTPATIENT
Start: 2022-11-01 | End: 2022-11-04 | Stop reason: HOSPADM

## 2022-11-01 RX ORDER — AMOXICILLIN 250 MG
1 CAPSULE ORAL DAILY
Status: DISCONTINUED | OUTPATIENT
Start: 2022-11-02 | End: 2022-11-04 | Stop reason: HOSPADM

## 2022-11-01 RX ORDER — FENTANYL CITRATE 50 UG/ML
25 INJECTION, SOLUTION INTRAMUSCULAR; INTRAVENOUS
Status: DISCONTINUED | OUTPATIENT
Start: 2022-11-01 | End: 2022-11-01 | Stop reason: HOSPADM

## 2022-11-01 RX ORDER — PHENYLEPHRINE HCL IN 0.9% NACL 0.4MG/10ML
SYRINGE (ML) INTRAVENOUS AS NEEDED
Status: DISCONTINUED | OUTPATIENT
Start: 2022-11-01 | End: 2022-11-01 | Stop reason: HOSPADM

## 2022-11-01 RX ORDER — TRAZODONE HYDROCHLORIDE 100 MG/1
100 TABLET ORAL
Status: DISCONTINUED | OUTPATIENT
Start: 2022-11-01 | End: 2022-11-04 | Stop reason: HOSPADM

## 2022-11-01 RX ORDER — OXYCODONE HYDROCHLORIDE 5 MG/1
5 TABLET ORAL
Status: DISCONTINUED | OUTPATIENT
Start: 2022-11-01 | End: 2022-11-04 | Stop reason: HOSPADM

## 2022-11-01 RX ORDER — SODIUM CHLORIDE 0.9 % (FLUSH) 0.9 %
5-40 SYRINGE (ML) INJECTION EVERY 8 HOURS
Status: DISCONTINUED | OUTPATIENT
Start: 2022-11-01 | End: 2022-11-01 | Stop reason: HOSPADM

## 2022-11-01 RX ORDER — ACETAMINOPHEN 500 MG
1000 TABLET ORAL EVERY 6 HOURS
Status: DISCONTINUED | OUTPATIENT
Start: 2022-11-02 | End: 2022-11-04 | Stop reason: HOSPADM

## 2022-11-01 RX ORDER — DEXTROSE MONOHYDRATE AND SODIUM CHLORIDE 5; .45 G/100ML; G/100ML
100 INJECTION, SOLUTION INTRAVENOUS CONTINUOUS
Status: DISCONTINUED | OUTPATIENT
Start: 2022-11-01 | End: 2022-11-02

## 2022-11-01 RX ORDER — LATANOPROST 50 UG/ML
1 SOLUTION/ DROPS OPHTHALMIC
Status: DISCONTINUED | OUTPATIENT
Start: 2022-11-01 | End: 2022-11-04 | Stop reason: HOSPADM

## 2022-11-01 RX ORDER — HYDROMORPHONE HYDROCHLORIDE 1 MG/ML
.25-1 INJECTION, SOLUTION INTRAMUSCULAR; INTRAVENOUS; SUBCUTANEOUS
Status: DISCONTINUED | OUTPATIENT
Start: 2022-11-01 | End: 2022-11-01 | Stop reason: HOSPADM

## 2022-11-01 RX ORDER — FLUMAZENIL 0.1 MG/ML
0.2 INJECTION INTRAVENOUS
Status: DISCONTINUED | OUTPATIENT
Start: 2022-11-01 | End: 2022-11-01 | Stop reason: HOSPADM

## 2022-11-01 RX ORDER — GABAPENTIN 100 MG/1
800 CAPSULE ORAL 3 TIMES DAILY
Status: DISCONTINUED | OUTPATIENT
Start: 2022-11-01 | End: 2022-11-04 | Stop reason: HOSPADM

## 2022-11-01 RX ORDER — VECURONIUM BROMIDE FOR INJECTION 1 MG/ML
INJECTION, POWDER, LYOPHILIZED, FOR SOLUTION INTRAVENOUS AS NEEDED
Status: DISCONTINUED | OUTPATIENT
Start: 2022-11-01 | End: 2022-11-01 | Stop reason: HOSPADM

## 2022-11-01 RX ORDER — HEPARIN SODIUM 1000 [USP'U]/ML
INJECTION, SOLUTION INTRAVENOUS; SUBCUTANEOUS AS NEEDED
Status: DISCONTINUED | OUTPATIENT
Start: 2022-11-01 | End: 2022-11-01 | Stop reason: HOSPADM

## 2022-11-01 RX ORDER — LIDOCAINE HYDROCHLORIDE 10 MG/ML
0.1 INJECTION, SOLUTION EPIDURAL; INFILTRATION; INTRACAUDAL; PERINEURAL AS NEEDED
Status: DISCONTINUED | OUTPATIENT
Start: 2022-11-01 | End: 2022-11-01 | Stop reason: HOSPADM

## 2022-11-01 RX ORDER — TOPIRAMATE 25 MG/1
25 TABLET ORAL 2 TIMES DAILY WITH MEALS
Status: DISCONTINUED | OUTPATIENT
Start: 2022-11-02 | End: 2022-11-04 | Stop reason: HOSPADM

## 2022-11-01 RX ORDER — SODIUM CHLORIDE, SODIUM LACTATE, POTASSIUM CHLORIDE, CALCIUM CHLORIDE 600; 310; 30; 20 MG/100ML; MG/100ML; MG/100ML; MG/100ML
INJECTION, SOLUTION INTRAVENOUS
Status: DISCONTINUED | OUTPATIENT
Start: 2022-11-01 | End: 2022-11-01 | Stop reason: HOSPADM

## 2022-11-01 RX ORDER — DIPHENHYDRAMINE HYDROCHLORIDE 50 MG/ML
INJECTION, SOLUTION INTRAMUSCULAR; INTRAVENOUS AS NEEDED
Status: DISCONTINUED | OUTPATIENT
Start: 2022-11-01 | End: 2022-11-01 | Stop reason: HOSPADM

## 2022-11-01 RX ORDER — NALOXONE HYDROCHLORIDE 0.4 MG/ML
0.04 INJECTION, SOLUTION INTRAMUSCULAR; INTRAVENOUS; SUBCUTANEOUS
Status: DISCONTINUED | OUTPATIENT
Start: 2022-11-01 | End: 2022-11-01 | Stop reason: HOSPADM

## 2022-11-01 RX ORDER — ZOLPIDEM TARTRATE 5 MG/1
5 TABLET ORAL
Status: DISCONTINUED | OUTPATIENT
Start: 2022-11-01 | End: 2022-11-04 | Stop reason: HOSPADM

## 2022-11-01 RX ORDER — SODIUM CHLORIDE, SODIUM LACTATE, POTASSIUM CHLORIDE, CALCIUM CHLORIDE 600; 310; 30; 20 MG/100ML; MG/100ML; MG/100ML; MG/100ML
125 INJECTION, SOLUTION INTRAVENOUS CONTINUOUS
Status: DISCONTINUED | OUTPATIENT
Start: 2022-11-01 | End: 2022-11-01 | Stop reason: HOSPADM

## 2022-11-01 RX ORDER — PAPAVERINE HYDROCHLORIDE 30 MG/ML
INJECTION INTRAMUSCULAR; INTRAVENOUS AS NEEDED
Status: DISCONTINUED | OUTPATIENT
Start: 2022-11-01 | End: 2022-11-01 | Stop reason: HOSPADM

## 2022-11-01 RX ORDER — ONDANSETRON 2 MG/ML
4 INJECTION INTRAMUSCULAR; INTRAVENOUS AS NEEDED
Status: DISCONTINUED | OUTPATIENT
Start: 2022-11-01 | End: 2022-11-01 | Stop reason: HOSPADM

## 2022-11-01 RX ORDER — DULOXETIN HYDROCHLORIDE 30 MG/1
90 CAPSULE, DELAYED RELEASE ORAL
Status: DISCONTINUED | OUTPATIENT
Start: 2022-11-01 | End: 2022-11-04 | Stop reason: HOSPADM

## 2022-11-01 RX ORDER — SODIUM CHLORIDE 0.9 % (FLUSH) 0.9 %
5-40 SYRINGE (ML) INJECTION EVERY 8 HOURS
Status: DISCONTINUED | OUTPATIENT
Start: 2022-11-01 | End: 2022-11-04 | Stop reason: HOSPADM

## 2022-11-01 RX ADMIN — FENTANYL CITRATE 50 MCG: 50 INJECTION, SOLUTION INTRAMUSCULAR; INTRAVENOUS at 09:16

## 2022-11-01 RX ADMIN — Medication 40 MCG: at 08:24

## 2022-11-01 RX ADMIN — ONDANSETRON HYDROCHLORIDE 4 MG: 2 SOLUTION INTRAMUSCULAR; INTRAVENOUS at 17:10

## 2022-11-01 RX ADMIN — MIDAZOLAM HYDROCHLORIDE 2 MG: 1 INJECTION, SOLUTION INTRAMUSCULAR; INTRAVENOUS at 10:17

## 2022-11-01 RX ADMIN — ENOXAPARIN SODIUM 40 MG: 100 INJECTION SUBCUTANEOUS at 22:05

## 2022-11-01 RX ADMIN — SODIUM CHLORIDE, PRESERVATIVE FREE 10 ML: 5 INJECTION INTRAVENOUS at 22:06

## 2022-11-01 RX ADMIN — LIDOCAINE HYDROCHLORIDE 60 MG: 20 INJECTION, SOLUTION EPIDURAL; INFILTRATION; INTRACAUDAL; PERINEURAL at 07:54

## 2022-11-01 RX ADMIN — VECURONIUM BROMIDE 4 MG: 1 INJECTION, POWDER, LYOPHILIZED, FOR SOLUTION INTRAVENOUS at 11:39

## 2022-11-01 RX ADMIN — PROPOFOL 150 MCG/KG/MIN: 10 INJECTION, EMULSION INTRAVENOUS at 08:08

## 2022-11-01 RX ADMIN — Medication 80 MCG: at 09:26

## 2022-11-01 RX ADMIN — CEFAZOLIN SODIUM 2 G: 1 POWDER, FOR SOLUTION INTRAMUSCULAR; INTRAVENOUS at 16:05

## 2022-11-01 RX ADMIN — SUGAMMADEX 200 MG: 100 INJECTION, SOLUTION INTRAVENOUS at 17:08

## 2022-11-01 RX ADMIN — FENTANYL CITRATE 100 MCG: 50 INJECTION, SOLUTION INTRAMUSCULAR; INTRAVENOUS at 08:27

## 2022-11-01 RX ADMIN — DEXTROSE AND SODIUM CHLORIDE 100 ML/HR: 5; 450 INJECTION, SOLUTION INTRAVENOUS at 22:04

## 2022-11-01 RX ADMIN — VECURONIUM BROMIDE 4 MG: 1 INJECTION, POWDER, LYOPHILIZED, FOR SOLUTION INTRAVENOUS at 13:06

## 2022-11-01 RX ADMIN — DIPHENHYDRAMINE HYDROCHLORIDE 25 MG: 50 INJECTION, SOLUTION INTRAMUSCULAR; INTRAVENOUS at 17:16

## 2022-11-01 RX ADMIN — VECURONIUM BROMIDE 6 MG: 1 INJECTION, POWDER, LYOPHILIZED, FOR SOLUTION INTRAVENOUS at 10:04

## 2022-11-01 RX ADMIN — SODIUM CHLORIDE, POTASSIUM CHLORIDE, SODIUM LACTATE AND CALCIUM CHLORIDE: 600; 310; 30; 20 INJECTION, SOLUTION INTRAVENOUS at 08:05

## 2022-11-01 RX ADMIN — MIDAZOLAM HYDROCHLORIDE 3 MG: 1 INJECTION, SOLUTION INTRAMUSCULAR; INTRAVENOUS at 07:43

## 2022-11-01 RX ADMIN — Medication 40 MCG: at 08:26

## 2022-11-01 RX ADMIN — VECURONIUM BROMIDE 6 MG: 1 INJECTION, POWDER, LYOPHILIZED, FOR SOLUTION INTRAVENOUS at 08:05

## 2022-11-01 RX ADMIN — ACETAMINOPHEN 1000 MG: 500 TABLET ORAL at 23:38

## 2022-11-01 RX ADMIN — LATANOPROST 1 DROP: 50 SOLUTION OPHTHALMIC at 23:01

## 2022-11-01 RX ADMIN — SODIUM CHLORIDE, POTASSIUM CHLORIDE, SODIUM LACTATE AND CALCIUM CHLORIDE 125 ML/HR: 600; 310; 30; 20 INJECTION, SOLUTION INTRAVENOUS at 07:37

## 2022-11-01 RX ADMIN — ALBUMIN (HUMAN) 12.5 G: 12.5 SOLUTION INTRAVENOUS at 11:02

## 2022-11-01 RX ADMIN — ALBUMIN (HUMAN) 12.5 G: 12.5 SOLUTION INTRAVENOUS at 12:15

## 2022-11-01 RX ADMIN — DEXAMETHASONE SODIUM PHOSPHATE 8 MG: 4 INJECTION, SOLUTION INTRAMUSCULAR; INTRAVENOUS at 08:40

## 2022-11-01 RX ADMIN — WATER 2 G: 1 INJECTION INTRAMUSCULAR; INTRAVENOUS; SUBCUTANEOUS at 22:05

## 2022-11-01 RX ADMIN — Medication 80 MCG: at 09:41

## 2022-11-01 RX ADMIN — HEPARIN SODIUM 5000 UNITS: 1000 INJECTION, SOLUTION INTRAVENOUS; SUBCUTANEOUS at 13:21

## 2022-11-01 RX ADMIN — TIMOLOL MALEATE 1 DROP: 5 SOLUTION OPHTHALMIC at 23:01

## 2022-11-01 RX ADMIN — DULOXETINE HYDROCHLORIDE 90 MG: 30 CAPSULE, DELAYED RELEASE ORAL at 22:06

## 2022-11-01 RX ADMIN — VECURONIUM BROMIDE 4 MG: 1 INJECTION, POWDER, LYOPHILIZED, FOR SOLUTION INTRAVENOUS at 14:31

## 2022-11-01 RX ADMIN — SUCCINYLCHOLINE CHLORIDE 100 MG: 20 INJECTION, SOLUTION INTRAMUSCULAR; INTRAVENOUS at 07:55

## 2022-11-01 RX ADMIN — Medication 80 MCG: at 09:51

## 2022-11-01 RX ADMIN — Medication 100 MCG: at 10:24

## 2022-11-01 RX ADMIN — CEFAZOLIN SODIUM 2 G: 1 POWDER, FOR SOLUTION INTRAMUSCULAR; INTRAVENOUS at 12:17

## 2022-11-01 RX ADMIN — VECURONIUM BROMIDE 3 MG: 1 INJECTION, POWDER, LYOPHILIZED, FOR SOLUTION INTRAVENOUS at 08:43

## 2022-11-01 RX ADMIN — CEFAZOLIN SODIUM 2 G: 1 POWDER, FOR SOLUTION INTRAMUSCULAR; INTRAVENOUS at 08:08

## 2022-11-01 RX ADMIN — HYDROMORPHONE HYDROCHLORIDE 1 MG: 2 INJECTION, SOLUTION INTRAMUSCULAR; INTRAVENOUS; SUBCUTANEOUS at 17:13

## 2022-11-01 RX ADMIN — REMIFENTANIL HYDROCHLORIDE 0.1 MCG/KG/MIN: 1 INJECTION, POWDER, LYOPHILIZED, FOR SOLUTION INTRAVENOUS at 08:42

## 2022-11-01 RX ADMIN — FENTANYL CITRATE 100 MCG: 50 INJECTION, SOLUTION INTRAMUSCULAR; INTRAVENOUS at 07:51

## 2022-11-01 RX ADMIN — VECURONIUM BROMIDE 4 MG: 1 INJECTION, POWDER, LYOPHILIZED, FOR SOLUTION INTRAVENOUS at 16:02

## 2022-11-01 RX ADMIN — PROPOFOL 150 MG: 10 INJECTION, EMULSION INTRAVENOUS at 07:54

## 2022-11-01 RX ADMIN — GABAPENTIN 800 MG: 100 CAPSULE ORAL at 23:01

## 2022-11-01 RX ADMIN — VECURONIUM BROMIDE 1 MG: 1 INJECTION, POWDER, LYOPHILIZED, FOR SOLUTION INTRAVENOUS at 07:54

## 2022-11-01 NOTE — OP NOTES
Name: Gelacio Pablo  Surgeon: Sarah Lazcano MD   Account #: [de-identified] Surgery Date: 2022   : 1965  Age: 59 y.o. Location: [place] [address]    OPERATIVE REPORT     PREOPERATIVE DIAGNOSES:   1. Right breast cancer. 2.  Acquired absence of bilateral breasts. 3.  History of radiation therapy to right breast.     POSTOPERATIVE DIAGNOSES:   1. Right breast cancer. 2.  Acquired absence of bilateral breasts. 3.  History of radiation therapy to right breast.     OPERATIVE PROCEDURE:   1. Excision of bilateral mastectomy scars, 13 cm each (Dr. Sean Dumas on the right, Dr. Ginger Whiting on the left). 2.  Removal of intact bilateral breast tissue expanders (Dr. Sean Dumas on the right, Dr. Ginger Whiting on the left). 3.  Bilateral breast capsulectomies (Dr. Sean Dumas on the right, Dr. Ginger Whiting on the left). 4.  Bilateral extrapleural rib resection (Dr. Sean Dumas on the right, Dr. Ginger Whiting on the left). 5.  Delayed bilateral breast reconstruction with GREG (deep inferior epigastric artery ) flaps (both as Co-Surgeons). 6.  Intraoperative bilateral TAP (transversus abdominis plane) regional block (injection) with ultrasound guidance (Dr. Sean Dumas on the right, Dr. Ginger Whiting on the left). 7. Umbilical hernia repair, open (Dr. Sean Dumas)    SURGEON:  Sarah Lazcano MD, MD    CO-SURGEON:  Christine Newell MD     ANESTHESIA: General endotracheal.     INDICATIONS: The patient is a 59 y.o. female who was previously diagnosed with breast cancer. She had bilateral mastectomies and tissue expander based reconstruction. She had radiation therapy on the right side. She was a candidate for autologous tissue breast reconstruction. Bilateral microsurgical  flap reconstruction was recommended. The procedure, as well as the alternatives, possible complications, and anticipated scars were outlined with the patient and she agrees to proceed having given her informed and written consent.  CT angiography was performed pre-operatively to map out the courses of the perforating blood vessels to the flap tissue of the lower abdomen and to guide intraoperative dissection. PROCEDURE IN DETAIL: Please note that Dr. Javid Gallegos is a co-surgeon for this operation due to the length and complexity of the microsurgical case. A co-surgeon is required for the operation because there is no other qualified assistant, fellow, or resident otherwise available. The complexity of the  flaps involves dissection of the perforating blood vessels through the fibers of the rectus abdominis muscle during flap harvest and the extra time required for this (at least 50% longer), which is significantly more technically difficult than a standard free TRAM flap. The -22 modifier is also used due to this increased complexity and time requirement, as well as that associated with preparation of recipient chest vessels in light of her history of radiation therapy in that region. Dr. Kris Hardin was assigned preparation of the right chest and harvest of the right hemiabdominal GREG flap. Dr. Javid Gallegos was assigned preparation of the left chest and harvest of the left hemiabdominal GREG flap. Both worked independently and concomitantly during these portions of the case. They then assisted each other under the microscope during the microsurgical portion of the procedure, then closed their respective sides. The patient was marked in the upright position in the holding area. She received preoperative intravenous antibiotics. She was taken to the operating room and placed in the supine position with all pressure points padded. Pneumatic compression stockings were applied to bilateral lower extremities. Following successful general endotracheal anesthesia, a Ureña catheter was placed. The arms were tucked to her sides with appropriate padding. The chest and abdomen were prepped and draped in the usual sterile fashion.      Dr. Javid Gallegos began in the left chest with preparation of the recipient vessels, specifically the internal mammary vessels. The 13 cm mastectomy scar was excised and sent for permanent section. The capsule was entered with the bovie and an intact tissue expander removed. The capsulectomy was performed by removing the entire breast capsule with the bovie. It was sent for permanent section. Next, the breast pocket was dissected to the extent of the pre-operative markings. The breast pocket was irrigated and hemostasis assured. He then removed the portacath from the left breast pocket. A segment of muscle was removed over the fourth rib. The fourth costal cartilage was resected in extrapleural fashion to expose the internal mammary vessels. Under loupe magnification, they were dissected circumferentially, dividing branches between hemostatic clips. The vessels were bathed in papaverine and covered with saline-moistened gauze until ready for use. At the same time, Dr. Blair Vallecillo harvested the right hemiabdominal GREG flap. The upper incision was made with a scalpel. Dissection continued with the Bovie down to the abdominal wall fascia. Next, dissection continued in a cephalad direction to elevate the upper abdominal flap to facilitate closure. Next, the midline incision was made with a scalpel and completed with the bovie down to fascia. The umbilicus was circumscribed. We noted an umbilical hernia at this time, incidental. The lower abdominal incision was then made with a scalpel. Under loupe magnification, the superficial inferior epigastric vein was identified and dissected inferiorly. It was divided between clips and preserved for possible later use for additional venous drainage for the flap. Dissection in the lower incision continued down to the abdominal wall with the Bovie. Next, starting laterally, the GREG flap was elevated on the right side, based on a lateral row  with palpable pulse.   Next, the anterior fascia was split and reflected to expose the rectus abdominis muscle. The course of the perforators was traced through the fibers of the rectus abdominis, to the source vessel on the posterior aspect of the muscle, dividing branches between hemostatic clips. The vascular pedicle was then dissected down into the pelvis at its origin off the iliac vessels. The pedicle vessels were bathed in papaverine, and the flap was temporarily secured in position using skin kenrick. Next, Dr. Sean Dumas moved to preparation of the right chest and Dr. Ginger Whiting to harvest of the left hemiabdominal GREG flap. In the left hemiabdomen, a two lateral row  GREG flap was elevated, dissecting the perforating blood vessels through the rectus abdominis muscle fibers, dividing branches between hemostatic clips. The vessels of the vascular pedicle of the flap were dissected circumferentially at their origins off the iliac vessels and bathed in papaverine. The flap was stapled into position until ready for use. In the right chest, the 13 cm mastectomy scar was excised with a scalpel. Dissection continued with the bovie and the capsule was entered. An intact tissue expander was removed. The capsulectomy was performed by removing the entire breast capsule with the bovie. It was sent for permanent section. Next, the breast pocket was dissected to the extent of the pre-operative markings. The breast pocket was irrigated and hemostasis assured. A segment of muscle was removed over the fourth rib. The fourth costal cartilage was resected in extrapleural fashion to expose the internal mammary vessels. Under loupe magnification, they were dissected circumferentially, dividing branches between hemostatic clips. The vessels were bathed in papaverine and covered with saline-moistened gauze until ready for use. The patient received 5000 units subcutaneous heparin.     Next, the right hemiabdominal GREG flap was harvested by ligating the origin of the vessels with hemostatic clips. It was brought to the left chest and placed in the pocket. The skin island was marked as an oval.  The remainder was de-epithelialized with scissors. The flap was positioned and the operating microscope brought in. The flap vessels were prepared under the microscope and irrigated with heparin saline solution. The venous anastomosis was performed first.  The internal mammary vein was clipped inferiorly and clamped superiorly. It was divided, prepared, and irrigated. Next an end-to-end anastomosis was performed with a 2.5 mm venous  in antegrade fashion. The clamp was taken down and excellent backfill noted. Next the arterial anastomosis was performed. The internal mammary artery was clipped inferiorly and clamped superiorly. It was divided, prepared, and irrigated. Then the anastomosis was performed with interrupted #9-0 nylon sutures in hand sewn fashion. The clamps were taken down and excellent perfusion of the flap noted. Exparel had been expanded with 40 mL injectable saline and 40 mL 0.25% plain marcaine to 100 mL total.  Twenty mL was infiltrated laterally in the breast pocket along the intercostal nerves. A [facility] drain was placed in the breast pocket, brought out through a #15 blade stab incision in the lateral inframammary fold and secured with a #2-0 nylon suture. The flap was anchored to the chest wall superomedially with a #2-0 vicryl suture. The flap was stapled in the breast pocket. Next the left hemiabdominal GREG flap was harvested by ligating the origin of the vessels with hemostatic clips. It was brought to the right chest and placed in the pocket. The skin island was marked as an oval.  The remainder was de-epithelialized with scissors. The flap was positioned, the vessels were prepared under the microscope, and they were irrigated with heparin saline solution.   The venous anastomosis was performed first.  The internal mammary vein was clipped inferiorly and clamped superiorly. It was divided, prepared, and irrigated. Next an end-to-end anastomosis was performed with a 3.0 mm venous  in antegrade fashion. The clamp was taken down and excellent backfill noted. Next the arterial anastomosis was performed. The internal mammary artery was clipped inferiorly and clamped superiorly. It was divided, prepared, and irrigated. Then the anastomosis was performed with interrupted #9-0 nylon sutures in hand sewn fashion. The clamps were taken down and excellent perfusion of the flap noted. Twentyt mL of exparel was infiltrated laterally in the breast pocket along the intercostal nerves. A 19 Fr. McCracken Polo drain was placed in the breast pocket, brought out through a #15 blade stab incision in the lateral inframammary fold and secured with a #2-0 nylon suture. The flap was anchored to the chest wall superomedially with a #2-0 vicryl suture. The flap was stapled in the breast pocket. Both GREG flaps were then inset with buried #3-0 monocryl sutures, followed by #2-0/3-0 monoderm barbed suture in running subcuticular fashion. The abdomen was then closed after placing the patient in semi-Humphrey position. No fascia nor muscle had been removed from the abdomen. The anterior rectus sheath was closed primarily on each side with a #2-0 stratafix PDS suture in running whipstitch fashion. No mesh was used. The umbilical hernia was dissected from the surrounding tissues until the fascial edge was freed from all sides. #2-0 PDS was used to close the fascial edges in a figure of eight fashion with care to avoid strangulation of the preperitoneal fat. Two 19 Fr Elvis drains were placed in the abdomen and brought out through the ends of the abdominal incision and secured with #2-0 nylon sutures. Next, bilateral transversus abdominis plane (TAP) regional blocks were performed through the open wound and exposed abdominal wall.   With ultrasound guidance, the plane between the internal oblique and transversus abdominis muscles was identified on each side, and 20 mL of the Exparel mixture was injected on each respective side with a hypodermic needle. The remainder of the Exparel was injected directly into both rectus abdominis muscles. Elizabeth's fascia was then closed with interrupted #2-0 vicryl sutures. Skin closure was completed with #2-0/3-0 monoderm barbed suture in running subcuticular fashion. A core of skin and adipose tissue was removed with a scalpel and bovie so as to accept the umbilicus. The umbilicus was inset with interrupted buried dermal #4-0 monocryl sutures. All of the wounds were cleaned. Drains were hooked to bulb suction. Skin glue was applied to the incisions. Vioptix probes were placed on each flap skin island and appropriate readings noted. They were secured with tegaderm dressings. Instrument, sponge, and needle counts were reported to be correct. The patient was allowed to awaken from anesthesia. She was extubated without apparent complication. She was transferred to the bed in semi-Humphrey position and was then taken to the recovery room in excellent condition. COMPLICATIONS: None. EBL: 150 mL     IV Fluid: 4600 mL crystalloid, 500 mL albumin. UOP:  1550 mL    DRAINS: Elvis x4. SPECIMENS: right and left mastectomy scars, right and left breast capsules, additional right and left breast tissue.          Geraldine Chisholm MD    CC: Vicenta Chapin MD

## 2022-11-01 NOTE — H&P
Pre-op History and Physical    CC: Breast cancer ([facility] [address] [address] [C50.919]   HPI: 59y.o. year old female with Breast cancer ([facility] [address] [C50.919] for Procedure(s):  BILATERAL REMOVAL BREAST TISSUE EXPANDERS/RECONSTRUCTION WITH GREG MICROSURGICAL FREE FLAPS/POSSIBLE PORTACATH REMOVAL.   Past medical history:   Past Medical History:   Diagnosis Date    Arthritis     Back/Neck problemsm hips and knees bilaterally    Burning with urination     pt  denies at this time     10/17/22    Cancer [facility])     RIGHT breast,  reoccurrance in 2022    Congestive heart failure (HCC)     Depression     Diabetes (HCC)     Dizziness     GERD (gastroesophageal reflux disease)     Glaucoma     Gout     Headache     Heart disease     Hypercholesterolemia     Hypertension     Localized swelling of both lower legs     s/p chemo RX in   / wears compression stockings    Lymphedema     s/p breast lumpectomy 2009    Morbid obesity ([facility] [address]     Neuropathy     Shortness of breath     With activity    Sleep apnea 1998    does not use CPAP    Sleep disorder     Difficulty falling asleep, night sweats    Stool color black     patient denies  as of 10/17/2022      Past surgical history:   Past Surgical History:   Procedure Laterality Date    HX APPENDECTOMY      HX BILATERAL MASTECTOMY      silicone expanders inserted at time ot Mastectomy    HX BREAST LUMPECTOMY  2009    RIGHT with SNBX    HX CHOLECYSTECTOMY      HX GASTRIC BYPASS  2020    HX GYN       x1, Bilateral tubal ligation, Dilation & Curettage    HX GYN      Hysterectomy (partial)    HX ORTHOPAEDIC Left     Procedure on shoulder    HX PREMALIG/BENIGN SKIN LESION EXCISION      Cyst removed from scalp    HX PREMALIG/BENIGN SKIN LESION EXCISION      Debridement of subcutaneous tissue    HX TONSILLECTOMY  1970    HX UROLOGICAL      stone removal      Family history:   Family History   Problem Relation Age of Onset    Hypertension Mother     Cancer Mother     Thyroid Disease Mother Hypertension Father     Cancer Father     Diabetes Brother     Hypertension Brother     Cancer Brother     Stroke Brother     Lung Disease Daughter     Asthma Daughter     Hypertension Daughter     Thyroid Disease Daughter     Diabetes Son     Lung Disease Son     Hypertension Son     Stroke Maternal Grandmother     Dementia Maternal Grandmother     Hypertension Maternal Grandmother       Social history:   Social History     Socioeconomic History    Marital status:      Spouse name: Not on file    Number of children: Not on file    Years of education: Not on file    Highest education level: Not on file   Occupational History    Not on file   Tobacco Use    Smoking status: Never    Smokeless tobacco: Never   Vaping Use    Vaping Use: Never used   Substance and Sexual Activity    Alcohol use: Not Currently     Alcohol/week: 2.0 standard drinks     Types: 2 Glasses of wine per week    Drug use: No    Sexual activity: Yes   Other Topics Concern    Not on file   Social History Narrative    Not on file     Social Determinants of Health     Financial Resource Strain: Not on file   Food Insecurity: Not on file   Transportation Needs: Not on file   Physical Activity: Not on file   Stress: Not on file   Social Connections: Not on file   Intimate Partner Violence: Not on file   Housing Stability: Not on file      Home Medications:   Prior to Admission medications    Medication Sig Start Date End Date Taking? Authorizing Provider   DULoxetine (CYMBALTA) 60 mg capsule Take 90 mg by mouth nightly. Takes 30 +60mg = 90mg   Yes Provider, Historical   zolpidem (AMBIEN) 5 mg tablet Take 5 mg by mouth nightly. Yes Provider, Historical   semaglutide (Ozempic) 1 mg/dose (2 mg/1.5 mL) sub-q pen 0.5 mg by SubCUTAneous route every seven (7) days. Yes Provider, Historical   topiramate (TOPAMAX) 25 mg tablet Take 1 Tablet by mouth two (2) times daily (with meals).  5/20/22  Yes Courtney Nelson MD   metFORMIN ER (GLUCOPHAGE XR) 500 mg tablet TAKE 2 TABLET BY MOUTH TWICE DAILY DAILY WITH MEALS STOP SYNJARDY 11/19/21  Yes Darrel Baldwin MD   hydrOXYzine pamoate (VISTARIL) 25 mg capsule Take 50 mg by mouth nightly. 4/8/21  Yes Provider, Historical   b complex vitamins tablet Take 1 Tablet by mouth every morning. Yes Provider, Historical   ascorbic acid-collagen (Collagen Plus Vitamin C) 125-740 mg cap Take 1 Tablet by mouth every morning. Yes Provider, Historical   IRON, FERROUS SULFATE, PO Take 1 Tablet by mouth every morning. Yes Provider, Historical   timoloL (BetimoL) 0.5 % ophthalmic solution Administer 1 Drop to both eyes two (2) times a day. Yes Provider, Historical   omega 3-dha-epa-fish oil (Fish Oil) 100-160-1,000 mg cap Take 3 mg by mouth daily. Yes Provider, Historical   calcium-cholecalciferol, d3, 600 mg calcium- 200 unit cap Take 1 Tablet by mouth every morning. Yes Provider, Historical   traZODone (DESYREL) 100 mg tablet Take 100 mg by mouth nightly. 10/24/19  Yes Provider, Historical   latanoprost (XALATAN) 0.005 % ophthalmic solution Administer 1 Drop to both eyes nightly. Yes Provider, Historical   gabapentin (NEURONTIN) 800 mg tablet Take 1 Tab by mouth three (3) times daily. 3/24/17  Yes Provider, Historical   furosemide (LASIX) 20 mg tablet Take 20 mg by mouth daily as needed. Provider, Historical   potassium chloride SR (K-TAB) 20 mEq tablet Take 20 mEq by mouth daily as needed. Takes daily as needed (when taking furosemide)    Provider, Historical   carvediloL (COREG) 6.25 mg tablet Take 6.25 mg by mouth two (2) times a day. Provider, Historical   sertraline (ZOLOFT) 25 mg tablet Take 25 mg by mouth every morning. Patient not taking: Reported on 11/1/2022    Provider, Historical   multivitamin-min-iron-FA-vit K (Bariatric Multivitamins) 45 mg iron- 800 mcg-120 mcg cap Take 1 Tablet by mouth daily. 1/23/20   Provider, Historical   biotin 500 mcg Cap Take 1 Tablet by mouth every morning.     Provider, Historical      Allergies: Allergies   Allergen Reactions    Januvia [Sitagliptin] Rash    Levorphanol Itching and Rash    Statins-Hmg-Coa Reductase Inhibitors Itching      Review of systems:  Denies headache, fever, chills, weight change, congestion, sore throat, chest pain, shortness of breath, nausea, vomiting, diarrhea, constipation, abdominal pain, generalized weakness, muscle or joint pain, and rash. Physical Exam:  Vitals: Blood pressure 114/63, pulse 94, temperature 97.7 °F (36.5 °C), resp. rate 16, height 5' 5\" (1.651 m), weight 77 kg (169 lb 12.1 oz), SpO2 100 %.    General: awake and alert, NAD  Neck: supple  Cor: RRR  Lungs: clear  Abdomen: soft, non-tender, non-distended  Extremities: no edema  Skin: no rash    Impression: [facility] [C50.919]    Plan:  Procedure(s):  BILATERAL REMOVAL BREAST TISSUE EXPANDERS/RECONSTRUCTION WITH GREG MICROSURGICAL FREE FLAPS/POSSIBLE PORTACATH REMOVAL

## 2022-11-01 NOTE — ANESTHESIA PREPROCEDURE EVALUATION
Relevant Problems   RESPIRATORY SYSTEM   (+) Obstructive sleep apnea syndrome      CARDIOVASCULAR   (+) Essential hypertension      ENDOCRINE   (+) Severe obesity (HCC)      PERSONAL HX & FAMILY HX OF CANCER   (+) Breast cancer, stage 2       Anesthetic History               Review of Systems / Medical History  Patient summary reviewed, nursing notes reviewed and pertinent labs reviewed    Pulmonary          Shortness of breath         Neuro/Psych         Psychiatric history    Comments: Chronic insomnia  Neuropathy  Glaucoma Cardiovascular    Hypertension: well controlled          Hyperlipidemia    Exercise tolerance: >4 METS     GI/Hepatic/Renal     GERD: well controlled           Endo/Other    Diabetes    Arthritis and cancer    Comments: Right breast cancer Other Findings   Comments: Lymphedema: RUE           Physical Exam    Airway  Mallampati: II    Neck ROM: normal range of motion   Mouth opening: Normal     Cardiovascular    Rhythm: regular  Rate: normal         Dental         Pulmonary  Breath sounds clear to auscultation               Abdominal         Other Findings            Anesthetic Plan    ASA: 3  Anesthesia type: general          Induction: Intravenous  Anesthetic plan and risks discussed with: Patient      Informed consent obtained.

## 2022-11-01 NOTE — PERIOP NOTES
10:30 AM  Patient family updated on surgical progress and patient well being. 1:35 PM  Patient family updated on surgical progress and patient well being.

## 2022-11-01 NOTE — OP NOTES
Name: Vern Garza  Surgeon: Avel Bergeron MD   Account #: [id number] Surgery Date: 2022   : 1965  Age: 59 y.o. Location: [facility]    OPERATIVE REPORT     PREOPERATIVE DIAGNOSES:   1. History of right breast cancer. 2.  Acquired absence of bilateral breasts. 3.  History of radiation therapy to right breast.     POSTOPERATIVE DIAGNOSES:   1. History of right breast cancer. 2.  Acquired absence of bilateral breasts. 3.  History of radiation therapy to right breast.     OPERATIVE PROCEDURE:   1. Excision of bilateral mastectomy scars, 13 cm each (Dr. Char Inman on the right, Dr. Leonardo Pruett on the left). 2.  Removal of intact bilateral breast tissue expanders (Dr. Char Inman on the right, Dr. Leonardo Pruett on the left). 3.  Bilateral breast capsulectomies (Dr. Char Inman on the right, Dr. Leonardo Pruett on the left). 4.  Bilateral extrapleural rib resection (Dr. Char Inman on the right, Dr. Leonardo Pruett on the left). 5.  Delayed bilateral breast reconstruction with GREG (deep inferior epigastric artery ) flaps (both as Co-Surgeons). 6.  Intraoperative bilateral TAP (transversus abdominis plane) regional block (injection) with ultrasound guidance (Dr. Char Inman on the right, Dr. Leonardo Pruett on the left). 7.  Port removal (Dr. Leonardo Pruett as surgeon). 8.  Umbilical hernia repair (Dr. Char Inman as surgeon). SURGEON:  Mo Pruett MD    CO-SURGEON:  Attila Begum MD     ANESTHESIA: General endotracheal.     INDICATIONS: The patient is a 59 y.o. female who was previously diagnosed with breast cancer. She had undergone a right lumpectomy with radiation in the remote past.  More recently she developed a recurrence on the right and underwent bilateral mastectomies and tissue expander reconstruction. She did not want implants and was a candidate for autologous tissue breast reconstruction. Bilateral microsurgical  flap reconstruction was recommended. She is ready for port removal as well.   The procedures, as well as the alternatives, possible complications, and anticipated scars were outlined with the patient and she agrees to proceed having given her informed and written consent. CT angiography was performed pre-operatively to map out the courses of the perforating blood vessels to the flap tissue of the lower abdomen and to guide intraoperative dissection. PROCEDURE IN DETAIL: Please note that Dr. Aye Johnson is a co-surgeon for this operation due to the length and complexity of the microsurgical case. A co-surgeon is required for the operation because there is no other qualified assistant, fellow, or resident otherwise available. The complexity of the  flaps involves dissection of the perforating blood vessels through the fibers of the rectus abdominis muscle during flap harvest and the extra time required for this (at least 50% longer), which is significantly more technically difficult than a standard free TRAM flap. The -22 modifier is also used due to this increased complexity and time requirement, as well as that associated with preparation of recipient chest vessels in light of her history of radiation therapy in that region. Dr. Medina Valiente was assigned preparation of the left chest and harvest of the left hemiabdominal GREG flap. Dr. Aye Johnson was assigned preparation of the right chest and harvest of the right hemiabdominal GREG flap. Both worked independently and concomitantly during these portions of the case. They then assisted each other under the microscope during the microsurgical portion of the procedure, then closed their respective sides. The patient was marked in the upright position in the holding area. She received preoperative intravenous antibiotics. She was taken to the operating room and placed in the supine position with all pressure points padded. Pneumatic compression stockings were applied to bilateral lower extremities.  Following successful general endotracheal anesthesia, a Ureña catheter was placed. The arms were tucked to her sides with appropriate padding. The chest and abdomen were prepped and draped in the usual sterile fashion. Dr. Tobi Sultana began in the left chest with preparation of the recipient vessels, specifically the internal mammary vessels. The 13 cm mastectomy scar was excised and sent for permanent section. The capsule was entered with the bovie and an intact tissue expander removed. Total capsulectomy was performed with the bovie. It was sent for permanent section. Next, the breast pocket was dissected to the extent of the pre-operative markings. The breast pocket was irrigated and hemostasis assured. The port was dissected from within the breast cavity. Anchoring sutures were removed. The patient was placed in Trendelenburg position and the port was removed with the tip intact. Pressure was held at the venipuncture site for approximately 5 minutes. She was taken out of Trendelenburg position and excellent hemostasis was noted. Next, a segment of muscle was removed over the fourth rib. The fourth costal cartilage was resected in extrapleural fashion to expose the internal mammary vessels. Under loupe magnification, they were dissected circumferentially, dividing branches between hemostatic clips. The vessels were bathed in papaverine and covered with saline-moistened gauze until ready for use. At the same time, Dr. Janet Robles harvested the right hemiabdominal GREG flap. The upper incision was made with a scalpel. Dissection continued with the Bovie down to the abdominal wall fascia. Next, dissection continued in a cephalad direction to elevate the upper abdominal flap to facilitate closure. Next, the midline incision was made with a scalpel and completed with the bovie down to fascia. The umbilicus was circumscribed. The lower abdominal incision was then made with a scalpel.    Under loupe magnification, the superficial inferior epigastric vein was identified and dissected inferiorly. It was divided between clips and preserved for possible later use for additional venous drainage for the flap. Dissection in the lower incision continued down to the abdominal wall with the Bovie. Next, starting laterally, the GREG flap was elevated on the right side, based on a large, lateral row  with palpable pulse. Next, the anterior fascia was split and reflected to expose the rectus abdominis muscle. The course of the perforators was traced through the fibers of the rectus abdominis, to the source vessel on the posterior aspect of the muscle, dividing branches between hemostatic clips. The vascular pedicle was then dissected down into the pelvis at its origin off the iliac vessels. The pedicle vessels were bathed in papaverine, and the flap was temporarily secured in position using skin kenrick. Next, Dr. Meredith Casanova moved to preparation of the right chest and Dr. Lynn Ambrocio to harvest of the left hemiabdominal GREG flap. In the left hemiabdomen, a two lateral row  GREG flap was elevated, dissecting the perforating blood vessels through the rectus abdominis muscle fibers, dividing branches between hemostatic clips. There was no SIEV. There was division of a minimal amount of muscle between the two perforators. The vessels of the vascular pedicle of the flap were dissected circumferentially at their origins off the iliac vessels and bathed in papaverine. The flap was stapled into position until ready for use. In the right chest, the 13 cm mastectomy scar was excised with a scalpel. Dissection continued with the bovie and the capsule was entered. An intact tissue expander was removed. The capsulectomy was performed by removing the entire anterior portion of the breast capsule with the bovie. It was sent for permanent section. Next, the breast pocket was dissected to the extent of the pre-operative markings.   The breast pocket was irrigated and hemostasis assured. The remaining posterior capsule on the pectoralis muscle was scored with the bovie to promote adhesion. A segment of muscle was removed over the fourth rib. The fourth costal cartilage was resected in extrapleural fashion to expose the internal mammary vessels. Under loupe magnification, they were dissected circumferentially, dividing branches between hemostatic clips. The vessels were bathed in papaverine and covered with saline-moistened gauze until ready for use. The patient received 5000 units subcutaneous heparin. Next, the right hemiabdominal GREG flap was harvested by ligating the origin of the vessels with hemostatic clips. It was brought to the left chest and placed in the pocket. Additional breast skin was excised and sent for permanent section. The skin island was marked as a large oval, situated 3 cm above the inframammary fold. The remainder was de-epithelialized with scissors. The flap was positioned and the operating microscope brought in. The flap vessels were prepared under the microscope and irrigated with heparin saline solution. The venous anastomosis was performed first.  The internal mammary vein was clipped inferiorly and clamped superiorly. It was divided, prepared, and irrigated. Next an end-to-end anastomosis was performed with a 2.5 mm venous  in antegrade fashion. The clamp was taken down and excellent backfill noted. Next the arterial anastomosis was performed. The internal mammary artery was clipped inferiorly and clamped superiorly. It was divided, prepared, and irrigated. Then the anastomosis was performed with interrupted #9-0 nylon sutures in hand sewn fashion. The clamps were taken down and excellent perfusion of the flap noted. Exparel had been expanded with 40 mL injectable saline and 40 mL 0.25% plain marcaine to 100 mL total.  Twenty mL was infiltrated laterally in the breast pocket along the intercostal nerves.   A 19 Fr. Rojean Heft drain was placed in the breast pocket, brought out through a #15 blade stab incision in the lateral inframammary fold and secured with a #2-0 nylon suture. The flap was anchored to the chest wall superomedially with a #2-0 vicryl suture. The flap was stapled in the breast pocket. Next the left hemiabdominal GREG flap was harvested by ligating the origin of the vessels with hemostatic clips. It was brought to the right chest and placed in the pocket. Additional breast skin was excised and sent for permanent section. The skin island was marked as a large oval to match the opposite side. The remainder was de-epithelialized with scissors. The flap was positioned, the vessels were prepared under the microscope, and they were irrigated with heparin saline solution. The venous anastomosis was performed first.  The internal mammary vein was clipped inferiorly and clamped superiorly. It was divided, prepared, and irrigated. Next an end-to-end anastomosis was performed with a 3.0 mm venous  in antegrade fashion. The clamp was taken down and excellent backfill noted. Next the arterial anastomosis was performed. The internal mammary artery was clipped inferiorly and clamped superiorly. It was divided, prepared, and irrigated. Then the anastomosis was performed with interrupted #9-0 nylon sutures in hand sewn fashion. The clamps were taken down and excellent perfusion of the flap noted. Twentyt mL of exparel was infiltrated laterally in the breast pocket along the intercostal nerves. A 19 Fr. Rojean Heft drain was placed in the breast pocket, brought out through a #15 blade stab incision in the lateral inframammary fold and secured with a #2-0 nylon suture. The flap was anchored to the chest wall superomedially with a #2-0 vicryl suture. The flap was stapled in the breast pocket.     Both GREG flaps were then inset with buried #3-0 monocryl sutures, followed by #2-0/3-0 monoderm barbed suture in running subcuticular fashion. In the abdomen, there was a small hernia located immediately superior to the umbilicus, which contained preperitoneal fat. This was dissected free and repaired with PDS suture by Dr. Estephanie Pepe, and it will be dictated separately by her. Next, the abdomen was then closed after placing the patient in semi-Humphrey position. No fascia nor muscle had been removed from the abdomen. The anterior rectus sheath was closed primarily on each side with barbed #2-0 PDS suture in double layer, running whipstitch fashion. No mesh was used. Two 19 Fr Elvis drains were placed in the abdomen and brought out through the lateral ends of the abdominal incision and secured with #2-0 nylon sutures. Next, bilateral transversus abdominis plane (TAP) regional blocks were performed through the open wound and exposed abdominal wall. With ultrasound guidance, the plane between the internal oblique and transversus abdominis muscles was identified on each side, and 20 mL of the Exparel mixture was injected on each respective side with a hypodermic needle. The remainder of the Exparel was injected directly into both rectus abdominis muscles. Elizabeth's fascia was then closed with interrupted #2-0 vicryl sutures. Dermis was approximated with buried #3-0 monocryl sutures. Skin closure was completed with #2-0/3-0 monoderm barbed suture in running subcuticular fashion. A core of skin and adipose tissue was removed with a scalpel and bovie so as to accept the umbilicus. The umbilicus was inset with interrupted buried dermal #4-0 monocryl sutures. All of the wounds were cleaned. Drains were hooked to bulb suction. Skin glue was applied to the incisions. Vioptix probes were placed on each flap skin island and appropriate readings noted. They were secured with tegaderm dressings. Instrument, sponge, and needle counts were reported to be correct. The patient was allowed to awaken from anesthesia.  She was extubated without apparent complication. She was transferred to the bed in semi-Humphrey position and was then taken to the recovery room in excellent condition. COMPLICATIONS: None. EBL: 150 mL     IV Fluid: 4600 mL crystalloid, 500 mL albumin. UOP:  1550 mL    DRAINS: Elvis x4. SPECIMENS: right and left mastectomy scars, right and left breast capsules, additional right and left breast skin.          Cande Peñaloza MD    CC: MD Wily Beckford MD

## 2022-11-01 NOTE — ANESTHESIA POSTPROCEDURE EVALUATION
Procedure(s):  BILATERAL REMOVAL BREAST TISSUE EXPANDERS/RECONSTRUCTION WITH GREG MICROSURGICAL FREE FLAPS/PORTACATH REMOVAL. general    Anesthesia Post Evaluation      Multimodal analgesia: multimodal analgesia used between 6 hours prior to anesthesia start to PACU discharge  Patient location during evaluation: PACU  Patient participation: complete - patient participated  Level of consciousness: awake and alert  Pain management: adequate  Airway patency: patent  Anesthetic complications: no  Cardiovascular status: acceptable  Respiratory status: acceptable  Hydration status: acceptable  Post anesthesia nausea and vomiting:  none      INITIAL Post-op Vital signs:   Vitals Value Taken Time   /67 11/01/22 1950   Temp 37 °C (98.6 °F) 11/01/22 1819   Pulse 71 11/01/22 1954   Resp 11 11/01/22 1954   SpO2 100 % 11/01/22 1954   Vitals shown include unvalidated device data.

## 2022-11-02 LAB
EST. AVERAGE GLUCOSE BLD GHB EST-MCNC: 111 MG/DL
GLUCOSE BLD STRIP.AUTO-MCNC: 109 MG/DL (ref 65–117)
GLUCOSE BLD STRIP.AUTO-MCNC: 113 MG/DL (ref 65–117)
GLUCOSE BLD STRIP.AUTO-MCNC: 118 MG/DL (ref 65–117)
GLUCOSE BLD STRIP.AUTO-MCNC: 129 MG/DL (ref 65–117)
HBA1C MFR BLD: 5.5 % (ref 4–5.6)
SERVICE CMNT-IMP: ABNORMAL
SERVICE CMNT-IMP: ABNORMAL
SERVICE CMNT-IMP: NORMAL
SERVICE CMNT-IMP: NORMAL

## 2022-11-02 PROCEDURE — 74011250636 HC RX REV CODE- 250/636: Performed by: SURGERY

## 2022-11-02 PROCEDURE — 97530 THERAPEUTIC ACTIVITIES: CPT

## 2022-11-02 PROCEDURE — 97165 OT EVAL LOW COMPLEX 30 MIN: CPT

## 2022-11-02 PROCEDURE — 97161 PT EVAL LOW COMPLEX 20 MIN: CPT

## 2022-11-02 PROCEDURE — 65610000006 HC RM INTENSIVE CARE

## 2022-11-02 PROCEDURE — 88305 TISSUE EXAM BY PATHOLOGIST: CPT

## 2022-11-02 PROCEDURE — 97116 GAIT TRAINING THERAPY: CPT

## 2022-11-02 PROCEDURE — 74011250637 HC RX REV CODE- 250/637: Performed by: SURGERY

## 2022-11-02 PROCEDURE — 74011000250 HC RX REV CODE- 250: Performed by: SURGERY

## 2022-11-02 PROCEDURE — 82962 GLUCOSE BLOOD TEST: CPT

## 2022-11-02 PROCEDURE — 97535 SELF CARE MNGMENT TRAINING: CPT

## 2022-11-02 RX ORDER — INSULIN LISPRO 100 [IU]/ML
INJECTION, SOLUTION INTRAVENOUS; SUBCUTANEOUS
Status: DISCONTINUED | OUTPATIENT
Start: 2022-11-02 | End: 2022-11-04 | Stop reason: HOSPADM

## 2022-11-02 RX ADMIN — GABAPENTIN 800 MG: 100 CAPSULE ORAL at 15:44

## 2022-11-02 RX ADMIN — SENNOSIDES AND DOCUSATE SODIUM 1 TABLET: 50; 8.6 TABLET ORAL at 08:53

## 2022-11-02 RX ADMIN — GABAPENTIN 800 MG: 100 CAPSULE ORAL at 08:54

## 2022-11-02 RX ADMIN — MORPHINE SULFATE 2 MG: 2 INJECTION, SOLUTION INTRAMUSCULAR; INTRAVENOUS at 10:11

## 2022-11-02 RX ADMIN — GABAPENTIN 800 MG: 100 CAPSULE ORAL at 21:02

## 2022-11-02 RX ADMIN — WATER 2 G: 1 INJECTION INTRAMUSCULAR; INTRAVENOUS; SUBCUTANEOUS at 05:41

## 2022-11-02 RX ADMIN — SODIUM CHLORIDE, PRESERVATIVE FREE 10 ML: 5 INJECTION INTRAVENOUS at 13:13

## 2022-11-02 RX ADMIN — ENOXAPARIN SODIUM 40 MG: 100 INJECTION SUBCUTANEOUS at 21:01

## 2022-11-02 RX ADMIN — OXYCODONE 5 MG: 5 TABLET ORAL at 08:54

## 2022-11-02 RX ADMIN — OXYCODONE 10 MG: 5 TABLET ORAL at 12:08

## 2022-11-02 RX ADMIN — ACETAMINOPHEN 1000 MG: 500 TABLET ORAL at 17:20

## 2022-11-02 RX ADMIN — SODIUM CHLORIDE, PRESERVATIVE FREE 10 ML: 5 INJECTION INTRAVENOUS at 05:41

## 2022-11-02 RX ADMIN — OXYCODONE 10 MG: 5 TABLET ORAL at 15:44

## 2022-11-02 RX ADMIN — TIMOLOL MALEATE 1 DROP: 5 SOLUTION OPHTHALMIC at 08:54

## 2022-11-02 RX ADMIN — MORPHINE SULFATE 2 MG: 2 INJECTION, SOLUTION INTRAMUSCULAR; INTRAVENOUS at 21:02

## 2022-11-02 RX ADMIN — DULOXETINE HYDROCHLORIDE 90 MG: 30 CAPSULE, DELAYED RELEASE ORAL at 21:02

## 2022-11-02 RX ADMIN — SODIUM CHLORIDE, PRESERVATIVE FREE 10 ML: 5 INJECTION INTRAVENOUS at 21:06

## 2022-11-02 RX ADMIN — TIMOLOL MALEATE 1 DROP: 5 SOLUTION OPHTHALMIC at 21:09

## 2022-11-02 RX ADMIN — ZOLPIDEM TARTRATE 5 MG: 5 TABLET ORAL at 21:02

## 2022-11-02 RX ADMIN — LATANOPROST 1 DROP: 50 SOLUTION OPHTHALMIC at 21:08

## 2022-11-02 RX ADMIN — TRAZODONE HYDROCHLORIDE 100 MG: 100 TABLET ORAL at 21:02

## 2022-11-02 RX ADMIN — WATER 2 G: 1 INJECTION INTRAMUSCULAR; INTRAVENOUS; SUBCUTANEOUS at 13:04

## 2022-11-02 RX ADMIN — ACETAMINOPHEN 1000 MG: 500 TABLET ORAL at 11:38

## 2022-11-02 RX ADMIN — TOPIRAMATE 25 MG: 25 TABLET, FILM COATED ORAL at 08:53

## 2022-11-02 RX ADMIN — ACETAMINOPHEN 1000 MG: 500 TABLET ORAL at 05:40

## 2022-11-02 RX ADMIN — TOPIRAMATE 25 MG: 25 TABLET, FILM COATED ORAL at 17:20

## 2022-11-02 NOTE — PROGRESS NOTES
Plastic Surgery Progress Note    POD 1 s/p b/l GREG flap    Doing well, no complaints this AM. Pain controlled    Patient Vitals for the past 12 hrs:   Temp Pulse Resp BP SpO2   11/02/22 0800 99 °F (37.2 °C) 78 17 (!) 110/59 100 %   11/02/22 0730 -- 76 13 112/60 100 %   11/02/22 0700 -- 78 14 101/62 100 %   11/02/22 0630 -- 78 12 114/62 99 %   11/02/22 0615 -- 80 13 -- 99 %   11/02/22 0600 -- 79 14 114/66 99 %   11/02/22 0545 -- 79 17 -- 100 %   11/02/22 0530 -- 81 15 (!) 110/58 100 %   11/02/22 0515 -- 78 12 -- 100 %   11/02/22 0500 -- 81 16 115/63 98 %   11/02/22 0445 -- 80 (!) 6 -- 98 %   11/02/22 0430 -- 79 16 (!) 106/58 99 %   11/02/22 0415 -- 83 16 -- 100 %   11/02/22 0400 97.9 °F (36.6 °C) 83 24 110/60 100 %   11/02/22 0345 -- 82 15 -- 99 %   11/02/22 0330 -- 84 14 115/61 100 %   11/02/22 0315 -- 82 23 -- 100 %   11/02/22 0300 -- 82 16 120/62 100 %   11/02/22 0245 -- 78 12 -- 100 %   11/02/22 0230 -- 82 15 118/63 100 %   11/02/22 0215 -- 77 15 -- 100 %   11/02/22 0200 -- 81 12 (!) 100/57 100 %   11/02/22 0145 -- 83 19 -- 100 %   11/02/22 0130 -- 77 12 126/62 100 %   11/02/22 0115 -- 76 11 -- 100 %   11/02/22 0100 -- 76 13 134/66 100 %   11/02/22 0045 -- 76 (!) 0 -- 100 %   11/02/22 0030 -- 78 9 (!) 126/55 100 %   11/02/22 0015 -- 77 15 -- 100 %   11/02/22 0000 97.5 °F (36.4 °C) 72 16 137/70 100 %   11/01/22 2345 -- 73 16 -- 100 %   11/01/22 2330 -- 74 11 (!) 142/71 100 %   11/01/22 2315 -- 75 15 -- 100 %   11/01/22 2300 -- 72 15 132/64 100 %   11/01/22 2245 -- 71 12 -- 100 %   11/01/22 2230 -- 77 14 (!) 131/57 100 %   11/01/22 2215 -- 75 13 (!) 127/57 100 %   11/01/22 2200 -- 72 (!) 6 (!) 140/63 100 %   11/01/22 2145 -- 73 13 -- 100 %   11/01/22 2130 -- 72 (!) 7 137/61 100 %   11/01/22 2115 -- 72 9 -- 100 %   11/01/22 2100 -- 71 (!) 4 136/62 100 %   11/01/22 2045 -- 74 11 -- 100 %   11/01/22 2030 98.6 °F (37 °C) 76 14 (!) 146/69 100 %   11/01/22 2010 -- 73 16 136/63 100 %     Date 11/01/22 0700 - 11/02/22 4747 11/02/22 0700 - 11/03/22 0659   Shift 6725-0379 3139-1189 24 Hour Total 4323-7489 8729-3566 24 Hour Total   INTAKE   I.V.(mL/kg/hr) 5140(5.6) 193.3(0.2) 5333.3(2.9)        Volume (lactated Ringers infusion) 1600  1600        Volume (lactated Ringers infusion) 3000  3000        Volume (dextrose 5 % - 0.45% NaCl infusion)  193.3 193.3        Volume (ceFAZolin (ANCEF) 2 g in sterile water (preservative free) 20 mL IV syringe) 40  40        Volume (albumin human 5% (BUMINATE) solution) 500  500      Shift Total(mL/kg) 5140(66.8) 193.3(2.5) 5333. 3(69.3)      OUTPUT   Urine(mL/kg/hr) 1590(1.7) 920(1) 2510(1.4)        Urine Output 1590  1590        Urine Output (mL) (Urinary Catheter 11/01/22 Jacobson - Temperature)  920 920      Drains  205 205        Output (ml) (Brandon-Clark Drain 11/01/22 Right Breast)  70 70        Output (ml) (Brandon-Clark Drain 11/01/22 Left Breast)  45 45        Output (ml) (Brandon-Clark Drain 11/01/22 Right Abdomen)  50 50        Output (ml) (Brandon-Clark Drain 11/01/22 Left Abdomen)  40 40      Blood 150  150        Estimated Blood Loss 150  150      Shift Total(mL/kg) 1740(22.6) 1125(14.6) 6526(22.7)      NET 3400 -931.7 2468. 3      Weight (kg) 77 77 77 77 77 77       Gen; NAD, comfortable  HEENT: NCAT  CV: reg rate and rhythm  Resp: normal resp effort RA  Breast:   R 59%, L 68%  B/l flap warm, well perfused  L flap more full than R (unchanged from OR)  Incision c/d/I  PRANAV drain s/s  Abdomen: incision c/d/I.  Umbo viable    Lab Results   Component Value Date/Time    Glucose 85 10/17/2022 12:01 PM    Glucose (POC) 129 (H) 11/02/2022 05:27 AM     POD 1 s/p b/l GREG flap    - q2hr flap check  - continue vioptix  - drain cares  - d/c IVF  - advance diet CLD (diabetic)  - SSI  - pain control: oxycodone, morphine, tylenol  - out of bed this AM  - d/c jacobson  - ambulate PM  - incentive spirometry  - DVT ppx: lovenox  - dispo: to SNF, expected d/c date Friday 11/4

## 2022-11-02 NOTE — PROGRESS NOTES
Problem: Falls - Risk of  Goal: *Absence of Falls  Description: Document Mariposa Demarco Fall Risk and appropriate interventions in the flowsheet. Outcome: Progressing Towards Goal  Note: Fall Risk Interventions:  Mobility Interventions: Bed/chair exit alarm, Communicate number of staff needed for ambulation/transfer, OT consult for ADLs, Patient to call before getting OOB, PT Consult for mobility concerns, PT Consult for assist device competence, Strengthening exercises (ROM-active/passive), Utilize walker, cane, or other assistive device         Medication Interventions: Assess postural VS orthostatic hypotension, Bed/chair exit alarm, Evaluate medications/consider consulting pharmacy, Teach patient to arise slowly, Patient to call before getting OOB         History of Falls Interventions: Bed/chair exit alarm, Consult care management for discharge planning, Door open when patient unattended, Evaluate medications/consider consulting pharmacy, Investigate reason for fall, Room close to nurse's station, Assess for delayed presentation/identification of injury for 48 hrs (comment for end date), Vital signs minimum Q4HRs X 24 hrs (comment for end date)         Problem: Patient Education: Go to Patient Education Activity  Goal: Patient/Family Education  Outcome: Progressing Towards Goal     Problem: Pressure Injury - Risk of  Goal: *Prevention of pressure injury  Description: Document Anton Scale and appropriate interventions in the flowsheet.   Outcome: Progressing Towards Goal  Note: Pressure Injury Interventions:  Sensory Interventions: Assess changes in LOC, Assess need for specialty bed, Avoid rigorous massage over bony prominences, Check visual cues for pain, Discuss PT/OT consult with provider, Float heels, Keep linens dry and wrinkle-free, Maintain/enhance activity level, Minimize linen layers, Monitor skin under medical devices, Pad between skin to skin, Pressure redistribution bed/mattress (bed type), Turn and reposition approx. every two hours (pillows and wedges if needed)         Activity Interventions: Assess need for specialty bed, Increase time out of bed, Pressure redistribution bed/mattress(bed type), PT/OT evaluation    Mobility Interventions: Assess need for specialty bed, HOB 30 degrees or less, Pressure redistribution bed/mattress (bed type), Turn and reposition approx.  every two hours(pillow and wedges)    Nutrition Interventions: Document food/fluid/supplement intake                     Problem: Patient Education: Go to Patient Education Activity  Goal: Patient/Family Education  Outcome: Progressing Towards Goal

## 2022-11-02 NOTE — PROGRESS NOTES
Problem: Self Care Deficits Care Plan (Adult)  Goal: *Acute Goals and Plan of Care (Insert Text)  Description: FUNCTIONAL STATUS PRIOR TO ADMISSION: Patient was modified independent using a rollator and single point cane for functional mobility. HOME SUPPORT: The patient lived with spouse but did not require assist.    Occupational Therapy Goals  Initiated 11/2/2022  1. Patient will perform lower body dressing with supervision/set-up within 7 day(s). 2.  Patient will perform grooming with supervision/set-up within 7 day(s). 3.  Patient will perform toilet transfers with supervision/set-up within 7 day(s). 4.  Patient will perform all aspects of toileting with supervision/set-up within 7 day(s). 5.  Patient will utilize energy conservation techniques during functional activities with verbal cues within 7 day(s). Outcome: Progressing Towards Goal   OCCUPATIONAL THERAPY EVALUATION  Patient: Gelacio Pablo (62 y.o. female)  Date: 11/2/2022  [facility] [C50.919]  Procedure(s) (LRB):  BILATERAL REMOVAL BREAST TISSUE EXPANDERS/RECONSTRUCTION WITH GREG MICROSURGICAL FREE FLAPS/PORTACATH REMOVAL (Bilateral) 1 Day Post-Op   Precautions: fall       ASSESSMENT  Based on the objective data described below, the patient presents with hospital admission secondary to bilateral breast reconstruction with GREG flap. Patient received in bed, pleasant and agreeable. She reports pain controlled at present. Patient educated on safe movement and able to perform bed mobility with mod x 2. Patient able to stand at EOB with min x 2 and take side steps to [facility]. Patient BP stable with all activity. Returned to supine at end of session. Patient reports plan for rehab at discharge as spouse with inability to assist at home. Patient surgery limits UE ROM at this time as well as forward flexion at hips to decreased pull on abdominal incision. Patient reports baseline use of cane/ rollator.   Patient to benefit from continued OT services to maximize function, however question ability to tolerate and appropriateness of intensive rehab at discharge. .    Current Level of Function Impacting Discharge (ADLs/self-care): mod x 2 bed mobility - patient sleeps in chair, mod for ADLs    Other factors to consider for discharge: no assist at home     Patient will benefit from skilled therapy intervention to address the above noted impairments. PLAN :  Recommendations and Planned Interventions: self care training, functional mobility training, therapeutic exercise, balance training, therapeutic activities, endurance activities, patient education, home safety training, and family training/education    Frequency/Duration: Patient will be followed by occupational therapy 5 times a week to address goals. Recommendation for discharge: (in order for the patient to meet his/her long term goals)  To be determined: This discharge recommendation:  Has not yet been discussed the attending provider and/or case management    IF patient discharges home will need the following DME: TBD       SUBJECTIVE:   Patient stated I know he can't help me.     OBJECTIVE DATA SUMMARY:   HISTORY:   Past Medical History:   Diagnosis Date    Arthritis     Back/Neck problemsm hips and knees bilaterally    Burning with urination     pt  denies at this time     10/17/22    Cancer [facility]) 2009    RIGHT breast,  reoccurrance in 4/2022    Congestive heart failure (HCC)     Depression     Diabetes (HCC)     Dizziness     GERD (gastroesophageal reflux disease)     Glaucoma     Gout     Headache     Heart disease     Hypercholesterolemia     Hypertension     Localized swelling of both lower legs     s/p chemo RX in  2009 / wears compression stockings    Lymphedema     s/p breast lumpectomy 2009    Morbid obesity ([facility]     Neuropathy     Shortness of breath     With activity    Sleep apnea 1998    does not use CPAP    Sleep disorder     Difficulty falling asleep, night sweats    Stool color black     patient denies  as of 10/17/2022     Past Surgical History:   Procedure Laterality Date    HX APPENDECTOMY      HX BILATERAL MASTECTOMY      silicone expanders inserted at time ot Mastectomy    HX BREAST LUMPECTOMY  2009    RIGHT with SNBX    HX CHOLECYSTECTOMY      HX GASTRIC BYPASS  2020    HX GYN       x1, Bilateral tubal ligation, Dilation & Curettage    HX GYN      Hysterectomy (partial)    HX ORTHOPAEDIC Left     Procedure on shoulder    HX PREMALIG/BENIGN SKIN LESION EXCISION      Cyst removed from scalp    HX PREMALIG/BENIGN SKIN LESION EXCISION      Debridement of subcutaneous tissue    HX TONSILLECTOMY  1970    HX UROLOGICAL      stone removal       Expanded or extensive additional review of patient history:     Home Situation  Home Environment: Private residence  Wheelchair Ramp: Yes  One/Two Story Residence: One story  Living Alone: No  Support Systems: Spouse/Significant Other (unable to assist)  Patient Expects to be Discharged to[de-identified] Rehab hospital/unit acute  Current DME Used/Available at Home: [address] [facility], [place], straight, Walker, rollator    Hand dominance: Right    EXAMINATION OF PERFORMANCE DEFICITS:  Cognitive/Behavioral Status:  Neurologic State: Alert  Orientation Level: Oriented X4  Cognition: Follows commands  Perception: Appears intact  Perseveration: No perseveration noted  Safety/Judgement: Awareness of environment    Skin: intact as seen    Edema:     Hearing: WFL       Vision/Perceptual:                           Acuity:  (glaucoma)         Range of Motion:  AROM: Generally decreased, functional (limited by surgery)                         Strength:  Strength: Generally decreased, functional                Coordination:  Coordination: Within functional limits  Fine Motor Skills-Upper: Left Intact; Right Intact    Gross Motor Skills-Upper: Left Intact; Right Intact    Tone & Sensation:    Tone: Normal  Sensation: Intact Balance:  Sitting: Intact  Standing: With support    Functional Mobility and Transfers for ADLs:  Bed Mobility:  Rolling: Minimum assistance; Moderate assistance;Assist x2  Supine to Sit: Moderate assistance;Assist x2  Sit to Supine: Moderate assistance;Assist x2    Transfers:  Sit to Stand: Minimum assistance;Assist x2  Stand to Sit: Minimum assistance;Assist x2  Toilet Transfer : Minimum assistance;Assist x2 [facility])    ADL Assessment:  Feeding: Setup    Oral Facial Hygiene/Grooming: Minimum assistance    Bathing: Moderate assistance         Upper Body Dressing: Moderate assistance    Lower Body Dressing: Moderate assistance    Toileting: Moderate assistance                  ADL Intervention and task modifications:                                          Cognitive Retraining  Safety/Judgement: Awareness of environment      Therapeutic Exercise:     Functional Measure:  Occupational Therapy Evaluation Charge Determination   History Examination Decision-Making   LOW Complexity : Brief history review  LOW Complexity : 1-3 performance deficits relating to physical, cognitive , or psychosocial skils that result in activity limitations and / or participation restrictions  LOW Complexity : No comorbidities that affect functional and no verbal or physical assistance needed to complete eval tasks       Based on the above components, the patient evaluation is determined to be of the following complexity level: LOW   Pain Rating:  Reports pain with mobility    Activity Tolerance:   Good    After treatment patient left in no apparent distress:    Supine in bed, Call bell within reach, and Side rails x 3    COMMUNICATION/EDUCATION:   The patients plan of care was discussed with: Physical therapist and Registered nurse. Home safety education was provided and the patient/caregiver indicated understanding., Patient/family have participated as able in goal setting and plan of care. , and Patient/family agree to work toward stated goals and plan of care. This patients plan of care is appropriate for delegation to IZZY.     Thank you for this referral.  Jim Montoya OTR/L  Time Calculation: 33 mins

## 2022-11-02 NOTE — PROGRESS NOTES
Reason for Admission:  GREG flap                     RUR Score:     11%                Plan for utilizing home health:          PCP: First and Last name:  Courtney Nelson MD     Name of Practice:    Are you a current patient: Yes/No: yes   Approximate date of last visit:    Can you participate in a virtual visit with your PCP:                     Current Advanced Directive/Advance Care Plan: Full Code      Healthcare Decision Maker:                Primary Decision Maker: Santi Garza - Spouse - [phone]                  Transition of Care Plan:                    I met with pt who updated me that she told her surgeon she needs to go to rehab. I received orders for rehab:SNF. However,in further discussing disposition with pt,pt does not want SNF. She prefers to go to inpatient rehab @ [facility]. PT ordered,Nurse is requesting order for OT. Pt has peripheral neuropathy  At home,pt uses a rollator and cane . PT/OT here to work with pt.     Waleska El

## 2022-11-02 NOTE — PROGRESS NOTES
2000-TRANSFER - IN REPORT:    Verbal report received from Matilda(name) on Luis Garza  being received from PACU(unit) for routine progression of care      Report consisted of patients Situation, Background, Assessment and   Recommendations(SBAR). Information from the following report(s) SBAR, Kardex, Procedure Summary, Intake/Output, MAR, Recent Results, Cardiac Rhythm nsr, and Quality Measures was reviewed with the receiving nurse. Opportunity for questions and clarification was provided. Assessment completed upon patients arrival to unit and care assumed. See flowsheets for all assessments. See MAR for all medication administrations. 6313-  notified of increased swelling above left flap. MD stated she will be in soon to assess, but increased swelling is to be expected on left side due to surgery. 0700-Bedside and Verbal shift change report given to Enedina Johnson (oncoming nurse) by Trellis Phoenix (offgoing nurse). Report included the following information SBAR, Kardex, Intake/Output, MAR, Recent Results, Cardiac Rhythm nsr, Alarm Parameters , and Quality Measures.

## 2022-11-02 NOTE — PROGRESS NOTES
..Bedside shift change report given to reid woods (oncoming nurse) by Mary Rodriguez (offgoing nurse). Report included the following information SBAR, Kardex, Intake/Output, MAR, and Cardiac Rhythm NSR . Rowena Stauffer .Primary Nurse Jose Arredondo RN and reid maxwell, RN performed a dual skin assessment on this patient Impairment noted- see wound doc flow sheet  Anton score is see flow sheet. Rowena Stauffer .Bedside and Verbal shift change report given to Lee Motta RN (oncoming nurse) by iLta Lopez RN (offgoing nurse). Report included the following information SBAR, Kardex, Intake/Output, MAR, and Cardiac Rhythm NSR . Uneventful shift, patient had no critical events.

## 2022-11-02 NOTE — PERIOP NOTES
TRANSFER - OUT REPORT:    Verbal report given to Keiry Barker RN on Luis Garza  being transferred to ICU 14 for routine post - op       Report consisted of patients Situation, Background, Assessment and   Recommendations(SBAR). Information from the following report(s) SBAR, Procedure Summary, Intake/Output, and MAR was reviewed with the receiving nurse. Lines:   Peripheral IV (Active)   Site Assessment Clean, dry, & intact 11/01/22 0733   Infiltration Assessment 0 11/01/22 0733   Dressing Status Clean, dry, & intact 11/01/22 0733   Dressing Type Transparent 11/01/22 0733   Hub Color/Line Status Pink; Infusing 11/01/22 0733       Peripheral IV 11/01/22 Left Forearm (Active)   Site Assessment Clean, dry, & intact 11/01/22 1819   Phlebitis Assessment 0 11/01/22 1819   Infiltration Assessment 0 11/01/22 1819   Dressing Status Clean, dry, & intact 11/01/22 1819   Dressing Type Tape;Transparent 11/01/22 1819   Hub Color/Line Status Pink 11/01/22 1819   Action Taken Open ports on tubing capped 11/01/22 1819   Alcohol Cap Used Yes 11/01/22 1819       Peripheral IV 11/01/22 Left Antecubital (Active)   Site Assessment Clean, dry, & intact 11/01/22 1819   Phlebitis Assessment 0 11/01/22 1819   Infiltration Assessment 0 11/01/22 1819   Dressing Status Clean, dry, & intact 11/01/22 1819   Dressing Type Tape;Transparent 11/01/22 1819   Hub Color/Line Status Green 11/01/22 1819   Action Taken Open ports on tubing capped 11/01/22 1819   Alcohol Cap Used Yes 11/01/22 1819        Opportunity for questions and clarification was provided.       Patient transported with:   O2 @ 3 liters  Registered Nurse

## 2022-11-02 NOTE — PROGRESS NOTES
Problem: Mobility Impaired (Adult and Pediatric)  Goal: *Acute Goals and Plan of Care (Insert Text)  Description: FUNCTIONAL STATUS PRIOR TO ADMISSION: Patient was modified independent using a rollator and single point cane for functional mobility. Reports sometimes not using any AD in the home. HOME SUPPORT PRIOR TO ADMISSION: The patient lived with her  but generally did not require assistance. She reports her  is disabled and uses a rollator, is unable to assist her post-operatively. Physical Therapy Goals  Initiated 11/2/2022  1. Patient will move from supine to sit and sit to supine , scoot up and down, and roll side to side in bed with modified independence within 7 day(s). 2.  Patient will transfer from bed to chair and chair to bed with modified independence using the least restrictive device within 7 day(s). 3.  Patient will perform sit to stand with modified independence within 7 day(s). 4.  Patient will ambulate with modified independence for 150 feet with the least restrictive device within 7 day(s). 5.  Patient will ascend/descend 3 stairs with 2 handrail(s) with minimal assistance/contact guard assist within 7 day(s). Outcome: Not Met   PHYSICAL THERAPY EVALUATION  Patient: Katelynn Valentin (62 y.o. female)  Date: 11/2/2022  [facility] [C50.919]  Procedure(s) (LRB):  BILATERAL REMOVAL BREAST TISSUE EXPANDERS/RECONSTRUCTION WITH GREG MICROSURGICAL FREE FLAPS/PORTACATH REMOVAL (Bilateral) 1 Day Post-Op   Precautions:          ASSESSMENT  Based on the objective data described below, the patient presents with generalized weakness, abdominal/chest incisional pain, impaired standing balance, decreased activity tolerance, and gait dysfunction not consistent with baseline functional mobility level s/p admission for B GREG flap procedure.   Patient requiring MOD A x 2 for bed mob to achieve sitting EOB, did better with HOB elevated getting back into bed vs rolling to get out. Needed MIN A x 2 to come to standing and take lateral steps to [address]. Patient reports using rollator at baseline or no AD in home and is indep with mobility and self care but relates concern she can care for herself at home. Reports her  is disabled and unable to assist her. Patient would benefit from skilled PT in the acute setting followed by either rehab or home with assistance pending progress and ability to get someone there to help her. She wishes to go to inpatient rehab but question whether she can tolerate or if appropriate given her post-op restrictions. Current Level of Function Impacting Discharge (mobility/balance): up to MOD A x 2 for bed mob, MIN A x 2 for transfesr and gait    Functional Outcome Measure: The patient scored Total: 45/100 on the Barthel Index outcome measure which is indicative of being partially dependent in basic self-care. Other factors to consider for discharge:  disabled     Patient will benefit from skilled therapy intervention to address the above noted impairments. PLAN :  Recommendations and Planned Interventions: bed mobility training, transfer training, gait training, therapeutic exercises, neuromuscular re-education, patient and family training/education, and therapeutic activities      Frequency/Duration: Patient will be followed by physical therapy:  5 times a week to address goals. Recommendation for discharge: (in order for the patient to meet his/her long term goals)  Therapy up to 5 days/week in rehab setting    This discharge recommendation:  Has been made in collaboration with the attending provider and/or case management    IF patient discharges home will need the following DME: to be determined (TBD)         SUBJECTIVE:   Patient stated I just know my  thinks he can help but he isn't able physically.     OBJECTIVE DATA SUMMARY:   HISTORY:    Past Medical History:   Diagnosis Date    Arthritis Back/Neck problemsm hips and knees bilaterally    Burning with urination     pt  denies at this time     10/17/22    Cancer [facility]) 2009    RIGHT breast,  reoccurrance in 2022    Congestive heart failure (HCC)     Depression     Diabetes (HCC)     Dizziness     GERD (gastroesophageal reflux disease)     Glaucoma     Gout     Headache     Heart disease     Hypercholesterolemia     Hypertension     Localized swelling of both lower legs     s/p chemo RX in   / wears compression stockings    Lymphedema     s/p breast lumpectomy 2009    Morbid obesity ([address]     Neuropathy     Shortness of breath     With activity    Sleep apnea 1998    does not use CPAP    Sleep disorder     Difficulty falling asleep, night sweats    Stool color black     patient denies  as of 10/17/2022     Past Surgical History:   Procedure Laterality Date    HX APPENDECTOMY      HX BILATERAL MASTECTOMY  10/6146    silicone expanders inserted at time ot Mastectomy    HX BREAST LUMPECTOMY      RIGHT with SNBX    HX CHOLECYSTECTOMY      HX GASTRIC BYPASS  2020    HX GYN       x1, Bilateral tubal ligation, Dilation & Curettage    HX GYN      Hysterectomy (partial)    HX ORTHOPAEDIC Left     Procedure on shoulder    HX PREMALIG/BENIGN SKIN LESION EXCISION      Cyst removed from scalp    HX PREMALIG/BENIGN SKIN LESION EXCISION      Debridement of subcutaneous tissue    HX TONSILLECTOMY  1970    HX UROLOGICAL      stone removal       Personal factors and/or comorbidities impacting plan of care:     Home Situation  Home Environment: Private residence  Wheelchair Ramp: Yes  One/Two Story Residence: One story  Living Alone: No  Support Systems: Spouse/Significant Other (unable to assist)  Patient Expects to be Discharged to[de-identified] Rehab hospital/unit acute  Current DME Used/Available at Home: [employer], straight, Walker, rollator    EXAMINATION/PRESENTATION/DECISION MAKING:   Critical Behavior:  Neurologic State: Alert  Orientation Level: Oriented X4  Cognition: Follows commands  Safety/Judgement: Awareness of environment  Hearing:       Range Of Motion:  AROM: Generally decreased, functional (limited by surgery)                       Strength:    Strength: Generally decreased, functional                    Tone & Sensation:   Tone: Normal              Sensation: Intact               Coordination:  Coordination: Within functional limits  Vision:   Acuity:  (glaucoma)  Functional Mobility:  Bed Mobility:  Rolling: Minimum assistance; Moderate assistance;Assist x2  Supine to Sit: Moderate assistance;Assist x2  Sit to Supine: Moderate assistance;Assist x2     Transfers:  Sit to Stand: Minimum assistance;Assist x2  Stand to Sit: Minimum assistance;Assist x2                       Balance:   Sitting: Intact  Standing: With support  Ambulation/Gait Training:  Distance (ft): 3 Feet (ft)     Ambulation - Level of Assistance: Minimal assistance;Assist x1 (sidesteps at bedside)        Gait Abnormalities: Decreased step clearance;Trunk sway increased                                           Functional Measure:  Barthel Index:    Bathin  Bladder: 10  Bowels: 10  Groomin  Dressin  Feeding: 10  Mobility: 0  Stairs: 0  Toilet Use: 5  Transfer (Bed to Chair and Back): 10  Total: 45/100       The Barthel ADL Index: Guidelines  1. The index should be used as a record of what a patient does, not as a record of what a patient could do. 2. The main aim is to establish degree of independence from any help, physical or verbal, however minor and for whatever reason. 3. The need for supervision renders the patient not independent. 4. A patient's performance should be established using the best available evidence. Asking the patient, friends/relatives and nurses are the usual sources, but direct observation and common sense are also important. However direct testing is not needed.   5. Usually the patient's performance over the preceding 24-48 hours is important, but occasionally longer periods will be relevant. 6. Middle categories imply that the patient supplies over 50 per cent of the effort. 7. Use of aids to be independent is allowed. Score Interpretation (from [address])    Independent   60-79 Minimally independent   40-59 Partially dependent   20-39 Very dependent   <20 Totally dependent     -Rigoberto Cervantes., Barthel, D.W. (1965). Functional evaluation: the Barthel Index. [address] ([address] (1997). The Barthel activities of daily living index: self-reporting versus actual performance in the old (> or = 75 years). Journal of [address] [place] [address] 45(7), 14 [facility], [place], Chau Gonzalez., Gina Jules. (1999). Measuring the change in disability after inpatient rehabilitation; comparison of the responsiveness of the Barthel Index and Functional [place] Measure. Journal of Neurology, Neurosurgery, and Psychiatry, 66(4), 775-331. Ragini Bunch, N.J.A, AINSLEY Hoff, & Devon Diamond M.A. (2004) Assessment of post-stroke quality of life in cost-effectiveness studies: The usefulness of the Barthel Index and the EuroQoL-5D.  Quality of Life Research, 15, 009-12        Physical Therapy Evaluation Charge Determination   History Examination Presentation Decision-Making   HIGH Complexity :3+ comorbidities / personal factors will impact the outcome/ POC  MEDIUM Complexity : 3 Standardized tests and measures addressing body structure, function, activity limitation and / or participation in recreation  MEDIUM Complexity : Evolving with changing characteristics  Other outcome measures Barthel 45  MEDIUM      Based on the above components, the patient evaluation is determined to be of the following complexity level: MEDIUM    Pain Rating:  Reports 5/10    Activity Tolerance:   Fair    After treatment patient left in no apparent distress:   Supine in bed and Call bell within reach    COMMUNICATION/EDUCATION:   The patients plan of care was discussed with: Occupational therapist and Registered nurse. Fall prevention education was provided and the patient/caregiver indicated understanding. and Patient/family agree to work toward stated goals and plan of care.     Thank you for this referral.  Uriel Alvares, PT   Time Calculation: 33 mins

## 2022-11-03 LAB
GLUCOSE BLD STRIP.AUTO-MCNC: 150 MG/DL (ref 65–117)
GLUCOSE BLD STRIP.AUTO-MCNC: 85 MG/DL (ref 65–117)
GLUCOSE BLD STRIP.AUTO-MCNC: 85 MG/DL (ref 65–117)
GLUCOSE BLD STRIP.AUTO-MCNC: 92 MG/DL (ref 65–117)
GLUCOSE BLD STRIP.AUTO-MCNC: 99 MG/DL (ref 65–117)
SERVICE CMNT-IMP: ABNORMAL
SERVICE CMNT-IMP: NORMAL

## 2022-11-03 PROCEDURE — 82962 GLUCOSE BLOOD TEST: CPT

## 2022-11-03 PROCEDURE — 74011250636 HC RX REV CODE- 250/636: Performed by: SURGERY

## 2022-11-03 PROCEDURE — 74011636637 HC RX REV CODE- 636/637: Performed by: SURGERY

## 2022-11-03 PROCEDURE — 97535 SELF CARE MNGMENT TRAINING: CPT

## 2022-11-03 PROCEDURE — 97530 THERAPEUTIC ACTIVITIES: CPT

## 2022-11-03 PROCEDURE — 74011250637 HC RX REV CODE- 250/637: Performed by: SURGERY

## 2022-11-03 PROCEDURE — 74011000250 HC RX REV CODE- 250: Performed by: SURGERY

## 2022-11-03 PROCEDURE — 65270000029 HC RM PRIVATE

## 2022-11-03 PROCEDURE — 97116 GAIT TRAINING THERAPY: CPT

## 2022-11-03 RX ADMIN — OXYCODONE 10 MG: 5 TABLET ORAL at 13:07

## 2022-11-03 RX ADMIN — ACETAMINOPHEN 1000 MG: 500 TABLET ORAL at 06:07

## 2022-11-03 RX ADMIN — GABAPENTIN 800 MG: 100 CAPSULE ORAL at 16:12

## 2022-11-03 RX ADMIN — OXYCODONE 10 MG: 5 TABLET ORAL at 20:22

## 2022-11-03 RX ADMIN — TIMOLOL MALEATE 1 DROP: 5 SOLUTION OPHTHALMIC at 08:50

## 2022-11-03 RX ADMIN — GABAPENTIN 800 MG: 100 CAPSULE ORAL at 22:24

## 2022-11-03 RX ADMIN — TRAZODONE HYDROCHLORIDE 100 MG: 100 TABLET ORAL at 22:24

## 2022-11-03 RX ADMIN — ENOXAPARIN SODIUM 40 MG: 100 INJECTION SUBCUTANEOUS at 22:25

## 2022-11-03 RX ADMIN — SODIUM CHLORIDE, PRESERVATIVE FREE 10 ML: 5 INJECTION INTRAVENOUS at 05:06

## 2022-11-03 RX ADMIN — DULOXETINE HYDROCHLORIDE 90 MG: 30 CAPSULE, DELAYED RELEASE ORAL at 22:25

## 2022-11-03 RX ADMIN — ACETAMINOPHEN 1000 MG: 500 TABLET ORAL at 13:07

## 2022-11-03 RX ADMIN — SENNOSIDES AND DOCUSATE SODIUM 1 TABLET: 50; 8.6 TABLET ORAL at 08:51

## 2022-11-03 RX ADMIN — TOPIRAMATE 25 MG: 25 TABLET, FILM COATED ORAL at 08:51

## 2022-11-03 RX ADMIN — SODIUM CHLORIDE, PRESERVATIVE FREE 10 ML: 5 INJECTION INTRAVENOUS at 22:38

## 2022-11-03 RX ADMIN — LATANOPROST 1 DROP: 50 SOLUTION OPHTHALMIC at 22:27

## 2022-11-03 RX ADMIN — ACETAMINOPHEN 1000 MG: 500 TABLET ORAL at 00:04

## 2022-11-03 RX ADMIN — ZOLPIDEM TARTRATE 5 MG: 5 TABLET ORAL at 22:24

## 2022-11-03 RX ADMIN — INSULIN LISPRO 2 UNITS: 100 INJECTION, SOLUTION INTRAVENOUS; SUBCUTANEOUS at 13:07

## 2022-11-03 RX ADMIN — TIMOLOL MALEATE 1 DROP: 5 SOLUTION OPHTHALMIC at 22:27

## 2022-11-03 RX ADMIN — GABAPENTIN 800 MG: 100 CAPSULE ORAL at 08:51

## 2022-11-03 RX ADMIN — ACETAMINOPHEN 1000 MG: 500 TABLET ORAL at 18:07

## 2022-11-03 RX ADMIN — SODIUM CHLORIDE, PRESERVATIVE FREE 10 ML: 5 INJECTION INTRAVENOUS at 13:09

## 2022-11-03 RX ADMIN — OXYCODONE 10 MG: 5 TABLET ORAL at 16:12

## 2022-11-03 RX ADMIN — TOPIRAMATE 25 MG: 25 TABLET, FILM COATED ORAL at 18:07

## 2022-11-03 NOTE — PROGRESS NOTES
Transition of care note     RUR 10%    Los 2 days,GLOS 1.7    Today:    I discussed pt with PT and OT    I also discussed pt with [facility]ason who is submitting pt.'s clinicals to her health director . Dontae informed me that pt is considered a good candidate for inpatient rehab. Pt is interested in the [employer].     Haylie Plasencia

## 2022-11-03 NOTE — PROGRESS NOTES
Problem: Self Care Deficits Care Plan (Adult)  Goal: *Acute Goals and Plan of Care (Insert Text)  Description: FUNCTIONAL STATUS PRIOR TO ADMISSION: Patient was modified independent using a rollator and single point cane for functional mobility. HOME SUPPORT: The patient lived with spouse but did not require assist.    Occupational Therapy Goals  Initiated 11/2/2022  1. Patient will perform lower body dressing with supervision/set-up within 7 day(s). 2.  Patient will perform grooming with supervision/set-up within 7 day(s). 3.  Patient will perform toilet transfers with supervision/set-up within 7 day(s). 4.  Patient will perform all aspects of toileting with supervision/set-up within 7 day(s). 5.  Patient will utilize energy conservation techniques during functional activities with verbal cues within 7 day(s). Outcome: Progressing Towards Goal   OCCUPATIONAL THERAPY TREATMENT  Patient: Shelli Lugo (62 y.o. female)  Date: 11/3/2022  Diagnosis: [facility] [C50.919] <principal problem not specified>  Procedure(s) (LRB):  BILATERAL REMOVAL BREAST TISSUE EXPANDERS/RECONSTRUCTION WITH GREG MICROSURGICAL FREE FLAPS/PORTACATH REMOVAL (Bilateral) 2 Days Post-Op  Precautions:  (UE po restrictions)  Chart, occupational therapy assessment, plan of care, and goals were reviewed. ASSESSMENT  Patient continues with skilled OT services and is progressing towards goals. Patient received in chair, agreeable to activity. She reports pain overall but no specific location. Patient able to stand with CGA/SBA. She transfers to [facility], and able to manage gown for toileting. She manages hygiene with CGA. Min assist to don disposable underwear, with care to avoid abdominal incision and bilateral lower drains. Patient ambulates within room with CGA. She returns to bed with light LE assist, HOB elevated to avoid abdominal stretch. Patient left in NAD and progressing well.      Current Level of Function Impacting Discharge (ADLs): CGA/Min A    Other factors to consider for discharge: lives with spouse- reports he is unable to assist         PLAN :  Patient continues to benefit from skilled intervention to address the above impairments. Continue treatment per established plan of care to address goals. Recommend with staff: [place] for meals, commode transfers     Recommend next OT session: continue goals     Recommendation for discharge: (in order for the patient to meet his/her long term goals)  To be determined: rehab v return home with assistance    This discharge recommendation:  Has been made in collaboration with the attending provider and/or case management    IF patient discharges home will need the following DME: TBD       SUBJECTIVE:   Patient stated I am ready to get in bed .     OBJECTIVE DATA SUMMARY:   Cognitive/Behavioral Status:  Neurologic State: Alert  Orientation Level: Oriented X4  Cognition: Follows commands  Perception: Appears intact  Perseveration: No perseveration noted  Safety/Judgement: Awareness of environment    Functional Mobility and Transfers for ADLs:  Bed Mobility:  Rolling: Supervision;Modified independent  Sit to Supine: Supervision;Modified independent; Additional time    Transfers:  Sit to Stand: Contact guard assistance;Stand-by assistance          Balance:  Sitting: Intact  Standing: Intact; With support    ADL Intervention:                 Type of Bath: Chlorhexidine (CHG); Bath Pack              Lower Body Dressing Assistance  Underpants: Minimum assistance  Leg Crossed Method Used: No  Position Performed:  (seated on BSC)  Cues: Physical assistance;Verbal cues provided    Toileting  Toileting Assistance: Contact guard assistance  Bladder Hygiene: Contact guard assistance  Clothing Management: Contact guard assistance  Cues: Verbal cues provided;Physical assistance for pants up    Cognitive Retraining  Safety/Judgement: Awareness of environment    Therapeutic Exercises: Activity Tolerance:   Good    After treatment patient left in no apparent distress:   Supine in bed, Call bell within reach, and Side rails x 3    COMMUNICATION/COLLABORATION:   The patients plan of care was discussed with: Physical therapist and Registered nurse.      Roseline Mckeon, OTR/L  Time Calculation: 29 mins

## 2022-11-03 NOTE — PROGRESS NOTES
Bedside and Verbal shift change report given to Merritt (oncoming nurse) by Jennifer Barfield  (offgoing nurse). Report included the following information SBAR, Kardex, and Cardiac Rhythm NSR . TRANSFER - OUT REPORT:    Verbal report given to 4th floor nurse(name) on Luis Garza  being transferred to 4th floor(unit) for routine progression of care       Report consisted of patients Situation, Background, Assessment and   Recommendations(SBAR). Information from the following report(s) SBAR, Kardex, OR Summary, Intake/Output, and Cardiac Rhythm NSR  was reviewed with the receiving nurse. Opportunity for questions and clarification was provided.       Patient transported with:   [employer]

## 2022-11-03 NOTE — PROGRESS NOTES
Physical Therapy Note:  Pt sitting up in chair eating her lunch. Will return for PT tx when finished.   Wen Paulino, PT

## 2022-11-03 NOTE — PROGRESS NOTES
Bedside and Verbal shift change report given to Anselmo Kenney RN (oncoming nurse) by Mulu Akhtar RN (offgoing nurse). Report included the following information SBAR, Kardex, OR Summary, Intake/Output, MAR, and Recent Results.

## 2022-11-03 NOTE — PROGRESS NOTES
Problem: Falls - Risk of  Goal: *Absence of Falls  Description: Document Elizade Counts Fall Risk and appropriate interventions in the flowsheet. Outcome: Progressing Towards Goal  Note: Fall Risk Interventions:  Mobility Interventions: Bed/chair exit alarm, Patient to call before getting OOB         Medication Interventions: Bed/chair exit alarm, Teach patient to arise slowly, Patient to call before getting OOB    Elimination Interventions: Bed/chair exit alarm, Patient to call for help with toileting needs    History of Falls Interventions: Bed/chair exit alarm         Problem: Patient Education: Go to Patient Education Activity  Goal: Patient/Family Education  Outcome: Progressing Towards Goal     Problem: Pressure Injury - Risk of  Goal: *Prevention of pressure injury  Description: Document Anton Scale and appropriate interventions in the flowsheet.   Outcome: Progressing Towards Goal  Note: Pressure Injury Interventions:  Sensory Interventions: Keep linens dry and wrinkle-free, Assess changes in LOC         Activity Interventions: Pressure redistribution bed/mattress(bed type)    Mobility Interventions: HOB 30 degrees or less, Float heels    Nutrition Interventions: Document food/fluid/supplement intake, Offer support with meals,snacks and hydration    Friction and Shear Interventions: Apply protective barrier, creams and emollients, HOB 30 degrees or less, Minimize layers                Problem: Patient Education: Go to Patient Education Activity  Goal: Patient/Family Education  Outcome: Progressing Towards Goal     Problem: Patient Education: Go to Patient Education Activity  Goal: Patient/Family Education  Outcome: Progressing Towards Goal     Problem: Patient Education: Go to Patient Education Activity  Goal: Patient/Family Education  Outcome: Progressing Towards Goal

## 2022-11-03 NOTE — PROGRESS NOTES
Plastic Surgery Progress Note    POD 2 s/p b/l GREG flap    Doing well, no complaints this AM. Pain controlled. Voiding, has started to ambulate    Patient Vitals for the past 12 hrs:   Temp Pulse Resp BP SpO2   11/03/22 0800 97.8 °F (36.6 °C) -- -- -- --   11/03/22 0600 -- 76 14 (!) 94/58 100 %   11/03/22 0500 -- 78 12 (!) 94/59 97 %   11/03/22 0400 98.6 °F (37 °C) 78 13 (!) 100/58 97 %   11/03/22 0300 -- 76 10 (!) 96/58 97 %   11/03/22 0200 -- 79 13 99/60 96 %   11/03/22 0100 -- 79 13 (!) 101/59 96 %   11/03/22 0005 -- 79 14 93/63 96 %   11/03/22 0000 98.6 °F (37 °C) 80 13 (!) 89/61 96 %   11/02/22 2330 -- 81 13 96/61 96 %     Date 11/02/22 0700 - 11/03/22 0659 11/03/22 0700 - 11/04/22 0659   Shift 1031-6970 1160-7790 24 Hour Total 3864-6975 3158-2422 24 Hour Total   INTAKE   P.O.  60 60        P. O.  60 60      I. V.(mL/kg/hr) 888.3(1)  888.3(0.4)        Volume (dextrose 5 % - 0.45% NaCl infusion) 828.3  828. 3        Volume (ceFAZolin (ANCEF) 2 g in sterile water (preservative free) 20 mL IV syringe) 60  60      Shift Total(mL/kg) 888. 3(11.5) 60(0.7) 948. 3(11.2)      OUTPUT   Urine(mL/kg/hr) 600(0.6) 350(0.3) 950(0.5)        Urine Voided  350 350        Urine Occurrence(s)  1 x 1 x        Urine Output (mL) ([REMOVED] Urinary Catheter 11/01/22 [place] - Temperature) 600  600      Drains 145 52 197 53  53     Output (ml) (Brandon-Clark Drain 11/01/22 Right Breast) 25 7 32 15  15     Output (ml) (Brandon-Clark Drain 11/01/22 Left Breast) 25 3 28 6  6     Output (ml) (Brandon-Clark Drain 11/01/22 Right Abdomen) 65 30 95 23  23     Output (ml) (Brandon-Clark Drain 11/01/22 Left Abdomen) 30 12 42 9  9   Shift Total(mL/kg) 745(9.7) 402(4.7) 1147(13.5) 53(0.6)  53(0.6)   .3 -342 -198.7 -53  -53   Weight (kg) 77 84.9 84.9 84.9 84.9 84.9       Gen; NAD, comfortable  HEENT: NCAT  CV: reg rate and rhythm  Resp: normal resp effort RA  Breast:   R 61%, L 74%  B/l flap warm, well perfused  L flap more full than R (unchanged from OR)  Incision c/d/I  PRANAV drain s/s  Abdomen: incision c/d/I.  Umbo viable    Lab Results   Component Value Date/Time    Glucose 85 10/17/2022 12:01 PM    Glucose (POC) 92 11/03/2022 08:01 AM     POD 2 s/p b/l GREG flap    - q4hr flap check  - vioptix d/karthikeyan todya  - drain cares  - advance diet general (diabetic)  - SSI  - pain control: oxycodone, morphine, tylenol  - ambulate   - incentive spirometry  - DVT ppx: lovenox  - transfer to floor today  - dispo: to SNF, expected d/c date Friday 11/4

## 2022-11-03 NOTE — PROGRESS NOTES
Problem: Mobility Impaired (Adult and Pediatric)  Goal: *Acute Goals and Plan of Care (Insert Text)  Description: FUNCTIONAL STATUS PRIOR TO ADMISSION: Patient was modified independent using a rollator and single point cane for functional mobility. Reports sometimes not using any AD in the home. HOME SUPPORT PRIOR TO ADMISSION: The patient lived with her  but generally did not require assistance. She reports her  is disabled and uses a rollator, is unable to assist her post-operatively. Physical Therapy Goals  Initiated 11/2/2022  1. Patient will move from supine to sit and sit to supine , scoot up and down, and roll side to side in bed with modified independence within 7 day(s). 2.  Patient will transfer from bed to chair and chair to bed with modified independence using the least restrictive device within 7 day(s). 3.  Patient will perform sit to stand with modified independence within 7 day(s). 4.  Patient will ambulate with modified independence for 150 feet with the least restrictive device within 7 day(s). 5.  Patient will ascend/descend 3 stairs with 2 handrail(s) with minimal assistance/contact guard assist within 7 day(s). 11/3/2022 1329 by Gurinder Mesa, PT  Outcome: Progressing Towards Goal  11/3/2022 1313 by Gurinder Mesa, PT  Outcome: Progressing Towards Goal   PHYSICAL THERAPY TREATMENT  Patient: Shelli Lugo (62 y.o. female)  Date: 11/3/2022  Diagnosis: Breast cancer ([facility] Lisette Neal <principal problem not specified>  Procedure(s) (LRB):  BILATERAL REMOVAL BREAST TISSUE EXPANDERS/RECONSTRUCTION WITH GREG MICROSURGICAL FREE FLAPS/PORTACATH REMOVAL (Bilateral) 2 Days Post-Op  Precautions:  (UE po restrictions)  Chart, physical therapy assessment, plan of care and goals were reviewed. ASSESSMENT  Patient continues with skilled PT services and is progressing towards goals. Pt received sitting up in recliner. Stating has been OOB approx 2hrs.   Requesting to use BSC. Pt guiding her care with directness. Sit to stand with CGA to SBA with additional time. Transferred to [facility] with same. Set up to independence with self care. Gait of 80' is slow, yet tolerated very well with RW and SBA to S. Pt very talkative throughout session and during walking demonstrating good activity tolerance. Recommending home with family assist and HHPT, yet pt stating  can not help her with her care and is requesting rehab. Discussed options with . Current Level of Function Impacting Discharge (mobility/balance): SBA to CGA/good    Other factors to consider for discharge: per above         PLAN :  Patient continues to benefit from skilled intervention to address the above impairments. Continue treatment per established plan of care. to address goals. Recommendation for discharge: (in order for the patient to meet his/her long term goals)  Physical therapy at least 2 days/week in the home  with family assist; pt wants rehab    This discharge recommendation:  Has been made in collaboration with the attending provider and/or case management    IF patient discharges home will need the following DME: may need RW or rollator if goes home? SUBJECTIVE:   Patient stated I want to go to rehab because my  cant help me.     OBJECTIVE DATA SUMMARY:   Critical Behavior:  Neurologic State: Alert  Orientation Level: Oriented X4  Cognition: Follows commands  Safety/Judgement: Awareness of environment  Functional Mobility Training:  Bed Mobility:  Rolling: Supervision;Modified independent     Sit to Supine: Supervision;Modified independent; Additional time           Transfers:  Sit to Stand: Contact guard assistance;Stand-by assistance  Stand to Sit: Stand-by assistance                             Balance:  Sitting: Intact  Standing: Intact; With support  Ambulation/Gait Training:  Distance (ft): 90 Feet (ft)  Assistive Device: Walker, rolling  Ambulation - Level of Assistance: Stand-by assistance                       Speed/Radha: Slow                     Pain Ratin/10 all over per pt    Activity Tolerance:   Good    After treatment patient left in no apparent distress:   Supine in bed, Call bell within reach, and Side rails x 3    COMMUNICATION/COLLABORATION:   The patients plan of care was discussed with: Occupational therapist, Registered nurse, and Case management.      Yarely Mehta, PT   Time Calculation: 26 mins

## 2022-11-03 NOTE — PROGRESS NOTES
Nutrition Note    RD modified diet order; 4 Carb Choice Restriction not indicated for clear liquid diet.     Original Diet Order: Clear Liquid, 4 Carb Choice  New Diet Order: Clear Liquid, No Concentrated Sweets    Electronically signed by Karson Sky RD on 40/0/1327   Contact: Ext: [phone], or via [employer]

## 2022-11-04 VITALS
HEIGHT: 65 IN | RESPIRATION RATE: 16 BRPM | BODY MASS INDEX: 31.19 KG/M2 | WEIGHT: 187.2 LBS | SYSTOLIC BLOOD PRESSURE: 110 MMHG | HEART RATE: 71 BPM | OXYGEN SATURATION: 99 % | TEMPERATURE: 97.9 F | DIASTOLIC BLOOD PRESSURE: 61 MMHG

## 2022-11-04 LAB
GLUCOSE BLD STRIP.AUTO-MCNC: 90 MG/DL (ref 65–117)
GLUCOSE BLD STRIP.AUTO-MCNC: 91 MG/DL (ref 65–117)
SERVICE CMNT-IMP: NORMAL
SERVICE CMNT-IMP: NORMAL

## 2022-11-04 PROCEDURE — 74011000250 HC RX REV CODE- 250: Performed by: SURGERY

## 2022-11-04 PROCEDURE — 82962 GLUCOSE BLOOD TEST: CPT

## 2022-11-04 PROCEDURE — 74011250637 HC RX REV CODE- 250/637: Performed by: SURGERY

## 2022-11-04 RX ORDER — ENOXAPARIN SODIUM 100 MG/ML
40 INJECTION SUBCUTANEOUS EVERY 24 HOURS
Qty: 14 EACH | Refills: 0 | Status: SHIPPED | OUTPATIENT
Start: 2022-11-04

## 2022-11-04 RX ORDER — ACETAMINOPHEN 500 MG
1000 TABLET ORAL EVERY 6 HOURS
Qty: 30 TABLET | Refills: 1 | Status: SHIPPED | OUTPATIENT
Start: 2022-11-04

## 2022-11-04 RX ORDER — AMOXICILLIN 250 MG
1 CAPSULE ORAL DAILY
Qty: 30 TABLET | Refills: 0 | Status: SHIPPED | OUTPATIENT
Start: 2022-11-05

## 2022-11-04 RX ORDER — FACIAL-BODY WIPES
10 EACH TOPICAL DAILY PRN
Status: DISCONTINUED | OUTPATIENT
Start: 2022-11-04 | End: 2022-11-04 | Stop reason: HOSPADM

## 2022-11-04 RX ORDER — OXYCODONE HYDROCHLORIDE 5 MG/1
5 TABLET ORAL
Qty: 30 TABLET | Refills: 0 | Status: SHIPPED | OUTPATIENT
Start: 2022-11-04 | End: 2022-11-07

## 2022-11-04 RX ADMIN — SODIUM CHLORIDE, PRESERVATIVE FREE 10 ML: 5 INJECTION INTRAVENOUS at 05:27

## 2022-11-04 RX ADMIN — SENNOSIDES AND DOCUSATE SODIUM 1 TABLET: 50; 8.6 TABLET ORAL at 08:36

## 2022-11-04 RX ADMIN — TOPIRAMATE 25 MG: 25 TABLET, FILM COATED ORAL at 08:36

## 2022-11-04 RX ADMIN — TIMOLOL MALEATE 1 DROP: 5 SOLUTION OPHTHALMIC at 08:38

## 2022-11-04 RX ADMIN — GABAPENTIN 800 MG: 100 CAPSULE ORAL at 15:15

## 2022-11-04 RX ADMIN — OXYCODONE 10 MG: 5 TABLET ORAL at 15:15

## 2022-11-04 RX ADMIN — SODIUM CHLORIDE, PRESERVATIVE FREE 10 ML: 5 INJECTION INTRAVENOUS at 12:02

## 2022-11-04 RX ADMIN — GABAPENTIN 800 MG: 100 CAPSULE ORAL at 08:36

## 2022-11-04 RX ADMIN — OXYCODONE 10 MG: 5 TABLET ORAL at 11:56

## 2022-11-04 RX ADMIN — ACETAMINOPHEN 1000 MG: 500 TABLET ORAL at 11:56

## 2022-11-04 RX ADMIN — OXYCODONE 10 MG: 5 TABLET ORAL at 08:36

## 2022-11-04 RX ADMIN — ACETAMINOPHEN 1000 MG: 500 TABLET ORAL at 05:27

## 2022-11-04 NOTE — PROGRESS NOTES
11/04/22      Medicare pt has received, reviewed, and signed 2nd IM letter informing them of their right to appeal the discharge. Signed copied has been placed on pt bedside chart.

## 2022-11-04 NOTE — DISCHARGE INSTRUCTIONS
MICROSURGICAL BREAST RECONSTRUCTION POST-OPERATIVE INSTRUCTIONS      BRA:  You should not wear a brassiere for two weeks following your procedure. This is to prevent excessive compression on the blood supply to the flap. After the first two weeks, you may wear a loosely-fitting soft bra for the next month. Avoid underwire bras for one month. SURGICAL DRAINS:  Please refer to the PRANAV Drain Instructions section below for details. ACTIVITY:  Take it easy for the first several days. No cleaning, housework, or strenuous activity. Do not lift anything over 10 pounds, including children. No running, aerobics, or other strenuous activities until four weeks. However, do not remain constantly in bed. You should walk at least three times daily, with assistance if needed. It is normal to feel tight when standing for the first several weeks, so you may want to hunch over slightly when walking. BATHING:  You may shower after you go home. Use a long piece of string or yarn to make a necklace to loop through the PRANAV drain tabs, so they are not dangling in the shower. NO tub baths, hot tubs, swimming, or any submersion in water for one week after removal of all PRANAV drains. Skin glue covering your incisions will fall off on its own. If it is still present at two weeks, you may peel or scrub it off. MEDICATION:  Your prescriptions will be sent electronically to your pharmacy. You will receive prescriptions for a narcotic pain medication, a stool softener (senokot), and a blood thinner (lovenox). Take tyelonol around the clock and the narcotic pain medication as needed. Take the stool softener while you are taking the narcotic to prevent constipation. Take the lovenox for two weeks. Do not drive while taking narcotic pain medication. Keep drinking plenty of fluids. If you are unable to have a bowel movement, you may take an over the counter laxative pill or suppository such as Dulcolax.      EXERCISE: You may resume non-strenuous activities as tolerated two weeks after surgery. Normal activity can be instituted one month after surgery, starting slowly, and increasing as your body allows. Weight training, cross-fit, sexual activity, and other vigorous activity should not be started until six weeks following surgery. THINGS TO WATCH FOR:  If the GREG flap skin turns purple, black, or cold, or if you experience excessive or sudden swelling, spreading or increasing redness, increasing pain, foul-smelling drainage, separation of any incisions, fever, shaking chills, or any other concerns, please call the office immediately    FOLLOW-UP APPOINTMENT: Please call the office [phone] to schedule an appointment on Wednesday 11/9/22. PRANAV Drain Instructions    PURPOSE:  You have had surgery during which a Brandon-Clark drain, or PRANAV drain, has been placed by your surgeon. A PRANAV drain is a rubber tube which goes under the skin and drains excess fluid during the healing process so that this fluid does not accumulate. There is a one-way valve which allows the fluid to collect in the bulb on the end of the drain. The drain is usually held in place by a single stitch. The color of the drainage can range from reddish to pink to straw-colored. CARE OF THE DRAIN:  Caring for the PRANAV drain is fairly easy. First, protect the drain so that it does not get pulled inadvertently. You may fasten them to your clothing with a safety pin through the floppy tag on the bulb. DO NOT place a pin through either the drain tubing or the bulb itself. The drain works by maintaining a constant low pressure of suction when the bulb is in the collapsed (squeezed in) position. If the bulb will not maintain this collapsed position when it is recapped, please call the office, as the drain may not be working properly. MEASURING THE DRAINAGE:  Grasp the bulb in one hand and remove the cap with the other hand.   This will cause the bulb to relax into a round shape. Holding the open end over a specimen cup, squirt the fluid into the cup by squeezing the bulb. Once the bulb is empty, squeeze it in (collapse it) with one hand, and replace the cap with your other hand. Then holding the measuring cup level, note how much fluid there is (in milliliters, mL), and record this number on the chart below. Discard the fluid in the toilet and rinse out the specimen cup. Note: your specimen cup may be in cubic centimeters (cc). 1 cc = 1 mL. REMOVAL:  The PRANAV drain will be removed in the office when the daily drainage is at a low enough level. You may be asked to call the office with your measuring totals to determine if the drain(s) is (are) ready to be removed. Removal involves minimal discomfort without the need for anesthesia. The drain site in the skin heals on its own once the drain is removed without any further stitches. SHOWERING:  Your surgeon may give you permission to shower while you have one or more PRANAV drains in. Just let the water run over the drain sites and then pat them dry when you are done. Some patients like to place a long string necklace around their necks during showers to which they can safety pin the tag on the bulb to prevent it from dangling. DO NOT take a tub bath, go swimming (pool or lake/ocean), or otherwise submerge your body in water before all PRANAV drains have been removed and you are permitted by your surgeon.     THINGS TO WATCH FOR:  Please call the office immediately ([phone]) if you notice:  Sudden bright or dark red bleeding into the bulb or around the drain site in the skin  Dislodgment of the PRANAV drain or if the drain falls out  Spreading redness around the drain site in the skin  Cloudy or foul-smelling drainage in the bulb or around the drain site  Fever, shaking chills, excessive swelling, pain or discomfort  Any other concerns or questions         Date           Right Breast (AM) Right Breast (PM)           Daily Total   (AM+PM)           Left Breast (AM)           Left Breast (PM)           Daily  Total  (AM+PM)           Right Abdomen (AM)           Right Abdomen (PM)           Daily  Total  (AM+PM)           Left Abdomen (AM)           Left Abdomen (PM)           Daily Total   (AM+PM)

## 2022-11-04 NOTE — PROGRESS NOTES
Plastic Surgery Progress Note    POD 3 s/p b/l GREG flap    Doing well, no complaints this AM. Transferred to the floor yesterday. Pain controlled. Voiding, has started to ambulate    Patient Vitals for the past 12 hrs:   Temp Pulse Resp BP SpO2   11/04/22 0811 97.8 °F (36.6 °C) 70 16 108/65 100 %   11/04/22 0500 98 °F (36.7 °C) 75 15 113/64 100 %     Date 11/03/22 0700 - 11/04/22 0659 11/04/22 0700 - 11/05/22 0659   Shift [id number] 1900-0659 24 Hour Total 5375-7358 9933-9236 24 Hour Total   INTAKE   P.O.    160  160     P. O.    160  160   I. V.(mL/kg/hr)    0  0     I.V.    0  0   Blood    0  0     Autotransfused    0  0   Other    0  0     Other    0  0   Shift Total(mL/kg)    160(1.9)  160(1.9)   OUTPUT   Urine(mL/kg/hr)    0  0     Urine Voided    0  0     Urine Occurrence(s)  2 x 2 x 1 x  1 x   Emesis/NG output    0  0     Emesis    0  0     Emesis Occurrence(s)    0 x  0 x   Drains 106 75 181        Output (ml) (Brandon-Clark Drain 11/01/22 Right Breast) 19 10 29        Output (ml) (Brandon-Clark Drain 11/01/22 Left Breast) 21 5 26        Output (ml) (Brandon-Clark Drain 11/01/22 Right Abdomen) 45 50 95        Output (ml) (Brandon-Clark Drain 11/01/22 Left Abdomen) 21 10 31      Other    0  0     Other Output    0  0   Stool    0  0     Stool Occurrence(s)    0 x  0 x     Stool    0  0   Blood    0  0     Estimated Blood Loss    0  0     Quantitative Blood Loss    0  0     Blood    0  0   Shift Total(mL/kg) 106(1.2) 75(0.9) 181(2.1) 0(0)  0(0)   NET -106 -75 -181 160  160   Weight (kg) 84.9 84.9 84.9 84.9 84.9 84.9       Gen; NAD, comfortable  HEENT: NCAT  CV: reg rate and rhythm  Resp: normal resp effort RA  Breast:   B/l flap warm, well perfused  L flap more full than R (unchanged from OR)  Incision c/d/I  PRANAV drain s/s  Abdomen: incision c/d/I.  Umbo viable    Lab Results   Component Value Date/Time    Glucose 85 10/17/2022 12:01 PM    Glucose (POC) 90 11/04/2022 08:10 AM     POD 3 s/p b/l GREG flap    - q4hr flap check  - drain cares  - diet general (diabetic)  - SSI  - pain control: oxycodone, morphine, tylenol  - ambulate   - incentive spirometry  - DVT ppx: lovenox  - dispo: to SNF, expected d/c today

## 2022-11-04 NOTE — PROGRESS NOTES
Problem: Falls - Risk of  Goal: *Absence of Falls  Description: Document Rosalva Ace Fall Risk and appropriate interventions in the flowsheet. Outcome: Progressing Towards Goal  Note: Fall Risk Interventions:  Mobility Interventions: Bed/chair exit alarm         Medication Interventions: Bed/chair exit alarm    Elimination Interventions: Bed/chair exit alarm    History of Falls Interventions: Bed/chair exit alarm, Room close to nurse's station         Problem: Patient Education: Go to Patient Education Activity  Goal: Patient/Family Education  Outcome: Progressing Towards Goal     Problem: Pressure Injury - Risk of  Goal: *Prevention of pressure injury  Description: Document Anton Scale and appropriate interventions in the flowsheet.   Outcome: Progressing Towards Goal  Note: Pressure Injury Interventions:  Sensory Interventions: Keep linens dry and wrinkle-free, Minimize linen layers         Activity Interventions: Pressure redistribution bed/mattress(bed type), PT/OT evaluation    Mobility Interventions: HOB 30 degrees or less, Pressure redistribution bed/mattress (bed type)    Nutrition Interventions: Document food/fluid/supplement intake    Friction and Shear Interventions: HOB 30 degrees or less                Problem: Patient Education: Go to Patient Education Activity  Goal: Patient/Family Education  Outcome: Progressing Towards Goal     Problem: Patient Education: Go to Patient Education Activity  Goal: Patient/Family Education  Outcome: Progressing Towards Goal     Problem: Patient Education: Go to Patient Education Activity  Goal: Patient/Family Education  Outcome: Progressing Towards Goal     Problem: Pain  Goal: *Control of Pain  Outcome: Progressing Towards Goal  Goal: *PALLIATIVE CARE:  Alleviation of Pain  Outcome: Progressing Towards Goal     Problem: Patient Education: Go to Patient Education Activity  Goal: Patient/Family Education  Outcome: Progressing Towards Goal     Problem: General Medical Care Plan  Goal: *Vital signs within specified parameters  Outcome: Progressing Towards Goal  Goal: *Labs within defined limits  Outcome: Progressing Towards Goal  Goal: *Absence of infection signs and symptoms  Outcome: Progressing Towards Goal  Goal: *Optimal pain control at patient's stated goal  Outcome: Progressing Towards Goal  Goal: *Skin integrity maintained  Outcome: Progressing Towards Goal  Goal: *Fluid volume balance  Outcome: Progressing Towards Goal  Goal: *Optimize nutritional status  Outcome: Progressing Towards Goal  Goal: *Anxiety reduced or absent  Outcome: Progressing Towards Goal  Goal: *Progressive mobility and function (eg: ADL's)  Outcome: Progressing Towards Goal     Problem: Patient Education: Go to Patient Education Activity  Goal: Patient/Family Education  Outcome: Progressing Towards Goal

## 2022-11-04 NOTE — PROGRESS NOTES
11/4/2022 1:58 PM     [facility] cannot accept pt until after 4PM today. Nursing please call report to [facility] at [phone] or [phone]. CM spoke with pt who reported she does not have transport to [facility]. CM scheduled [facility] wheelchair transport for pt at [address] [facility].   Pt and pt's RN are aware. Wheelchair packet on hard chart. 11/4/2022 9:03 AM Pt transferred from ICU, noted plan for pt to [facility]. CM called and spoke with Liberty with [facility] who confirmed pt has been accepted, and can admit today. Liberty reported Dontae will need instructions for pt's drains. CM will follow up.  SUE Freed    Care Management Interventions  Support Systems: Spouse/Significant Other (unable to assist)  Discharge Location  Patient Expects to be Discharged to[de-identified] Rehab hospital/unit acute

## 2022-11-14 NOTE — DISCHARGE SUMMARY
Admit date: 11/1/2022   Admitting Provider: Komal Villalobos MD    Discharge date: 11/14/2022  Discharging Provider: Komal Villalobos MD      * Admission Diagnoses: Breast cancer [facility]) [address]    * Discharge Diagnoses:    Hospital Problems as of 11/4/2022 Date Reviewed: 7/11/2022   [facility] Course: Patient was taken to the OR on 11/1/2022 for bilateral tissue expander removal and bilateral GREG flap reconstruction. Postoperatively, she was admitted to the intensive care unit where she underwent q1hr flap checks. On POD 1, her jacobson was removed and her activity was advanced. Her sugars were monitored and she was on a sliding scale insulin. She was started on a clear liquid diet. On POD 2, she was advanced to a general diet and transferred to the floor. On day of discharge, she was tolerating a diet, her pain was controlled with oral medications, and she was able to void. She also had a BM. There were no concerns with her surgical sites on day of discharge. Per patient's request, she was discharged to a rehab facility. * Procedures:   Procedure(s):  BILATERAL REMOVAL BREAST TISSUE EXPANDERS/RECONSTRUCTION WITH GREG MICROSURGICAL FREE FLAPS/PORTACATH REMOVAL      Consults: None    Significant Diagnostic Studies: glucose checks    Discharge Exam:  Gen; NAD, comfortable  HEENT: NCAT  CV: reg rate and rhythm  Resp: normal resp effort RA  Breast:   B/l flap warm, well perfused  L flap more full than R (unchanged from OR)  Incision c/d/I  PRANAV drain s/s  Abdomen: incision c/d/I. Umbo viable    * Discharge Condition: good  * Disposition: [place] ([facility])    Discharge Medications:  Discharge Medication List as of 11/4/2022  4:02 PM        START taking these medications    Details   acetaminophen (TYLENOL) 500 mg tablet Take 2 Tablets by mouth every six (6) hours. , Normal, Disp-30 Tablet, R-1      enoxaparin (LOVENOX) 40 mg/0.4 mL 0.4 mL by SubCUTAneous route every twenty-four (24) hours. , Normal, Disp-14 Each, R-0      oxyCODONE IR (ROXICODONE) 5 mg immediate release tablet Take 1 Tablet by mouth every three (3) hours as needed for Pain for up to 3 days. Max Daily Amount: 40 mg., Normal, Disp-30 Tablet, R-0      senna-docusate (PERICOLACE) 8.6-50 mg per tablet Take 1 Tablet by mouth daily. , Normal, Disp-30 Tablet, R-0           CONTINUE these medications which have NOT CHANGED    Details   DULoxetine (CYMBALTA) 60 mg capsule Take 90 mg by mouth nightly. Takes 30 +60mg = 90mg, Historical Med      zolpidem (AMBIEN) 5 mg tablet Take 5 mg by mouth nightly., Historical Med      semaglutide (Ozempic) 1 mg/dose (2 mg/1.5 mL) sub-q pen 0.5 mg by SubCUTAneous route every seven (7) days. , Historical Med      topiramate (TOPAMAX) 25 mg tablet Take 1 Tablet by mouth two (2) times daily (with meals). , Normal, Disp-60 Tablet, R-0      metFORMIN ER (GLUCOPHAGE XR) 500 mg tablet TAKE 2 TABLET BY MOUTH TWICE DAILY DAILY WITH MEALS STOP SYNJARDY, Normal, Disp-360 Tablet, R-1      hydrOXYzine pamoate (VISTARIL) 25 mg capsule Take 50 mg by mouth nightly., Historical Med      b complex vitamins tablet Take 1 Tablet by mouth every morning., Historical Med      ascorbic acid-collagen (Collagen Plus Vitamin C) 125-740 mg cap Take 1 Tablet by mouth every morning., Historical Med      IRON, FERROUS SULFATE, PO Take 1 Tablet by mouth every morning., Historical Med      timoloL (BetimoL) 0.5 % ophthalmic solution Administer 1 Drop to both eyes two (2) times a day., Historical Med      omega 3-dha-epa-fish oil (Fish Oil) 100-160-1,000 mg cap Take 3 mg by mouth daily. , Historical Med      calcium-cholecalciferol, d3, 600 mg calcium- 200 unit cap Take 1 Tablet by mouth every morning., Historical Med      traZODone (DESYREL) 100 mg tablet Take 100 mg by mouth nightly., Historical Med, R-1      latanoprost (XALATAN) 0.005 % ophthalmic solution Administer 1 Drop to both eyes nightly., Historical Med      gabapentin (NEURONTIN) 800 mg tablet Take 1 Tab by mouth three (3) times daily. , Historical Med, R-0      furosemide (LASIX) 20 mg tablet Take 20 mg by mouth daily as needed., Historical Med      potassium chloride SR (K-TAB) 20 mEq tablet Take 20 mEq by mouth daily as needed. Takes daily as needed (when taking furosemide), Historical Med      carvediloL (COREG) 6.25 mg tablet Take 6.25 mg by mouth two (2) times a day., Historical Med      sertraline (ZOLOFT) 25 mg tablet Take 25 mg by mouth every morning., Historical Med      multivitamin-min-iron-FA-vit K (Bariatric Multivitamins) 45 mg iron- 800 mcg-120 mcg cap Take 1 Tablet by mouth daily. , Historical Med      biotin 500 mcg Cap Take 1 Tablet by mouth every morning., Historical Med             * Follow-up Care/Patient Instructions:   Activity: See surgical instructions  Diet: Diabetic Diet  Wound Care: Keep wound clean and dry    Follow-up Information       Follow up With Specialties Details Why Jose Reynoso MD [facility] Go on 11/15/2022 Jan Loge AT 1:30PM [address] [place] [address] Rd  [phone]              Signed:  Alie Ramires MD  11/14/2022  7:37 AM

## 2022-12-05 ENCOUNTER — TRANSCRIBE ORDER (OUTPATIENT)
Dept: REGISTRATION | Age: 57
End: 2022-12-05

## 2022-12-05 ENCOUNTER — HOSPITAL ENCOUNTER (OUTPATIENT)
Dept: GENERAL RADIOLOGY | Age: 57
Discharge: HOME | End: 2022-12-05
Payer: MEDICARE

## 2022-12-05 DIAGNOSIS — M62.838 MUSCLE SPASMS OF LOWER EXTREMITY: ICD-10-CM

## 2022-12-05 DIAGNOSIS — R52 PAIN: ICD-10-CM

## 2022-12-05 DIAGNOSIS — R52 PAIN: Primary | ICD-10-CM

## 2022-12-05 PROCEDURE — 73562 X-RAY EXAM OF KNEE 3: CPT

## 2022-12-05 PROCEDURE — 72100 X-RAY EXAM L-S SPINE 2/3 VWS: CPT

## 2022-12-05 PROCEDURE — 73522 X-RAY EXAM HIPS BI 3-4 VIEWS: CPT

## 2023-02-10 ENCOUNTER — OFFICE VISIT (OUTPATIENT)
Dept: ENDOCRINOLOGY | Age: 58
End: 2023-02-10
Payer: MEDICARE

## 2023-02-10 VITALS
WEIGHT: 179.6 LBS | HEIGHT: 65 IN | TEMPERATURE: 97.2 F | SYSTOLIC BLOOD PRESSURE: 88 MMHG | OXYGEN SATURATION: 100 % | DIASTOLIC BLOOD PRESSURE: 64 MMHG | HEART RATE: 67 BPM | BODY MASS INDEX: 29.92 KG/M2

## 2023-02-10 DIAGNOSIS — G62.9 NEUROPATHY: ICD-10-CM

## 2023-02-10 DIAGNOSIS — Z79.4 UNCONTROLLED TYPE 2 DIABETES MELLITUS WITH HYPERGLYCEMIA, WITH LONG-TERM CURRENT USE OF INSULIN (HCC): Primary | ICD-10-CM

## 2023-02-10 DIAGNOSIS — C50.919 MALIGNANT NEOPLASM OF FEMALE BREAST, UNSPECIFIED ESTROGEN RECEPTOR STATUS, UNSPECIFIED LATERALITY, UNSPECIFIED SITE OF BREAST (HCC): ICD-10-CM

## 2023-02-10 DIAGNOSIS — Z79.4 UNCONTROLLED TYPE 2 DIABETES MELLITUS WITH HYPERGLYCEMIA, WITH LONG-TERM CURRENT USE OF INSULIN (HCC): ICD-10-CM

## 2023-02-10 DIAGNOSIS — E11.65 UNCONTROLLED TYPE 2 DIABETES MELLITUS WITH HYPERGLYCEMIA, WITH LONG-TERM CURRENT USE OF INSULIN (HCC): ICD-10-CM

## 2023-02-10 DIAGNOSIS — E11.65 UNCONTROLLED TYPE 2 DIABETES MELLITUS WITH HYPERGLYCEMIA, WITH LONG-TERM CURRENT USE OF INSULIN (HCC): Primary | ICD-10-CM

## 2023-02-10 DIAGNOSIS — Z98.84 S/P GASTRIC BYPASS: ICD-10-CM

## 2023-02-10 DIAGNOSIS — Z79.4 ENCOUNTER FOR LONG-TERM (CURRENT) USE OF INSULIN (HCC): ICD-10-CM

## 2023-02-10 RX ORDER — METFORMIN HYDROCHLORIDE 500 MG/1
TABLET, EXTENDED RELEASE ORAL
Qty: 360 TABLET | Refills: 1 | Status: SHIPPED | OUTPATIENT
Start: 2023-02-10

## 2023-02-10 RX ORDER — MIDODRINE HYDROCHLORIDE 5 MG/1
TABLET ORAL
COMMUNITY

## 2023-02-10 RX ORDER — SEMAGLUTIDE 1.34 MG/ML
0.5 INJECTION, SOLUTION SUBCUTANEOUS
Qty: 4.5 ML | Refills: 3 | Status: SHIPPED | OUTPATIENT
Start: 2023-02-10

## 2023-02-10 RX ORDER — DICLOFENAC SODIUM 50 MG/1
TABLET, DELAYED RELEASE ORAL
COMMUNITY

## 2023-02-10 NOTE — LETTER
2/10/2023    Patient: Kerwin Stuart   YOB: 1965   Date of Visit: 2/10/2023     Deana Fried MD  [address] [facility]    Dear Deana Fried MD,      Thank you for referring Ms. Luis Garza to [facility] for evaluation. My notes for this consultation are attached. If you have questions, please do not hesitate to call me. I look forward to following your patient along with you.       Sincerely,    Luis Ribera MD

## 2023-02-10 NOTE — PATIENT INSTRUCTIONS
SPECIFIC INSTRUCTIONS BELOW     Emilia Webb    ? Metformin er 500 mg 2 pills twice a day     ozempic    0.5 mg a week       -------------PAY ATTENTION TO THESE GENERAL INSTRUCTIONS -----------------      - The medications prescribed at this visit will not be available at pharmacy until 6 pm       - YOUR MED LIST IS NOT UP TO DATE AS SOME CHANGES ARE BEING MADE AFTER THE VISIT - FOLLOW SPECIFIC INSTRUCTIONS  ABOVE     -ANY tests other than blood work, which you opt to do  outside the  [facility] facilities, you are responsible for prior authorizations if  required    - [address] [facility]- PLEASE IGNORE     Results     *Normal results will not be notified by a phone call starting January 1 2021   *If you have an upcoming visit, the results will be discussed at the visit   *Please sign up for MY CHART if you want access to your lab and test results  *Abnormal results which require immediate attention will be notified by phone call   *Abnormal results which do not require immediate assistance will be notified in 1-2 weeks       Refills    -    have your pharmacy send us a refill request . Refills are done max for one year and a visit is a must before refills are extended    Follow up appointments -  highly encourage you to make it when you are checking out. We can accommodate you into the schedule based on your clinical situation, but not for extending refills beyond a year. Labs are important to give refills and is important to get labs before the visit     Phone calls  -  Allow  24 hrs.  for non-urgent calls to be returned  Prior authorization - It may take 2-4 weeks to process  Forms  -  FMLA, DMV etc., will take up to 2 weeks to process  Cancellations - please notify the office 2 days in advance   Samples  - will only be dispensed at visits       If not showing for the appointments and cancelling appointments within 24 hours are kept track of and three  of such situations in  two consecutive years will likely be considered for termination from the practice    -------------------------------------------------------------------------------------------------------------------

## 2023-02-10 NOTE — PROGRESS NOTES
HISTORY OF PRESENT ILLNESS  Luis Garza is a 59 y.o. female. HPI  Patient here for  f/u  After last visit of Type 2 diabetes mellitus  From Nov 2021   H/o DM 2 for 30 yares, history of breast cancer , severe  neuropathy from chemo and DM related , depression       S/p bariatric surgery at  [facility] by Dr. Lizzie Brand in jan 2020   She is s/p  Lap choly  April 2021     In the interim, pt had recurrence of breast cancer, she underwent double mastectomy , she had to undergo chemo therapy , then underwent  reconstructive surgery   Her balance is impaired, depending on rollator  and cane           Nov 2021     Gained 10 lbs   She has not changed diet and she is not able to exercise        She is s/p  Lap choly  April 2021       Prior history :    Referred : by self/pcp  H/o diabetes for 30 years   Current A1C is 9 % and symptoms/problems include polyuria, polydipsia and visual disturbances   Current diabetic medications include intensive insulin injection program. And metformin   novolog by 10 units tid plus sliding scale and levemir  36 units hs   Current monitoring regimen: home blood tests - 2 times daily      Review of Systems   Constitutional: Negative. Neuropathy, back issues are persisting     Psychiatric/Behavioral: Negative for depression and memory loss. The patient does not have insomnia. Physical Exam   Constitutional: oriented to person, place, and time. appears well-developed and well-nourished. Psychiatric: He has a normal mood and affect.            Lab Results   Component Value Date/Time    Hemoglobin A1c 5.7 (H) 12/08/2022 11:11 AM    Hemoglobin A1c 5.5 11/01/2022 10:21 PM    Hemoglobin A1c 5.7 (H) 07/20/2022 05:36 AM    Hemoglobin A1c, External 9.0 03/18/2016 12:00 AM    Glucose 86 12/08/2022 11:11 AM    Glucose (POC) 91 11/04/2022 12:15 PM    Microalbumin/Creat ratio (mg/g creat) 6 11/12/2020 11:48 AM    Microalb/Creat ratio (ug/mg creat.) 5 12/08/2022 11:11 AM    Microalbumin,urine random 1.55 11/12/2020 11:48 AM    LDL,Direct 149 (H) 03/12/2019 09:26 AM    LDL, calculated 140 (H) 12/08/2022 11:11 AM    LDL, calculated 127.2 (H) 05/10/2021 09:20 AM    Creatinine (POC) 1.1 03/23/2022 10:28 AM    Creatinine 1.03 (H) 12/08/2022 11:11 AM      Lab Results   Component Value Date/Time    Cholesterol, total 234 (H) 12/08/2022 11:11 AM    HDL Cholesterol 57 12/08/2022 11:11 AM    LDL,Direct 149 (H) 03/12/2019 09:26 AM    LDL, calculated 140 (H) 12/08/2022 11:11 AM    LDL, calculated 127.2 (H) 05/10/2021 09:20 AM    Triglyceride 206 (H) 12/08/2022 11:11 AM    CHOL/HDL Ratio 4.2 05/10/2021 09:20 AM       Lab Results   Component Value Date/Time    ALT (SGPT) 54 (H) 12/08/2022 11:11 AM    Alk. phosphatase 70 12/08/2022 11:11 AM    Bilirubin, total <0.2 12/08/2022 11:11 AM                ASSESSMENT and PLAN    1. Type 2 DM, un controlled : a1c is   5.7 %    from   dec 2022     compared to   7.1 %    From   Nov 2021    Compared   To     6.4 %     From May 2021    Compared to    6.4 %      From     Nov 2020       Compared to  6.4 %     From April 2020     Compared to    6.6 %     From nov 2019  Compared to   6.8  %     From may 2019     Compared to  6.4 %       From nov 2018      Compared to    6.7 %    From  May 2018           Feb 2023    Stay  on ozempic   Stay on metformin er     Nov 2021   Losing control , gaining weight  ( sec to steroid shot in hip  - 2 weeks ago )   Alerted her about watching  diet   Start on ozempic   Stay on metformin er         3. HTN : controlled, continue coreg       4. Dyslipidemia : lipids are still above the goal despite weight loss from  Gastric  bypass surgery   Could not tolerate any kinds of statins    ? Nexletol         5. Diabetic complications :     A. Retinopathy-NO   educated on this complication,  regular f/u with ophthalmologist encouraged    B. Nephropathy - NO     educated on this disease and indicated stages and its prognosis.      C. Peripheral Neuropathy - YES Severe - On cymbalta and  neurontin 800 mg tid   recommending   Metanx ( too expensive )  She cannot tolerate Lyrica       D: CAD : YES, Cardiomyopathy , after chemo- adriamycin      6. H/o  Right breast cancer - treated and  Has lymphedema on right UE , recurrent breast cancer - 2022        7. Obesity  : Body mass index is 29.89 kg/m².   S/p bariatric surgery  Jan 2020 -    On vit d, b12 and iron          F/u in 6  Months    Reviewed results with patient and discussed the labs being ordered today/bnv  Patient voiced understanding of plan of care

## 2023-02-10 NOTE — PROGRESS NOTES
Janyce Gottron is a 59 y.o. female here for   Chief Complaint   Patient presents with    Vitamin B12 Deficiency    Diabetes       1. Have you been to the ER, urgent care clinic since your last visit? Hospitalized since your last visit? - No     2. Have you seen or consulted any other health care providers outside of the [address] [place] [address] since your last visit?   Include any pap smears or colon screening.- No

## 2023-03-13 ENCOUNTER — OFFICE VISIT (OUTPATIENT)
Dept: SURGERY | Age: 58
End: 2023-03-13
Payer: MEDICARE

## 2023-03-13 VITALS
BODY MASS INDEX: 29.92 KG/M2 | WEIGHT: 179.6 LBS | HEART RATE: 86 BPM | TEMPERATURE: 97.5 F | OXYGEN SATURATION: 99 % | DIASTOLIC BLOOD PRESSURE: 68 MMHG | SYSTOLIC BLOOD PRESSURE: 92 MMHG | HEIGHT: 65 IN

## 2023-03-13 DIAGNOSIS — Z71.3 DIETARY COUNSELING: ICD-10-CM

## 2023-03-13 DIAGNOSIS — E11.9 CONTROLLED TYPE 2 DIABETES MELLITUS WITHOUT COMPLICATION, WITHOUT LONG-TERM CURRENT USE OF INSULIN (HCC): ICD-10-CM

## 2023-03-13 DIAGNOSIS — Z98.84 STATUS POST BARIATRIC SURGERY: Primary | ICD-10-CM

## 2023-03-13 PROCEDURE — 3017F COLORECTAL CA SCREEN DOC REV: CPT | Performed by: SURGERY

## 2023-03-13 PROCEDURE — G8427 DOCREV CUR MEDS BY ELIG CLIN: HCPCS | Performed by: SURGERY

## 2023-03-13 PROCEDURE — 2022F DILAT RTA XM EVC RTNOPTHY: CPT | Performed by: SURGERY

## 2023-03-13 PROCEDURE — 3074F SYST BP LT 130 MM HG: CPT | Performed by: SURGERY

## 2023-03-13 PROCEDURE — 3046F HEMOGLOBIN A1C LEVEL >9.0%: CPT | Performed by: SURGERY

## 2023-03-13 PROCEDURE — G8417 CALC BMI ABV UP PARAM F/U: HCPCS | Performed by: SURGERY

## 2023-03-13 PROCEDURE — G8510 SCR DEP NEG, NO PLAN REQD: HCPCS | Performed by: SURGERY

## 2023-03-13 PROCEDURE — G9899 SCRN MAM PERF RSLTS DOC: HCPCS | Performed by: SURGERY

## 2023-03-13 PROCEDURE — 99214 OFFICE O/P EST MOD 30 MIN: CPT | Performed by: SURGERY

## 2023-03-13 PROCEDURE — 3078F DIAST BP <80 MM HG: CPT | Performed by: SURGERY

## 2023-03-13 RX ORDER — POLYETHYLENE GLYCOL 3350 17 G/17G
17 POWDER, FOR SOLUTION ORAL 2 TIMES DAILY
Qty: 60 PACKET | Refills: 2 | Status: SHIPPED | OUTPATIENT
Start: 2023-03-13 | End: 2023-06-11

## 2023-03-13 NOTE — PROGRESS NOTES
Identified pt with two pt identifiers (name and ). Reviewed chart in preparation for visit and have obtained necessary documentation. Mayra Parks is a 59 y.o. female  Chief Complaint   Patient presents with    Follow-up     6 months f/u gastric bypass     Visit Vitals  BP 92/68 (BP 1 Location: Left upper arm, BP Patient Position: Sitting, BP Cuff Size: Adult)   Pulse 86   Temp 97.5 °F (36.4 °C) (Temporal)   Ht 5' 5\" (1.651 m)   Wt 179 lb 9.6 oz (81.5 kg)   SpO2 99%   BMI 29.89 kg/m²       1. Have you been to the ER, urgent care clinic since your last visit? Hospitalized since your last visit? No    2. Have you seen or consulted any other health care providers outside of the [address] [place] [address] since your last visit? Include any pap smears or colon screening.  No

## 2023-03-20 NOTE — PROGRESS NOTES
Jhonny Fuentes  [facility], [address] [place] [address], [address] [place] [address]    Bariatric Surgery Follow Up    Patient Name: Avtar Thomas (66 y.o., female)    Patient Address: [address] [place] [id number]    PCP: Yossi Renteria MD     Patient contact numbers:     Home Phone  Work [facility] Phone  Home Phone    [phone]       Subjective:      Avtar Thomas is a 59 y.o. female, who presents for follow up. She underwent a laparoscopic gastric bypass by Dr. Waleska Gongora on 1/21/2020. She has been tolerating a regular diet and states she is using protein supplements as needed, appetite low but improving. She is maintaining adequate fluid intake. She continues to have difficulty with regular bowel movements, but had some success with Miralax 2-3 times daily. She was recently in a rehab facility after her breast reconstruction surgery. She states she has been doing better since that time. She continues to take her vitamins.     Past Medical History:   Diagnosis Date    Arthritis     Back/Neck problemsm hips and knees bilaterally    Burning with urination     pt  denies at this time     10/17/22    Cancer [facility]) 2009    RIGHT breast,  reoccurrance in 4/2022    Congestive heart failure (HCC)     Depression     Diabetes (HCC)     Dizziness     GERD (gastroesophageal reflux disease)     Glaucoma     Gout     Headache     Heart disease     Hypercholesterolemia     Hypertension     Localized swelling of both lower legs     s/p chemo RX in  2009 / wears compression stockings    Lymphedema     s/p breast lumpectomy 2009    Morbid obesity ([address]     Neuropathy     Shortness of breath     With activity    Sleep apnea 1998    does not use CPAP    Sleep disorder     Difficulty falling asleep, night sweats    Stool color black     patient denies  as of 10/17/2022       Past Surgical History:   Procedure Laterality Date    HX APPENDECTOMY      HX BILATERAL MASTECTOMY      silicone expanders inserted at time ot Mastectomy    HX BREAST LUMPECTOMY  2009    RIGHT with SNBX    HX CHOLECYSTECTOMY      HX GASTRIC BYPASS  2020    HX GYN       x1, Bilateral tubal ligation, Dilation & Curettage    HX GYN      Hysterectomy (partial)    HX ORTHOPAEDIC Left     Procedure on shoulder    HX PREMALIG/BENIGN SKIN LESION EXCISION      Cyst removed from scalp    HX PREMALIG/BENIGN SKIN LESION EXCISION      Debridement of subcutaneous tissue    HX TONSILLECTOMY  1970    HX UROLOGICAL      stone removal       Family History   Problem Relation Age of Onset    Hypertension Mother     Cancer Mother     Thyroid Disease Mother     Hypertension Father     Cancer Father     Diabetes Brother     Hypertension Brother     Cancer Brother     Stroke Brother     Lung Disease Daughter     Asthma Daughter     Hypertension Daughter     Thyroid Disease Daughter     Diabetes Son     Lung Disease Son     Hypertension Son     Stroke Maternal Grandmother     Dementia Maternal Grandmother     Hypertension Maternal Grandmother        Social History     Tobacco Use    Smoking status: Never    Smokeless tobacco: Never   Vaping Use    Vaping Use: Never used   Substance Use Topics    Alcohol use: Not Currently     Alcohol/week: 2.0 standard drinks     Types: 2 Glasses of wine per week    Drug use: No       Current Outpatient Medications   Medication Sig Dispense Refill    polyethylene glycol (MIRALAX) 17 gram packet Take 1 Packet by mouth two (2) times a day for 90 days.  60 Packet 2    diclofenac EC (VOLTAREN) 50 mg EC tablet diclofenac sodium 50 mg tablet,delayed release   TAKE 1 TABLET BY MOUTH TWICE DAILY      midodrine (PROAMATINE) 5 mg tablet midodrine 5 mg tablet   TAKE 1 TABLET BY MOUTH THREE TIMES DAILY      metFORMIN ER (GLUCOPHAGE XR) 500 mg tablet TAKE 2 TABLET BY MOUTH TWICE DAILY DAILY WITH MEALS STOP SYNJARDY 360 Tablet 1    semaglutide (Ozempic) 0.25 mg or 0.5 mg/dose (2 mg/1.5 ml) subq pen 0.5 mg by SubCUTAneous route every seven (7) days. 4.5 mL 3    acetaminophen (TYLENOL) 500 mg tablet Take 2 Tablets by mouth every six (6) hours. (Patient taking differently: Take 1,000 mg by mouth every six (6) hours as needed.) 30 Tablet 1    DULoxetine (CYMBALTA) 60 mg capsule Take 90 mg by mouth nightly. Takes 30 +60mg = 90mg      furosemide (LASIX) 20 mg tablet Take 40 mg by mouth daily as needed. potassium chloride SR (K-TAB) 20 mEq tablet Take 20 mEq by mouth daily as needed. Takes daily as needed (when taking furosemide)      zolpidem (AMBIEN) 5 mg tablet Take 5 mg by mouth nightly. semaglutide (Ozempic) 1 mg/dose (2 mg/1.5 mL) sub-q pen 0.5 mg by SubCUTAneous route every seven (7) days. topiramate (TOPAMAX) 25 mg tablet Take 1 Tablet by mouth two (2) times daily (with meals). 60 Tablet 0    hydrOXYzine pamoate (VISTARIL) 25 mg capsule Take 50 mg by mouth nightly. sertraline (ZOLOFT) 25 mg tablet Take 25 mg by mouth every morning. b complex vitamins tablet Take 1 Tablet by mouth every morning. ascorbic acid-collagen (Collagen Plus Vitamin C) 125-740 mg cap Take 1 Tablet by mouth every morning. IRON, FERROUS SULFATE, PO Take 1 Tablet by mouth every morning. timoloL (BetimoL) 0.5 % ophthalmic solution Administer 1 Drop to both eyes two (2) times a day. multivitamin-min-iron-FA-vit K (Bariatric Multivitamins) 45 mg iron- 800 mcg-120 mcg cap Take 1 Tablet by mouth daily. omega 3-dha-epa-fish oil (Fish Oil) 100-160-1,000 mg cap Take 3 mg by mouth daily. calcium-cholecalciferol, d3, 600 mg calcium- 200 unit cap Take 2 Tablets by mouth two (2) times a day. traZODone (DESYREL) 100 mg tablet Take 100 mg by mouth nightly. 1    latanoprost (XALATAN) 0.005 % ophthalmic solution Administer 1 Drop to both eyes nightly.      gabapentin (NEURONTIN) 800 mg tablet Take 1 Tab by mouth three (3) times daily.   0    biotin 500 mcg Cap Take 1 Tablet by mouth every morning. Allergies   Allergen Reactions    Januvia [Sitagliptin] Rash    Levorphanol Itching and Rash    Statins-Hmg-Coa Reductase Inhibitors Itching       Review of Systems  Review of Systems   Constitutional:  Negative for chills, fever, malaise/fatigue and weight loss. HENT:  Negative for congestion, ear pain and sore throat. Eyes:  Negative for blurred vision and redness. Respiratory:  Negative for cough, shortness of breath and wheezing. Cardiovascular:  Negative for chest pain, palpitations and leg swelling. Gastrointestinal:  Negative for abdominal pain, heartburn, nausea and vomiting. Genitourinary:  Negative for dysuria, frequency and hematuria. Musculoskeletal:  Positive for back pain and myalgias. Negative for joint pain. Skin:  Negative for itching and rash. Neurological:  Negative for dizziness, seizures and headaches. Endo/Heme/Allergies:  Does not bruise/bleed easily. Objective:     Temp: 97.5 °F (36.4 °C) (03/13/23 1200) Pulse (Heart Rate): 86 (03/13/23 1200) Resp: (not recorded) BP: 92/68 (03/13/23 1200) O2 Sat (%): 99 % (03/13/23 1200) Weight: 179 lb 9.6 oz (81.5 kg) (03/13/23 1200)     Physical Exam:  General:  Alert, cooperative, no distress. Head:  Normocephalic, without obvious abnormality, atraumatic. Eyes:  Conjunctivae/corneas clear. Pupils equal, round, reactive to light. Extraocular movements intact. Lungs:   Clear to auscultation bilaterally. Heart:  Regular rate and rhythm   Abdomen:   Soft, non-tender. Well healed surgical incisions   Extremities: Extremities normal, atraumatic, no cyanosis or edema. Pulses: 2+ and symmetric all extremities. Skin: Skin color, texture, turgor normal. No rashes or lesions. Neurologic: CNII-XII intact. Normal strength, sensation, and reflexes throughout.      Weight History:  Consult weight (9/9/2019): 259 lbs  Preop weight (1/21/2020): 251 lbs  3/31/2022: 185 lbs  7/7/2022: 162 lbs  3/13/2023: 179 lbs  Total weight loss: 80 lbs    Labs Reviewed:  Lab Results   Component Value Date/Time    WBC 5.9 12/08/2022 11:11 AM    HGB 9.6 (L) 12/08/2022 11:11 AM    HCT 30.0 (L) 12/08/2022 11:11 AM    PLATELET 224 45/62/6312 11:11 AM    MCV 92 12/08/2022 11:11 AM     Lab Results   Component Value Date/Time    Sodium 141 12/08/2022 11:11 AM    Potassium 5.2 12/08/2022 11:11 AM    Chloride 105 12/08/2022 11:11 AM    CO2 24 12/08/2022 11:11 AM    Anion gap 4 (L) 10/17/2022 12:01 PM    Glucose 86 12/08/2022 11:11 AM    BUN 25 (H) 12/08/2022 11:11 AM    Creatinine 1.03 (H) 12/08/2022 11:11 AM    BUN/Creatinine ratio 24 (H) 12/08/2022 11:11 AM    GFR est AA >60 08/16/2022 02:57 PM    GFR est non-AA >60 08/16/2022 02:57 PM    Calcium 9.3 12/08/2022 11:11 AM     Lab Results   Component Value Date/Time    ALT (SGPT) 54 (H) 12/08/2022 11:11 AM    AST (SGOT) 32 12/08/2022 11:11 AM    Alk.  phosphatase 70 12/08/2022 11:11 AM    Bilirubin, total <0.2 12/08/2022 11:11 AM     Lab Results   Component Value Date/Time    Vitamin D 25-Hydroxy 58.4 05/10/2021 09:20 AM    VITAMIN D, 25-HYDROXY 34.5 12/08/2022 11:11 AM       Lab Results   Component Value Date/Time    Vitamin B12 >2000 (H) 12/08/2022 11:11 AM    Folate 15.1 11/01/2021 03:25 PM     Lab Results   Component Value Date/Time    Calcium 9.3 12/08/2022 11:11 AM    Phosphorus 4.2 09/14/2020 12:44 PM     Lab Results   Component Value Date/Time    TSH 1.420 05/29/2019 08:52 AM     Cholesterol, total   Date Value Ref Range Status   12/08/2022 234 (H) 100 - 199 mg/dL Final   12/08/2022 239 (H) 100 - 199 mg/dL Final   11/01/2021 241 (H) 100 - 199 mg/dL Final     HDL Cholesterol   Date Value Ref Range Status   12/08/2022 57 >39 mg/dL Final   12/08/2022 58 >39 mg/dL Final   11/01/2021 62 >39 mg/dL Final     LDL,Direct   Date Value Ref Range Status   03/12/2019 149 (H) 0 - 99 mg/dL Final     Lab Results   Component Value Date/Time    Iron 54 12/08/2022 11:04 AM    TIBC 353 12/08/2022 11:04 AM    Iron % saturation 15 12/08/2022 11:04 AM    Ferritin 108 09/14/2020 12:44 PM     Lab Results   Component Value Date/Time    Hemoglobin A1c 5.7 (H) 12/08/2022 11:11 AM    Hemoglobin A1c (POC) 6.3 05/25/2018 09:13 AM    Hemoglobin A1c, External 9.0 03/18/2016 12:00 AM         Assessment:     S/P laparoscopic [place] gastric bypass surgery on 1/21/2020. Overall doing well with 80 lbs total weight loss. Problem List Items Addressed This Visit          Endocrine    Controlled type 2 diabetes mellitus without complication, without long-term current use of insulin ([facility] [address]       Other    Status post bariatric surgery - Primary     Other Visit Diagnoses       Dietary counseling                Plan:     Continue fluid intake to daily goal of 64 oz. Increase protein intake to daily goal of 60 g using supplements as needed  Increase daily exercise.   Vitamins: continue current  Return to clinic in January 2024 with labs  Email sent with support group schedule      Maki Fuentes,

## 2023-06-10 ENCOUNTER — HOSPITAL ENCOUNTER (EMERGENCY)
Facility: HOSPITAL | Age: 58
Discharge: HOME | End: 2023-06-10
Attending: EMERGENCY MEDICINE
Payer: MEDICARE

## 2023-06-10 ENCOUNTER — APPOINTMENT (OUTPATIENT)
Facility: HOSPITAL | Age: 58
End: 2023-06-10
Payer: MEDICARE

## 2023-06-10 VITALS
HEIGHT: 65 IN | WEIGHT: 179 LBS | BODY MASS INDEX: 29.82 KG/M2 | SYSTOLIC BLOOD PRESSURE: 101 MMHG | DIASTOLIC BLOOD PRESSURE: 77 MMHG | TEMPERATURE: 97.9 F | OXYGEN SATURATION: 100 % | HEART RATE: 79 BPM | RESPIRATION RATE: 18 BRPM

## 2023-06-10 DIAGNOSIS — S80.01XA CONTUSION OF RIGHT KNEE, INITIAL ENCOUNTER: ICD-10-CM

## 2023-06-10 DIAGNOSIS — S09.90XA CLOSED HEAD INJURY, INITIAL ENCOUNTER: Primary | ICD-10-CM

## 2023-06-10 PROCEDURE — 6360000002 HC RX W HCPCS: Performed by: EMERGENCY MEDICINE

## 2023-06-10 PROCEDURE — 73562 X-RAY EXAM OF KNEE 3: CPT

## 2023-06-10 PROCEDURE — 70450 CT HEAD/BRAIN W/O DYE: CPT

## 2023-06-10 PROCEDURE — 72125 CT NECK SPINE W/O DYE: CPT

## 2023-06-10 RX ORDER — KETOROLAC TROMETHAMINE 30 MG/ML
30 INJECTION, SOLUTION INTRAMUSCULAR; INTRAVENOUS
Status: COMPLETED | OUTPATIENT
Start: 2023-06-10 | End: 2023-06-10

## 2023-06-10 RX ORDER — BUTALBITAL/ACETAMINOPHEN 50MG-325MG
1 TABLET ORAL EVERY 6 HOURS PRN
Qty: 20 TABLET | Refills: 0 | Status: SHIPPED | OUTPATIENT
Start: 2023-06-10

## 2023-06-10 RX ADMIN — KETOROLAC TROMETHAMINE 30 MG: 30 INJECTION, SOLUTION INTRAMUSCULAR; INTRAVENOUS at 20:02

## 2023-06-10 ASSESSMENT — PAIN SCALES - GENERAL: PAINLEVEL_OUTOF10: 5

## 2023-06-10 NOTE — ED PROVIDER NOTES
Sitagliptin Rash    Statins Itching       Medications:  No current facility-administered medications for this encounter. Current Outpatient Medications   Medication Sig Dispense Refill    acetaminophen-butalbital  MG TABS Take 1 tablet by mouth every 6 hours as needed for Headaches 20 tablet 0    acetaminophen (TYLENOL) 500 MG tablet Take 1,000 mg by mouth in the morning and 1,000 mg at noon and 1,000 mg in the evening and 1,000 mg before bedtime. Calcium Carbonate-Vitamin D 600-5 MG-MCG CAPS Take 2 tablets by mouth 2 times daily      diclofenac (VOLTAREN) 50 MG EC tablet diclofenac sodium 50 mg tablet,delayed release   TAKE 1 TABLET BY MOUTH TWICE DAILY      DULoxetine (CYMBALTA) 60 MG extended release capsule Take 90 mg by mouth      furosemide (LASIX) 20 MG tablet Take 40 mg by mouth daily as needed      gabapentin (NEURONTIN) 800 MG tablet Take 1 tablet by mouth 3 times daily.       hydrOXYzine pamoate (VISTARIL) 25 MG capsule Take 50 mg by mouth      latanoprost (XALATAN) 0.005 % ophthalmic solution Apply 1 drop to eye      metFORMIN (GLUCOPHAGE-XR) 500 MG extended release tablet TAKE 2 TABLET BY MOUTH TWICE DAILY DAILY WITH MEALS STOP SYNJARDY      midodrine (PROAMATINE) 5 MG tablet midodrine 5 mg tablet   TAKE 1 TABLET BY MOUTH THREE TIMES DAILY      polyethylene glycol (GLYCOLAX) 17 GM/SCOOP powder Take 17 g by mouth 2 times daily      potassium chloride (KLOR-CON M) 20 MEQ extended release tablet Take 20 mEq by mouth daily as needed      Semaglutide,0.25 or 0.5MG/DOS, (OZEMPIC, 0.25 OR 0.5 MG/DOSE,) 2 MG/1.5ML SOPN Inject 0.5 mg into the skin every 7 days      Semaglutide, 1 MG/DOSE, (OZEMPIC, 1 MG/DOSE,) 2 MG/1.5ML SOPN Inject 0.5 mg into the skin every 7 days      sertraline (ZOLOFT) 25 MG tablet Take 25 mg by mouth      timolol (BETIMOL) 0.5 % ophthalmic solution Apply 1 drop to eye 2 times daily      topiramate (TOPAMAX) 25 MG tablet Take 25 mg by mouth 2 times daily (with meals)

## 2023-06-10 NOTE — DISCHARGE INSTRUCTIONS
code not generated  Current [facility] Status: Active (This is the date your [facility] access code will )    Enter the last four digits of your Social Security Number (xxxx) and Date of Birth (mm/dd/yyyy) as indicated and click Submit. You will be taken to the next sign-up page. Create a 20:20 Mobile[employer] ID. This will be your [facility] login ID and cannot be changed, so think of one that is secure and easy to remember. Create a [facility] password. You can change your password at any time. Enter your Password Reset Question and Answer. This can be used at a later time if you forget your password. Enter your e-mail address. You will receive e-mail notification when new information is available in [address] Click Sign Up. You can now view and download portions of your medical record. Click the [employer] link to download a portable copy of your medical information. Additional Information    If you have questions, please visit the Frequently Asked Questions section of the [employer] [facility] website at [email or web address] Remember, [facility] is NOT to be used for urgent needs. For medical emergencies, dial 911.

## 2023-06-10 NOTE — ED TRIAGE NOTES
Pt to ED c/o a slip and fall in the bathroom occurring yesterday morning. Reports head strike, denies any LOC. Denies blood thinners. Arrives to ED c/o head and neck pain.

## 2023-08-11 ENCOUNTER — HOSPITAL ENCOUNTER (OUTPATIENT)
Facility: HOSPITAL | Age: 58
End: 2023-08-11
Attending: INTERNAL MEDICINE
Payer: MEDICARE

## 2023-08-11 DIAGNOSIS — R92.8 ABNORMAL MAMMOGRAM: ICD-10-CM

## 2023-08-11 DIAGNOSIS — C50.111 MALIGNANT NEOPLASM OF CENTRAL PORTION OF RIGHT FEMALE BREAST, UNSPECIFIED ESTROGEN RECEPTOR STATUS (HCC): ICD-10-CM

## 2023-08-11 LAB — CREAT BLD-MCNC: 1.2 MG/DL (ref 0.6–1.3)

## 2023-08-11 PROCEDURE — 82565 ASSAY OF CREATININE: CPT

## 2023-08-11 PROCEDURE — 6360000004 HC RX CONTRAST MEDICATION: Performed by: INTERNAL MEDICINE

## 2023-08-11 PROCEDURE — 74177 CT ABD & PELVIS W/CONTRAST: CPT

## 2023-08-11 RX ADMIN — IOPAMIDOL 100 ML: 755 INJECTION, SOLUTION INTRAVENOUS at 12:17

## 2023-09-05 ENCOUNTER — TRANSCRIBE ORDERS (OUTPATIENT)
Facility: HOSPITAL | Age: 58
End: 2023-09-05

## 2023-09-05 DIAGNOSIS — R93.422 ABNORMAL FINDING ON DIAGNOSTIC IMAGING OF LEFT KIDNEY: Primary | ICD-10-CM

## 2023-09-07 ENCOUNTER — TRANSCRIBE ORDERS (OUTPATIENT)
Facility: HOSPITAL | Age: 58
End: 2023-09-07

## 2023-09-07 DIAGNOSIS — R93.422 ABNORMAL FINDING ON DIAGNOSTIC IMAGING OF LEFT KIDNEY: Primary | ICD-10-CM

## 2023-09-08 ENCOUNTER — HOSPITAL ENCOUNTER (OUTPATIENT)
Facility: HOSPITAL | Age: 58
End: 2023-09-08
Attending: INTERNAL MEDICINE
Payer: MEDICARE

## 2023-09-08 DIAGNOSIS — R93.422 ABNORMAL FINDING ON DIAGNOSTIC IMAGING OF LEFT KIDNEY: ICD-10-CM

## 2023-09-08 LAB — CREAT BLD-MCNC: 1 MG/DL (ref 0.6–1.3)

## 2023-09-08 PROCEDURE — 82565 ASSAY OF CREATININE: CPT

## 2023-09-08 PROCEDURE — 74177 CT ABD & PELVIS W/CONTRAST: CPT

## 2023-09-08 PROCEDURE — 6360000004 HC RX CONTRAST MEDICATION: Performed by: INTERNAL MEDICINE

## 2023-09-08 RX ADMIN — IOPAMIDOL 100 ML: 755 INJECTION, SOLUTION INTRAVENOUS at 15:56

## 2023-09-10 ENCOUNTER — APPOINTMENT (OUTPATIENT)
Facility: HOSPITAL | Age: 58
End: 2023-09-10
Payer: MEDICARE

## 2023-09-10 ENCOUNTER — HOSPITAL ENCOUNTER (EMERGENCY)
Facility: HOSPITAL | Age: 58
Discharge: HOME | End: 2023-09-10
Attending: STUDENT IN AN ORGANIZED HEALTH CARE EDUCATION/TRAINING PROGRAM
Payer: MEDICARE

## 2023-09-10 VITALS
DIASTOLIC BLOOD PRESSURE: 75 MMHG | TEMPERATURE: 98.6 F | SYSTOLIC BLOOD PRESSURE: 106 MMHG | RESPIRATION RATE: 16 BRPM | OXYGEN SATURATION: 100 % | HEART RATE: 72 BPM

## 2023-09-10 DIAGNOSIS — J18.9 PNEUMONIA OF RIGHT LOWER LOBE DUE TO INFECTIOUS ORGANISM: Primary | ICD-10-CM

## 2023-09-10 LAB
ALBUMIN SERPL-MCNC: 3.2 G/DL (ref 3.5–5)
ALBUMIN/GLOB SERPL: 0.7 (ref 1.1–2.2)
ALP SERPL-CCNC: 87 U/L (ref 45–117)
ALT SERPL-CCNC: 23 U/L (ref 12–78)
ANION GAP SERPL CALC-SCNC: 5 MMOL/L (ref 5–15)
AST SERPL W P-5'-P-CCNC: 27 U/L (ref 15–37)
BASOPHILS # BLD: 0 K/UL (ref 0–0.1)
BASOPHILS NFR BLD: 0 % (ref 0–1)
BILIRUB SERPL-MCNC: 0.2 MG/DL (ref 0.2–1)
BNP SERPL-MCNC: 73 PG/ML
BUN SERPL-MCNC: 10 MG/DL (ref 6–20)
BUN/CREAT SERPL: 11 (ref 12–20)
CA-I BLD-MCNC: 9.5 MG/DL (ref 8.5–10.1)
CHLORIDE SERPL-SCNC: 106 MMOL/L (ref 97–108)
CO2 SERPL-SCNC: 28 MMOL/L (ref 21–32)
CREAT SERPL-MCNC: 0.91 MG/DL (ref 0.55–1.02)
DIFFERENTIAL METHOD BLD: ABNORMAL
EOSINOPHIL # BLD: 0.1 K/UL (ref 0–0.4)
EOSINOPHIL NFR BLD: 1 % (ref 0–7)
ERYTHROCYTE [DISTWIDTH] IN BLOOD BY AUTOMATED COUNT: 12.6 % (ref 11.5–14.5)
GLOBULIN SER CALC-MCNC: 4.5 G/DL (ref 2–4)
GLUCOSE SERPL-MCNC: 114 MG/DL (ref 65–100)
HCT VFR BLD AUTO: 29.6 % (ref 35–47)
HGB BLD-MCNC: 9.7 G/DL (ref 11.5–16)
IMM GRANULOCYTES # BLD AUTO: 0.1 K/UL (ref 0–0.04)
IMM GRANULOCYTES NFR BLD AUTO: 1 % (ref 0–0.5)
LYMPHOCYTES # BLD: 2.2 K/UL (ref 0.8–3.5)
LYMPHOCYTES NFR BLD: 21 % (ref 12–49)
MCH RBC QN AUTO: 30.6 PG (ref 26–34)
MCHC RBC AUTO-ENTMCNC: 32.8 G/DL (ref 30–36.5)
MCV RBC AUTO: 93.4 FL (ref 80–99)
MONOCYTES # BLD: 0.7 K/UL (ref 0–1)
MONOCYTES NFR BLD: 6 % (ref 5–13)
NEUTS SEG # BLD: 7.3 K/UL (ref 1.8–8)
NEUTS SEG NFR BLD: 71 % (ref 32–75)
NRBC # BLD: 0 K/UL (ref 0–0.01)
NRBC BLD-RTO: 0 PER 100 WBC
PLATELET # BLD AUTO: 356 K/UL (ref 150–400)
PMV BLD AUTO: 9.5 FL (ref 8.9–12.9)
POTASSIUM SERPL-SCNC: 4.1 MMOL/L (ref 3.5–5.1)
PROT SERPL-MCNC: 7.7 G/DL (ref 6.4–8.2)
RBC # BLD AUTO: 3.17 M/UL (ref 3.8–5.2)
SODIUM SERPL-SCNC: 139 MMOL/L (ref 136–145)
TROPONIN I SERPL HS-MCNC: <4 NG/L (ref 0–51)
WBC # BLD AUTO: 10.3 K/UL (ref 3.6–11)

## 2023-09-10 PROCEDURE — 6360000002 HC RX W HCPCS: Performed by: STUDENT IN AN ORGANIZED HEALTH CARE EDUCATION/TRAINING PROGRAM

## 2023-09-10 PROCEDURE — 99284 EMERGENCY DEPT VISIT MOD MDM: CPT

## 2023-09-10 PROCEDURE — 2500000003 HC RX 250 WO HCPCS: Performed by: STUDENT IN AN ORGANIZED HEALTH CARE EDUCATION/TRAINING PROGRAM

## 2023-09-10 PROCEDURE — 96365 THER/PROPH/DIAG IV INF INIT: CPT

## 2023-09-10 PROCEDURE — 85025 COMPLETE CBC W/AUTO DIFF WBC: CPT

## 2023-09-10 PROCEDURE — 96375 TX/PRO/DX INJ NEW DRUG ADDON: CPT

## 2023-09-10 PROCEDURE — 36415 COLL VENOUS BLD VENIPUNCTURE: CPT

## 2023-09-10 PROCEDURE — 83880 ASSAY OF NATRIURETIC PEPTIDE: CPT

## 2023-09-10 PROCEDURE — 84484 ASSAY OF TROPONIN QUANT: CPT

## 2023-09-10 PROCEDURE — 71046 X-RAY EXAM CHEST 2 VIEWS: CPT

## 2023-09-10 PROCEDURE — 6370000000 HC RX 637 (ALT 250 FOR IP): Performed by: STUDENT IN AN ORGANIZED HEALTH CARE EDUCATION/TRAINING PROGRAM

## 2023-09-10 PROCEDURE — 80053 COMPREHEN METABOLIC PANEL: CPT

## 2023-09-10 PROCEDURE — 2580000003 HC RX 258: Performed by: STUDENT IN AN ORGANIZED HEALTH CARE EDUCATION/TRAINING PROGRAM

## 2023-09-10 RX ORDER — DOXYCYCLINE HYCLATE 100 MG
100 TABLET ORAL 2 TIMES DAILY
Qty: 20 TABLET | Refills: 0 | Status: SHIPPED | OUTPATIENT
Start: 2023-09-10 | End: 2023-09-20

## 2023-09-10 RX ORDER — KETOROLAC TROMETHAMINE 15 MG/ML
15 INJECTION, SOLUTION INTRAMUSCULAR; INTRAVENOUS ONCE
Status: COMPLETED | OUTPATIENT
Start: 2023-09-10 | End: 2023-09-10

## 2023-09-10 RX ORDER — ACETAMINOPHEN 325 MG/1
650 TABLET ORAL
Status: COMPLETED | OUTPATIENT
Start: 2023-09-10 | End: 2023-09-10

## 2023-09-10 RX ADMIN — KETOROLAC TROMETHAMINE 15 MG: 15 INJECTION, SOLUTION INTRAMUSCULAR; INTRAVENOUS at 12:43

## 2023-09-10 RX ADMIN — CEFTRIAXONE SODIUM 1000 MG: 1 INJECTION, POWDER, FOR SOLUTION INTRAMUSCULAR; INTRAVENOUS at 12:45

## 2023-09-10 RX ADMIN — ACETAMINOPHEN 650 MG: 325 TABLET ORAL at 12:41

## 2023-09-10 RX ADMIN — DOXYCYCLINE 100 MG: 100 INJECTION, POWDER, LYOPHILIZED, FOR SOLUTION INTRAVENOUS at 12:49

## 2023-09-10 ASSESSMENT — LIFESTYLE VARIABLES
HOW OFTEN DO YOU HAVE A DRINK CONTAINING ALCOHOL: NEVER
HOW MANY STANDARD DRINKS CONTAINING ALCOHOL DO YOU HAVE ON A TYPICAL DAY: PATIENT DOES NOT DRINK

## 2023-09-10 ASSESSMENT — PAIN - FUNCTIONAL ASSESSMENT: PAIN_FUNCTIONAL_ASSESSMENT: ACTIVITIES ARE NOT PREVENTED

## 2023-09-10 ASSESSMENT — PAIN SCALES - GENERAL: PAINLEVEL_OUTOF10: 6

## 2023-09-10 NOTE — DISCHARGE INSTRUCTIONS
Thank you! Thank you for allowing me to care for you in the emergency department. I sincerely hope that you are satisfied with your visit today. It is my goal to provide you with excellent care. Below you will find a list of your labs and imaging from your visit today if applicable. Should you have any questions regarding these results please do not hesitate to call the emergency department. Please review [employer] for a more detailed result list since the below list may not be comprehensive. Instructions on how to sign up to [email or web address] should be provided in this packet.     Labs -     Recent Results (from the past 12 hour(s))   CMP    Collection Time: 09/10/23 12:16 PM   Result Value Ref Range    Sodium 139 136 - 145 mmol/L    Potassium 4.1 3.5 - 5.1 mmol/L    Chloride 106 97 - 108 mmol/L    CO2 28 21 - 32 mmol/L    Anion Gap 5 5 - 15 mmol/L    Glucose 114 (H) 65 - 100 mg/dL    BUN 10 6 - 20 mg/dL    Creatinine 0.91 0.55 - 1.02 mg/dL    Bun/Cre Ratio 11 (L) 12 - 20      Est, Glom Filt Rate >60 >60 ml/min/1.73m2    Calcium 9.5 8.5 - 10.1 mg/dL    Total Bilirubin 0.2 0.2 - 1.0 mg/dL    AST 27 15 - 37 U/L    ALT 23 12 - 78 U/L    Alk Phosphatase 87 45 - 117 U/L    Total Protein 7.7 6.4 - 8.2 g/dL    Albumin 3.2 (L) 3.5 - 5.0 g/dL    Globulin 4.5 (H) 2.0 - 4.0 g/dL    Albumin/Globulin Ratio 0.7 (L) 1.1 - 2.2     Troponin    Collection Time: 09/10/23 12:16 PM   Result Value Ref Range    Troponin, High Sensitivity <4 0 - 51 ng/L   Brain Natriuretic Peptide    Collection Time: 09/10/23 12:16 PM   Result Value Ref Range    NT Pro-BNP 73 <125 pg/mL   CBC with Auto Differential    Collection Time: 09/10/23 12:45 PM   Result Value Ref Range    WBC 10.3 3.6 - 11.0 K/uL    RBC 3.17 (L) 3.80 - 5.20 M/uL    Hemoglobin 9.7 (L) 11.5 - 16.0 g/dL    Hematocrit 29.6 (L) 35.0 - 47.0 %    MCV 93.4 80.0 - 99.0 FL    MCH 30.6 26.0 - 34.0 PG    MCHC 32.8 30.0 - 36.5 g/dL    RDW 12.6 11.5 - 14.5 %    Platelets 075 406 - 899 K/uL    MPV

## 2023-09-10 NOTE — ED PROVIDER NOTES
homophones, and other interpretive errors are inadvertently transcribed by the computer software. Please disregards these errors.  Please excuse any errors that have escaped final proofreading.)       Dennard Jeans, MD  09/10/23 [id number]

## 2023-09-10 NOTE — ED NOTES
Ambulatory pulse ox completed. Pt remained at or above 95% on RA throughout ambulation. Dr. Mo Coles aware.      Luisito Goodrich, DELMAR  09/10/23 [id number]

## 2023-09-10 NOTE — ED TRIAGE NOTES
Pt reports still coughing, chest pressure, congestion. Is concerned for pneumonia, has been taking OTC meds with little relief.

## 2023-10-06 ENCOUNTER — APPOINTMENT (OUTPATIENT)
Facility: HOSPITAL | Age: 58
End: 2023-10-06
Payer: MEDICARE

## 2023-10-06 ENCOUNTER — HOSPITAL ENCOUNTER (EMERGENCY)
Facility: HOSPITAL | Age: 58
Discharge: HOME | End: 2023-10-06
Attending: EMERGENCY MEDICINE
Payer: MEDICARE

## 2023-10-06 VITALS
BODY MASS INDEX: 31.45 KG/M2 | RESPIRATION RATE: 20 BRPM | DIASTOLIC BLOOD PRESSURE: 83 MMHG | OXYGEN SATURATION: 99 % | HEART RATE: 81 BPM | WEIGHT: 189 LBS | TEMPERATURE: 97.8 F | SYSTOLIC BLOOD PRESSURE: 120 MMHG

## 2023-10-06 DIAGNOSIS — R07.9 RIGHT-SIDED CHEST PAIN: Primary | ICD-10-CM

## 2023-10-06 LAB
ALBUMIN SERPL-MCNC: 3.4 G/DL (ref 3.5–5)
ALBUMIN/GLOB SERPL: 0.9 (ref 1.1–2.2)
ALP SERPL-CCNC: 80 U/L (ref 45–117)
ALT SERPL-CCNC: 39 U/L (ref 12–78)
ANION GAP SERPL CALC-SCNC: 6 MMOL/L (ref 5–15)
AST SERPL W P-5'-P-CCNC: 16 U/L (ref 15–37)
BASOPHILS # BLD: 0.1 K/UL (ref 0–0.1)
BASOPHILS NFR BLD: 0 % (ref 0–1)
BILIRUB SERPL-MCNC: 0.2 MG/DL (ref 0.2–1)
BUN SERPL-MCNC: 23 MG/DL (ref 6–20)
BUN/CREAT SERPL: 21 (ref 12–20)
CA-I BLD-MCNC: 9.3 MG/DL (ref 8.5–10.1)
CHLORIDE SERPL-SCNC: 111 MMOL/L (ref 97–108)
CO2 SERPL-SCNC: 27 MMOL/L (ref 21–32)
CREAT SERPL-MCNC: 1.1 MG/DL (ref 0.55–1.02)
DIFFERENTIAL METHOD BLD: ABNORMAL
EOSINOPHIL # BLD: 0.1 K/UL (ref 0–0.4)
EOSINOPHIL NFR BLD: 1 % (ref 0–7)
ERYTHROCYTE [DISTWIDTH] IN BLOOD BY AUTOMATED COUNT: 13.9 % (ref 11.5–14.5)
GLOBULIN SER CALC-MCNC: 3.9 G/DL (ref 2–4)
GLUCOSE SERPL-MCNC: 83 MG/DL (ref 65–100)
HCT VFR BLD AUTO: 34.7 % (ref 35–47)
HGB BLD-MCNC: 11.1 G/DL (ref 11.5–16)
IMM GRANULOCYTES # BLD AUTO: 0 K/UL (ref 0–0.04)
IMM GRANULOCYTES NFR BLD AUTO: 0 % (ref 0–0.5)
LYMPHOCYTES # BLD: 2.5 K/UL (ref 0.8–3.5)
LYMPHOCYTES NFR BLD: 23 % (ref 12–49)
MAGNESIUM SERPL-MCNC: 2.5 MG/DL (ref 1.6–2.4)
MCH RBC QN AUTO: 30.4 PG (ref 26–34)
MCHC RBC AUTO-ENTMCNC: 32 G/DL (ref 30–36.5)
MCV RBC AUTO: 95.1 FL (ref 80–99)
MONOCYTES # BLD: 0.6 K/UL (ref 0–1)
MONOCYTES NFR BLD: 6 % (ref 5–13)
NEUTS SEG # BLD: 7.9 K/UL (ref 1.8–8)
NEUTS SEG NFR BLD: 70 % (ref 32–75)
NRBC # BLD: 0 K/UL (ref 0–0.01)
NRBC BLD-RTO: 0 PER 100 WBC
PLATELET # BLD AUTO: 347 K/UL (ref 150–400)
PMV BLD AUTO: 10.1 FL (ref 8.9–12.9)
POTASSIUM SERPL-SCNC: 4 MMOL/L (ref 3.5–5.1)
PROT SERPL-MCNC: 7.3 G/DL (ref 6.4–8.2)
RBC # BLD AUTO: 3.65 M/UL (ref 3.8–5.2)
SARS-COV-2 RDRP RESP QL NAA+PROBE: NOT DETECTED
SODIUM SERPL-SCNC: 144 MMOL/L (ref 136–145)
TROPONIN I SERPL HS-MCNC: 5 NG/L (ref 0–51)
WBC # BLD AUTO: 11.2 K/UL (ref 3.6–11)

## 2023-10-06 PROCEDURE — 36415 COLL VENOUS BLD VENIPUNCTURE: CPT

## 2023-10-06 PROCEDURE — 80053 COMPREHEN METABOLIC PANEL: CPT

## 2023-10-06 PROCEDURE — 99285 EMERGENCY DEPT VISIT HI MDM: CPT

## 2023-10-06 PROCEDURE — 83735 ASSAY OF MAGNESIUM: CPT

## 2023-10-06 PROCEDURE — 85025 COMPLETE CBC W/AUTO DIFF WBC: CPT

## 2023-10-06 PROCEDURE — 6360000004 HC RX CONTRAST MEDICATION: Performed by: EMERGENCY MEDICINE

## 2023-10-06 PROCEDURE — 96374 THER/PROPH/DIAG INJ IV PUSH: CPT

## 2023-10-06 PROCEDURE — 84484 ASSAY OF TROPONIN QUANT: CPT

## 2023-10-06 PROCEDURE — 71275 CT ANGIOGRAPHY CHEST: CPT

## 2023-10-06 PROCEDURE — 93005 ELECTROCARDIOGRAM TRACING: CPT

## 2023-10-06 PROCEDURE — 6360000002 HC RX W HCPCS: Performed by: EMERGENCY MEDICINE

## 2023-10-06 PROCEDURE — 6370000000 HC RX 637 (ALT 250 FOR IP): Performed by: EMERGENCY MEDICINE

## 2023-10-06 PROCEDURE — 87635 SARS-COV-2 COVID-19 AMP PRB: CPT

## 2023-10-06 RX ORDER — ASPIRIN 325 MG
325 TABLET ORAL
Status: COMPLETED | OUTPATIENT
Start: 2023-10-06 | End: 2023-10-06

## 2023-10-06 RX ORDER — KETOROLAC TROMETHAMINE 30 MG/ML
30 INJECTION, SOLUTION INTRAMUSCULAR; INTRAVENOUS
Status: COMPLETED | OUTPATIENT
Start: 2023-10-06 | End: 2023-10-06

## 2023-10-06 RX ORDER — HYDROCODONE BITARTRATE AND ACETAMINOPHEN 5; 325 MG/1; MG/1
1 TABLET ORAL EVERY 6 HOURS PRN
Qty: 20 TABLET | Refills: 0 | Status: SHIPPED | OUTPATIENT
Start: 2023-10-06 | End: 2023-10-11

## 2023-10-06 RX ORDER — METHOCARBAMOL 750 MG/1
750 TABLET, FILM COATED ORAL 4 TIMES DAILY
Qty: 40 TABLET | Refills: 0 | Status: SHIPPED | OUTPATIENT
Start: 2023-10-06 | End: 2023-10-16

## 2023-10-06 RX ADMIN — KETOROLAC TROMETHAMINE 30 MG: 30 INJECTION, SOLUTION INTRAMUSCULAR; INTRAVENOUS at 15:00

## 2023-10-06 RX ADMIN — IOPAMIDOL 100 ML: 755 INJECTION, SOLUTION INTRAVENOUS at 18:24

## 2023-10-06 RX ADMIN — ASPIRIN 325 MG ORAL TABLET 325 MG: 325 PILL ORAL at 15:00

## 2023-10-06 ASSESSMENT — PAIN - FUNCTIONAL ASSESSMENT: PAIN_FUNCTIONAL_ASSESSMENT: 0-10

## 2023-10-06 ASSESSMENT — HEART SCORE: ECG: 0

## 2023-10-06 ASSESSMENT — PAIN SCALES - GENERAL
PAINLEVEL_OUTOF10: 3
PAINLEVEL_OUTOF10: 8

## 2023-10-06 NOTE — ED PROVIDER NOTES
[facility] EMERGENCY DEPT  EMERGENCY DEPARTMENT HISTORY AND PHYSICAL EXAM      Date: 10/6/2023  Patient Name: Elsa Stanley  MRN: [id number]  [address] [facility] 1965  Date of evaluation: 10/6/2023  Provider: Gin Dang MD   Note Started: 2:20 PM EDT 10/6/23    HISTORY OF PRESENT ILLNESS     Chief Complaint   Patient presents with    Chest Pain    Shortness of Breath       History Provided By: Patient    HPI: Elsa Stanley is a 59 y.o. female presents to the emergency department complaint of 3 days history of right-sided chest pain. Patient reports she is noticed that the pain is worse with movement or taking a deep breath. Patient also reports exposure to someone who tested positive for COVID-19 during this time. Patient denies fevers or chills, denies nausea or vomiting.     PAST MEDICAL HISTORY   Past Medical History:  Past Medical History:   Diagnosis Date    Arthritis     Back/Neck problemsm hips and knees bilaterally    Burning with urination     pt  denies at this time     10/17/22    Cancer [facility])     RIGHT breast,  reoccurrance in 2022    Congestive heart failure (HCC)     Depression     Diabetes (HCC)     Dizziness     GERD (gastroesophageal reflux disease)     Glaucoma     Gout     Headache     Heart disease     Hypercholesterolemia     Hypertension     Localized swelling of both lower legs     s/p chemo RX in   / wears compression stockings    Lymphedema     s/p breast lumpectomy 2009    Morbid obesity ([address])     Neuropathy     Shortness of breath     With activity    Sleep apnea 1998    does not use CPAP    Sleep disorder     Difficulty falling asleep, night sweats    Stool color black     patient denies  as of 10/17/2022       Past Surgical History:  Past Surgical History:   Procedure Laterality Date    APPENDECTOMY      BREAST LUMPECTOMY  2009    RIGHT with SNBX    CHOLECYSTECTOMY      GASTRIC BYPASS SURGERY  2020    GYN       x1, Bilateral tubal ligation, Dilation & close follow-up as well as cardiology referral and return precautions. Patient was given the following medications:  Medications   aspirin tablet 325 mg (has no administration in time range)   ketorolac (TORADOL) injection 30 mg (has no administration in time range)   iopamidol (ISOVUE-370) 76 % injection 100 mL (100 mLs IntraVENous Given 10/6/23 4764)       CONSULTS: (Who and What was discussed)  None     Social Determinants affecting Dx or Tx: None    Smoking Cessation: Not Applicable    PROCEDURES   Unless otherwise noted above, none  Procedures      CRITICAL CARE TIME   Patient does not meet Critical Care Time, 0 minutes    ED FINAL IMPRESSION     1. Right-sided chest pain          DISPOSITION/PLAN   DISPOSITION Decision To Discharge 10/06/2023 06:37:56 PM    Discharge Note: The patient is stable for discharge home. The signs, symptoms, diagnosis, and discharge instructions have been discussed, understanding conveyed, and agreed upon. The patient is to follow up as recommended or return to ER should their symptoms worsen. PATIENT REFERRED TO:  Freida Gaytan MD  [address] [facility]  [address] [place] [address]  [phone]    Schedule an appointment as soon as possible for a visit   Or follow-up with your cardiologist.        DISCHARGE MEDICATIONS:     Medication List        START taking these medications      HYDROcodone-acetaminophen 5-325 MG per tablet  Commonly known as: [address]  Take 1 tablet by mouth every 6 hours as needed for Pain for up to 5 days.  Max Daily Amount: 4 tablets     methocarbamol 750 MG tablet  Commonly known as: Robaxin-750  Take 1 tablet by mouth 4 times daily for 10 days            ASK your doctor about these medications      acetaminophen 500 MG tablet  Commonly known as: TYLENOL     acetaminophen-butalbital  MG Tabs  Take 1 tablet by mouth every 6 hours as needed for Headaches     Betimol 0.5 % ophthalmic solution  Generic drug: timolol     Calcium Carbonate-Vitamin D 600-5

## 2023-10-06 NOTE — DISCHARGE INSTRUCTIONS
Thank you! Thank you for allowing me to care for you in the emergency department. It is my goal to provide you with excellent care. If you have not received excellent quality care, please ask to speak to the nurse manager. Please fill out the survey that will come to you by mail or email since we listen to your feedback! Below you will find a list of your tests from today's visit. Should you have any questions, please do not hesitate to call the emergency department.     Labs  Recent Results (from the past 12 hour(s))   CBC with Auto Differential    Collection Time: 10/06/23  2:45 PM   Result Value Ref Range    WBC 11.2 (H) 3.6 - 11.0 K/uL    RBC 3.65 (L) 3.80 - 5.20 M/uL    Hemoglobin 11.1 (L) 11.5 - 16.0 g/dL    Hematocrit 34.7 (L) 35.0 - 47.0 %    MCV 95.1 80.0 - 99.0 FL    MCH 30.4 26.0 - 34.0 PG    MCHC 32.0 30.0 - 36.5 g/dL    RDW 13.9 11.5 - 14.5 %    Platelets 200 518 - 398 K/uL    MPV 10.1 8.9 - 12.9 FL    Nucleated RBCs 0.0 0.0  WBC    nRBC 0.00 0.00 - 0.01 K/uL    Neutrophils % 70 32 - 75 %    Lymphocytes % 23 12 - 49 %    Monocytes % 6 5 - 13 %    Eosinophils % 1 0 - 7 %    Basophils % 0 0 - 1 %    Immature Granulocytes 0 0 - 0.5 %    Neutrophils Absolute 7.9 1.8 - 8.0 K/UL    Lymphocytes Absolute 2.5 0.8 - 3.5 K/UL    Monocytes Absolute 0.6 0.0 - 1.0 K/UL    Eosinophils Absolute 0.1 0.0 - 0.4 K/UL    Basophils Absolute 0.1 0.0 - 0.1 K/UL    Absolute Immature Granulocyte 0.0 0.00 - 0.04 K/UL    Differential Type AUTOMATED     CMP    Collection Time: 10/06/23  2:45 PM   Result Value Ref Range    Sodium 144 136 - 145 mmol/L    Potassium 4.0 3.5 - 5.1 mmol/L    Chloride 111 (H) 97 - 108 mmol/L    CO2 27 21 - 32 mmol/L    Anion Gap 6 5 - 15 mmol/L    Glucose 83 65 - 100 mg/dL    BUN 23 (H) 6 - 20 mg/dL    Creatinine 1.10 (H) 0.55 - 1.02 mg/dL    Bun/Cre Ratio 21 (H) 12 - 20      Est, Glom Filt Rate 58 (L) >60 ml/min/1.73m2    Calcium 9.3 8.5 - 10.1 mg/dL    Total Bilirubin 0.2 0.2 - 1.0

## 2023-10-06 NOTE — ED TRIAGE NOTES
C/o right sided chest pain and difficulty taking in deep breaths, states that this began approx. 2 1/2 days ago. Hx cardiomyopathy, hypotension, breast cancer, double mastectomy, lymphedema. 8/10 pain at this time. Pt also states that she has been exposed to someone who has tested positive for COVID-19 in the last two weeks.

## 2023-10-11 LAB
EKG ATRIAL RATE: 76 BPM
EKG DIAGNOSIS: NORMAL
EKG P AXIS: 23 DEGREES
EKG P-R INTERVAL: 134 MS
EKG Q-T INTERVAL: 378 MS
EKG QRS DURATION: 72 MS
EKG QTC CALCULATION (BAZETT): 425 MS
EKG R AXIS: -9 DEGREES
EKG T AXIS: 35 DEGREES
EKG VENTRICULAR RATE: 76 BPM

## 2023-10-31 ENCOUNTER — HOSPITAL ENCOUNTER (OUTPATIENT)
Facility: HOSPITAL | Age: 58
Discharge: HOME | End: 2023-11-03
Attending: INTERNAL MEDICINE
Payer: MEDICARE

## 2023-10-31 DIAGNOSIS — C50.111 MALIGNANT NEOPLASM OF CENTRAL PORTION OF RIGHT FEMALE BREAST, UNSPECIFIED ESTROGEN RECEPTOR STATUS (HCC): ICD-10-CM

## 2023-10-31 DIAGNOSIS — C50.911 MALIGNANT NEOPLASM OF RIGHT FEMALE BREAST, UNSPECIFIED ESTROGEN RECEPTOR STATUS, UNSPECIFIED SITE OF BREAST (HCC): ICD-10-CM

## 2023-10-31 PROCEDURE — 71260 CT THORAX DX C+: CPT

## 2023-10-31 PROCEDURE — 6360000004 HC RX CONTRAST MEDICATION: Performed by: INTERNAL MEDICINE

## 2023-10-31 RX ADMIN — IOPAMIDOL 100 ML: 755 INJECTION, SOLUTION INTRAVENOUS at 15:44

## 2023-11-06 NOTE — PROGRESS NOTES
Reason for Admission:  Peripheral Edema                     RUR Score: 10%                    Plan for utilizing home health: Willing if recommended       PCP: First and Last name:  Kenan Montoya MD     Name of Practice:    Are you a current patient: Yes/No: Yes   Approximate date of last visit: Unsure    Can you participate in a virtual visit with your PCP:                     Current Advanced Directive/Advance Care Plan: Full Code      Healthcare Decision Maker:   Click here to complete [address] including selection of the [address] Relationship (ie \"Primary\")           Nitesh Fischer   [phone]                  Transition of Care Plan:                    CM discussed discharge planning with patient at bedside. patient will return home self care.  will assist as needed. Patient lives in a single level home with 4 steps to enter. She is independent with ADL care.  assist with IADL care. She has a rolling walker and rollator for home. Patient self medicate. Pharmacy: [facility] standard [facility]. [facility] on [address] [address] for STAT medications.  [facility]  will transport at discharge. Subjective   Patient ID: Andrea is a 25 year old male.  Referring provider is Malu Perez CNP.    Chief Complaint   Patient presents with   • New Patient     Referred by Malu Perez NP. Pt reports R sided LBP since mid-September - no accident or injury. Denies history of LBP. Pt reports radiating pain down RLE to toes with occasional N/T/W down RLE. Pt denies LLE symptoms. Pt reports both the LBP and RLE pain as 4/10 on the pain scale. PT reports he had bladder incontinence at first, but it subsided about 1-2 weeks after taking the MDP. Taking Tylenol or Ibuprofen PRN. Denies previous MRI or CT scan. Denies any treatment other than Flexeril and MDP.    • Office Visit     Recent xrays done with [facility] 9/18/2023 ( pt was supine)  Explained to pt xrays will be needed prior to appnt.     HPI  Pt is a 26yo M for evaluation of lumbar spine.   Pt denies any trauma. Pt woke up with pain R knee area in August 2023. Pt then c/o R sided lower back pain, R buttock pain, and R thigh pain.   Pt went to [facility] 9/7/23. Venous dopplar US neg for DVT R leg. Pt discharged with ibuprofen 600mg #40.   Pt then f/u with PCP 9/18/23 and started on MDP and flexeril 10mg. Pt sts that he noticed improvement with MDP.  Pt now presents today for evaluation.  Pt sts that when he has pain R lower back, he will have pain radiating down R leg in the am that lasts 1 hour. He also c/o R knee pain and numbness/tingling R foot  Pt describes pain in R lower back as 4-5/10 with 10 being most severe.  Pt describes L leg pain as 0/10 with 10 being most severe.  Pt describes R leg pain as 3-4/10 with 10 being most severe.  Pt without any weakness B legs.  Pt with numbness R foot  Pt denies any bowel/bladder incontinence.  Past tx included:  No PT, injections, nor chiropractic tx.  Mother accompanies pt    Andrea has a past medical history of Anxiety, Depressive disorder, and Essential (primary) hypertension.  Andrea has Severe  episode of recurrent major depressive disorder, without psychotic features (CMD); Benign hypertension; Asperger syndrome; Class 3 severe obesity in adult (CMD); Metabolic syndrome; Influenza vaccine needed; BMI 40.0-44.9, adult (CMD); Dietary counseling and surveillance; Family history of aneurysm; Prediabetes; Class 3 severe obesity due to excess calories with serious comorbidity and body mass index (BMI) of 50.0 to 59.9 in adult (CMD); and Lumbar radiculopathy on their problem list.  Andrea has a past surgical history that includes oral surgery procedure (Bilateral).  His family history includes Hypertension in his father, maternal grandfather, and mother; Patient is unaware of any medical problems in his brother.  Andrea reports that he has never smoked. He has never used smokeless tobacco. He reports that he does not currently use alcohol. He reports that he does not use drugs.  Andrea has a current medication list which includes the following prescription(s): acetaminophen, amlodipine, valsartan-hydrochlorothiazide, bupropion xl, escitalopram, trazodone, vitamin d (ergocalciferol), metformin, naproxen, and cyclobenzaprine.  Andrea   Current Outpatient Medications   Medication Sig Dispense Refill   • acetaminophen (TYLENOL) 500 MG tablet      • naproxen 500 MG delayed-release tablet Take 1 tablet by mouth 2 times daily as needed (pain). 60 tablet 2   • amLODIPine (NORVASC) 5 MG tablet Take 1 tablet by mouth daily. 90 tablet 1   • cyclobenzaprine (FLEXERIL) 10 MG tablet Take 1 tablet by mouth 3 times daily as needed for Muscle spasms. 15 tablet 0   • valsartan-hydroCHLOROthiazide (DIOVAN-HCT) 80-12.5 MG per tablet Take 1 tablet by mouth daily. 90 tablet 1   • buPROPion XL (WELLBUTRIN XL) 300 MG 24 hr tablet Take 1 tablet by mouth daily. 90 tablet 1   • escitalopram (LEXAPRO) 20 MG tablet Take 1 tablet by mouth daily. 90 tablet 1   • traZODone (DESYREL) 100 MG tablet TAKE 1 TABLET BY MOUTH TWICE DAILY  180 tablet 1   • Vitamin D, Ergocalciferol, 1.25 mg (50,000 units) capsule Take 1 capsule by mouth 1 day a week. 12 capsule 3   • metFORMIN (GLUCOPHAGE) 500 MG tablet TAKE 1 TABLET BY MOUTH TWICE DAILY WITH MEALS 180 tablet 1     No current facility-administered medications for this visit.     Andrea has No Known Allergies.    Review of Systems    Objective   Physical Exam  Constitutional:       Appearance: Normal appearance.   HENT:      Head: Normocephalic.      Neck: Neck supple.   Eyes:      General: Lids are normal.      Extraocular Movements: Extraocular movements intact.      Pupils: Pupils are equal, round, and reactive to light.   Pulmonary:      Effort: Pulmonary effort is normal.   Skin:     General: Skin is warm and dry.   Neurological:      Mental Status: He is alert and oriented to person, place, and time.      Motor: Motor strength is normal.     Coordination: Coordination is intact.      Deep Tendon Reflexes: Reflexes are normal and symmetric.   Psychiatric:         Mood and Affect: Mood normal.         Speech: Speech normal.         Behavior: Behavior normal.       Neurological Exam  Mental Status  Alert. Oriented to person, place, and time. Recent and remote memory are intact. Speech is normal. Language is fluent with no aphasia. Attention and concentration are normal. Fund of knowledge is appropriate for level of education.    Cranial Nerves  CN I: Sense of smell is normal.  CN II: Visual acuity is normal. Visual fields full to confrontation.  CN III, IV, VI: Extraocular movements intact bilaterally. Normal lids and orbits bilaterally. Pupils equal round and reactive to light bilaterally.  CN V: Facial sensation is normal.  CN VII: Full and symmetric facial movement.  CN VIII: Hearing is normal.  CN IX, X: Palate elevates symmetrically  CN XI: Shoulder shrug strength is normal.  CN XII: Tongue midline without atrophy or fasciculations.    Motor  Normal muscle bulk throughout. No fasciculations  present. Normal muscle tone. Strength is 5/5 throughout all four extremities.    Sensory  Light touch is normal in upper and lower extremities. Pinprick is normal in upper and lower extremities. Vibration is normal in upper and lower extremities.     Reflexes  Deep tendon reflexes are 2+ and symmetric in all four extremities.    Coordination    Finger-to-nose, rapid alternating movements and heel-to-shin normal bilaterally without dysmetria.    Gait  Casual gait is normal including stance, stride, and arm swing.    +SLR in R leg in seated position    Imaging/Labs  I have personally reviewed imaging/labs.  xrays lumbar spine 11/6/23 [facility] shows lumbosacralized transitional anatomy, mild thoracolumbar dextrocurvature, disc space narrowing L5S1. No instability with flex/extension.    Report as follows:  EXAM: XR LUMBAR SPINE AP LAT WITH FLEX EXT 4 VIEWS     CLINICAL INDICATION: pain     TECHNIQUE: Radiographs of the lumbar spine.  Number of views: 4.     COMPARISON: 9/18/2023     FINDINGS:      There is suggestion of lumbosacral transitional anatomy present with a  presumed lumbarized S1 level.     Exaggerated lumbar lordosis with maintained vertebral body heights. Mild  retrolisthesis of L1 over L2 and L2 over L3 as well as L5 over S1.     Thoracolumbar dextrocurvature. No acute vertebral body collapse. Mild  spondylosis.     No appreciable instability on flexion or extension views.     Sacroiliac joints be relatively preserved.        IMPRESSION:     Mild thoracolumbar dextrocurvature with mild spondylosis. Lumbosacral  transitional anatomy is present.           Electronically Signed by: ZACH TYSON M.D.   Signed on: 11/6/2023 2:45 PM   Workstation ID: [id number]    Greater than 50% of the visit was spent counseling regarding above issues; total time spent 20 minutes.  Pt seen under supervision of Dr. Adiel James.    Assessment   Problem List Items Addressed This Visit        Neuro     Lumbar radiculopathy     R sided lower back pain, R leg pain  Pt with lumbar spondylosis  Pt to start PT and NSAID. Today Rx EC naprosyn 500mg BID with food sent to pharmacy. Pt to stop motrin.   F/U in 6 wks           Relevant Medications    naproxen 500 MG delayed-release tablet    Other Relevant Orders    SERVICE TO PHYSICAL THERAPY   Other Visit Diagnoses     Low back pain, unspecified back pain laterality, unspecified chronicity, unspecified whether sciatica present    -  Primary    Relevant Orders    XR LUMBAR SPINE AP LAT WITH FLEX EXT 4 VIEWS (Completed)          I have personally interviewed, examined the patient and reviewed all imaging studies with the patient via face-to-face visit. I confirmed the findings listed below:     Lumbar spondylosis  Lumbar radiculopathy    This was discussed with my PA, Judy Horn, and I agree with the assessment and plan as documented. The plan of care was discussed with the patient. I personally reviewed the imaging witht he patient and discussed the relationship of the findings to his clinical symptoms and the rationale for the proposed treatment.  He is in agreement with the recommendations.  All his questions were answered to their satisfaction.     Adiel James MD FACS FAANS  ABNS Board Certified Neurosurgeon  Clinical , [facility]  Fellow, American Association of Neurological Surgeons  Fellow, [employer]  Fellow, American College of [employer]geons

## 2023-11-20 ENCOUNTER — TRANSCRIBE ORDERS (OUTPATIENT)
Facility: HOSPITAL | Age: 58
End: 2023-11-20

## 2023-11-20 DIAGNOSIS — E04.1 THYROID NODULE: Primary | ICD-10-CM

## 2023-11-21 ENCOUNTER — HOSPITAL ENCOUNTER (OUTPATIENT)
Facility: HOSPITAL | Age: 58
Discharge: HOME | End: 2023-11-24
Payer: MEDICARE

## 2023-11-21 DIAGNOSIS — E04.1 THYROID NODULE: ICD-10-CM

## 2023-11-21 PROCEDURE — 76536 US EXAM OF HEAD AND NECK: CPT

## 2023-11-29 ENCOUNTER — HOSPITAL ENCOUNTER (OUTPATIENT)
Facility: HOSPITAL | Age: 58
Discharge: HOME | End: 2023-12-02
Attending: INTERNAL MEDICINE
Payer: MEDICARE

## 2023-11-29 DIAGNOSIS — R93.422 ABNORMAL FINDING ON DIAGNOSTIC IMAGING OF LEFT KIDNEY: ICD-10-CM

## 2023-11-29 PROCEDURE — 76770 US EXAM ABDO BACK WALL COMP: CPT

## 2023-12-01 ENCOUNTER — OFFICE VISIT (OUTPATIENT)
Age: 58
End: 2023-12-01
Payer: MEDICARE

## 2023-12-01 VITALS
TEMPERATURE: 96.9 F | RESPIRATION RATE: 20 BRPM | OXYGEN SATURATION: 99 % | HEART RATE: 89 BPM | WEIGHT: 186.4 LBS | SYSTOLIC BLOOD PRESSURE: 96 MMHG | HEIGHT: 65 IN | BODY MASS INDEX: 31.06 KG/M2 | DIASTOLIC BLOOD PRESSURE: 71 MMHG

## 2023-12-01 DIAGNOSIS — E11.65 TYPE 2 DIABETES MELLITUS WITH HYPERGLYCEMIA, WITHOUT LONG-TERM CURRENT USE OF INSULIN (HCC): ICD-10-CM

## 2023-12-01 DIAGNOSIS — C50.919 MALIGNANT NEOPLASM OF FEMALE BREAST, UNSPECIFIED ESTROGEN RECEPTOR STATUS, UNSPECIFIED LATERALITY, UNSPECIFIED SITE OF BREAST (HCC): ICD-10-CM

## 2023-12-01 DIAGNOSIS — Z98.84 BARIATRIC SURGERY STATUS: ICD-10-CM

## 2023-12-01 DIAGNOSIS — Z79.4 LONG TERM (CURRENT) USE OF INSULIN (HCC): ICD-10-CM

## 2023-12-01 DIAGNOSIS — E04.1 THYROID NODULE: Primary | ICD-10-CM

## 2023-12-01 LAB — HBA1C MFR BLD: 6.1 %

## 2023-12-01 PROCEDURE — 83036 HEMOGLOBIN GLYCOSYLATED A1C: CPT | Performed by: INTERNAL MEDICINE

## 2023-12-01 PROCEDURE — 2022F DILAT RTA XM EVC RTNOPTHY: CPT | Performed by: INTERNAL MEDICINE

## 2023-12-01 PROCEDURE — 3074F SYST BP LT 130 MM HG: CPT | Performed by: INTERNAL MEDICINE

## 2023-12-01 PROCEDURE — G8417 CALC BMI ABV UP PARAM F/U: HCPCS | Performed by: INTERNAL MEDICINE

## 2023-12-01 PROCEDURE — 3017F COLORECTAL CA SCREEN DOC REV: CPT | Performed by: INTERNAL MEDICINE

## 2023-12-01 PROCEDURE — 3046F HEMOGLOBIN A1C LEVEL >9.0%: CPT | Performed by: INTERNAL MEDICINE

## 2023-12-01 PROCEDURE — G8427 DOCREV CUR MEDS BY ELIG CLIN: HCPCS | Performed by: INTERNAL MEDICINE

## 2023-12-01 PROCEDURE — G8484 FLU IMMUNIZE NO ADMIN: HCPCS | Performed by: INTERNAL MEDICINE

## 2023-12-01 PROCEDURE — 99215 OFFICE O/P EST HI 40 MIN: CPT | Performed by: INTERNAL MEDICINE

## 2023-12-01 PROCEDURE — 3078F DIAST BP <80 MM HG: CPT | Performed by: INTERNAL MEDICINE

## 2023-12-01 PROCEDURE — 1036F TOBACCO NON-USER: CPT | Performed by: INTERNAL MEDICINE

## 2023-12-01 RX ORDER — SEMAGLUTIDE 0.68 MG/ML
INJECTION, SOLUTION SUBCUTANEOUS
Qty: 9 ML | Refills: 3 | Status: SHIPPED | OUTPATIENT
Start: 2023-12-01

## 2023-12-01 RX ORDER — METFORMIN HYDROCHLORIDE 500 MG/1
TABLET, EXTENDED RELEASE ORAL
Qty: 360 TABLET | Refills: 3 | Status: SHIPPED | OUTPATIENT
Start: 2023-12-01

## 2023-12-01 RX ORDER — METFORMIN HYDROCHLORIDE 500 MG/1
TABLET, EXTENDED RELEASE ORAL
Qty: 120 TABLET | Refills: 0 | Status: SHIPPED | OUTPATIENT
Start: 2023-12-01

## 2023-12-01 RX ORDER — BACLOFEN 10 MG/1
10 TABLET ORAL 2 TIMES DAILY PRN
COMMUNITY
Start: 2023-11-14

## 2023-12-01 RX ORDER — METHOCARBAMOL 500 MG/1
500 TABLET, FILM COATED ORAL 3 TIMES DAILY PRN
COMMUNITY
Start: 2023-11-17

## 2023-12-01 RX ORDER — METFORMIN HYDROCHLORIDE 500 MG/1
TABLET, EXTENDED RELEASE ORAL
Qty: 360 TABLET | OUTPATIENT
Start: 2023-12-01

## 2023-12-01 NOTE — PROGRESS NOTES
Kathy Martinez MD FACE         HISTORY OF PRESENT ILLNESS   Lola Portillo is a 59  y.o.  female. 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 Christoph Cristobal in jan 2020    She is s/p  Lap choly  April 2021     Pt is here for new diagnosis of thyroid nodule   Referred by dr. Subhash Vidales     Pt was following oncology for  cancer surveillance   Incidentally found thyroid nodule  on CT neck nov 2023   Pcp ordered usg   and that showed  presence of nodule      Pt compared the sizes and felt anxious  with the growth       Feb 2023     In the interim, pt had recurrence of breast cancer, she underwent double mastectomy , she had to undergo chemo therapy , then underwent  reconstructive surgery    Her balance is impaired, depending on rollator  and cane              Prior history :    Referred : by self/pcp   H/o diabetes for 30 years    Current A1C is 9 % and symptoms/problems include polyuria, polydipsia and visual disturbances    Current diabetic medications include intensive insulin injection program. And metformin    novolog by 10 units tid plus sliding scale and levemir  36 units hs    Current monitoring regimen: home blood tests - 2 times daily         Review of Systems    Constitutional: Negative. Neuropathy, back issues are persisting      Psychiatric/Behavioral: Negative for depression and memory loss. The patient does not have insomnia. Physical Exam    Constitutional: oriented to person, place, and time. appears well-developed and well-nourished. Psychiatric: He has a normal mood and affect.            Labs    Diabetes    Lab Results   Component Value Date    LABA1C 5.7 (H) 12/08/2022    LABA1C 5.5 11/01/2022    LABA1C 5.7 (H) 07/20/2022       Lab

## 2023-12-01 NOTE — PROGRESS NOTES
Juan Donaldson is a 59 y.o. female here for   Chief Complaint   Patient presents with    Diabetes       1. Have you been to the ER, urgent care clinic since your last visit? Hospitalized since your last visit? - [address]     2. Have you seen or consulted any other health care providers outside of the [address] [place] [address] Avenue since your last visit?   Include any pap smears or colon screening.- no

## 2023-12-15 DIAGNOSIS — E04.1 THYROID NODULE: ICD-10-CM

## 2024-01-08 ENCOUNTER — TRANSCRIBE ORDERS (OUTPATIENT)
Facility: HOSPITAL | Age: 59
End: 2024-01-08

## 2024-01-08 DIAGNOSIS — I11.9 MALIGNANT HYPERTENSIVE HEART DISEASE WITHOUT HEART FAILURE: Primary | ICD-10-CM

## 2024-01-16 ENCOUNTER — OFFICE VISIT (OUTPATIENT)
Age: 59
End: 2024-01-16
Payer: MEDICARE

## 2024-01-16 VITALS
BODY MASS INDEX: 30.46 KG/M2 | HEART RATE: 86 BPM | TEMPERATURE: 97.9 F | RESPIRATION RATE: 16 BRPM | OXYGEN SATURATION: 99 % | WEIGHT: 182.8 LBS | SYSTOLIC BLOOD PRESSURE: 101 MMHG | DIASTOLIC BLOOD PRESSURE: 72 MMHG | HEIGHT: 65 IN

## 2024-01-16 DIAGNOSIS — E11.9 CONTROLLED TYPE 2 DIABETES MELLITUS WITHOUT COMPLICATION, WITHOUT LONG-TERM CURRENT USE OF INSULIN (HCC): ICD-10-CM

## 2024-01-16 DIAGNOSIS — Z98.84 BARIATRIC SURGERY STATUS: Primary | ICD-10-CM

## 2024-01-16 DIAGNOSIS — E78.1 HYPERTRIGLYCERIDEMIA: ICD-10-CM

## 2024-01-16 DIAGNOSIS — I10 ESSENTIAL HYPERTENSION: ICD-10-CM

## 2024-01-16 DIAGNOSIS — Z98.84 BARIATRIC SURGERY STATUS: ICD-10-CM

## 2024-01-16 DIAGNOSIS — D64.9 ANEMIA, UNSPECIFIED TYPE: ICD-10-CM

## 2024-01-16 PROCEDURE — 1036F TOBACCO NON-USER: CPT | Performed by: SURGERY

## 2024-01-16 PROCEDURE — 3074F SYST BP LT 130 MM HG: CPT | Performed by: SURGERY

## 2024-01-16 PROCEDURE — G8427 DOCREV CUR MEDS BY ELIG CLIN: HCPCS | Performed by: SURGERY

## 2024-01-16 PROCEDURE — 3078F DIAST BP <80 MM HG: CPT | Performed by: SURGERY

## 2024-01-16 PROCEDURE — G8417 CALC BMI ABV UP PARAM F/U: HCPCS | Performed by: SURGERY

## 2024-01-16 PROCEDURE — 2022F DILAT RTA XM EVC RTNOPTHY: CPT | Performed by: SURGERY

## 2024-01-16 PROCEDURE — 3017F COLORECTAL CA SCREEN DOC REV: CPT | Performed by: SURGERY

## 2024-01-16 PROCEDURE — 3046F HEMOGLOBIN A1C LEVEL >9.0%: CPT | Performed by: SURGERY

## 2024-01-16 PROCEDURE — G8484 FLU IMMUNIZE NO ADMIN: HCPCS | Performed by: SURGERY

## 2024-01-16 PROCEDURE — 99213 OFFICE O/P EST LOW 20 MIN: CPT | Performed by: SURGERY

## 2024-01-16 ASSESSMENT — ENCOUNTER SYMPTOMS
SORE THROAT: 0
NAUSEA: 0
ABDOMINAL PAIN: 0
SHORTNESS OF BREATH: 0
VOMITING: 0
COUGH: 0
BACK PAIN: 0
EYE REDNESS: 0
WHEEZING: 0

## 2024-01-16 ASSESSMENT — PATIENT HEALTH QUESTIONNAIRE - PHQ9
SUM OF ALL RESPONSES TO PHQ QUESTIONS 1-9: 0
SUM OF ALL RESPONSES TO PHQ9 QUESTIONS 1 & 2: 0
SUM OF ALL RESPONSES TO PHQ QUESTIONS 1-9: 0
SUM OF ALL RESPONSES TO PHQ QUESTIONS 1-9: 0
1. LITTLE INTEREST OR PLEASURE IN DOING THINGS: 0
2. FEELING DOWN, DEPRESSED OR HOPELESS: 0
SUM OF ALL RESPONSES TO PHQ QUESTIONS 1-9: 0

## 2024-01-16 NOTE — PROGRESS NOTES
[facility]  Dr. Betina Saini  [address], [place] [address]    Bariatric Surgery Follow Up    Patient Name: Guilherme Portillo (58 y.o., female)    PCP: Lana Babb MD     Subjective:      Guilherme Portillo is a 58 y.o. female, who presents for routine follow up after laparoscopic Bulmaro en Y gastric bypass by Dr. Maya on 1/21/2020.      She states she has been doing well overall.  She continues to require laxatives in order to have a bowel movement.  She states she did not tolerate MiraLAX due to bloating.  She has completed her chemotherapy and has been weaned off of narcotic pain medications.  She has been tolerating a regular diet and focusing on fresh fruits and vegetables.  She continues to take her vitamins.  She states she has tried to exercise but she has very low endurance and stamina at this point and is trying to work on this.  She continues to have a moderate amount of swelling and states her weight fluctuates frequently based on whether she takes her Lasix or not.      Past Medical History:   Diagnosis Date    Arthritis     Back/Neck problemsm hips and knees bilaterally    Burning with urination     pt  denies at this time     10/17/22    Cancer (HCC) 2009    RIGHT breast,  reoccurrance in 4/2022    Congestive heart failure (HCC)     Depression     Diabetes (HCC)     Dizziness     GERD (gastroesophageal reflux disease)     Glaucoma     Gout     Headache     Heart disease     Hypercholesterolemia     Hypertension     Localized swelling of both lower legs     s/p chemo RX in  2009 / wears compression stockings    Lymphedema     s/p breast lumpectomy 2009    Morbid obesity (HCC)     Neuropathy     Shortness of breath     With activity    Sleep apnea 1998    does not use CPAP    Sleep disorder     Difficulty falling asleep, night sweats    Stool color black     patient denies  as of 10/17/2022       Past Surgical History:   Procedure Laterality  Chief complaint:   Chief Complaint   Patient presents with   • Ankle Injury     Pt. reports he was playing basketball yesterday and rolled his right ankle when he came down from getting a rebound.  Right ankle and top of foot swollen and red. Has been icing and elevating foot.       Vitals:  Visit Vitals  /70 (Patient Position: Sitting)   Pulse 78   Temp 98.5 °F (36.9 °C) (Tympanic)   Resp 12   Ht 6' 5\" (1.956 m)   Wt (!) 113.4 kg (250 lb)   SpO2 97%   BMI 29.65 kg/m²       HISTORY OF PRESENT ILLNESS     This is a 13-year-old male accompanied by his guardian coming in today with chief complaint of right ankle and foot pain and swelling after he twisted his ankle inward while playing basketball.  He stated that he rolled his ankle after jumping.  He stated he immediately had pain on the area but was able to bear weight.  The patient has not taken any medication for his symptoms.  He had ice the area the whole night and elevated it.  He has not seen anybody for this.      Other significant problems:  There are no problems to display for this patient.      PAST MEDICAL, FAMILY AND SOCIAL HISTORY     Medications:  Current Outpatient Medications   Medication   • lidocaine viscous (XYLOCAINE) 2 % solution   • ibuprofen (MOTRIN) 400 MG tablet   • Multiple Vitamin (MULTI-DAY PO)   • permethrin (ACTICIN) 5 % cream     No current facility-administered medications for this visit.       Allergies:  ALLERGIES:  No Known Allergies    Past Medical  History/Surgeries:  History reviewed. No pertinent past medical history.    History reviewed. No pertinent surgical history.    Family History:  History reviewed. No pertinent family history.    Social History:  Social History     Tobacco Use   • Smoking status: Never Smoker   • Smokeless tobacco: Never Used   Substance Use Topics   • Alcohol use: Not on file       REVIEW OF SYSTEMS     Review of Systems   Constitutional: Negative for chills and fever.   Musculoskeletal: Positive  for joint swelling.   Skin: Positive for color change and wound.       PHYSICAL EXAM     Physical Exam  Vitals and nursing note reviewed.   Constitutional:       General: He is awake.      Appearance: He is not ill-appearing, toxic-appearing or diaphoretic.   Musculoskeletal:      Right ankle: Swelling present. No ecchymosis. Tenderness present over the lateral malleolus and base of 5th metatarsal. No medial malleolus tenderness. Decreased range of motion. Anterior drawer test negative. Normal pulse.      Right Achilles Tendon: Normal.      Right foot: Decreased range of motion. Swelling, tenderness and bony tenderness present. Abnormal pulse.        Legs:    Neurological:      Mental Status: He is alert.   Psychiatric:         Behavior: Behavior is cooperative.         ASSESSMENT/PLAN     Ellis was seen today for ankle injury.    Diagnoses and all orders for this visit:    Acute right ankle pain  Comments:  ankle sprain  Orders:  -     XR ANKLE 3+ VW RIGHT; Future  -     FORM FIT ANKLE BRACE WITH FIGURE 8 STRAPS OFF THE SHELF    Right foot pain  Comments:  foot sprain  Orders:  -     XR FOOT 3+ VW RIGHT; Future      I discussed with the patient regarding the final reading of the radiologist that showed    Foot reading:    FINDINGS:     No acute fracture, traumatic malalignment, or suspicious osseous  abnormality. No significant degenerative change. No gross soft tissue  abnormality.     IMPRESSION:     No acute osseous abnormality.    Ankle reading:    FINDINGS/IMPRESSION: Soft tissue swelling inferior to the distal fibula and  along the lateral aspect of the foot. No fractures, dislocations or other  bone or joint pathology.    Lace-up figure of 8 ankle support provided and applied.    Symptomatic treatment     Close monitoring and follow-up    Final recheck on pt at discharge was reassuring and patient was appropriately stable at time of discharge from urgent care clinic. All questions answered and patient  appeared to understand and agree with treatment and discharge plan, including return precautions and follow up plan.     The provisional diagnosis that the patient is discharged with today was based on the history taken, presenting symptoms, physical exam, and/or any ancillary testing. Patient states understanding that often times the diagnosis can change. If new or worsening symptoms occur, patient was instructed to seek immediate medical attention for re-evaluation. See the After Visit Summary for additional instructions, follow-up plans and/or emergency room precautions discussed with the patient.    The following PPE was worn by provider during all interactions and exam with this patient:  [x] gloves, goggles, level 1 procedural mask  [] Level 2 procedural mask  [] gloves, faceshield, N95 mask, gown

## 2024-01-16 NOTE — PROGRESS NOTES
Identified pt with two pt identifiers (name and ). Reviewed chart in preparation for visit and have obtained necessary documentation.    Guilherme Portillo is a 58 y.o. female  Chief Complaint   Patient presents with    Follow-up     Gastric bypass      /72 (Site: Left Upper Arm, Position: Sitting, Cuff Size: Medium Adult)   Pulse 86   Temp 97.9 °F (36.6 °C) (Temporal)   Resp 16   Ht 1.651 m (5' 5\")   Wt 82.9 kg (182 lb 12.8 oz)   SpO2 99%   BMI 30.42 kg/m²     1. Have you been to the ER, urgent care clinic since your last visit?  Hospitalized since your last visit?no    2. Have you seen or consulted any other health care providers outside of the [facility] since your last visit?  Include any pap smears or colon screening. no

## 2024-01-16 NOTE — PATIENT INSTRUCTIONS
[facility]    [place] to Continued Success    Choose foods wisely.  Think about the nutritional benefit of the food.    Protein first and at each meal.  Include produce (vegetables and/or fruits) at each meal.    Limit or eliminate \"filler\" foods such as breads, pasta, potatoes, rice, crackers, pretzels, and the like.  Avoid eating and drinking together, there just isn't enough room!  You may continue protein supplementation if needed to meet your daily protein goals.  Many patients use a protein shake or bar to replace a meal.  There are a variety of protein supplements listed in your handbook.      If you would like to make an appointment with one of the bariatric dietitians, please call the bariatric line at [phone].  Appointments are offered in person and virtual at no charge.    Take your vitamins every day, attend support group and keep your regular follow-up appointments.    Ultimately, success depends on you.  Choose to use your tool and we will guide you along the way!

## 2024-02-06 ENCOUNTER — HOSPITAL ENCOUNTER (OUTPATIENT)
Facility: HOSPITAL | Age: 59
Discharge: HOME | End: 2024-02-09
Payer: MEDICARE

## 2024-02-06 VITALS — DIASTOLIC BLOOD PRESSURE: 64 MMHG | SYSTOLIC BLOOD PRESSURE: 94 MMHG | HEART RATE: 75 BPM | RESPIRATION RATE: 14 BRPM

## 2024-02-06 DIAGNOSIS — I11.9 MALIGNANT HYPERTENSIVE HEART DISEASE WITHOUT HEART FAILURE: ICD-10-CM

## 2024-02-06 LAB — CREAT BLD-MCNC: 1.4 MG/DL (ref 0.6–1.3)

## 2024-02-06 PROCEDURE — 75574 CT ANGIO HRT W/3D IMAGE: CPT

## 2024-02-06 PROCEDURE — 6360000004 HC RX CONTRAST MEDICATION: Performed by: INTERNAL MEDICINE

## 2024-02-06 PROCEDURE — 82565 ASSAY OF CREATININE: CPT

## 2024-02-06 RX ORDER — NITROGLYCERIN 0.4 MG/1
0.4 TABLET SUBLINGUAL ONCE
Status: DISCONTINUED | OUTPATIENT
Start: 2024-02-06 | End: 2024-02-10 | Stop reason: HOSPADM

## 2024-02-06 RX ADMIN — IOPAMIDOL 150 ML: 755 INJECTION, SOLUTION INTRAVENOUS at 09:13

## 2024-02-06 NOTE — PROGRESS NOTES
Patient brought back from the waiting room in preparation of CT Coronary scan.    Patient was prescribed oral beta blocker protocol to be taken at home prior to the exam.    20 gauge diffusics IV initiated in the right AC.  Brisk blood return.      POC lab studies were drawn.    Current vitals are as follows:    BP 91/67  HR 68    Nitroglycerin was held due to low blood pressure.    Taken to CT via stretcher at 0855    Test completed, vitals after exam are as follows:    BP 94/64  HR 75    Patients IV removed by CT tech, and patient discharged.  Denies dizziness upon arising.    Isadora Damon RN

## 2024-02-14 ENCOUNTER — OFFICE VISIT (OUTPATIENT)
Age: 59
End: 2024-02-14

## 2024-02-14 VITALS
WEIGHT: 186.6 LBS | DIASTOLIC BLOOD PRESSURE: 69 MMHG | TEMPERATURE: 98.4 F | HEART RATE: 104 BPM | RESPIRATION RATE: 18 BRPM | BODY MASS INDEX: 31.09 KG/M2 | SYSTOLIC BLOOD PRESSURE: 101 MMHG | HEIGHT: 65 IN | OXYGEN SATURATION: 98 %

## 2024-02-14 DIAGNOSIS — E04.1 THYROID NODULE: Primary | ICD-10-CM

## 2024-02-14 DIAGNOSIS — E11.65 TYPE 2 DIABETES MELLITUS WITH HYPERGLYCEMIA, WITHOUT LONG-TERM CURRENT USE OF INSULIN (HCC): ICD-10-CM

## 2024-02-14 DIAGNOSIS — C50.919 MALIGNANT NEOPLASM OF FEMALE BREAST, UNSPECIFIED ESTROGEN RECEPTOR STATUS, UNSPECIFIED LATERALITY, UNSPECIFIED SITE OF BREAST (HCC): ICD-10-CM

## 2024-02-14 DIAGNOSIS — Z98.84 BARIATRIC SURGERY STATUS: ICD-10-CM

## 2024-02-14 DIAGNOSIS — Z79.4 LONG TERM (CURRENT) USE OF INSULIN (HCC): ICD-10-CM

## 2024-02-14 LAB
25(OH)D3+25(OH)D2 SERPL-MCNC: 50.8 NG/ML (ref 30–100)
ALBUMIN SERPL-MCNC: 4.5 G/DL (ref 3.8–4.9)
ALBUMIN/GLOB SERPL: 1.7 {RATIO} (ref 1.2–2.2)
ALP SERPL-CCNC: 73 IU/L (ref 44–121)
ALT SERPL-CCNC: 16 IU/L (ref 0–32)
AST SERPL-CCNC: 16 IU/L (ref 0–40)
BILIRUB SERPL-MCNC: 0.2 MG/DL (ref 0–1.2)
BUN SERPL-MCNC: 22 MG/DL (ref 8–27)
BUN/CREAT SERPL: 17 (ref 12–28)
CALCIUM SERPL-MCNC: 9.5 MG/DL (ref 8.7–10.3)
CHLORIDE SERPL-SCNC: 102 MMOL/L (ref 96–106)
CHOLEST SERPL-MCNC: 257 MG/DL (ref 100–199)
CO2 SERPL-SCNC: 19 MMOL/L (ref 20–29)
CREAT SERPL-MCNC: 1.3 MG/DL (ref 0.57–1)
EGFRCR SERPLBLD CKD-EPI 2021: 47 ML/MIN/1.73
ERYTHROCYTE [DISTWIDTH] IN BLOOD BY AUTOMATED COUNT: 14.4 % (ref 11.7–15.4)
FERRITIN SERPL-MCNC: 53 NG/ML (ref 15–150)
FOLATE SERPL-MCNC: >20 NG/ML
GLOBULIN SER CALC-MCNC: 2.6 G/DL (ref 1.5–4.5)
GLUCOSE SERPL-MCNC: 145 MG/DL (ref 70–99)
HCT VFR BLD AUTO: 37.3 % (ref 34–46.6)
HDLC SERPL-MCNC: 79 MG/DL
HGB BLD-MCNC: 12.6 G/DL (ref 11.1–15.9)
IRON SATN MFR SERPL: 27 % (ref 15–55)
IRON SERPL-MCNC: 106 UG/DL (ref 27–159)
LDLC SERPL CALC-MCNC: 134 MG/DL (ref 0–99)
MCH RBC QN AUTO: 30.7 PG (ref 26.6–33)
MCHC RBC AUTO-ENTMCNC: 33.8 G/DL (ref 31.5–35.7)
MCV RBC AUTO: 91 FL (ref 79–97)
PLATELET # BLD AUTO: 469 X10E3/UL (ref 150–450)
POTASSIUM SERPL-SCNC: 4.2 MMOL/L (ref 3.5–5.2)
PROT SERPL-MCNC: 7.1 G/DL (ref 6–8.5)
RBC # BLD AUTO: 4.1 X10E6/UL (ref 3.77–5.28)
SODIUM SERPL-SCNC: 140 MMOL/L (ref 134–144)
T4 FREE SERPL-MCNC: 1.24 NG/DL (ref 0.82–1.77)
THYROGLOB AB SERPL-ACNC: <1 IU/ML (ref 0–0.9)
THYROPEROXIDASE AB SERPL-ACNC: 29 IU/ML (ref 0–34)
TIBC SERPL-MCNC: 399 UG/DL (ref 250–450)
TRIGL SERPL-MCNC: 249 MG/DL (ref 0–149)
TSH SERPL DL<=0.005 MIU/L-ACNC: 1.46 UIU/ML (ref 0.45–4.5)
UIBC SERPL-MCNC: 293 UG/DL (ref 131–425)
VIT B12 SERPL-MCNC: >2000 PG/ML (ref 232–1245)
VLDLC SERPL CALC-MCNC: 44 MG/DL (ref 5–40)
WBC # BLD AUTO: 8.9 X10E3/UL (ref 3.4–10.8)

## 2024-02-14 NOTE — PROGRESS NOTES
[facility]              Meka Piña MD FACE         OFFICE PROCEDURE PROGRESS NOTE        Chart reviewed for the following:   Meka BETHEA MD, have reviewed the History, Physical and updated the Allergic reactions for Guilherme B Bessix     TIME OUT performed immediately prior to start of procedure:   Meka BETHEA MD, have performed the following reviews on Guilherme B Bessix prior to the start of the procedure:            * Patient was identified by name and date of birth   * Agreement on procedure being performed was verified  * Risks and Benefits explained to the patient  * Procedure site verified and marked as necessary  * Patient was positioned for comfort  * Consent was signed and verified     Time: 145 pm     Pain scale : 0    Date of procedure: 2/14/2024    Procedure performed by:  Meka Piña MD    Provider assisted by: Ms. Arellano     Real time imaging was performed in both transverse and sagittal planes    Nodule size was :1.4 cm LEFT side   Following informed consent, a procedural pause was held to confirm patiient identity and site of biopsy.    After sterile preparation, FNA guidance was performed using direct ultrasound guidance to confirm accurate needle placement.    5 aspirations were made using 25 G needles  Samples were submitted to cytology  Patient  tolerated procedure well without complications  After care instructions provided.      Pain scale post procedure : 4

## 2024-02-14 NOTE — PATIENT INSTRUCTIONS
Please take tylenol 500 mg or Motrin 400 mg (2 pills of 200 mg dose) OTC,  if there is any biopsy site pain    You can go back to your normal routine immediately    If you notice any dime sized redness, at the biopsy site, it is normal and do not worry     If you have more symptoms and signs requiring emergency assistance,  call 911   or go to Emergency room

## 2024-02-14 NOTE — PROGRESS NOTES
Chief Complaint   Patient presents with    Thyroid Problem     Thyroid biopsy         /69 (Site: Left Upper Arm)   Pulse (!) 104   Temp 98.4 °F (36.9 °C)   Resp 18   Ht 1.651 m (5' 5\")   Wt 84.6 kg (186 lb 9.6 oz)   SpO2 98%   BMI 31.05 kg/m²      1. Have you been to the ER, urgent care clinic since your last visit?  Hospitalized since your last visit? no    2. Have you seen or consulted any other health care providers outside of the [facility] since your last visit?  Include any pap smears or colon screening.  no    Health Maintenance Due   Topic Date Due    Pneumococcal 0-64 years Vaccine (1 - PCV) Never done    HIV screen  Never done    Hepatitis C screen  Never done    DTaP/Tdap/Td vaccine (1 - Tdap) Never done    Colorectal Cancer Screen  Never done    Shingles vaccine (1 of 2) Never done    Cervical cancer screen  12/09/2018    Diabetic foot exam  11/21/2020    Diabetic retinal exam  03/02/2021    Respiratory Syncytial Virus (RSV) Pregnant or age 60 yrs+ (1 - 1-dose 60+ series) Never done    Flu vaccine (1) Never done    COVID-19 Vaccine (5 - 2023-24 season) 09/01/2023    Annual Wellness Visit (Medicare)  Never done    Diabetic Alb to Cr ratio (uACR) test  12/08/2023             No data to display

## 2024-02-19 LAB
VIT A SERPL-MCNC: 93.5 UG/DL (ref 22–69.5)
VIT B1 BLD-SCNC: 174.5 NMOL/L (ref 66.5–200)

## 2024-02-21 ENCOUNTER — TELEPHONE (OUTPATIENT)
Age: 59
End: 2024-02-21

## 2024-02-22 ENCOUNTER — TELEPHONE (OUTPATIENT)
Age: 59
End: 2024-02-22

## 2024-02-22 NOTE — TELEPHONE ENCOUNTER
Reviewed the [facility] biopsy result  from feb 14 2024    Thyroid left nodule biopsy  size 1.4 cm     Resulted benign     Meka Piña MD

## 2024-03-27 ENCOUNTER — TELEPHONE (OUTPATIENT)
Age: 59
End: 2024-03-27

## 2024-03-28 NOTE — TELEPHONE ENCOUNTER
Use pain meds - and hot liquids     This should not last for month plus     If she wants to get more help, we can get an usg outside our office     Meka Piña MD

## 2024-03-29 DIAGNOSIS — E04.1 THYROID NODULE: Primary | ICD-10-CM

## 2024-03-29 NOTE — TELEPHONE ENCOUNTER
Called patient back and verified date of birth.  Patient states that since her thyroid biopsy she has been having discomfort in throat.. Feels like stabbing in throat. When she talks feels like something in her throat and now she has a raspy voice.     She would liek to proceed with ultrasound at outside facility.

## 2024-04-10 ENCOUNTER — HOSPITAL ENCOUNTER (OUTPATIENT)
Facility: HOSPITAL | Age: 59
Discharge: HOME | End: 2024-04-13
Attending: INTERNAL MEDICINE
Payer: MEDICARE

## 2024-04-10 DIAGNOSIS — E04.1 THYROID NODULE: ICD-10-CM

## 2024-04-10 PROCEDURE — 76536 US EXAM OF HEAD AND NECK: CPT

## 2024-06-07 ENCOUNTER — PATIENT MESSAGE (OUTPATIENT)
Age: 59
End: 2024-06-07

## 2024-06-07 DIAGNOSIS — E11.65 TYPE 2 DIABETES MELLITUS WITH HYPERGLYCEMIA, WITHOUT LONG-TERM CURRENT USE OF INSULIN (HCC): Primary | ICD-10-CM

## 2024-06-25 ENCOUNTER — TELEPHONE (OUTPATIENT)
Age: 59
End: 2024-06-25

## 2024-07-16 DIAGNOSIS — E11.65 TYPE 2 DIABETES MELLITUS WITH HYPERGLYCEMIA, WITHOUT LONG-TERM CURRENT USE OF INSULIN (HCC): Primary | ICD-10-CM

## 2024-07-16 RX ORDER — SEMAGLUTIDE 1.34 MG/ML
INJECTION, SOLUTION SUBCUTANEOUS
Qty: 9 ML | Refills: 3 | Status: SHIPPED | OUTPATIENT
Start: 2024-07-16

## 2024-07-18 ENCOUNTER — HOSPITAL ENCOUNTER (OUTPATIENT)
Facility: HOSPITAL | Age: 59
Setting detail: RECURRING SERIES
Discharge: HOME | End: 2024-07-21
Payer: MEDICARE

## 2024-07-18 PROCEDURE — 97161 PT EVAL LOW COMPLEX 20 MIN: CPT

## 2024-07-18 PROCEDURE — 97110 THERAPEUTIC EXERCISES: CPT

## 2024-07-18 RX ORDER — SEMAGLUTIDE 1.34 MG/ML
1 INJECTION, SOLUTION SUBCUTANEOUS
Qty: 9 ML | Refills: 3 | Status: SHIPPED | OUTPATIENT
Start: 2024-07-18

## 2024-07-18 NOTE — THERAPY EVALUATION
[facility]  [address], [address]  [place] [address]  Phone: [phone]      Fax: [phone]                                                                                PHYSICAL THERAPY LYMPHEDEMA - MEDICARE EVALUATION/PLAN OF CARE NOTE (updated 3/23)      Date: 2024          Patient Name:  Guilherme Portillo :  1965   Medical   Diagnosis:  Lymphedema, not elsewhere classified [I89.0] Treatment Diagnosis:  I89.0     Lymphedema, not elsewhere classified and I97.2     Post-Mastectomy lymphedema syndrome    Referral Source:  Vera Pathak MD Provider #:  [id number]                Insurance: Payor: MEDICARE / Plan: MEDICARE PART A AND B / Product Type: *No Product type* /        Patient  verified yes     Visit #   Current  / Total 1 12   Time   In / Out 1045 1145   Total Treatment Time 60   Total Timed Codes 10   1:1 Treatment Time 10      [facility] Totals Reminder:  bill using total billable   min of TIMED therapeutic procedures and modalities.   8-22 min = 1 unit; 23-37 min = 2 units; 38-52 min = 3 units;  53-67 min = 4 units; 68-82 min = 5 units         SUBJECTIVE  Pain Level (0-10 scale): 5  [x]constant []intermittent []improving []worsening []no change since onset    Any medication changes, allergies to medications, adverse drug reactions, diagnosis change, or new procedure performed?: [x] No    [] Yes (see summary sheet for update)  Medications: Verified on Patient Summary List    Subjective functional status/changes:     Patient reports that she has had swelling in her R UE and R chest and trunk down to her R LE since .  She has had breast cancer twice.  In , she had a lumpectomy and 7 lymph nodes removed.  In , she had a R mastectomy with the remaining lymph node removed with reconstruction.  She had chemo both times and radiation after the first surgery.  She had compression garment (sleeve and gauntlet) that were

## 2024-07-30 ENCOUNTER — OFFICE VISIT (OUTPATIENT)
Age: 59
End: 2024-07-30
Payer: MEDICARE

## 2024-07-30 VITALS
HEIGHT: 65 IN | WEIGHT: 205.2 LBS | SYSTOLIC BLOOD PRESSURE: 104 MMHG | DIASTOLIC BLOOD PRESSURE: 67 MMHG | BODY MASS INDEX: 34.19 KG/M2

## 2024-07-30 DIAGNOSIS — Z12.72 SCREENING FOR MALIGNANT NEOPLASM OF VAGINA AFTER TOTAL HYSTERECTOMY: ICD-10-CM

## 2024-07-30 DIAGNOSIS — Z01.419 GYNECOLOGIC EXAM NORMAL: Primary | ICD-10-CM

## 2024-07-30 DIAGNOSIS — Z90.710 SCREENING FOR MALIGNANT NEOPLASM OF VAGINA AFTER TOTAL HYSTERECTOMY: ICD-10-CM

## 2024-07-30 PROCEDURE — G8427 DOCREV CUR MEDS BY ELIG CLIN: HCPCS | Performed by: OBSTETRICS & GYNECOLOGY

## 2024-07-30 PROCEDURE — G8417 CALC BMI ABV UP PARAM F/U: HCPCS | Performed by: OBSTETRICS & GYNECOLOGY

## 2024-07-30 PROCEDURE — G0101 CA SCREEN;PELVIC/BREAST EXAM: HCPCS | Performed by: OBSTETRICS & GYNECOLOGY

## 2024-07-30 RX ORDER — LURASIDONE HYDROCHLORIDE 120 MG/1
120 TABLET, FILM COATED ORAL
COMMUNITY
Start: 2024-01-13

## 2024-07-30 RX ORDER — ARIPIPRAZOLE 5 MG/1
5 TABLET ORAL DAILY
COMMUNITY
Start: 2024-01-13

## 2024-07-30 NOTE — PROGRESS NOTES
Guilherme Portillo is a 59 y.o. female, , No LMP recorded. Patient has had a hysterectomy., who presents today for the following:  Chief Complaint   Patient presents with   • Annual Exam        Allergies   Allergen Reactions   • Levorphanol Itching and Rash   • Sitagliptin Rash   • Statins Itching       Current Outpatient Medications   Medication Sig Dispense Refill   • ARIPiprazole (ABILIFY) 5 MG tablet Take 1 tablet by mouth daily     • lurasidone (LATUDA) 120 MG tablet Take 1 tablet by mouth Daily with supper     • OZEMPIC, 1 MG/DOSE, 4 MG/3ML SOPN sc injection Inject 1.0 mg subcutaneously every 7 days, E11.65 9 mL 3   • methocarbamol (ROBAXIN) 500 MG tablet Take 1 tablet by mouth 3 times daily as needed     • metFORMIN (GLUCOPHAGE-XR) 500 MG extended release tablet 2 pills twice a day with meals 360 tablet 3   • Calcium Carbonate-Vitamin D 600-5 MG-MCG CAPS Take 2 tablets by mouth 2 times daily     • DULoxetine (CYMBALTA) 60 MG extended release capsule Take 90 mg by mouth     • furosemide (LASIX) 20 MG tablet Take 2 tablets by mouth daily as needed     • gabapentin (NEURONTIN) 800 MG tablet Take 1 tablet by mouth 3 times daily.     • potassium chloride (KLOR-CON M) 20 MEQ extended release tablet Take 1 tablet by mouth daily as needed     • Semaglutide, 1 MG/DOSE, (OZEMPIC, 1 MG/DOSE,) 4 MG/3ML SOPN sc injection Inject 1 mg into the skin every 7 days 9 mL 3   • midodrine (PROAMATINE) 5 MG tablet midodrine 5 mg tablet   TAKE 1 TABLET BY MOUTH THREE TIMES DAILY (Patient not taking: Reported on 2024)       No current facility-administered medications for this visit.         Past Medical History:   Diagnosis Date   • Abnormal Pap smear of cervix    • Arthritis     Back/Neck problemsm hips and knees bilaterally   • Burning with urination     pt  denies at this time     10/17/22   • Cancer (HCC) 2009    RIGHT breast,  reoccurrance in 2022   • Congestive heart failure (HCC)    • Depression    • Diabetes ([facility])

## 2024-07-30 NOTE — PROGRESS NOTES
1. \"Have you been to the ER, urgent care clinic since your last visit?  Hospitalized since your last visit?\" No    2. \"Have you seen or consulted any other health care providers outside of the [facility] since your last visit?\" No     3. For patients aged 45-75: Has the patient had a colonoscopy / FIT/ Cologuard? Yes - Care Gap present. Rooming MA/LPN to request most recent results      If the patient is female:    4. For patients aged 40-74: Has the patient had a mammogram within the past 2 years? Yes - Care Gap present. Rooming MA/LPN to request most recent results      5. For patients aged 21-65: Has the patient had a pap smear? Yes - Care Gap present. Rooming MA/LPN to request most recent results

## 2024-07-31 LAB
ALBUMIN SERPL-MCNC: 4.3 G/DL (ref 3.9–4.9)
ALBUMIN/CREAT UR: <12 MG/G CREAT (ref 0–29)
ALP SERPL-CCNC: 73 IU/L (ref 44–121)
ALT SERPL-CCNC: 23 IU/L (ref 0–32)
AST SERPL-CCNC: 23 IU/L (ref 0–40)
BILIRUB SERPL-MCNC: 0.2 MG/DL (ref 0–1.2)
BUN SERPL-MCNC: 20 MG/DL (ref 8–27)
BUN/CREAT SERPL: 16 (ref 12–28)
CALCIUM SERPL-MCNC: 9.4 MG/DL (ref 8.7–10.3)
CHLORIDE SERPL-SCNC: 104 MMOL/L (ref 96–106)
CHOLEST SERPL-MCNC: 243 MG/DL (ref 100–199)
CO2 SERPL-SCNC: 19 MMOL/L (ref 20–29)
CREAT SERPL-MCNC: 1.25 MG/DL (ref 0.57–1)
CREAT UR-MCNC: 25.7 MG/DL
EGFRCR SERPLBLD CKD-EPI 2021: 49 ML/MIN/1.73
GLOBULIN SER CALC-MCNC: 2.4 G/DL (ref 1.5–4.5)
GLUCOSE SERPL-MCNC: 91 MG/DL (ref 70–99)
HBA1C MFR BLD: 5.8 % (ref 4.8–5.6)
HDLC SERPL-MCNC: 83 MG/DL
LDLC SERPL CALC-MCNC: 132 MG/DL (ref 0–99)
MICROALBUMIN UR-MCNC: <3 UG/ML
POTASSIUM SERPL-SCNC: 4.7 MMOL/L (ref 3.5–5.2)
PROT SERPL-MCNC: 6.7 G/DL (ref 6–8.5)
SODIUM SERPL-SCNC: 140 MMOL/L (ref 134–144)
T4 FREE SERPL-MCNC: 1.08 NG/DL (ref 0.82–1.77)
TRIGL SERPL-MCNC: 160 MG/DL (ref 0–149)
TSH SERPL DL<=0.005 MIU/L-ACNC: 1.48 UIU/ML (ref 0.45–4.5)
VLDLC SERPL CALC-MCNC: 28 MG/DL (ref 5–40)

## 2024-08-01 ENCOUNTER — OFFICE VISIT (OUTPATIENT)
Age: 59
End: 2024-08-01
Payer: MEDICARE

## 2024-08-01 VITALS
OXYGEN SATURATION: 98 % | TEMPERATURE: 97.9 F | WEIGHT: 202.2 LBS | DIASTOLIC BLOOD PRESSURE: 68 MMHG | HEART RATE: 95 BPM | SYSTOLIC BLOOD PRESSURE: 99 MMHG | BODY MASS INDEX: 33.65 KG/M2

## 2024-08-01 DIAGNOSIS — Z98.84 BARIATRIC SURGERY STATUS: ICD-10-CM

## 2024-08-01 DIAGNOSIS — Z79.4 LONG TERM (CURRENT) USE OF INSULIN (HCC): ICD-10-CM

## 2024-08-01 DIAGNOSIS — E04.1 THYROID NODULE: ICD-10-CM

## 2024-08-01 DIAGNOSIS — C50.919 MALIGNANT NEOPLASM OF FEMALE BREAST, UNSPECIFIED ESTROGEN RECEPTOR STATUS, UNSPECIFIED LATERALITY, UNSPECIFIED SITE OF BREAST (HCC): ICD-10-CM

## 2024-08-01 DIAGNOSIS — G62.9 POLYNEUROPATHY, UNSPECIFIED: ICD-10-CM

## 2024-08-01 DIAGNOSIS — E11.65 TYPE 2 DIABETES MELLITUS WITH HYPERGLYCEMIA, WITHOUT LONG-TERM CURRENT USE OF INSULIN (HCC): Primary | ICD-10-CM

## 2024-08-01 LAB
IMP & REVIEW OF LAB RESULTS: NORMAL
Lab: NORMAL
REPORT: NORMAL
REPORT: NORMAL

## 2024-08-01 PROCEDURE — 3044F HG A1C LEVEL LT 7.0%: CPT | Performed by: INTERNAL MEDICINE

## 2024-08-01 PROCEDURE — 2022F DILAT RTA XM EVC RTNOPTHY: CPT | Performed by: INTERNAL MEDICINE

## 2024-08-01 PROCEDURE — 1036F TOBACCO NON-USER: CPT | Performed by: INTERNAL MEDICINE

## 2024-08-01 PROCEDURE — 3074F SYST BP LT 130 MM HG: CPT | Performed by: INTERNAL MEDICINE

## 2024-08-01 PROCEDURE — G8417 CALC BMI ABV UP PARAM F/U: HCPCS | Performed by: INTERNAL MEDICINE

## 2024-08-01 PROCEDURE — G8427 DOCREV CUR MEDS BY ELIG CLIN: HCPCS | Performed by: INTERNAL MEDICINE

## 2024-08-01 PROCEDURE — 3017F COLORECTAL CA SCREEN DOC REV: CPT | Performed by: INTERNAL MEDICINE

## 2024-08-01 PROCEDURE — 3078F DIAST BP <80 MM HG: CPT | Performed by: INTERNAL MEDICINE

## 2024-08-01 PROCEDURE — 99215 OFFICE O/P EST HI 40 MIN: CPT | Performed by: INTERNAL MEDICINE

## 2024-08-01 RX ORDER — EZETIMIBE 10 MG/1
10 TABLET ORAL DAILY
Qty: 90 TABLET | Refills: 1 | Status: SHIPPED | OUTPATIENT
Start: 2024-08-01

## 2024-08-01 RX ORDER — ZOLPIDEM TARTRATE 10 MG/1
10 TABLET ORAL NIGHTLY PRN
COMMUNITY

## 2024-08-01 NOTE — PROGRESS NOTES
[facility] DIAB[facility]ES AND ENDOCRINOLOGY              Meka Piña MD FACE         HISTORY OF PRESENT ILLNESS   Guilherme Portillo is a 59  y.o.  female.   HPI   Patient here for  f/u  After last visit of  Type 2 diabetes mellitus  From  DEC   2023    H/o DM 2 for 30 yares, history of breast cancer , severe  neuropathy from chemo and DM related , depression ;  S/p bariatric surgery at  [facility] by Dr. Maya in jan 2020    She is s/p  Lap choly  April 2021       Aug 2024     S/p FNA in office  in Feb 2024   Noted to be benign     She says she is not able to sing well     In the interim, she requested Wegovy, but because she is on ozempic , I asked her to increase ozempic to 1 mg a week   She has not started that yet  !    GAINED 16 lbs ( ON ABILIFY )         DEC 2023     Pt is here for new diagnosis of thyroid nodule   Referred by dr. Levi     Pt was following oncology for  cancer surveillance   Incidentally found thyroid nodule  on CT neck nov 2023   Pcp ordered usg   and that showed  presence of nodule      Pt compared the sizes and felt anxious  with the growth       Feb 2023     In the interim, pt had recurrence of breast cancer, she underwent double mastectomy , she had to undergo chemo therapy , then underwent  reconstructive surgery    Her balance is impaired, depending on rollator  and cane              Prior history :    Referred : by self/pcp   H/o diabetes for 30 years    Current A1C is 9 % and symptoms/problems include polyuria, polydipsia and visual disturbances    Current diabetic medications include intensive insulin injection program. And metformin    novolog by 10 units tid plus sliding scale and levemir  36 units hs    Current monitoring regimen: home blood tests - 2 times daily         Review of Systems    Constitutional: Negative.     Neuropathy, back issues are persisting      Psychiatric/Behavioral: Negative for depression and memory loss. The patient does not have insomnia.

## 2024-08-01 NOTE — PATIENT INSTRUCTIONS
SPECIFIC INSTRUCTIONS BELOW     Ozempic  @   1  mg a week    Metofmrin er   500 mg 2  pills twice a day with meals     Start on zetia 10 mg a day         -------------PAY ATTENTION TO THESE GENERAL INSTRUCTIONS -----------------      - The medications prescribed at this visit will not be available at pharmacy until 6 pm       - YOUR MED LIST IS NOT UP TO DATE AS SOME CHANGES ARE BEING MADE AFTER THE VISIT - FOLLOW SPECIFIC INSTRUCTIONS  ABOVE     -ANY tests other than blood work, which you opt to do  outside the  [facility] imaging facilities, you are responsible for prior authorizations if  required    - HEALTH MAINTENANCE IS NOT GOING TO BE UP TO DATE ON YOUR AVS- PLEASE IGNORE     Results     *Normal results will not be notified by a phone call starting January 1 2021   *If you have an upcoming visit, the results will be discussed at the visit   *Please sign up for MY CHART if you want access to your lab and test results  *Abnormal results which require immediate attention will be notified by phone call   *Abnormal results which do not require immediate assistance will be notified in 1-2 weeks       Refills    -    have your pharmacy send us a refill request . Refills are done max for one year and a visit is a must before refills are extended    Follow up appointments -  highly encourage you to make it when you are checking out. We can accommodate you into the schedule based on your clinical situation, but not for extending refills beyond a year. Labs are important to give refills and is important to get labs before the visit     Phone calls  -  Allow  24 hrs. for non-urgent calls to be returned  Prior authorization - It may take 2-4 weeks to process  Forms  -  FMLA, DMV etc., will take up to 2 weeks to process  Cancellations - please notify the office 2 days in advance   Samples  - will only be dispensed at visits       If not showing for the appointments and cancelling appointments within 24 hours are kept

## 2024-08-02 LAB
CYTOLOGIST CVX/VAG CYTO: NORMAL
CYTOLOGY CVX/VAG DOC CYTO: NORMAL
CYTOLOGY CVX/VAG DOC THIN PREP: NORMAL
DX ICD CODE: NORMAL
HPV GENOTYPE REFLEX: NORMAL
HPV I/H RISK 4 DNA CVX QL PROBE+SIG AMP: NEGATIVE
Lab: NORMAL
OTHER STN SPEC: NORMAL
STAT OF ADQ CVX/VAG CYTO-IMP: NORMAL

## 2024-11-20 ENCOUNTER — HOSPITAL ENCOUNTER (OUTPATIENT)
Facility: HOSPITAL | Age: 59
Discharge: HOME | End: 2024-11-23
Attending: INTERNAL MEDICINE
Payer: MEDICARE

## 2024-11-20 DIAGNOSIS — C50.111 MALIGNANT NEOPLASM OF CENTRAL PORTION OF RIGHT FEMALE BREAST, UNSPECIFIED ESTROGEN RECEPTOR STATUS (HCC): ICD-10-CM

## 2024-11-20 LAB — CREAT BLD-MCNC: 1.3 MG/DL (ref 0.6–1.3)

## 2024-11-20 PROCEDURE — 6360000004 HC RX CONTRAST MEDICATION: Performed by: INTERNAL MEDICINE

## 2024-11-20 PROCEDURE — 74177 CT ABD & PELVIS W/CONTRAST: CPT

## 2024-11-20 PROCEDURE — 82565 ASSAY OF CREATININE: CPT

## 2024-11-20 RX ORDER — IOPAMIDOL 755 MG/ML
100 INJECTION, SOLUTION INTRAVASCULAR
Status: COMPLETED | OUTPATIENT
Start: 2024-11-20 | End: 2024-11-20

## 2024-11-20 RX ADMIN — IOPAMIDOL 100 ML: 755 INJECTION, SOLUTION INTRAVENOUS at 09:38

## 2025-01-08 ENCOUNTER — TELEMEDICINE (OUTPATIENT)
Age: 60
End: 2025-01-08

## 2025-01-08 DIAGNOSIS — Z98.84 BARIATRIC SURGERY STATUS: ICD-10-CM

## 2025-01-08 DIAGNOSIS — E78.2 MIXED HYPERLIPIDEMIA: ICD-10-CM

## 2025-01-08 DIAGNOSIS — E11.65 TYPE 2 DIABETES MELLITUS WITH HYPERGLYCEMIA, WITHOUT LONG-TERM CURRENT USE OF INSULIN (HCC): Primary | ICD-10-CM

## 2025-01-08 DIAGNOSIS — E04.1 THYROID NODULE: ICD-10-CM

## 2025-01-08 LAB
ALBUMIN SERPL-MCNC: 4.1 G/DL (ref 3.9–4.9)
ALBUMIN/CREAT UR: 9 MG/G CREAT (ref 0–29)
ALP SERPL-CCNC: 68 IU/L (ref 44–121)
ALT SERPL-CCNC: 36 IU/L (ref 0–32)
AST SERPL-CCNC: 26 IU/L (ref 0–40)
BILIRUB SERPL-MCNC: 0.2 MG/DL (ref 0–1.2)
BUN SERPL-MCNC: 25 MG/DL (ref 8–27)
BUN/CREAT SERPL: 21 (ref 12–28)
CALCIUM SERPL-MCNC: 9.9 MG/DL (ref 8.7–10.3)
CHLORIDE SERPL-SCNC: 101 MMOL/L (ref 96–106)
CHOLEST SERPL-MCNC: 255 MG/DL (ref 100–199)
CO2 SERPL-SCNC: 26 MMOL/L (ref 20–29)
CREAT SERPL-MCNC: 1.2 MG/DL (ref 0.57–1)
CREAT UR-MCNC: 125.3 MG/DL
EGFRCR SERPLBLD CKD-EPI 2021: 51 ML/MIN/1.73
GLOBULIN SER CALC-MCNC: 2.4 G/DL (ref 1.5–4.5)
GLUCOSE SERPL-MCNC: 97 MG/DL (ref 70–99)
HBA1C MFR BLD: 5.9 % (ref 4.8–5.6)
HDLC SERPL-MCNC: 79 MG/DL
IMP & REVIEW OF LAB RESULTS: NORMAL
LDLC SERPL CALC-MCNC: 127 MG/DL (ref 0–99)
Lab: NORMAL
MICROALBUMIN UR-MCNC: 11.2 UG/ML
POTASSIUM SERPL-SCNC: 5 MMOL/L (ref 3.5–5.2)
PROT SERPL-MCNC: 6.5 G/DL (ref 6–8.5)
REPORT: NORMAL
REPORT: NORMAL
SODIUM SERPL-SCNC: 139 MMOL/L (ref 134–144)
T4 FREE SERPL-MCNC: 1.01 NG/DL (ref 0.82–1.77)
TRIGL SERPL-MCNC: 284 MG/DL (ref 0–149)
TSH SERPL DL<=0.005 MIU/L-ACNC: 0.89 UIU/ML (ref 0.45–4.5)
VLDLC SERPL CALC-MCNC: 49 MG/DL (ref 5–40)

## 2025-01-08 RX ORDER — SEMAGLUTIDE 2.68 MG/ML
INJECTION, SOLUTION SUBCUTANEOUS
Qty: 9 ML | Refills: 3 | Status: SHIPPED | OUTPATIENT
Start: 2025-01-08

## 2025-01-08 RX ORDER — BIOTIN 800 MCG
800 TABLET ORAL DAILY
COMMUNITY

## 2025-01-08 RX ORDER — METFORMIN HYDROCHLORIDE 500 MG/1
TABLET, EXTENDED RELEASE ORAL
Qty: 360 TABLET | Refills: 3 | Status: SHIPPED | OUTPATIENT
Start: 2025-01-08

## 2025-01-08 NOTE — PROGRESS NOTES
Guilherme Portillo is a 59 y.o. female here for   Chief Complaint   Patient presents with    Diabetes       1. Have you been to the ER, urgent care clinic since your last visit?  Hospitalized since your last visit? - no    2. Have you seen or consulted any other health care providers outside of the [facility] since your last visit?  Include any pap smears or colon screening.- [facility] for nurse visit for initial set up visit for new PCP Tomasa Richardson MD    
              3. BP  runs low , ? From robaxin likely. Takes midodrine          4.  Dyslipidemia : lipids are still above the goal despite weight loss from  Gastric   bypass surgery    Could not tolerate any kinds of statins . I started   zetia   in August 2024  -    LDL   is still high   Can try  repatha  -  requires  PA  ( pt has cardiomyopathy   )      5.    Diabetic complications :       A. Retinopathy-NO   educated on this complication,  regular f/u with ophthalmologist encouraged      B. Nephropathy - NO   ( her BP are low since gastric bypass surgery )  she takes midodrine to keep BP up    educated on this disease and indicated stages and its prognosis.       C. Peripheral Neuropathy - YES  Severe - On cymbalta and  neurontin 800 mg tid    recommending   Metanx ( too expensive )   She cannot tolerate Lyrica          D: CAD : YES, Cardiomyopathy , after chemo- adriamycin          6.  H/o  Right breast cancer - treated and  Has lymphedema on right UE , recurrent breast cancer - 2022           7. Obesity  :     Jan 2025    There is no height or weight on file to calculate BMI.      Aug 2024   Gained 16 lbs in 7 months ( on abilify )   Not accounted by diet  , refer to medical weight loss program   Discussed diet       Dec 2023     Body mass index is 29.89 kg/m².   S/p bariatric surgery  Jan 2020 -     On vit d, b12 and iron       8 . Thyroid nodule incidentally found on CT scan     Aug  2024 :  pt expressed that singing is not the same as before the FNA   Reviewed usg and Ct scan : assured that this is not growth as these are 2 diff tests   The nodule is 1.4 cm TIRADS 4 - does not require  FNA  nov 2023   Feb  :  s/p   FNA - benign    April 2024  :  a repeat usg was ordered to follow up on pain  after FNA  and  the nodule volume is same , 1.7 cm TR 4 lower left lobe nodule, stable to slightly increased from prior (1.5  cm)      Jan 2025  - will plan for usg then. Pt is asymptomatic and euthyroid         >50 %

## 2025-01-08 NOTE — PATIENT INSTRUCTIONS
SPECIFIC INSTRUCTIONS BELOW     Ozempic  @   2 mg a week    Metofmrin er   500 mg 2  pills twice a day with meals     Stay on zetia 10 mg a day          -------------PAY ATTENTION TO THESE GENERAL INSTRUCTIONS -----------------      - The medications prescribed at this visit will not be available at pharmacy until 6 pm       - YOUR MED LIST IS NOT UP TO DATE AS SOME CHANGES ARE BEING MADE AFTER THE VISIT - FOLLOW SPECIFIC INSTRUCTIONS  ABOVE     -ANY tests other than blood work, which you opt to do  outside the  [facility] imaging facilities, you are responsible for prior authorizations if  required    - HEALTH MAINTENANCE IS NOT GOING TO BE UP TO DATE ON YOUR AVS- PLEASE IGNORE     Results     *Normal results will not be notified by a phone call starting January 1 2021   *If you have an upcoming visit, the results will be discussed at the visit   *Please sign up for MY CHART if you want access to your lab and test results  *Abnormal results which require immediate attention will be notified by phone call   *Abnormal results which do not require immediate assistance will be notified in 1-2 weeks       Refills    -    have your pharmacy send us a refill request . Refills are done max for one year and a visit is a must before refills are extended    Follow up appointments -  highly encourage you to make it when you are checking out. We can accommodate you into the schedule based on your clinical situation, but not for extending refills beyond a year. Labs are important to give refills and is important to get labs before the visit     Phone calls  -  Allow  24 hrs. for non-urgent calls to be returned  Prior authorization - It may take 2-4 weeks to process  Forms  -  FMLA, DMV etc., will take up to 2 weeks to process  Cancellations - please notify the office 2 days in advance   Samples  - will only be dispensed at visits       If not showing for the appointments and cancelling appointments within 24 hours are kept

## 2025-01-30 ENCOUNTER — OFFICE VISIT (OUTPATIENT)
Age: 60
End: 2025-01-30
Payer: MEDICARE

## 2025-01-30 VITALS
DIASTOLIC BLOOD PRESSURE: 76 MMHG | RESPIRATION RATE: 16 BRPM | HEART RATE: 82 BPM | OXYGEN SATURATION: 99 % | TEMPERATURE: 98.1 F | HEIGHT: 65 IN | WEIGHT: 200.6 LBS | BODY MASS INDEX: 33.42 KG/M2 | SYSTOLIC BLOOD PRESSURE: 102 MMHG

## 2025-01-30 DIAGNOSIS — E78.1 HYPERTRIGLYCERIDEMIA: ICD-10-CM

## 2025-01-30 DIAGNOSIS — Z98.84 BARIATRIC SURGERY STATUS: Primary | ICD-10-CM

## 2025-01-30 DIAGNOSIS — D64.9 ANEMIA, UNSPECIFIED TYPE: ICD-10-CM

## 2025-01-30 DIAGNOSIS — E78.2 MIXED HYPERLIPIDEMIA: ICD-10-CM

## 2025-01-30 DIAGNOSIS — Z98.84 BARIATRIC SURGERY STATUS: ICD-10-CM

## 2025-01-30 PROCEDURE — 99213 OFFICE O/P EST LOW 20 MIN: CPT | Performed by: SURGERY

## 2025-01-30 PROCEDURE — G8417 CALC BMI ABV UP PARAM F/U: HCPCS | Performed by: SURGERY

## 2025-01-30 PROCEDURE — 3017F COLORECTAL CA SCREEN DOC REV: CPT | Performed by: SURGERY

## 2025-01-30 PROCEDURE — 3074F SYST BP LT 130 MM HG: CPT | Performed by: SURGERY

## 2025-01-30 PROCEDURE — 1036F TOBACCO NON-USER: CPT | Performed by: SURGERY

## 2025-01-30 PROCEDURE — G8427 DOCREV CUR MEDS BY ELIG CLIN: HCPCS | Performed by: SURGERY

## 2025-01-30 PROCEDURE — 3078F DIAST BP <80 MM HG: CPT | Performed by: SURGERY

## 2025-01-30 ASSESSMENT — PATIENT HEALTH QUESTIONNAIRE - PHQ9
2. FEELING DOWN, DEPRESSED OR HOPELESS: NOT AT ALL
SUM OF ALL RESPONSES TO PHQ QUESTIONS 1-9: 0
SUM OF ALL RESPONSES TO PHQ QUESTIONS 1-9: 0
SUM OF ALL RESPONSES TO PHQ9 QUESTIONS 1 & 2: 0
SUM OF ALL RESPONSES TO PHQ QUESTIONS 1-9: 0
1. LITTLE INTEREST OR PLEASURE IN DOING THINGS: NOT AT ALL
SUM OF ALL RESPONSES TO PHQ QUESTIONS 1-9: 0

## 2025-01-30 NOTE — PROGRESS NOTES
Identified pt with two pt identifiers (name and ). Reviewed chart in preparation for visit and have obtained necessary documentation.    Guilherme Portillo is a 59 y.o. female  Chief Complaint   Patient presents with    Follow-up     gastric bypass     /76 (Site: Left Upper Arm, Position: Sitting, Cuff Size: Large Adult)   Pulse 82   Temp 98.1 °F (36.7 °C) (Oral)   Resp 16   Ht 1.651 m (5' 5\")   Wt 91 kg (200 lb 9.6 oz)   SpO2 99%   BMI 33.38 kg/m²     1. Have you been to the ER, urgent care clinic since your last visit?  Hospitalized since your last visit?no    2. Have you seen or consulted any other health care providers outside of the [facility] since your last visit?  Include any pap smears or colon screening. no  
    Continue fluid intake to daily goal of 64 oz.  Continue protein intake to daily goal of 60 g.  Increase daily exercise.  Vitamins: continue current vitamins  Check labs  Support group  Follow up with dietician as needed  Return to clinic in 1 year for  6 year postop visit .      Betina Saini, DO  1/30/2025

## 2025-02-13 LAB
25(OH)D3+25(OH)D2 SERPL-MCNC: 61.7 NG/ML (ref 30–100)
ALBUMIN SERPL-MCNC: 4.1 G/DL (ref 3.9–4.9)
ALBUMIN/CREAT UR: 5 MG/G CREAT (ref 0–29)
ALP SERPL-CCNC: 60 IU/L (ref 44–121)
ALT SERPL-CCNC: 40 IU/L (ref 0–32)
AST SERPL-CCNC: 28 IU/L (ref 0–40)
BILIRUB SERPL-MCNC: <0.2 MG/DL (ref 0–1.2)
BUN SERPL-MCNC: 28 MG/DL (ref 8–27)
BUN/CREAT SERPL: 25 (ref 12–28)
CALCIUM SERPL-MCNC: 9.1 MG/DL (ref 8.7–10.3)
CHLORIDE SERPL-SCNC: 103 MMOL/L (ref 96–106)
CHOLEST SERPL-MCNC: 147 MG/DL (ref 100–199)
CO2 SERPL-SCNC: 23 MMOL/L (ref 20–29)
CREAT SERPL-MCNC: 1.1 MG/DL (ref 0.57–1)
CREAT UR-MCNC: 326.6 MG/DL
EGFRCR SERPLBLD CKD-EPI 2021: 57 ML/MIN/1.73
ERYTHROCYTE [DISTWIDTH] IN BLOOD BY AUTOMATED COUNT: 12.5 % (ref 11.7–15.4)
FERRITIN SERPL-MCNC: 21 NG/ML (ref 15–150)
FOLATE SERPL-MCNC: >20 NG/ML
GLOBULIN SER CALC-MCNC: 2 G/DL (ref 1.5–4.5)
GLUCOSE SERPL-MCNC: 130 MG/DL (ref 70–99)
HBA1C MFR BLD: 5.8 % (ref 4.8–5.6)
HCT VFR BLD AUTO: 35.3 % (ref 34–46.6)
HDLC SERPL-MCNC: 68 MG/DL
HGB BLD-MCNC: 11.8 G/DL (ref 11.1–15.9)
IRON SATN MFR SERPL: 21 % (ref 15–55)
IRON SERPL-MCNC: 75 UG/DL (ref 27–139)
LDLC SERPL CALC-MCNC: 50 MG/DL (ref 0–99)
MCH RBC QN AUTO: 31.6 PG (ref 26.6–33)
MCHC RBC AUTO-ENTMCNC: 33.4 G/DL (ref 31.5–35.7)
MCV RBC AUTO: 95 FL (ref 79–97)
MICROALBUMIN UR-MCNC: 16 UG/ML
PLATELET # BLD AUTO: 399 X10E3/UL (ref 150–450)
POTASSIUM SERPL-SCNC: 5 MMOL/L (ref 3.5–5.2)
PROT SERPL-MCNC: 6.1 G/DL (ref 6–8.5)
PTH-INTACT SERPL-MCNC: 32 PG/ML (ref 15–65)
RBC # BLD AUTO: 3.73 X10E6/UL (ref 3.77–5.28)
SODIUM SERPL-SCNC: 138 MMOL/L (ref 134–144)
T4 FREE SERPL-MCNC: 1 NG/DL (ref 0.82–1.77)
TIBC SERPL-MCNC: 362 UG/DL (ref 250–450)
TRIGL SERPL-MCNC: 180 MG/DL (ref 0–149)
TSH SERPL DL<=0.005 MIU/L-ACNC: 1 UIU/ML (ref 0.45–4.5)
UIBC SERPL-MCNC: 287 UG/DL (ref 118–369)
VIT B12 SERPL-MCNC: 1992 PG/ML (ref 232–1245)
VLDLC SERPL CALC-MCNC: 29 MG/DL (ref 5–40)
WBC # BLD AUTO: 6.7 X10E3/UL (ref 3.4–10.8)

## 2025-02-14 LAB
IMP & REVIEW OF LAB RESULTS: NORMAL
Lab: NORMAL
REPORT: NORMAL
REPORT: NORMAL

## 2025-02-15 LAB — VIT A SERPL-MCNC: 101.2 UG/DL (ref 22–69.5)

## 2025-02-17 LAB — ZINC SERPL-MCNC: 82 UG/DL (ref 44–115)

## 2025-02-18 LAB — VIT B1 BLD-SCNC: 110.3 NMOL/L (ref 66.5–200)

## 2025-04-11 DIAGNOSIS — E04.1 THYROID NODULE: ICD-10-CM

## 2025-04-11 DIAGNOSIS — E11.65 TYPE 2 DIABETES MELLITUS WITH HYPERGLYCEMIA, WITHOUT LONG-TERM CURRENT USE OF INSULIN (HCC): Primary | ICD-10-CM

## 2025-04-15 ENCOUNTER — HOSPITAL ENCOUNTER (OUTPATIENT)
Facility: HOSPITAL | Age: 60
Setting detail: RECURRING SERIES
Discharge: HOME | End: 2025-04-18
Payer: MEDICARE

## 2025-04-15 PROCEDURE — 97760 ORTHOTIC MGMT&TRAING 1ST ENC: CPT

## 2025-04-15 PROCEDURE — 97140 MANUAL THERAPY 1/> REGIONS: CPT

## 2025-04-15 PROCEDURE — 97162 PT EVAL MOD COMPLEX 30 MIN: CPT

## 2025-04-15 NOTE — THERAPY EVALUATION
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   [x]   C50.211 Malignant neoplasm of breast, Right [x]   G89.3  Cancer associated pain   []   C50.212 Malignant neoplasm of breast, Left []   L03.115 LE Cellulitis, Right   []    []   L03.116 LE Cellulitis, Left                                                           Guilherme VELASQUEZ Bessix    1965  Page 2 of 2    Physician Order for DME for Diagnosis of I89.0/I97.2 as Listed on Statement of Medical Necessity, Page 1        Recommended Product:  Units Upper Extremity Rt Lt Units Lower Extremity Rt Lt    Circ-Aid, Ready Wrap, Sigvaris Arm    Inelastic binders (Circ-Aid, Farrow)  []Foot   []Below Knee   []Knee   []Thigh      Circ-Aid Ready Wrap, Sigvaris hand    Kaushal Irving, night use []Full Leg  []Lower Leg     1 Tribute Arm, night use 1   Tribute, night use  []Full Leg  []Lower Leg      Kaushal Irving Arm, night use    Albert Sleeve Leg/ Foot, night use     3 Gradiant Compression Sleeves & Gloves  [x]Custom [] RM Arm:  [x]CCL1 [x]CCL2 []CCL3  [x]Custom [] RM Glove: [x]CCL1 [x]CCL2 []CCL3   x   Gradient Compression Stockings   []Custom  []RM Lower Extremity:   []CCL1       []CCL2         []CCL3   []Knee       []Thigh        []Waist Length      Albert sleeve arm w/ hand, night use    Tribute Wrap, night use     1 Compression Bra         1 Compression Vest         The above patient was referred for treatment of Lymphedema due to the diagnosis 
      Objective/Functional Outcome Measure:   Lymphedema Life Impact Scale: 19/68; 28% impairment      OBJECTIVE    Patient ambulates indep into clinic, able to perform all transfers and mat mobility indep.    Dominant Hand: left handed    Current or Previous Compression Garment(s):  OTS compression sleeve/glove purchased OOP from [employer], with patient not bringing or wearing garments to clinic.                         Compression Pump:  Flexitouch obtained in 2009, no longer operating.    Skin Integrity and Tissue Assessment  Dermal Status: Intact and Scars  Texture/Consistency:  Boggy and Fibrotic/Woody   Sensation:  neuropathy  Pigmentation/Color Change: Hyperpigmented  Circulatory: Pulses   Nails: Normal  Stemmers Sign: positive  Photos:    Height: 5'4\"  Weight: 202 lbs  BMI: 34.7  (36 or greater: adversely affecting lymphedema)    PROM:  L Shoulder (degrees) R Shoulder (degrees)   Flexion: WFL Flexion: 150   Extension: WFL Extension: WFL   Internal rotation: WFL Internal rotation: WFL   External rotation: WFL External rotation: 60   Abduction: WFL Abduction: 120       MMT: R hip flexors 4-/5; remaining LE grossly 5/5.        Volumetric Measurements:   Right:  3010.30 mL Left:  2595.75 mL   % Difference: 15.97% Dominance: Left   Circumferential measurements:  Axilla: 109.3 cm  Chest: 114.5 cm  Waist: 106.4 cm  Hips: 115.5 cm      28 min [x]Eval - untimed                        Therapeutic Procedures:  Tx Min Billable or 1:1 Min (if diff from Tx Min) Procedure, Rationale, Specifics   32  [id number] Manual Therapy (timed):  decrease pain, increase ROM, decrease edema, and decrease trigger points to improve patient's ability to progress to PLOF and address remaining functional goals.  The manual therapy interventions were performed at a separate and distinct time from the therapeutic activities interventions . (see flow sheet as applicable)    Details if applicable:  Manual Lymphatic Drainage (MLD):  Area to

## 2025-05-21 ENCOUNTER — HOSPITAL ENCOUNTER (OUTPATIENT)
Facility: HOSPITAL | Age: 60
Setting detail: RECURRING SERIES
Discharge: HOME | End: 2025-05-24
Payer: MEDICARE

## 2025-05-21 PROCEDURE — 97140 MANUAL THERAPY 1/> REGIONS: CPT

## 2025-05-21 NOTE — PROGRESS NOTES
[facility]   a part of [facility]  [address], [address]   [place] [address]   Phone: [phone]  Fax: [phone]      PHYSICAL THERAPY PROGRESS NOTE  Patient Name:  Guilherme Portillo :  1965   Treatment/Medical Diagnosis: No admission diagnoses are documented for this encounter.   Referral Source:  Vera Pathak MD     Date of Initial Visit:  4/15/2025 Attended Visits:  2 Missed Visits:  0     SUMMARY OF TREATMENT/ASSESSMENT:  Patient seen for 2 visits for management of R upper quadrant BCRL.  Patient continues with significant pain, elevated limb volume discrepancy, decreased shoulder ROM. Recommend weekly visits.  Vasopneumatic pump medically necessary for home management of lymphedema.     CURRENT STATUS/GOALS    Patient will demonstrate decreased volumetric measurements from 3010.30 ml of R UE extremity to 2900 ml, in order to reduce risk for infection, decrease feeling of limb heaviness, and increase independence/tolerance for performing ADLs with 50% pain within 6 weeks.   Status at last Eval/Progress Note: ongoing  Current Status: ongoing  Goal Met?  no    2.  Patient to perform 5/5 lymphedema remedial exercises in session with modified independence utilizing HEP handout, in order to promote optimal independence with management of condition, as well as promote optimal limb volume reduction required for proper fit of donned clothing in 6 weeks.   Status at last Eval/Progress Note: ongoing  Current Status: ongoing  Goal Met?  no    3. Patient/Caregiver to verbalize 3/3 signs and symptoms of infection without external cueing, in order to promote optimal self-management of condition in 6 weeks.   Status at last Eval/Progress Note: ongoing  Current Status: ongoing  Goal Met?  no    4. Patient/caregiver will demonstrate improved edema management as evidenced by performing donning/doffing of garments modified independent 3/3 times within session to

## 2025-05-21 NOTE — PROGRESS NOTES
PHYSICAL THERAPY/OCCUPATIONAL THERAPY - MEDICARE DAILY TREATMENT NOTE (updated 3/23)      Date: 2025          Patient Name:  Guilherme Portillo :  1965   Medical   Diagnosis:  No admission diagnoses are documented for this encounter. Treatment Diagnosis:  I97.2     Post-Mastectomy lymphedema syndrome M25.611  Stiffness of RIGHT shoulder, NOS    Referral Source:  Vera Pathak MD Insurance:   Payor: [employer] MEDICARE / Plan: [employer] CHOICE-O MEDICARE / Product Type: *No Product type* /                     Patient  verified yes     Visit #   Current  / Total 1 12   Time   In / Out 1115 1217   Total Treatment Time 62   Total Timed Codes 62   1:1 Treatment Time 62      [facility] Totals Reminder:  bill using total billable   min of TIMED therapeutic procedures and modalities.   8-22 min = 1 unit; 23-37 min = 2 units; 38-52 min = 3 units;  53-67 min = 4 units; 68-82 min = 5 units         SUBJECTIVE  If an interpreting service was utilized for treatment of this patient, the contents of this document represent the material reviewed with the patient via the .     Pain Level (0-10 scale): 5/10 R UE, increases to as high as 8/10 intermittently.    Any medication changes, allergies to medications, adverse drug reactions, diagnosis change, or new procedure performed?: [x] No    [] Yes (see summary sheet for update)  Medications: Verified on Patient Summary List    Subjective functional status/changes:     Patient returns for treatment of R UE BCRL and R shoulder dysfunction.  States she has been wearing compression sleeve/glove from first thing in the morning until bedtime. Arrives with compression garments donned. States she continues with R shoulder/UE pain, radiating into hand intermittently.  Agreeable to proceeding with treatments.    OBJECTIVE      Therapeutic Procedures:  Tx Min Billable or 1:1 Min (if diff from Tx Min) Procedure, Rationale, Specifics   62  20950 Manual Therapy (timed):

## 2025-06-04 ENCOUNTER — HOSPITAL ENCOUNTER (OUTPATIENT)
Facility: HOSPITAL | Age: 60
Setting detail: RECURRING SERIES
Discharge: HOME | End: 2025-06-07
Payer: MEDICARE

## 2025-06-04 PROCEDURE — 97140 MANUAL THERAPY 1/> REGIONS: CPT

## 2025-06-04 PROCEDURE — 97110 THERAPEUTIC EXERCISES: CPT

## 2025-06-04 NOTE — PROGRESS NOTES
PHYSICAL THERAPY/OCCUPATIONAL THERAPY - MEDICARE DAILY TREATMENT NOTE (updated 3/23)      Date: 2025          Patient Name:  Guilherme Portillo :  1965   Medical   Diagnosis:  No admission diagnoses are documented for this encounter. Treatment Diagnosis:  I97.2     Post-Mastectomy lymphedema syndrome M25.611  Stiffness of RIGHT shoulder, NOS    Referral Source:  Vera Pathak MD Insurance:   Payor: [employer] MEDICARE / Plan: [employer] CHOICE-O MEDICARE / Product Type: *No Product type* /                     Patient  verified yes     Visit #   Current  / Total 3 12   Time   In / Out 1103 1202   Total Treatment Time 59   Total Timed Codes 59   1:1 Treatment Time 59      [facility] Totals Reminder:  bill using total billable   min of TIMED therapeutic procedures and modalities.   8-22 min = 1 unit; 23-37 min = 2 units; 38-52 min = 3 units;  53-67 min = 4 units; 68-82 min = 5 units         SUBJECTIVE  If an interpreting service was utilized for treatment of this patient, the contents of this document represent the material reviewed with the patient via the .     Pain Level (0-10 scale): 5/10 R UE, increases to as high as 8/10 intermittently.    Any medication changes, allergies to medications, adverse drug reactions, diagnosis change, or new procedure performed?: [x] No    [] Yes (see summary sheet for update)  Medications: Verified on Patient Summary List    Subjective functional status/changes:     Patient returns for treatment of R UE BCRL and R shoulder dysfunction.  Continues wearing compression sleeve/glove from first thing in the morning until bedtime. Arrives with compression garments donned. States she continues with R shoulder/UE pain, radiating into hand intermittently.  Agreeable to proceeding with treatments.    OBJECTIVE      Therapeutic Procedures:  Tx Min Billable or 1:1 Min (if diff from Tx Min) Procedure, Rationale, Specifics   8  08908 Therapeutic Exercise (timed):  increase ROM,

## 2025-06-11 ENCOUNTER — APPOINTMENT (OUTPATIENT)
Facility: HOSPITAL | Age: 60
End: 2025-06-11
Payer: MEDICARE

## 2025-06-17 ENCOUNTER — APPOINTMENT (OUTPATIENT)
Facility: HOSPITAL | Age: 60
End: 2025-06-17
Payer: MEDICARE

## 2025-07-02 ENCOUNTER — HOSPITAL ENCOUNTER (OUTPATIENT)
Facility: HOSPITAL | Age: 60
Setting detail: RECURRING SERIES
Discharge: HOME | End: 2025-07-05
Payer: MEDICARE

## 2025-07-02 PROCEDURE — 97110 THERAPEUTIC EXERCISES: CPT

## 2025-07-02 PROCEDURE — 97140 MANUAL THERAPY 1/> REGIONS: CPT

## 2025-07-02 NOTE — PROGRESS NOTES
PHYSICAL THERAPY/OCCUPATIONAL THERAPY - MEDICARE DAILY TREATMENT NOTE (updated 3/23)      Date: 2025          Patient Name:  Guilherme Portillo :  1965   Medical   Diagnosis:  No admission diagnoses are documented for this encounter. Treatment Diagnosis:  I97.2     Post-Mastectomy lymphedema syndrome M25.611  Stiffness of RIGHT shoulder, NOS    Referral Source:  Vera Pathak MD Insurance:   Payor: [employer] MEDICARE / Plan: [employer] CHO[employer] MEDICARE / Product Type: *No Product type* /                     Patient  verified yes     Visit #   Current  / Total 4 12   Time   In / Out 0950 1102   Total Treatment Time 72   Total Timed Codes 72   1:1 Treatment Time 72      [facility] Totals Reminder:  bill using total billable   min of TIMED therapeutic procedures and modalities.   8-22 min = 1 unit; 23-37 min = 2 units; 38-52 min = 3 units;  53-67 min = 4 units; 68-82 min = 5 units         SUBJECTIVE  If an interpreting service was utilized for treatment of this patient, the contents of this document represent the material reviewed with the patient via the .     Pain Level (0-10 scale): 5/10 R UE, increases to as high as 8/10 intermittently.    Any medication changes, allergies to medications, adverse drug reactions, diagnosis change, or new procedure performed?: [x] No    [] Yes (see summary sheet for update)  Medications: Verified on Patient Summary List    Subjective functional status/changes:     Patient returns for treatment of R UE BCRL and R shoulder dysfunction.  Continues wearing compression sleeve/glove from first thing in the morning until bedtime. Reports she had home demo vasopneumatic pump with TTM. States she continues with R shoulder/UE pain, radiating into hand intermittently.  Agreeable to proceeding with treatments.    OBJECTIVE      Therapeutic Procedures:  Tx Min Billable or 1:1 Min (if diff from Tx Min) Procedure, Rationale, Specifics   8  19592 Therapeutic Exercise (timed):

## 2025-07-02 NOTE — PROGRESS NOTES
[facility]   a part of [facility]  [address], [address]   [place] [address]   Phone: [phone]  Fax: [phone]      PHYSICAL THERAPY PROGRESS NOTE  Patient Name:  Guilherme Portillo :  1965   Treatment/Medical Diagnosis: No admission diagnoses are documented for this encounter.   Referral Source:  Vera Pathak MD     Date of Initial Visit:  4/15/2025 Attended Visits:  4 Missed Visits:  0     SUMMARY OF TREATMENT/ASSESSMENT:  Patient seen for 4 visits for management of R upper quadrant BCRL.  Patient continues with significant pain, elevated limb volume discrepancy, decreased shoulder ROM. Recommend weekly visits.  Vasopneumatic pump medically necessary for home management of lymphedema, with pump demo completed at home.     CURRENT STATUS/GOALS    Patient will demonstrate decreased volumetric measurements from 3010.30 ml of R UE extremity to 2900 ml, in order to reduce risk for infection, decrease feeling of limb heaviness, and increase independence/tolerance for performing ADLs with 50% pain within 6 weeks.   Status at last Eval/Progress Note: ongoing  Current Status: volume goal met, ongoing  Goal Met?  no    2.  Patient to perform 5/5 lymphedema remedial exercises in session with modified independence utilizing HEP handout, in order to promote optimal independence with management of condition, as well as promote optimal limb volume reduction required for proper fit of donned clothing in 6 weeks.   Status at last Eval/Progress Note: ongoing  Current Status: progressing  Goal Met?  no    3. Patient/Caregiver to verbalize 3/3 signs and symptoms of infection without external cueing, in order to promote optimal self-management of condition in 6 weeks.   Status at last Eval/Progress Note: ongoing  Current Status: ongoing  Goal Met?  no    4. Patient/caregiver will demonstrate improved edema management as evidenced by performing donning/doffing of

## 2025-07-09 ENCOUNTER — HOSPITAL ENCOUNTER (OUTPATIENT)
Facility: HOSPITAL | Age: 60
Setting detail: RECURRING SERIES
Discharge: HOME | End: 2025-07-12
Payer: MEDICARE

## 2025-07-09 PROCEDURE — 97140 MANUAL THERAPY 1/> REGIONS: CPT

## 2025-07-09 NOTE — THERAPY RECERTIFICATION
[facility]   a part of [facility]  [address], [address]   [place] [address]   Phone: [phone]  Fax: [phone]      CONTINUED PLAN OF CARE/RECERTIFICATION FOR PHYSICAL THERAPY          Patient Name:              Guilherme Portillo :  1965   Treatment/Medical Diagnosis:  No admission diagnoses are documented for this encounter.   Onset Date:      Referral Source:  Vera Pathak MD Start of Care (SOC):  4/15/2025   Prior Hospitalization:  See Medical History Provider #:  [id number]      Prior Level of Function (PLOF):  No UE lymphedema prior to initial onset of breast cancer. Patient previously managed her lymphedema with good success, with wear of day/night time compression garments.     Comorbidities:  DM, HTN, CHF, arthritis, R breast cancer.    Medications:  Verified on Patient Summary List   Visits from SOC:  5 Missed Visits:  1     Progress toward Goals:  Patient will demonstrate decreased volumetric measurements from 3010.30 ml of R UE extremity to 2900 ml, in order to reduce risk for infection, decrease feeling of limb heaviness, and increase independence/tolerance for performing ADLs with 50% pain within 6 weeks.   Status at last Eval/Progress Note: ongoing  Current Status: partially met, volume reduced  Goal Met?  Partially met    2.  Patient to perform 5/5 lymphedema remedial exercises in session with modified independence utilizing HEP handout, in order to promote optimal independence with management of condition, as well as promote optimal limb volume reduction required for proper fit of donned clothing in 6 weeks.   Status at last Eval/Progress Note: ongoing  Current Status: progressing  Goal Met?  no    3. Patient/Caregiver to verbalize 3/3 signs and symptoms of infection without external cueing, in order to promote optimal self-management of condition in 6 weeks.   Status at last Eval/Progress Note: ongoing  Current Status:

## 2025-07-09 NOTE — PROGRESS NOTES
TPR. GENO techniques performed including gentle skin traction. First rib mobilization.   Skin/wound care/debridement: Reviewed skin care principles:   Instructed to continue skin care nightly, applying low pH lotion R UE using upward strokes.           60     Total Total       Skin assessment post-treatment (if applicable):    [x]  intact    []  redness- no adverse reaction                 []redness - adverse reaction:          Patient Education: [x] Review HEP    [x]  Patient Education billed concurrently with other procedures   [x] MLD Patient Education Reviewed with patient, as well as demonstration and instruction during MLD portion of session  [x] Progressed/Changed HEP based on: See above  [] positioning   [] Kinesiotape   [] Skin care   [] wound care   [] other:   [x] Precautions.  Patient / caregiver re-demonstrated bandaging. [] Yes  [x] No  Compression bandaging/garment precautions reviewed: [x] Yes  [] No      Other Objective/Functional Measures    Volumetric Measurements:      Date:  Right Left % difference    7/2/2025 2778.12 2482.54 11.91   6/4/2025 2894.95 2482.54 16.61   5/21/2025 2878.08 2482.54 15.93   4/15/2025 3010.30 2595.75 15.97   Circumferential measurements:  Axilla: 105 cm  Chest:114.7 cm  Waist: 107.2 cm    Pain Level at end of session (0-10 scale): 5/10 R UE      Assessment   Patient presents with improved R UE volume as noted above.  Patient states pump demo with TTM went well.  Advised we can continue with current plan regarding lymphedema management, with patient to implement pump use daily upon receiving device. Instructed transition to custom flat knit sleeve/glove vs addition of night time compression garment wear might be indicated. MLD tolerated by patient.  Patient tolerated STM and gentle GENO techniques well. To continue HEP including first rib mobilization as noted above.    Patient initiated elevation, exercise, and compression R UE from 4/15/2025 to 5/21/2025, with lymphedema

## 2025-07-14 ENCOUNTER — APPOINTMENT (OUTPATIENT)
Facility: HOSPITAL | Age: 60
End: 2025-07-14
Payer: MEDICARE

## 2025-07-22 ENCOUNTER — OFFICE VISIT (OUTPATIENT)
Age: 60
End: 2025-07-22
Payer: OTHER GOVERNMENT

## 2025-07-22 VITALS
RESPIRATION RATE: 18 BRPM | SYSTOLIC BLOOD PRESSURE: 96 MMHG | HEIGHT: 65 IN | DIASTOLIC BLOOD PRESSURE: 66 MMHG | HEART RATE: 79 BPM | TEMPERATURE: 98.4 F | WEIGHT: 197 LBS | OXYGEN SATURATION: 100 % | BODY MASS INDEX: 32.82 KG/M2

## 2025-07-22 DIAGNOSIS — C50.919 MALIGNANT NEOPLASM OF FEMALE BREAST, UNSPECIFIED ESTROGEN RECEPTOR STATUS, UNSPECIFIED LATERALITY, UNSPECIFIED SITE OF BREAST (HCC): ICD-10-CM

## 2025-07-22 DIAGNOSIS — Z98.84 BARIATRIC SURGERY STATUS: ICD-10-CM

## 2025-07-22 DIAGNOSIS — E04.1 THYROID NODULE: ICD-10-CM

## 2025-07-22 DIAGNOSIS — E11.65 TYPE 2 DIABETES MELLITUS WITH HYPERGLYCEMIA, WITHOUT LONG-TERM CURRENT USE OF INSULIN (HCC): Primary | ICD-10-CM

## 2025-07-22 DIAGNOSIS — Z79.4 LONG TERM (CURRENT) USE OF INSULIN (HCC): ICD-10-CM

## 2025-07-22 LAB — HBA1C MFR BLD: 5.8 %

## 2025-07-22 PROCEDURE — 3044F HG A1C LEVEL LT 7.0%: CPT | Performed by: INTERNAL MEDICINE

## 2025-07-22 PROCEDURE — 3074F SYST BP LT 130 MM HG: CPT | Performed by: INTERNAL MEDICINE

## 2025-07-22 PROCEDURE — 2022F DILAT RTA XM EVC RTNOPTHY: CPT | Performed by: INTERNAL MEDICINE

## 2025-07-22 PROCEDURE — 3078F DIAST BP <80 MM HG: CPT | Performed by: INTERNAL MEDICINE

## 2025-07-22 PROCEDURE — G8427 DOCREV CUR MEDS BY ELIG CLIN: HCPCS | Performed by: INTERNAL MEDICINE

## 2025-07-22 PROCEDURE — 99214 OFFICE O/P EST MOD 30 MIN: CPT | Performed by: INTERNAL MEDICINE

## 2025-07-22 PROCEDURE — 1036F TOBACCO NON-USER: CPT | Performed by: INTERNAL MEDICINE

## 2025-07-22 PROCEDURE — 83036 HEMOGLOBIN GLYCOSYLATED A1C: CPT | Performed by: INTERNAL MEDICINE

## 2025-07-22 PROCEDURE — G8417 CALC BMI ABV UP PARAM F/U: HCPCS | Performed by: INTERNAL MEDICINE

## 2025-07-22 PROCEDURE — 3017F COLORECTAL CA SCREEN DOC REV: CPT | Performed by: INTERNAL MEDICINE

## 2025-07-22 RX ORDER — EZETIMIBE 10 MG/1
10 TABLET ORAL DAILY
Qty: 90 TABLET | Refills: 3 | Status: SHIPPED | OUTPATIENT
Start: 2025-07-22

## 2025-07-22 NOTE — PROGRESS NOTES
Guilherme Portillo is a 60 y.o. female here for   Chief Complaint   Patient presents with    Diabetes       1. Have you been to the ER, urgent care clinic since your last visit?  Hospitalized since your last visit? -no    2. Have you seen or consulted any other health care providers outside of the [facility] since your last visit?  Include any pap smears or colon screening.-PCP    
cardiomyopathy   )              4.    Diabetic complications :       A. Retinopathy-NO   educated on this complication,  regular f/u with ophthalmologist encouraged      B. Nephropathy - NO   ( her BP are low since gastric bypass surgery )  she takes midodrine to keep BP up    educated on this disease and indicated stages and its prognosis.       C. Peripheral Neuropathy - YES  Severe - On cymbalta and  neurontin 800 mg tid    recommending   Metanx ( too expensive )   She cannot tolerate Lyrica          D: CAD : YES, Cardiomyopathy , after chemo- adriamycin          6.  H/o  Right breast cancer - treated and  Has lymphedema on right UE , recurrent breast cancer - 2022           7. Obesity  :     July 2025    Body mass index is 32.78 kg/m².      Aug 2024   Gained 16 lbs in 7 months ( on abilify )   Not accounted by diet  , refer to medical weight loss program   Discussed diet       Dec 2023   Body mass index is 29.89 kg/m².   S/p bariatric surgery  Jan 2020 -     On vit d, b12 and iron       8 . Thyroid nodule incidentally found on CT scan     July 2025  :  will do usg  at the hospital        Jan 2025  - will plan for usg then. Pt is asymptomatic and euthyroid     Aug  2024 :  pt expressed that singing is not the same as before the FNA   Reviewed usg and Ct scan : assured that this is not growth as these are 2 diff tests     The nodule is 1.4 cm TIRADS 4 - does not require  FNA  nov 2023   Feb    :  s/p   FNA - benign      April 2024  :  a repeat usg was ordered to follow up on pain  after FNA  and  the nodule volume is same , 1.7 cm TR 4 lower left lobe nodule, stable to slightly increased from prior (1.5 cm)            >50 %  spent time on reviewing the info,  management  and   counseling         F/u in 6  Months

## 2025-07-22 NOTE — PATIENT INSTRUCTIONS
SPECIFIC INSTRUCTIONS BELOW     Ozempic  @   2 mg a week    Metofmrin er   500 mg 2  pills twice a day with meals     Stay on zetia 10 mg a day     Repatha 140 mg every 2 weeks

## 2025-07-23 ENCOUNTER — APPOINTMENT (OUTPATIENT)
Facility: HOSPITAL | Age: 60
End: 2025-07-23
Payer: MEDICARE

## 2025-07-30 ENCOUNTER — HOSPITAL ENCOUNTER (OUTPATIENT)
Facility: HOSPITAL | Age: 60
Setting detail: RECURRING SERIES
Discharge: HOME | End: 2025-08-02
Payer: MEDICARE

## 2025-07-30 PROCEDURE — 97140 MANUAL THERAPY 1/> REGIONS: CPT

## 2025-07-30 NOTE — PROGRESS NOTES
Instructed transition to custom flat knit sleeve/glove vs addition of night time compression garment wear might be indicated. MLD/STM tolerated by patient. Per Patient tolerated STM and gentle GENO techniques well. To continue HEP including first rib mobilization as noted above.    Patient initiated elevation, exercise, and compression R UE from 4/15/2025 to 5/21/2025, with lymphedema symptoms persistent.  Patient continues with truncal lymphedema, see circumferential measurements noted above. Vasopneumatic pump medically necessary for optimal management of BCRL, with advanced pump indicated due to truncal involvement.  Hyperpigmentation, pain, fibrotic tissue changes persist.      Patient will continue to benefit from skilled PT / OT services to address ROM deficits, address swelling, analyze compression product fit and use, assess and modify postural abnormalities, instruct in home lymphedema management program, and measure for compression products to address functional deficits and attain remaining goals.    Progress toward goals / Updated goals:  [x]  See Progress Note/Recertification    Short Term Goals: To be accomplished in 6 treatments.  Patient will demonstrate decreased volumetric measurements from 3010.30 ml of R UE extremity to 2900 ml, in order to reduce risk for infection, decrease feeling of limb heaviness, and increase independence/tolerance for performing ADLs with 50% pain within 6 weeks. Met volume goal, pain persistents.  2.   Patient to perform 5/5 lymphedema remedial exercises in session with modified independence utilizing HEP handout, in order to promote optimal independence with management of condition,  as well as promote optimal limb volume reduction required for proper fit of donned clothing in 6 weeks. progressing  3.   Patient/Caregiver to verbalize 3/3 signs and symptoms of infection without external cueing, in order to promote optimal self-management of condition in 6 weeks.

## 2025-08-18 DIAGNOSIS — E78.2 MIXED HYPERLIPIDEMIA: ICD-10-CM

## (undated) DEVICE — MATERIAL BKGRND W25XL50MM 1MM GRID LN BLU SIL RADPQ MERCIAN

## (undated) DEVICE — TOWEL SURG W17XL27IN STD BLU COT NONFENESTRATED PREWASHED

## (undated) DEVICE — SUT MCRYL 4-0 27IN PS2 UD --

## (undated) DEVICE — 3M™ TEGADERM™ TRANSPARENT FILM DRESSING FRAME STYLE, 1626W, 4 IN X 4-3/4 IN (10 CM X 12 CM), 50/CT 4CT/CASE: Brand: 3M™ TEGADERM™

## (undated) DEVICE — SPONGE GZ W4XL4IN COT RADPQ HIGHLY ABSRB

## (undated) DEVICE — REM POLYHESIVE ADULT PATIENT RETURN ELECTRODE: Brand: VALLEYLAB

## (undated) DEVICE — SENSOR OXMTR PTCH TISS DISP

## (undated) DEVICE — COVER US PRB W15XL120CM W/ GEL RUBBERBAND TAPE STRP FLD GEN

## (undated) DEVICE — DECANTER BAG 9": Brand: MEDLINE INDUSTRIES, INC.

## (undated) DEVICE — UNIVERSAL DRAPES: Brand: MEDLINE INDUSTRIES, INC.

## (undated) DEVICE — CORD ELECSURG BPLR 12 FT DISP [810T818750] [ADLER INSTRUMENT CO]

## (undated) DEVICE — SUTURE NONABSORBABLE MONOFILAMENT 2-0 FS 18 IN ETHILON 664H

## (undated) DEVICE — BLADE ASSEMB CLP HAIR FINE --

## (undated) DEVICE — Device

## (undated) DEVICE — STAPLER SKIN H3.9MM WIRE DIA0.58MM CRWN 6.9MM 35 STPL ROT

## (undated) DEVICE — HOOK RETRCT 12MM S STL BLNT E STAY LONE STAR

## (undated) DEVICE — BLANKET WRM W35.4XL86.6IN FULL UNDERBODY + FORC AIR

## (undated) DEVICE — MICROVASCULAR CLAMPS ARE USED FOR END-TO-END ANASTOMOTIC PROCEDURES FOR ARTERIES AND VEINS: Brand: GEM BIOVER MICROVASCULAR CLAMP

## (undated) DEVICE — FRAZIER SUCTION INSTRUMENT 7 FR W/CONTROL VENT & OBTURATOR: Brand: FRAZIER

## (undated) DEVICE — CLIP SKIN CLSR MIC TI SUPERFINE

## (undated) DEVICE — CANISTER, RIGID, 3000CC: Brand: MEDLINE INDUSTRIES, INC.

## (undated) DEVICE — ROCKER SWITCH PENCIL BLADE ELECTRODE, HOLSTER: Brand: EDGE

## (undated) DEVICE — DRAPE MICSCP W46XL120IN FOR ZEISS MD FEATURING CLEARLENS

## (undated) DEVICE — CODMAN® SURGICAL PATTIES 3/4" X 3/4" (1.91CM X 1.91CM): Brand: CODMAN®

## (undated) DEVICE — GLOVE ORANGE PI 7 1/2   MSG9075

## (undated) DEVICE — SUTURE ETHLN SZ 9-0 L5IN NONABSORBABLE BLK BV130-5 L19MM 2809G

## (undated) DEVICE — WIPE 400300 MEROCEL 20PK INSTRUMENT: Brand: MEROCEL®

## (undated) DEVICE — SYR 10ML LUER LOK 1/5ML GRAD --

## (undated) DEVICE — APPLIER LIG CLP M L11IN TI STR RNG HNDL FOR 20 CLP DISP

## (undated) DEVICE — DRAIN SURG 19FR 0.25IN SIL RND W/ TRCR INDIC DOT RADPQ FULL

## (undated) DEVICE — CLIP HEMSTAT TI CHEVRON SHP INTLOK ATRAUM TEETH MICROCLP

## (undated) DEVICE — SUT VCRL 2-0 36IN CT1 UD --

## (undated) DEVICE — APPLIER CLP LIG SM 9IN DISP -- LIGACLIP

## (undated) DEVICE — TUBING SUCT 10FR MAL ALUM SHFT FN CAP VENT UNIV CONN W/ OBT

## (undated) DEVICE — SOL INJ SOD CL0.9% 10ML PREFIL -- SUB B-D306546Z 30/BX $6.00

## (undated) DEVICE — PLASTICS -SFMC: Brand: MEDLINE INDUSTRIES, INC.

## (undated) DEVICE — TTL1LYR 16FR10ML 100%SIL TMPST TR: Brand: MEDLINE

## (undated) DEVICE — RESERVOIR,SUCTION,100CC,SILICONE: Brand: MEDLINE

## (undated) DEVICE — INSULATED BLADE ELECTRODE: Brand: EDGE

## (undated) DEVICE — DRAPE FLD WRM W44XL66IN C6L FOR INTRATEMP SYS THERMABASIN

## (undated) DEVICE — PREP SKN CHLRAPRP APL 26ML STR --

## (undated) DEVICE — SUTURE ABSRB L30CM 2-0 VLT SPRL PDS + STRATAFIX SXPP1B410

## (undated) DEVICE — SOLUTION IRRIG 1000ML 0.9% SOD CHL USP POUR PLAS BTL